# Patient Record
Sex: FEMALE | Race: WHITE | Employment: UNEMPLOYED
[De-identification: names, ages, dates, MRNs, and addresses within clinical notes are randomized per-mention and may not be internally consistent; named-entity substitution may affect disease eponyms.]

---

## 2017-01-17 RX ORDER — ALBUTEROL SULFATE 90 UG/1
2 AEROSOL, METERED RESPIRATORY (INHALATION) 2 TIMES DAILY PRN
Qty: 1 INHALER | Refills: 0 | Status: SHIPPED | OUTPATIENT
Start: 2017-01-17 | End: 2017-02-16 | Stop reason: SDUPTHER

## 2017-02-10 ENCOUNTER — TELEPHONE (OUTPATIENT)
Dept: FAMILY MEDICINE CLINIC | Facility: CLINIC | Age: 62
End: 2017-02-10

## 2017-02-16 ENCOUNTER — OFFICE VISIT (OUTPATIENT)
Dept: FAMILY MEDICINE CLINIC | Facility: CLINIC | Age: 62
End: 2017-02-16

## 2017-02-16 VITALS
HEIGHT: 62 IN | SYSTOLIC BLOOD PRESSURE: 138 MMHG | OXYGEN SATURATION: 96 % | DIASTOLIC BLOOD PRESSURE: 70 MMHG | BODY MASS INDEX: 30 KG/M2 | WEIGHT: 163 LBS | HEART RATE: 70 BPM | TEMPERATURE: 98 F

## 2017-02-16 DIAGNOSIS — J06.9 UPPER RESPIRATORY TRACT INFECTION, UNSPECIFIED TYPE: ICD-10-CM

## 2017-02-16 DIAGNOSIS — I10 ESSENTIAL HYPERTENSION: ICD-10-CM

## 2017-02-16 DIAGNOSIS — E11.9 TYPE 2 DIABETES MELLITUS WITHOUT COMPLICATION, WITHOUT LONG-TERM CURRENT USE OF INSULIN (HCC): Primary | ICD-10-CM

## 2017-02-16 LAB — HBA1C MFR BLD: 6.5 %

## 2017-02-16 PROCEDURE — 83036 HEMOGLOBIN GLYCOSYLATED A1C: CPT | Performed by: FAMILY MEDICINE

## 2017-02-16 PROCEDURE — 99214 OFFICE O/P EST MOD 30 MIN: CPT | Performed by: FAMILY MEDICINE

## 2017-02-16 RX ORDER — ALBUTEROL SULFATE 90 UG/1
2 AEROSOL, METERED RESPIRATORY (INHALATION) 2 TIMES DAILY
Qty: 1 INHALER | Refills: 1 | Status: SHIPPED | OUTPATIENT
Start: 2017-02-16 | End: 2018-05-31 | Stop reason: SDUPTHER

## 2017-02-16 RX ORDER — BENZONATATE 200 MG/1
CAPSULE ORAL
COMMUNITY
Start: 2017-02-10 | End: 2017-02-16 | Stop reason: ALTCHOICE

## 2017-02-16 RX ORDER — BUDESONIDE AND FORMOTEROL FUMARATE DIHYDRATE 80; 4.5 UG/1; UG/1
2 AEROSOL RESPIRATORY (INHALATION) 2 TIMES DAILY
Qty: 1 INHALER | Refills: 0 | COMMUNITY
Start: 2017-02-16 | End: 2017-05-23 | Stop reason: ALTCHOICE

## 2017-02-16 ASSESSMENT — ENCOUNTER SYMPTOMS
NAUSEA: 0
WHEEZING: 1
VOICE CHANGE: 1
SORE THROAT: 1
COUGH: 1
SHORTNESS OF BREATH: 0
ABDOMINAL PAIN: 0

## 2017-02-16 ASSESSMENT — PATIENT HEALTH QUESTIONNAIRE - PHQ9
1. LITTLE INTEREST OR PLEASURE IN DOING THINGS: 0
SUM OF ALL RESPONSES TO PHQ9 QUESTIONS 1 & 2: 0
SUM OF ALL RESPONSES TO PHQ QUESTIONS 1-9: 0
2. FEELING DOWN, DEPRESSED OR HOPELESS: 0

## 2017-02-23 ENCOUNTER — OFFICE VISIT (OUTPATIENT)
Dept: FAMILY MEDICINE CLINIC | Facility: CLINIC | Age: 62
End: 2017-02-23

## 2017-02-23 VITALS
BODY MASS INDEX: 29.44 KG/M2 | HEIGHT: 62 IN | OXYGEN SATURATION: 97 % | WEIGHT: 160 LBS | HEART RATE: 75 BPM | DIASTOLIC BLOOD PRESSURE: 68 MMHG | TEMPERATURE: 98.4 F | SYSTOLIC BLOOD PRESSURE: 128 MMHG

## 2017-02-23 DIAGNOSIS — J06.9 UPPER RESPIRATORY TRACT INFECTION, UNSPECIFIED TYPE: Primary | ICD-10-CM

## 2017-02-23 DIAGNOSIS — I10 ESSENTIAL HYPERTENSION: ICD-10-CM

## 2017-02-23 DIAGNOSIS — Z12.11 COLON CANCER SCREENING: ICD-10-CM

## 2017-02-23 DIAGNOSIS — E11.9 TYPE 2 DIABETES MELLITUS WITHOUT COMPLICATION, WITHOUT LONG-TERM CURRENT USE OF INSULIN (HCC): ICD-10-CM

## 2017-02-23 PROCEDURE — 99213 OFFICE O/P EST LOW 20 MIN: CPT | Performed by: FAMILY MEDICINE

## 2017-02-23 ASSESSMENT — PATIENT HEALTH QUESTIONNAIRE - PHQ9
SUM OF ALL RESPONSES TO PHQ9 QUESTIONS 1 & 2: 0
SUM OF ALL RESPONSES TO PHQ QUESTIONS 1-9: 0
1. LITTLE INTEREST OR PLEASURE IN DOING THINGS: 0
1. LITTLE INTEREST OR PLEASURE IN DOING THINGS: 0
SUM OF ALL RESPONSES TO PHQ9 QUESTIONS 1 & 2: 0
2. FEELING DOWN, DEPRESSED OR HOPELESS: 0
2. FEELING DOWN, DEPRESSED OR HOPELESS: 0
SUM OF ALL RESPONSES TO PHQ QUESTIONS 1-9: 0

## 2017-02-23 ASSESSMENT — ENCOUNTER SYMPTOMS
WHEEZING: 0
NAUSEA: 0
SHORTNESS OF BREATH: 0
ABDOMINAL PAIN: 0
SORE THROAT: 0
COUGH: 0

## 2017-03-03 ENCOUNTER — TELEPHONE (OUTPATIENT)
Dept: FAMILY MEDICINE CLINIC | Facility: CLINIC | Age: 62
End: 2017-03-03

## 2017-04-06 RX ORDER — LEVOTHYROXINE SODIUM 0.2 MG/1
TABLET ORAL
Qty: 30 TABLET | Refills: 2 | Status: SHIPPED | OUTPATIENT
Start: 2017-04-06 | End: 2017-07-31 | Stop reason: SDUPTHER

## 2017-04-06 RX ORDER — LOSARTAN POTASSIUM AND HYDROCHLOROTHIAZIDE 25; 100 MG/1; MG/1
TABLET ORAL
Qty: 30 TABLET | Refills: 2 | Status: SHIPPED | OUTPATIENT
Start: 2017-04-06 | End: 2017-07-31 | Stop reason: SDUPTHER

## 2017-04-20 RX ORDER — OMEPRAZOLE 20 MG/1
CAPSULE, DELAYED RELEASE ORAL
Qty: 30 CAPSULE | Refills: 2 | Status: SHIPPED | OUTPATIENT
Start: 2017-04-20 | End: 2017-10-12 | Stop reason: SDUPTHER

## 2017-05-18 RX ORDER — PIOGLITAZONEHYDROCHLORIDE 15 MG/1
TABLET ORAL
Qty: 30 TABLET | Refills: 2 | Status: SHIPPED | OUTPATIENT
Start: 2017-05-18 | End: 2017-09-05 | Stop reason: SDUPTHER

## 2017-05-18 RX ORDER — GLYBURIDE 5 MG/1
TABLET ORAL
Qty: 360 TABLET | Refills: 2 | Status: SHIPPED | OUTPATIENT
Start: 2017-05-18 | End: 2019-02-05 | Stop reason: ALTCHOICE

## 2017-05-23 ENCOUNTER — OFFICE VISIT (OUTPATIENT)
Dept: FAMILY MEDICINE CLINIC | Age: 62
End: 2017-05-23
Payer: COMMERCIAL

## 2017-05-23 VITALS
BODY MASS INDEX: 29.7 KG/M2 | WEIGHT: 161.4 LBS | HEIGHT: 62 IN | HEART RATE: 56 BPM | TEMPERATURE: 97.5 F | DIASTOLIC BLOOD PRESSURE: 64 MMHG | SYSTOLIC BLOOD PRESSURE: 128 MMHG

## 2017-05-23 DIAGNOSIS — E55.9 VITAMIN D DEFICIENCY: ICD-10-CM

## 2017-05-23 DIAGNOSIS — I10 ESSENTIAL HYPERTENSION: ICD-10-CM

## 2017-05-23 DIAGNOSIS — E03.9 HYPOTHYROIDISM, UNSPECIFIED TYPE: ICD-10-CM

## 2017-05-23 DIAGNOSIS — E11.9 TYPE 2 DIABETES MELLITUS WITHOUT COMPLICATION, WITHOUT LONG-TERM CURRENT USE OF INSULIN (HCC): Primary | ICD-10-CM

## 2017-05-23 DIAGNOSIS — E78.5 HYPERLIPIDEMIA, UNSPECIFIED HYPERLIPIDEMIA TYPE: ICD-10-CM

## 2017-05-23 PROCEDURE — 99213 OFFICE O/P EST LOW 20 MIN: CPT | Performed by: FAMILY MEDICINE

## 2017-05-23 RX ORDER — LINAGLIPTIN 5 MG/1
TABLET, FILM COATED ORAL
COMMUNITY
Start: 2017-02-15 | End: 2017-05-23 | Stop reason: ALTCHOICE

## 2017-05-23 ASSESSMENT — PATIENT HEALTH QUESTIONNAIRE - PHQ9
SUM OF ALL RESPONSES TO PHQ QUESTIONS 1-9: 0
SUM OF ALL RESPONSES TO PHQ9 QUESTIONS 1 & 2: 0
1. LITTLE INTEREST OR PLEASURE IN DOING THINGS: 0
2. FEELING DOWN, DEPRESSED OR HOPELESS: 0

## 2017-05-23 ASSESSMENT — ENCOUNTER SYMPTOMS
COUGH: 0
ABDOMINAL PAIN: 0
CONSTIPATION: 0
SHORTNESS OF BREATH: 0
SORE THROAT: 0
EYE ITCHING: 1
NAUSEA: 0

## 2017-07-29 LAB
ALBUMIN SERPL-MCNC: 4.1 G/DL (ref 3.2–5.3)
ALK PHOSPHATASE: 83 IU/L (ref 35–121)
ALT SERPL-CCNC: 10 IU/L (ref 5–59)
ANION GAP SERPL CALCULATED.3IONS-SCNC: 11 MMOL/L
AST SERPL-CCNC: 14 IU/L (ref 10–42)
BILIRUB SERPL-MCNC: 0.8 MG/DL (ref 0.2–1.3)
BUN BLDV-MCNC: 14 MG/DL (ref 10–20)
CALCIUM SERPL-MCNC: 9.5 MG/DL (ref 8.7–10.8)
CHLORIDE BLD-SCNC: 99 MMOL/L (ref 95–111)
CHOLESTEROL/HDL RATIO: 3.6
CHOLESTEROL: 166 MG/DL
CO2: 28 MMOL/L (ref 21–32)
CREAT SERPL-MCNC: 0.7 MG/DL (ref 0.5–1.3)
EGFR AFRICAN AMERICAN: 103
EGFR IF NONAFRICAN AMERICAN: 85
GLUCOSE: 159 MG/DL (ref 70–100)
HDLC SERPL-MCNC: 46 MG/DL (ref 40–60)
LDL CHOLESTEROL CALCULATED: 96 MG/DL
LDL/HDL RATIO: 2.1
POTASSIUM SERPL-SCNC: 4.3 MMOL/L (ref 3.5–5.4)
SODIUM BLD-SCNC: 134 MMOL/L (ref 134–147)
TOTAL PROTEIN: 6.6 G/DL (ref 5.8–8)
TRIGL SERPL-MCNC: 120 MG/DL
TSH SERPL DL<=0.05 MIU/L-ACNC: 3.56 UIU/ML (ref 0.4–4.4)
VLDLC SERPL CALC-MCNC: 24 MG/DL

## 2017-07-31 RX ORDER — LOSARTAN POTASSIUM AND HYDROCHLOROTHIAZIDE 25; 100 MG/1; MG/1
TABLET ORAL
Qty: 30 TABLET | Refills: 2 | Status: SHIPPED | OUTPATIENT
Start: 2017-07-31 | End: 2017-11-16 | Stop reason: SDUPTHER

## 2017-07-31 RX ORDER — LEVOTHYROXINE SODIUM 0.2 MG/1
TABLET ORAL
Qty: 30 TABLET | Refills: 2 | Status: SHIPPED | OUTPATIENT
Start: 2017-07-31 | End: 2017-11-16 | Stop reason: SDUPTHER

## 2017-08-01 LAB — VITAMIN D 25-HYDROXY: 22 NG/ML

## 2017-08-17 RX ORDER — PRAVASTATIN SODIUM 40 MG
TABLET ORAL
Qty: 30 TABLET | Refills: 2 | Status: SHIPPED | OUTPATIENT
Start: 2017-08-17 | End: 2018-10-14 | Stop reason: SDUPTHER

## 2017-09-05 RX ORDER — PIOGLITAZONEHYDROCHLORIDE 15 MG/1
TABLET ORAL
Qty: 30 TABLET | Refills: 2 | Status: SHIPPED | OUTPATIENT
Start: 2017-09-05 | End: 2017-12-21 | Stop reason: SDUPTHER

## 2017-09-25 ENCOUNTER — OFFICE VISIT (OUTPATIENT)
Dept: FAMILY MEDICINE CLINIC | Age: 62
End: 2017-09-25
Payer: COMMERCIAL

## 2017-09-25 VITALS
SYSTOLIC BLOOD PRESSURE: 148 MMHG | TEMPERATURE: 98.2 F | WEIGHT: 165.4 LBS | DIASTOLIC BLOOD PRESSURE: 74 MMHG | HEART RATE: 72 BPM | OXYGEN SATURATION: 97 % | HEIGHT: 62 IN | BODY MASS INDEX: 30.44 KG/M2

## 2017-09-25 DIAGNOSIS — E78.5 HYPERLIPIDEMIA, UNSPECIFIED HYPERLIPIDEMIA TYPE: ICD-10-CM

## 2017-09-25 DIAGNOSIS — I10 ESSENTIAL HYPERTENSION: ICD-10-CM

## 2017-09-25 DIAGNOSIS — E11.9 TYPE 2 DIABETES MELLITUS WITHOUT COMPLICATION, WITHOUT LONG-TERM CURRENT USE OF INSULIN (HCC): Primary | ICD-10-CM

## 2017-09-25 DIAGNOSIS — Z23 NEED FOR PNEUMOCOCCAL VACCINATION: ICD-10-CM

## 2017-09-25 DIAGNOSIS — Z12.31 SCREENING MAMMOGRAM, ENCOUNTER FOR: ICD-10-CM

## 2017-09-25 LAB — HBA1C MFR BLD: 6.9 %

## 2017-09-25 PROCEDURE — 99214 OFFICE O/P EST MOD 30 MIN: CPT | Performed by: FAMILY MEDICINE

## 2017-09-25 PROCEDURE — 83036 HEMOGLOBIN GLYCOSYLATED A1C: CPT | Performed by: FAMILY MEDICINE

## 2017-09-25 PROCEDURE — 90732 PPSV23 VACC 2 YRS+ SUBQ/IM: CPT | Performed by: FAMILY MEDICINE

## 2017-09-25 PROCEDURE — 90471 IMMUNIZATION ADMIN: CPT | Performed by: FAMILY MEDICINE

## 2017-09-25 ASSESSMENT — ENCOUNTER SYMPTOMS
SHORTNESS OF BREATH: 0
SORE THROAT: 0
ABDOMINAL PAIN: 0
CONSTIPATION: 0
NAUSEA: 0

## 2017-10-09 ENCOUNTER — HOSPITAL ENCOUNTER (OUTPATIENT)
Dept: WOMENS IMAGING | Age: 62
Discharge: HOME | End: 2017-10-09
Payer: COMMERCIAL

## 2017-10-09 DIAGNOSIS — Z12.31 SCREENING MAMMOGRAM, ENCOUNTER FOR: ICD-10-CM

## 2017-10-09 PROCEDURE — G0202 SCR MAMMO BI INCL CAD: HCPCS

## 2017-10-12 RX ORDER — OMEPRAZOLE 20 MG/1
CAPSULE, DELAYED RELEASE ORAL
Qty: 90 CAPSULE | Refills: 1 | Status: SHIPPED | OUTPATIENT
Start: 2017-10-12 | End: 2018-06-25 | Stop reason: SDUPTHER

## 2017-11-16 RX ORDER — LEVOTHYROXINE SODIUM 0.2 MG/1
TABLET ORAL
Qty: 90 TABLET | Refills: 1 | Status: SHIPPED | OUTPATIENT
Start: 2017-11-16 | End: 2018-05-31 | Stop reason: SDUPTHER

## 2017-11-16 RX ORDER — LOSARTAN POTASSIUM AND HYDROCHLOROTHIAZIDE 25; 100 MG/1; MG/1
TABLET ORAL
Qty: 90 TABLET | Refills: 1 | Status: SHIPPED | OUTPATIENT
Start: 2017-11-16 | End: 2018-06-20 | Stop reason: SDUPTHER

## 2017-12-21 RX ORDER — PIOGLITAZONEHYDROCHLORIDE 15 MG/1
TABLET ORAL
Qty: 90 TABLET | Refills: 0 | Status: SHIPPED | OUTPATIENT
Start: 2017-12-21 | End: 2018-10-14 | Stop reason: SDUPTHER

## 2018-01-26 ENCOUNTER — HOSPITAL ENCOUNTER (OUTPATIENT)
Age: 63
Setting detail: SPECIMEN
Discharge: HOME | End: 2018-01-26
Payer: COMMERCIAL

## 2018-01-26 ENCOUNTER — OFFICE VISIT (OUTPATIENT)
Dept: FAMILY MEDICINE CLINIC | Age: 63
End: 2018-01-26
Payer: COMMERCIAL

## 2018-01-26 VITALS
DIASTOLIC BLOOD PRESSURE: 86 MMHG | WEIGHT: 166.4 LBS | OXYGEN SATURATION: 95 % | SYSTOLIC BLOOD PRESSURE: 136 MMHG | BODY MASS INDEX: 30.43 KG/M2 | HEART RATE: 70 BPM | TEMPERATURE: 98.1 F

## 2018-01-26 DIAGNOSIS — I10 ESSENTIAL HYPERTENSION: ICD-10-CM

## 2018-01-26 DIAGNOSIS — E11.9 TYPE 2 DIABETES MELLITUS WITHOUT COMPLICATION, WITHOUT LONG-TERM CURRENT USE OF INSULIN (HCC): ICD-10-CM

## 2018-01-26 DIAGNOSIS — E78.5 HYPERLIPIDEMIA, UNSPECIFIED HYPERLIPIDEMIA TYPE: ICD-10-CM

## 2018-01-26 DIAGNOSIS — E03.9 HYPOTHYROIDISM, UNSPECIFIED TYPE: ICD-10-CM

## 2018-01-26 DIAGNOSIS — E11.9 TYPE 2 DIABETES MELLITUS WITHOUT COMPLICATION, WITHOUT LONG-TERM CURRENT USE OF INSULIN (HCC): Primary | ICD-10-CM

## 2018-01-26 LAB
CREATININE URINE: 109.6 MG/DL (ref 28–217)
MICROALBUMIN/CREAT 24H UR: 61 MG/L
MICROALBUMIN/CREAT UR-RTO: 56 MCG/MG CREAT

## 2018-01-26 PROCEDURE — 99213 OFFICE O/P EST LOW 20 MIN: CPT | Performed by: FAMILY MEDICINE

## 2018-01-26 PROCEDURE — 3014F SCREEN MAMMO DOC REV: CPT | Performed by: FAMILY MEDICINE

## 2018-01-26 PROCEDURE — G8427 DOCREV CUR MEDS BY ELIG CLIN: HCPCS | Performed by: FAMILY MEDICINE

## 2018-01-26 PROCEDURE — G8417 CALC BMI ABV UP PARAM F/U: HCPCS | Performed by: FAMILY MEDICINE

## 2018-01-26 PROCEDURE — G8484 FLU IMMUNIZE NO ADMIN: HCPCS | Performed by: FAMILY MEDICINE

## 2018-01-26 PROCEDURE — 4004F PT TOBACCO SCREEN RCVD TLK: CPT | Performed by: FAMILY MEDICINE

## 2018-01-26 PROCEDURE — 3046F HEMOGLOBIN A1C LEVEL >9.0%: CPT | Performed by: FAMILY MEDICINE

## 2018-01-26 PROCEDURE — 3017F COLORECTAL CA SCREEN DOC REV: CPT | Performed by: FAMILY MEDICINE

## 2018-01-26 ASSESSMENT — ENCOUNTER SYMPTOMS
VOICE CHANGE: 1
ABDOMINAL PAIN: 0
NAUSEA: 0
SHORTNESS OF BREATH: 0
SORE THROAT: 0

## 2018-01-26 NOTE — PROGRESS NOTES
Subjective:      Patient ID: Vj Mcdonald is a 60 y.o. female. Diabetes  Visit Information    Have you changed or started any medications since your last visit including any over-the-counter medicines, vitamins, or herbal medicines? no   Have you stopped taking any of your medications? Is so, why? -  no  Are you having any side effects from any of your medications? - no    Have you seen any other physician or provider since your last visit?  no   Have you had any other diagnostic tests since your last visit?  no   Have you been seen in the emergency room and/or had an admission in a hospital since we last saw you?  no   Have you had your routine dental cleaning in the past 6 months? No- pt has dentures     Do you have an active MyChart account? If no, what is the barrier?   Yes    Patient Care Team:  Mitchell Flor MD as PCP - General (Family Medicine)  Mitchell Flor MD as PCP - S Attributed Provider  Paige Romero DO as Consulting Physician (Obstetrics & Gynecology)    Medical History Review  Past Medical, Family, and Social History reviewed and does contribute to the patient presenting condition    Health Maintenance   Topic Date Due    Hepatitis C screen  1955    Diabetic retinal exam  10/29/1964    HIV screen  10/29/1969    DTaP/Tdap/Td vaccine (1 - Tdap) 10/29/1973    Colon cancer screen colonoscopy  10/29/2004    Zostavax vaccine  10/29/2014    Cervical cancer screen  07/18/2017    Diabetic microalbuminuria test  08/05/2017    Flu vaccine (1) 09/01/2017    Diabetic foot exam  05/23/2018    Lipid screen  07/28/2018    TSH testing  07/28/2018    Potassium monitoring  07/28/2018    Creatinine monitoring  07/28/2018    A1C test (Diabetic or Prediabetic)  09/25/2018    Breast cancer screen  10/09/2018    Pneumococcal med risk  Completed             HPI  This 78-year-old female is seen in the office today for follow-up for her diabetes hypertension and hyperlipidemia blood sugars are running between 120-140 and when she doesn't watch her diet it goes up to 160. Blood pressure is controlled is on Norvasc  Tenormin and losartan denies of any chest pain or shortness of  breath. Denies of any numbness or tingling in her  feet tries to watch her diet as much as possible is on pravastatin for her  hyperlipidemia. Patient still hasn't seen the ENT for he hoarseness  again told her she needs to go since she is a smoker and she has had hoarseness for long time has some shoulder pain but she does a lot of lifting at work  Review of Systems   Constitutional: Negative for appetite change. HENT: Positive for voice change. Negative for ear pain and sore throat. Eyes: Negative for visual disturbance. Respiratory: Negative for shortness of breath. Cardiovascular: Negative for chest pain. Gastrointestinal: Negative for abdominal pain and nausea. Genitourinary: Negative for frequency, pelvic pain and vaginal discharge. Musculoskeletal: Negative for arthralgias. Neurological: Negative for dizziness, syncope and headaches. Objective:   Physical Exam   Constitutional: She is oriented to person, place, and time. She appears well-developed and well-nourished. /86 (Site: Left Arm, Position: Sitting, Cuff Size: Small Adult)   Pulse 70   Temp 98.1 °F (36.7 °C) (Oral)   Wt 166 lb 6.4 oz (75.5 kg)   SpO2 95%   BMI 30.43 kg/m²    HENT:   Head: Normocephalic. Mouth/Throat: Oropharynx is clear and moist.        Eyes: Conjunctivae are normal.   Cardiovascular: Normal rate and regular rhythm. Pulmonary/Chest: Breath sounds normal. She has no rales. Abdominal: Soft. Bowel sounds are normal. There is no tenderness. Musculoskeletal: She exhibits no edema. Lymphadenopathy:     She has no cervical adenopathy. Neurological: She is alert and oriented to person, place, and time. Nursing note and vitals reviewed. Assessment:      1.  Type 2

## 2018-04-12 ENCOUNTER — TELEPHONE (OUTPATIENT)
Dept: FAMILY MEDICINE CLINIC | Age: 63
End: 2018-04-12

## 2018-05-26 LAB
ALBUMIN SERPL-MCNC: 4.2 G/DL (ref 3.5–5.2)
ALK PHOSPHATASE: 82 U/L (ref 30–134)
ALT SERPL-CCNC: 11 U/L (ref 5–40)
ANION GAP SERPL CALCULATED.3IONS-SCNC: 12 MEQ/L (ref 10–19)
AST SERPL-CCNC: 15 U/L (ref 9–40)
BILIRUB SERPL-MCNC: 0.7 MG/DL
BUN BLDV-MCNC: 12 MG/DL (ref 8–23)
CALCIUM SERPL-MCNC: 9.3 MG/DL (ref 8.5–10.5)
CHLORIDE BLD-SCNC: 95 MEQ/L (ref 95–107)
CHOLESTEROL/HDL RATIO: 3.6
CHOLESTEROL: 158 MG/DL
CO2: 29 MEQ/L (ref 19–31)
CREAT SERPL-MCNC: 0.7 MG/DL (ref 0.6–1.3)
EGFR AFRICAN AMERICAN: 106.9 ML/MIN/1.73 M2
EGFR IF NONAFRICAN AMERICAN: 92.2 ML/MIN/1.73 M2
GLUCOSE: 182 MG/DL (ref 70–99)
HDLC SERPL-MCNC: 44 MG/DL
LDL CHOLESTEROL CALCULATED: 92 MG/DL
LDL/HDL RATIO: 2.1
POTASSIUM SERPL-SCNC: 4.5 MEQ/L (ref 3.5–5.4)
SODIUM BLD-SCNC: 136 MEQ/L (ref 135–146)
TOTAL PROTEIN: 7.1 G/DL (ref 6.1–8.3)
TRIGL SERPL-MCNC: 111 MG/DL
TSH SERPL DL<=0.05 MIU/L-ACNC: 1.74 UIU/ML (ref 0.4–4.1)
VLDLC SERPL CALC-MCNC: 22 MG/DL

## 2018-05-31 ENCOUNTER — OFFICE VISIT (OUTPATIENT)
Dept: FAMILY MEDICINE CLINIC | Age: 63
End: 2018-05-31
Payer: COMMERCIAL

## 2018-05-31 VITALS
OXYGEN SATURATION: 98 % | WEIGHT: 166 LBS | HEART RATE: 69 BPM | SYSTOLIC BLOOD PRESSURE: 130 MMHG | BODY MASS INDEX: 30.36 KG/M2 | DIASTOLIC BLOOD PRESSURE: 70 MMHG | RESPIRATION RATE: 20 BRPM | TEMPERATURE: 98 F

## 2018-05-31 DIAGNOSIS — I10 ESSENTIAL HYPERTENSION: ICD-10-CM

## 2018-05-31 DIAGNOSIS — E03.9 HYPOTHYROIDISM, UNSPECIFIED TYPE: ICD-10-CM

## 2018-05-31 DIAGNOSIS — E11.9 TYPE 2 DIABETES MELLITUS WITHOUT COMPLICATION, WITHOUT LONG-TERM CURRENT USE OF INSULIN (HCC): Primary | ICD-10-CM

## 2018-05-31 LAB — HBA1C MFR BLD: 7.1 %

## 2018-05-31 PROCEDURE — 3017F COLORECTAL CA SCREEN DOC REV: CPT | Performed by: FAMILY MEDICINE

## 2018-05-31 PROCEDURE — 83036 HEMOGLOBIN GLYCOSYLATED A1C: CPT | Performed by: FAMILY MEDICINE

## 2018-05-31 PROCEDURE — G8427 DOCREV CUR MEDS BY ELIG CLIN: HCPCS | Performed by: FAMILY MEDICINE

## 2018-05-31 PROCEDURE — 99214 OFFICE O/P EST MOD 30 MIN: CPT | Performed by: FAMILY MEDICINE

## 2018-05-31 PROCEDURE — G8417 CALC BMI ABV UP PARAM F/U: HCPCS | Performed by: FAMILY MEDICINE

## 2018-05-31 PROCEDURE — 2022F DILAT RTA XM EVC RTNOPTHY: CPT | Performed by: FAMILY MEDICINE

## 2018-05-31 PROCEDURE — 4004F PT TOBACCO SCREEN RCVD TLK: CPT | Performed by: FAMILY MEDICINE

## 2018-05-31 PROCEDURE — 3045F PR MOST RECENT HEMOGLOBIN A1C LEVEL 7.0-9.0%: CPT | Performed by: FAMILY MEDICINE

## 2018-05-31 RX ORDER — ALBUTEROL SULFATE 90 UG/1
2 AEROSOL, METERED RESPIRATORY (INHALATION) 2 TIMES DAILY PRN
Qty: 1 INHALER | Refills: 1 | Status: SHIPPED | OUTPATIENT
Start: 2018-05-31 | End: 2019-02-12 | Stop reason: SDUPTHER

## 2018-05-31 RX ORDER — LEVOTHYROXINE SODIUM 0.2 MG/1
TABLET ORAL
Qty: 90 TABLET | Refills: 1 | Status: SHIPPED | OUTPATIENT
Start: 2018-05-31 | End: 2019-01-28 | Stop reason: SDUPTHER

## 2018-05-31 ASSESSMENT — PATIENT HEALTH QUESTIONNAIRE - PHQ9
1. LITTLE INTEREST OR PLEASURE IN DOING THINGS: 0
SUM OF ALL RESPONSES TO PHQ QUESTIONS 1-9: 0
2. FEELING DOWN, DEPRESSED OR HOPELESS: 0
SUM OF ALL RESPONSES TO PHQ9 QUESTIONS 1 & 2: 0

## 2018-05-31 ASSESSMENT — ENCOUNTER SYMPTOMS
ABDOMINAL PAIN: 0
NAUSEA: 0
SORE THROAT: 0
SHORTNESS OF BREATH: 0

## 2018-06-25 RX ORDER — OMEPRAZOLE 20 MG/1
CAPSULE, DELAYED RELEASE ORAL
Qty: 90 CAPSULE | Refills: 0 | Status: SHIPPED | OUTPATIENT
Start: 2018-06-25 | End: 2019-02-09 | Stop reason: SDUPTHER

## 2018-06-28 RX ORDER — ATENOLOL 25 MG/1
25 TABLET ORAL DAILY
Qty: 30 TABLET | Refills: 1 | Status: SHIPPED | OUTPATIENT
Start: 2018-06-28 | End: 2019-02-08 | Stop reason: SINTOL

## 2018-06-28 RX ORDER — AMLODIPINE BESYLATE 10 MG/1
TABLET ORAL
Qty: 30 TABLET | Refills: 1 | Status: SHIPPED | OUTPATIENT
Start: 2018-06-28 | End: 2018-10-13 | Stop reason: SDUPTHER

## 2018-09-24 ENCOUNTER — OFFICE VISIT (OUTPATIENT)
Dept: FAMILY MEDICINE CLINIC | Age: 63
End: 2018-09-24
Payer: COMMERCIAL

## 2018-09-24 VITALS
DIASTOLIC BLOOD PRESSURE: 84 MMHG | HEIGHT: 62 IN | WEIGHT: 170 LBS | BODY MASS INDEX: 31.28 KG/M2 | HEART RATE: 78 BPM | SYSTOLIC BLOOD PRESSURE: 150 MMHG | TEMPERATURE: 97.5 F

## 2018-09-24 DIAGNOSIS — R06.02 SOB (SHORTNESS OF BREATH): ICD-10-CM

## 2018-09-24 DIAGNOSIS — E11.9 TYPE 2 DIABETES MELLITUS WITHOUT COMPLICATION, WITHOUT LONG-TERM CURRENT USE OF INSULIN (HCC): Primary | ICD-10-CM

## 2018-09-24 DIAGNOSIS — Z23 NEED FOR PROPHYLACTIC VACCINATION AND INOCULATION AGAINST INFLUENZA: ICD-10-CM

## 2018-09-24 DIAGNOSIS — I10 ESSENTIAL HYPERTENSION: ICD-10-CM

## 2018-09-24 LAB — HBA1C MFR BLD: 6.9 %

## 2018-09-24 PROCEDURE — 3017F COLORECTAL CA SCREEN DOC REV: CPT | Performed by: FAMILY MEDICINE

## 2018-09-24 PROCEDURE — 90686 IIV4 VACC NO PRSV 0.5 ML IM: CPT | Performed by: FAMILY MEDICINE

## 2018-09-24 PROCEDURE — 99214 OFFICE O/P EST MOD 30 MIN: CPT | Performed by: FAMILY MEDICINE

## 2018-09-24 PROCEDURE — 2022F DILAT RTA XM EVC RTNOPTHY: CPT | Performed by: FAMILY MEDICINE

## 2018-09-24 PROCEDURE — 3044F HG A1C LEVEL LT 7.0%: CPT | Performed by: FAMILY MEDICINE

## 2018-09-24 PROCEDURE — 4004F PT TOBACCO SCREEN RCVD TLK: CPT | Performed by: FAMILY MEDICINE

## 2018-09-24 PROCEDURE — 83036 HEMOGLOBIN GLYCOSYLATED A1C: CPT | Performed by: FAMILY MEDICINE

## 2018-09-24 PROCEDURE — G8417 CALC BMI ABV UP PARAM F/U: HCPCS | Performed by: FAMILY MEDICINE

## 2018-09-24 PROCEDURE — G8427 DOCREV CUR MEDS BY ELIG CLIN: HCPCS | Performed by: FAMILY MEDICINE

## 2018-09-24 PROCEDURE — 90471 IMMUNIZATION ADMIN: CPT | Performed by: FAMILY MEDICINE

## 2018-09-24 ASSESSMENT — ENCOUNTER SYMPTOMS
SHORTNESS OF BREATH: 1
CONSTIPATION: 0
ABDOMINAL PAIN: 0
NAUSEA: 0
WHEEZING: 0
COUGH: 0
SORE THROAT: 0

## 2018-09-24 NOTE — PROGRESS NOTES
Subjective:      Patient ID: Mariajose Ho is a 60 y.o. female. C/o diabetes and need for A1C. Chronic Disease Visit Information    BP Readings from Last 3 Encounters:   09/24/18 (!) 178/90   05/31/18 130/70   01/26/18 136/86          Hemoglobin A1C (%)   Date Value   05/31/2018 7.1 (A)   09/25/2017 6.9   02/16/2017 6.5     Microalb/Crt. Ratio (mcg/mg creat)   Date Value   01/26/2018 56 (H)     LDL Calculated (mg/dL)   Date Value   05/25/2018 92     HDL (mg/dL)   Date Value   05/25/2018 44     BUN (mg/dL)   Date Value   05/25/2018 12     CREATININE (mg/dL)   Date Value   05/25/2018 0.7     Glucose (mg/dL)   Date Value   05/25/2018 182 (H)            Have you changed or started any medications since your last visit including any over-the-counter medicines, vitamins, or herbal medicines? no   Are you having any side effects from any of your medications? -  no  Have you stopped taking any of your medications? Is so, why? -  no    Have you seen any other physician or provider since your last visit? No  Have you had any other diagnostic tests since your last visit? No  Have you been seen in the emergency room and/or had an admission to a hospital since we last saw you? No  Have you had your annual diabetic retinal (eye) exam? No  Have you had your routine dental cleaning in the past 6 months? no    Have you activated your [employer] account? If not, what are your barriers?  Yes     Patient Care Team:  Anamaria Naqvi MD as PCP - General (Family Medicine)  Mago Ramírez DO as Consulting Physician (Obstetrics & Gynecology)         Medical History Review      Health Maintenance   Topic Date Due    Hepatitis C screen  1955    Diabetic retinal exam  10/29/1964    HIV screen  10/29/1969    DTaP/Tdap/Td vaccine (1 - Tdap) 10/29/1973    Shingles Vaccine (1 of 2 - 2 Dose Series) 10/29/2004    Colon cancer screen colonoscopy  10/29/2004    Cervical cancer screen  07/18/2017    Diabetic foot exam  05/23/2018    Flu vaccine (1) 09/01/2018    Breast cancer screen  10/09/2018    Diabetic microalbuminuria test  01/26/2019    Lipid screen  05/25/2019    TSH testing  05/25/2019    Potassium monitoring  05/25/2019    Creatinine monitoring  05/25/2019    A1C test (Diabetic or Prediabetic)  05/31/2019    Pneumococcal med risk  Completed       HPI  89year-old female is seen in the office today for follow-up for her diabetes and hypertension she has not been checking her blood sugar she states her glucometer broke her hemoglobin A1c is good she is on Actos and glyburide . Has hypertension blood pressure is elevated she is on Norvasc  Tenormin and Hyzaar denies of any chest pain  short of breath on exertion is  smoker, denies of any numbness or tingling in her feet, She also drinks a beer quite often and I told her she needs to quit drinking  will order PFT  Review of Systems   Constitutional: Negative for appetite change. HENT: Negative for ear pain and sore throat. Eyes: Negative for visual disturbance. Respiratory: Positive for shortness of breath. Negative for cough and wheezing. Cardiovascular: Negative for chest pain. Gastrointestinal: Negative for abdominal pain, constipation and nausea. Genitourinary: Negative for frequency. Musculoskeletal: Negative for arthralgias. Neurological: Negative for dizziness and headaches. Objective:   Physical Exam   Constitutional: She is oriented to person, place, and time. She appears well-developed and well-nourished. BP (!) 150/84 (Site: Right Upper Arm, Position: Sitting, Cuff Size: Large Adult)   Pulse 78   Temp 97.5 °F (36.4 °C) (Oral)   Ht 5' 2\" (1.575 m)   Wt 170 lb (77.1 kg)   BMI 31.09 kg/m²    HENT:   Head: Normocephalic. Mouth/Throat: Oropharynx is clear and moist.        Cardiovascular: Normal rate and regular rhythm. Pulmonary/Chest: Breath sounds normal. She has no wheezes. She has no rales. Abdominal: Soft.

## 2018-10-09 RX ORDER — LOSARTAN POTASSIUM AND HYDROCHLOROTHIAZIDE 25; 100 MG/1; MG/1
TABLET ORAL
Qty: 30 TABLET | Refills: 2 | Status: SHIPPED | OUTPATIENT
Start: 2018-10-09 | End: 2019-01-15 | Stop reason: SDUPTHER

## 2018-10-11 ENCOUNTER — HOSPITAL ENCOUNTER (OUTPATIENT)
Dept: PULMONOLOGY | Age: 63
Discharge: HOME | End: 2018-10-11
Payer: COMMERCIAL

## 2018-10-11 DIAGNOSIS — R06.02 SOB (SHORTNESS OF BREATH): ICD-10-CM

## 2018-10-11 PROCEDURE — 94726 PLETHYSMOGRAPHY LUNG VOLUMES: CPT

## 2018-10-11 PROCEDURE — 94727 GAS DIL/WSHOT DETER LNG VOL: CPT

## 2018-10-11 PROCEDURE — 94060 EVALUATION OF WHEEZING: CPT

## 2018-10-11 PROCEDURE — 94728 AIRWY RESIST BY OSCILLOMETRY: CPT

## 2018-10-11 PROCEDURE — 94729 DIFFUSING CAPACITY: CPT

## 2018-10-11 PROCEDURE — 94664 DEMO&/EVAL PT USE INHALER: CPT

## 2018-10-11 PROCEDURE — 6360000002 HC RX W HCPCS: Performed by: INTERNAL MEDICINE

## 2018-10-11 RX ORDER — ALBUTEROL SULFATE 2.5 MG/3ML
2.5 SOLUTION RESPIRATORY (INHALATION) ONCE
Status: COMPLETED | OUTPATIENT
Start: 2018-10-11 | End: 2018-10-11

## 2018-10-11 RX ADMIN — ALBUTEROL SULFATE 2.5 MG: 2.5 SOLUTION RESPIRATORY (INHALATION) at 13:18

## 2018-10-15 RX ORDER — PRAVASTATIN SODIUM 40 MG
TABLET ORAL
Qty: 90 TABLET | Refills: 1 | Status: SHIPPED | OUTPATIENT
Start: 2018-10-15 | End: 2019-02-12 | Stop reason: SDUPTHER

## 2018-10-15 RX ORDER — PIOGLITAZONEHYDROCHLORIDE 15 MG/1
TABLET ORAL
Qty: 90 TABLET | Refills: 1 | Status: SHIPPED | OUTPATIENT
Start: 2018-10-15 | End: 2019-02-05 | Stop reason: ALTCHOICE

## 2018-10-15 RX ORDER — AMLODIPINE BESYLATE 10 MG/1
TABLET ORAL
Qty: 90 TABLET | Refills: 1 | Status: SHIPPED | OUTPATIENT
Start: 2018-10-15 | End: 2020-07-02 | Stop reason: SDUPTHER

## 2018-11-20 RX ORDER — GLIPIZIDE 10 MG/1
10 TABLET ORAL 2 TIMES DAILY
Qty: 180 TABLET | Refills: 0 | Status: SHIPPED | OUTPATIENT
Start: 2018-11-20 | End: 2019-03-08 | Stop reason: SDUPTHER

## 2018-11-20 RX ORDER — GLYBURIDE 5 MG/1
TABLET ORAL
Qty: 480 TABLET | Refills: 0 | OUTPATIENT
Start: 2018-11-20

## 2019-01-15 RX ORDER — LOSARTAN POTASSIUM AND HYDROCHLOROTHIAZIDE 25; 100 MG/1; MG/1
TABLET ORAL
Qty: 30 TABLET | Refills: 2 | Status: SHIPPED | OUTPATIENT
Start: 2019-01-15 | End: 2019-04-27 | Stop reason: SDUPTHER

## 2019-01-28 RX ORDER — LEVOTHYROXINE SODIUM 0.2 MG/1
TABLET ORAL
Qty: 90 TABLET | Refills: 1 | Status: SHIPPED | OUTPATIENT
Start: 2019-01-28 | End: 2019-07-25 | Stop reason: SDUPTHER

## 2019-02-05 ENCOUNTER — OFFICE VISIT (OUTPATIENT)
Dept: FAMILY MEDICINE CLINIC | Age: 64
End: 2019-02-05
Payer: COMMERCIAL

## 2019-02-05 ENCOUNTER — HOSPITAL ENCOUNTER (OUTPATIENT)
Age: 64
Setting detail: SPECIMEN
Discharge: HOME | End: 2019-02-05
Payer: COMMERCIAL

## 2019-02-05 VITALS
BODY MASS INDEX: 31.25 KG/M2 | HEART RATE: 66 BPM | OXYGEN SATURATION: 95 % | HEIGHT: 62 IN | SYSTOLIC BLOOD PRESSURE: 138 MMHG | DIASTOLIC BLOOD PRESSURE: 88 MMHG | WEIGHT: 169.8 LBS | TEMPERATURE: 98.7 F

## 2019-02-05 DIAGNOSIS — E11.9 TYPE 2 DIABETES MELLITUS WITHOUT COMPLICATION, WITHOUT LONG-TERM CURRENT USE OF INSULIN (HCC): ICD-10-CM

## 2019-02-05 DIAGNOSIS — E03.9 HYPOTHYROIDISM, UNSPECIFIED TYPE: ICD-10-CM

## 2019-02-05 DIAGNOSIS — I10 ESSENTIAL HYPERTENSION: ICD-10-CM

## 2019-02-05 DIAGNOSIS — E11.9 TYPE 2 DIABETES MELLITUS WITHOUT COMPLICATION, WITHOUT LONG-TERM CURRENT USE OF INSULIN (HCC): Primary | ICD-10-CM

## 2019-02-05 DIAGNOSIS — E78.5 HYPERLIPIDEMIA, UNSPECIFIED HYPERLIPIDEMIA TYPE: ICD-10-CM

## 2019-02-05 LAB
CREATININE URINE: 122.7 MG/DL (ref 28–217)
HBA1C MFR BLD: 7 %
MICROALBUMIN/CREAT 24H UR: 71 MG/L
MICROALBUMIN/CREAT UR-RTO: 58 MCG/MG CREAT

## 2019-02-05 PROCEDURE — 2022F DILAT RTA XM EVC RTNOPTHY: CPT | Performed by: FAMILY MEDICINE

## 2019-02-05 PROCEDURE — 4004F PT TOBACCO SCREEN RCVD TLK: CPT | Performed by: FAMILY MEDICINE

## 2019-02-05 PROCEDURE — 3045F PR MOST RECENT HEMOGLOBIN A1C LEVEL 7.0-9.0%: CPT | Performed by: FAMILY MEDICINE

## 2019-02-05 PROCEDURE — 83036 HEMOGLOBIN GLYCOSYLATED A1C: CPT | Performed by: FAMILY MEDICINE

## 2019-02-05 PROCEDURE — G8482 FLU IMMUNIZE ORDER/ADMIN: HCPCS | Performed by: FAMILY MEDICINE

## 2019-02-05 PROCEDURE — 3017F COLORECTAL CA SCREEN DOC REV: CPT | Performed by: FAMILY MEDICINE

## 2019-02-05 PROCEDURE — 99214 OFFICE O/P EST MOD 30 MIN: CPT | Performed by: FAMILY MEDICINE

## 2019-02-05 PROCEDURE — G8427 DOCREV CUR MEDS BY ELIG CLIN: HCPCS | Performed by: FAMILY MEDICINE

## 2019-02-05 PROCEDURE — G8417 CALC BMI ABV UP PARAM F/U: HCPCS | Performed by: FAMILY MEDICINE

## 2019-02-05 ASSESSMENT — ENCOUNTER SYMPTOMS
CONSTIPATION: 0
SHORTNESS OF BREATH: 1
SORE THROAT: 0
NAUSEA: 0
ABDOMINAL PAIN: 0

## 2019-02-06 ENCOUNTER — TELEPHONE (OUTPATIENT)
Dept: FAMILY MEDICINE CLINIC | Age: 64
End: 2019-02-06

## 2019-02-08 ENCOUNTER — HOSPITAL ENCOUNTER (EMERGENCY)
Age: 64
Discharge: HOME | End: 2019-02-08
Attending: EMERGENCY MEDICINE
Payer: COMMERCIAL

## 2019-02-08 ENCOUNTER — HOSPITAL ENCOUNTER (OUTPATIENT)
Age: 64
Discharge: HOME | End: 2019-02-08
Payer: COMMERCIAL

## 2019-02-08 ENCOUNTER — APPOINTMENT (OUTPATIENT)
Dept: GENERAL RADIOLOGY | Age: 64
End: 2019-02-08
Payer: COMMERCIAL

## 2019-02-08 VITALS
OXYGEN SATURATION: 97 % | DIASTOLIC BLOOD PRESSURE: 49 MMHG | TEMPERATURE: 97.9 F | HEART RATE: 58 BPM | SYSTOLIC BLOOD PRESSURE: 105 MMHG | RESPIRATION RATE: 15 BRPM | BODY MASS INDEX: 31.28 KG/M2 | WEIGHT: 170 LBS | HEIGHT: 62 IN

## 2019-02-08 DIAGNOSIS — I44.1 MOBITZ TYPE 1 SECOND DEGREE ATRIOVENTRICULAR BLOCK: Primary | ICD-10-CM

## 2019-02-08 LAB
ABSOLUTE EOS #: 0 K/UL (ref 0–0.4)
ABSOLUTE IMMATURE GRANULOCYTE: NORMAL K/UL (ref 0–0.3)
ABSOLUTE LYMPH #: 1.8 K/UL (ref 1–4.8)
ABSOLUTE MONO #: 0.5 K/UL (ref 0.1–1.3)
ALBUMIN SERPL-MCNC: 4.1 G/DL (ref 3.5–5.2)
ALBUMIN/GLOBULIN RATIO: ABNORMAL (ref 1–2.5)
ALP BLD-CCNC: 93 U/L (ref 35–104)
ALT SERPL-CCNC: 11 U/L (ref 5–33)
ANION GAP SERPL CALCULATED.3IONS-SCNC: 10 MMOL/L (ref 9–17)
AST SERPL-CCNC: 13 U/L
BASOPHILS # BLD: 1 % (ref 0–2)
BASOPHILS ABSOLUTE: 0.1 K/UL (ref 0–0.2)
BILIRUB SERPL-MCNC: 0.57 MG/DL (ref 0.3–1.2)
BUN BLDV-MCNC: 12 MG/DL (ref 8–23)
BUN/CREAT BLD: ABNORMAL (ref 9–20)
CALCIUM SERPL-MCNC: 9.4 MG/DL (ref 8.6–10.4)
CHLORIDE BLD-SCNC: 96 MMOL/L (ref 98–107)
CO2: 29 MMOL/L (ref 20–31)
CREAT SERPL-MCNC: 0.51 MG/DL (ref 0.5–0.9)
DIFFERENTIAL TYPE: NORMAL
EKG ATRIAL RATE: 72 BPM
EKG P AXIS: 67 DEGREES
EKG Q-T INTERVAL: 412 MS
EKG QRS DURATION: 78 MS
EKG QTC CALCULATION (BAZETT): 412 MS
EKG R AXIS: -62 DEGREES
EKG T AXIS: 48 DEGREES
EKG VENTRICULAR RATE: 60 BPM
EOSINOPHILS RELATIVE PERCENT: 0 % (ref 0–4)
GFR AFRICAN AMERICAN: >60 ML/MIN
GFR NON-AFRICAN AMERICAN: >60 ML/MIN
GFR SERPL CREATININE-BSD FRML MDRD: ABNORMAL ML/MIN/{1.73_M2}
GFR SERPL CREATININE-BSD FRML MDRD: ABNORMAL ML/MIN/{1.73_M2}
GLUCOSE BLD-MCNC: 179 MG/DL (ref 70–99)
HCT VFR BLD CALC: 40.6 % (ref 36–46)
HEMOGLOBIN: 13.8 G/DL (ref 12–16)
IMMATURE GRANULOCYTES: NORMAL %
LYMPHOCYTES # BLD: 26 % (ref 24–44)
MAGNESIUM: 1.8 MG/DL (ref 1.6–2.6)
MCH RBC QN AUTO: 31.7 PG (ref 26–34)
MCHC RBC AUTO-ENTMCNC: 33.9 G/DL (ref 31–37)
MCV RBC AUTO: 93.5 FL (ref 80–100)
MONOCYTES # BLD: 7 % (ref 1–7)
NRBC AUTOMATED: NORMAL PER 100 WBC
PDW BLD-RTO: 13.2 % (ref 11.5–14.9)
PLATELET # BLD: 294 K/UL (ref 150–450)
PLATELET ESTIMATE: NORMAL
PMV BLD AUTO: 7.7 FL (ref 6–12)
POTASSIUM SERPL-SCNC: 4.4 MMOL/L (ref 3.7–5.3)
RBC # BLD: 4.35 M/UL (ref 4–5.2)
RBC # BLD: NORMAL 10*6/UL
SEG NEUTROPHILS: 66 % (ref 36–66)
SEGMENTED NEUTROPHILS ABSOLUTE COUNT: 4.5 K/UL (ref 1.3–9.1)
SODIUM BLD-SCNC: 135 MMOL/L (ref 135–144)
TOTAL PROTEIN: 7.3 G/DL (ref 6.4–8.3)
TROPONIN INTERP: NORMAL
TROPONIN INTERP: NORMAL
TROPONIN T: NORMAL NG/ML
TROPONIN T: NORMAL NG/ML
TROPONIN, HIGH SENSITIVITY: 6 NG/L (ref 0–14)
TROPONIN, HIGH SENSITIVITY: <6 NG/L (ref 0–14)
WBC # BLD: 6.9 K/UL (ref 3.5–11)
WBC # BLD: NORMAL 10*3/UL

## 2019-02-08 PROCEDURE — 71045 X-RAY EXAM CHEST 1 VIEW: CPT

## 2019-02-08 PROCEDURE — 93005 ELECTROCARDIOGRAM TRACING: CPT

## 2019-02-08 PROCEDURE — 83735 ASSAY OF MAGNESIUM: CPT

## 2019-02-08 PROCEDURE — 84484 ASSAY OF TROPONIN QUANT: CPT

## 2019-02-08 PROCEDURE — 99285 EMERGENCY DEPT VISIT HI MDM: CPT

## 2019-02-08 PROCEDURE — 36415 COLL VENOUS BLD VENIPUNCTURE: CPT

## 2019-02-08 PROCEDURE — 85025 COMPLETE CBC W/AUTO DIFF WBC: CPT

## 2019-02-08 PROCEDURE — 80053 COMPREHEN METABOLIC PANEL: CPT

## 2019-02-08 ASSESSMENT — ENCOUNTER SYMPTOMS
SHORTNESS OF BREATH: 1
VOMITING: 0
COUGH: 1
RHINORRHEA: 0
ABDOMINAL PAIN: 0
WHEEZING: 1
NAUSEA: 0

## 2019-02-09 LAB
EKG ATRIAL RATE: 72 BPM
EKG P AXIS: 81 DEGREES
EKG Q-T INTERVAL: 400 MS
EKG QRS DURATION: 76 MS
EKG QTC CALCULATION (BAZETT): 419 MS
EKG R AXIS: -78 DEGREES
EKG T AXIS: 77 DEGREES
EKG VENTRICULAR RATE: 66 BPM

## 2019-02-11 ENCOUNTER — TELEPHONE (OUTPATIENT)
Dept: FAMILY MEDICINE CLINIC | Age: 64
End: 2019-02-11

## 2019-02-11 RX ORDER — OMEPRAZOLE 20 MG/1
CAPSULE, DELAYED RELEASE ORAL
Qty: 90 CAPSULE | Refills: 0 | Status: SHIPPED | OUTPATIENT
Start: 2019-02-11 | End: 2019-06-08 | Stop reason: SDUPTHER

## 2019-02-12 ENCOUNTER — OFFICE VISIT (OUTPATIENT)
Dept: FAMILY MEDICINE CLINIC | Age: 64
End: 2019-02-12
Payer: COMMERCIAL

## 2019-02-12 VITALS
WEIGHT: 171 LBS | DIASTOLIC BLOOD PRESSURE: 92 MMHG | HEIGHT: 62 IN | TEMPERATURE: 98 F | HEART RATE: 78 BPM | BODY MASS INDEX: 31.47 KG/M2 | SYSTOLIC BLOOD PRESSURE: 150 MMHG

## 2019-02-12 DIAGNOSIS — I10 ESSENTIAL HYPERTENSION: Primary | ICD-10-CM

## 2019-02-12 DIAGNOSIS — R94.31 ABNORMAL EKG: ICD-10-CM

## 2019-02-12 DIAGNOSIS — E11.9 TYPE 2 DIABETES MELLITUS WITHOUT COMPLICATION, WITHOUT LONG-TERM CURRENT USE OF INSULIN (HCC): ICD-10-CM

## 2019-02-12 PROCEDURE — 4004F PT TOBACCO SCREEN RCVD TLK: CPT | Performed by: FAMILY MEDICINE

## 2019-02-12 PROCEDURE — 2022F DILAT RTA XM EVC RTNOPTHY: CPT | Performed by: FAMILY MEDICINE

## 2019-02-12 PROCEDURE — 3017F COLORECTAL CA SCREEN DOC REV: CPT | Performed by: FAMILY MEDICINE

## 2019-02-12 PROCEDURE — 99214 OFFICE O/P EST MOD 30 MIN: CPT | Performed by: FAMILY MEDICINE

## 2019-02-12 PROCEDURE — G8482 FLU IMMUNIZE ORDER/ADMIN: HCPCS | Performed by: FAMILY MEDICINE

## 2019-02-12 PROCEDURE — G8417 CALC BMI ABV UP PARAM F/U: HCPCS | Performed by: FAMILY MEDICINE

## 2019-02-12 PROCEDURE — G8427 DOCREV CUR MEDS BY ELIG CLIN: HCPCS | Performed by: FAMILY MEDICINE

## 2019-02-12 PROCEDURE — 3045F PR MOST RECENT HEMOGLOBIN A1C LEVEL 7.0-9.0%: CPT | Performed by: FAMILY MEDICINE

## 2019-02-12 RX ORDER — ALBUTEROL SULFATE 90 UG/1
2 AEROSOL, METERED RESPIRATORY (INHALATION) 2 TIMES DAILY PRN
Qty: 1 INHALER | Refills: 1 | Status: SHIPPED | OUTPATIENT
Start: 2019-02-12 | End: 2019-05-16 | Stop reason: SDUPTHER

## 2019-02-12 RX ORDER — PRAVASTATIN SODIUM 40 MG
TABLET ORAL
Qty: 90 TABLET | Refills: 1 | Status: SHIPPED | OUTPATIENT
Start: 2019-02-12 | End: 2019-08-08 | Stop reason: SDUPTHER

## 2019-02-12 ASSESSMENT — ENCOUNTER SYMPTOMS
NAUSEA: 0
SORE THROAT: 0
WHEEZING: 0
SHORTNESS OF BREATH: 1
ABDOMINAL PAIN: 0

## 2019-02-12 ASSESSMENT — PATIENT HEALTH QUESTIONNAIRE - PHQ9
SUM OF ALL RESPONSES TO PHQ9 QUESTIONS 1 & 2: 0
SUM OF ALL RESPONSES TO PHQ QUESTIONS 1-9: 0
SUM OF ALL RESPONSES TO PHQ QUESTIONS 1-9: 0
2. FEELING DOWN, DEPRESSED OR HOPELESS: 0
1. LITTLE INTEREST OR PLEASURE IN DOING THINGS: 0

## 2019-03-07 ENCOUNTER — HOSPITAL ENCOUNTER (OUTPATIENT)
Dept: NON INVASIVE DIAGNOSTICS | Age: 64
Discharge: HOME | End: 2019-03-07
Payer: COMMERCIAL

## 2019-03-07 DIAGNOSIS — R94.31 ABNORMAL EKG: ICD-10-CM

## 2019-03-07 LAB
LV EF: 58 %
LVEF MODALITY: NORMAL

## 2019-03-07 PROCEDURE — 93306 TTE W/DOPPLER COMPLETE: CPT

## 2019-03-07 PROCEDURE — 93225 XTRNL ECG REC<48 HRS REC: CPT

## 2019-03-07 PROCEDURE — 93226 XTRNL ECG REC<48 HR SCAN A/R: CPT

## 2019-03-11 RX ORDER — GLIPIZIDE 10 MG/1
10 TABLET ORAL 2 TIMES DAILY
Qty: 180 TABLET | Refills: 1 | Status: SHIPPED | OUTPATIENT
Start: 2019-03-11 | End: 2019-11-24 | Stop reason: SDUPTHER

## 2019-03-12 ENCOUNTER — OFFICE VISIT (OUTPATIENT)
Dept: FAMILY MEDICINE CLINIC | Age: 64
End: 2019-03-12
Payer: COMMERCIAL

## 2019-03-12 VITALS
HEIGHT: 62 IN | DIASTOLIC BLOOD PRESSURE: 82 MMHG | TEMPERATURE: 98.7 F | HEART RATE: 78 BPM | WEIGHT: 169 LBS | BODY MASS INDEX: 31.1 KG/M2 | SYSTOLIC BLOOD PRESSURE: 138 MMHG

## 2019-03-12 DIAGNOSIS — E03.9 HYPOTHYROIDISM, UNSPECIFIED TYPE: ICD-10-CM

## 2019-03-12 DIAGNOSIS — E11.9 TYPE 2 DIABETES MELLITUS WITHOUT COMPLICATION, WITHOUT LONG-TERM CURRENT USE OF INSULIN (HCC): Primary | ICD-10-CM

## 2019-03-12 DIAGNOSIS — I10 ESSENTIAL HYPERTENSION: ICD-10-CM

## 2019-03-12 PROCEDURE — 4004F PT TOBACCO SCREEN RCVD TLK: CPT | Performed by: FAMILY MEDICINE

## 2019-03-12 PROCEDURE — 2022F DILAT RTA XM EVC RTNOPTHY: CPT | Performed by: FAMILY MEDICINE

## 2019-03-12 PROCEDURE — 3017F COLORECTAL CA SCREEN DOC REV: CPT | Performed by: FAMILY MEDICINE

## 2019-03-12 PROCEDURE — G8482 FLU IMMUNIZE ORDER/ADMIN: HCPCS | Performed by: FAMILY MEDICINE

## 2019-03-12 PROCEDURE — G8427 DOCREV CUR MEDS BY ELIG CLIN: HCPCS | Performed by: FAMILY MEDICINE

## 2019-03-12 PROCEDURE — 3045F PR MOST RECENT HEMOGLOBIN A1C LEVEL 7.0-9.0%: CPT | Performed by: FAMILY MEDICINE

## 2019-03-12 PROCEDURE — 99213 OFFICE O/P EST LOW 20 MIN: CPT | Performed by: FAMILY MEDICINE

## 2019-03-12 PROCEDURE — G8417 CALC BMI ABV UP PARAM F/U: HCPCS | Performed by: FAMILY MEDICINE

## 2019-03-12 ASSESSMENT — ENCOUNTER SYMPTOMS
ABDOMINAL PAIN: 0
SHORTNESS OF BREATH: 0
NAUSEA: 0
SORE THROAT: 0

## 2019-03-15 LAB
ACQUISITION DURATION: NORMAL S
AVERAGE HEART RATE: 72 BPM
HOOKUP DATE: NORMAL
HOOKUP TIME: NORMAL
MAX HEART RATE TIME/DATE: NORMAL
MAX HEART RATE: 111 BPM
MIN HEART RATE TIME/DATE: NORMAL
MIN HEART RATE: 45 BPM
NUMBER OF QRS COMPLEXES: NORMAL
NUMBER OF SUPRAVENTRICULAR BEATS IN RUNS: 0
NUMBER OF SUPRAVENTRICULAR COUPLETS: 0
NUMBER OF SUPRAVENTRICULAR ECTOPICS: 5
NUMBER OF SUPRAVENTRICULAR ISOLATED BEATS: 5
NUMBER OF SUPRAVENTRICULAR RUNS: 0
NUMBER OF VENTRICULAR BEATS IN RUNS: 0
NUMBER OF VENTRICULAR BIGEMINAL CYCLES: 0
NUMBER OF VENTRICULAR COUPLETS: 0
NUMBER OF VENTRICULAR ECTOPICS: 0
NUMBER OF VENTRICULAR ISOLATED BEATS: 0
NUMBER OF VENTRICULAR RUNS: 0

## 2019-04-26 LAB
ALBUMIN SERPL-MCNC: 4.4 G/DL (ref 3.2–5.3)
ALK PHOSPHATASE: 102 U/L (ref 39–130)
ALT SERPL-CCNC: 11 U/L (ref 0–31)
ANION GAP SERPL CALCULATED.3IONS-SCNC: 7 MMOL/L (ref 4–12)
AST SERPL-CCNC: 14 U/L (ref 0–41)
BILIRUB SERPL-MCNC: 1.2 MG/DL (ref 0.3–1.2)
BUN BLDV-MCNC: 14 MG/DL (ref 5–27)
CALCIUM SERPL-MCNC: 10 MG/DL (ref 8.5–10.5)
CHLORIDE BLD-SCNC: 97 MMOL/L (ref 98–109)
CHOLESTEROL/HDL RATIO: 3.8 (ref 1–5)
CHOLESTEROL: 184 MG/DL (ref 150–200)
CO2: 30 MMOL/L (ref 22–32)
CREAT SERPL-MCNC: 0.64 MG/DL (ref 0.4–1)
EGFR AFRICAN AMERICAN: >60 ML/MIN/1.73SQ.M
EGFR IF NONAFRICAN AMERICAN: >60 ML/MIN/1.73SQ.M
GLUCOSE: 212 MG/DL (ref 65–99)
HDLC SERPL-MCNC: 49 MG/DL
LDL CHOLESTEROL CALCULATED: 102 MG/DL
LDL/HDL RATIO: 2.1
POTASSIUM SERPL-SCNC: 4.7 MMOL/L (ref 3.5–5)
SODIUM BLD-SCNC: 134 MMOL/L (ref 134–146)
TOTAL PROTEIN: 7.3 G/DL (ref 6–8)
TRIGL SERPL-MCNC: 165 MG/DL (ref 27–150)
TSH SERPL DL<=0.05 MIU/L-ACNC: 0.54 UIU/ML (ref 0.49–4.67)
VLDLC SERPL CALC-MCNC: 33 MG/DL (ref 0–30)

## 2019-04-29 RX ORDER — LOSARTAN POTASSIUM AND HYDROCHLOROTHIAZIDE 25; 100 MG/1; MG/1
TABLET ORAL
Qty: 30 TABLET | Refills: 2 | Status: SHIPPED | OUTPATIENT
Start: 2019-04-29 | End: 2019-07-26 | Stop reason: SDUPTHER

## 2019-05-07 ENCOUNTER — OFFICE VISIT (OUTPATIENT)
Dept: FAMILY MEDICINE CLINIC | Age: 64
End: 2019-05-07
Payer: COMMERCIAL

## 2019-05-07 VITALS
WEIGHT: 171.8 LBS | BODY MASS INDEX: 31.62 KG/M2 | OXYGEN SATURATION: 98 % | HEIGHT: 62 IN | SYSTOLIC BLOOD PRESSURE: 150 MMHG | HEART RATE: 72 BPM | DIASTOLIC BLOOD PRESSURE: 92 MMHG | TEMPERATURE: 98.7 F

## 2019-05-07 DIAGNOSIS — Z12.31 SCREENING MAMMOGRAM, ENCOUNTER FOR: ICD-10-CM

## 2019-05-07 DIAGNOSIS — E11.9 TYPE 2 DIABETES MELLITUS WITHOUT COMPLICATION, WITHOUT LONG-TERM CURRENT USE OF INSULIN (HCC): Primary | ICD-10-CM

## 2019-05-07 DIAGNOSIS — G89.29 CHRONIC LEFT SHOULDER PAIN: ICD-10-CM

## 2019-05-07 DIAGNOSIS — M25.512 CHRONIC LEFT SHOULDER PAIN: ICD-10-CM

## 2019-05-07 DIAGNOSIS — I10 ESSENTIAL HYPERTENSION: ICD-10-CM

## 2019-05-07 LAB — HBA1C MFR BLD: 7.3 %

## 2019-05-07 PROCEDURE — G8427 DOCREV CUR MEDS BY ELIG CLIN: HCPCS | Performed by: FAMILY MEDICINE

## 2019-05-07 PROCEDURE — G8417 CALC BMI ABV UP PARAM F/U: HCPCS | Performed by: FAMILY MEDICINE

## 2019-05-07 PROCEDURE — 99214 OFFICE O/P EST MOD 30 MIN: CPT | Performed by: FAMILY MEDICINE

## 2019-05-07 PROCEDURE — 3045F PR MOST RECENT HEMOGLOBIN A1C LEVEL 7.0-9.0%: CPT | Performed by: FAMILY MEDICINE

## 2019-05-07 PROCEDURE — 3017F COLORECTAL CA SCREEN DOC REV: CPT | Performed by: FAMILY MEDICINE

## 2019-05-07 PROCEDURE — 83036 HEMOGLOBIN GLYCOSYLATED A1C: CPT | Performed by: FAMILY MEDICINE

## 2019-05-07 PROCEDURE — 4004F PT TOBACCO SCREEN RCVD TLK: CPT | Performed by: FAMILY MEDICINE

## 2019-05-07 PROCEDURE — 2022F DILAT RTA XM EVC RTNOPTHY: CPT | Performed by: FAMILY MEDICINE

## 2019-05-07 ASSESSMENT — ENCOUNTER SYMPTOMS
VOMITING: 0
NAUSEA: 0
SORE THROAT: 0
SHORTNESS OF BREATH: 0
ABDOMINAL PAIN: 0

## 2019-05-07 NOTE — PROGRESS NOTES
Subjective:      Patient ID: Jeff Tenorio is a 58 y.o. female. Chronic Disease Visit Information    BP Readings from Last 3 Encounters:   05/07/19 (!) 150/92   03/12/19 138/82   02/12/19 (!) 150/92          Hemoglobin A1C (%)   Date Value   02/05/2019 7.0   09/24/2018 6.9   05/31/2018 7.1 (A)     Microalb/Crt. Ratio (mcg/mg creat)   Date Value   02/05/2019 58 (H)     LDL Calculated (mg/dL)   Date Value   04/26/2019 102     HDL (mg/dL)   Date Value   04/26/2019 49     BUN (mg/dL)   Date Value   04/26/2019 14     CREATININE (mg/dL)   Date Value   04/26/2019 0.64     Glucose (mg/dL)   Date Value   04/26/2019 212 (H)            Have you changed or started any medications since your last visit including any over-the-counter medicines, vitamins, or herbal medicines? no   Are you having any side effects from any of your medications? -  no  Have you stopped taking any of your medications? Is so, why? -  no    Have you seen any other physician or provider since your last visit? No  Have you had any other diagnostic tests since your last visit? No  Have you been seen in the emergency room and/or had an admission to a hospital since we last saw you? No  Have you had your annual diabetic retinal (eye) exam? No  Have you had your routine dental cleaning in the past 6 months? no    Have you activated your [employer] account? If not, what are your barriers?  Yes     Patient Care Team:  Sofiya Chino MD as PCP - General (Family Medicine)  Carmen Phillip, DO as Consulting Physician (Obstetrics & Gynecology)         Medical History Review  Past Medical, Family, and Social History reviewed and does not contribute to the patient presenting condition    Health Maintenance   Topic Date Due    Colon cancer screen colonoscopy  10/29/2004    Breast cancer screen  10/09/2018    Diabetic retinal exam  08/07/2019 (Originally 10/29/1964)    DTaP/Tdap/Td vaccine (1 - Tdap) 03/24/2020 (Originally 10/29/1973)    Cervical cancer screen  03/24/2020 (Originally 7/18/2017)    HIV screen  03/24/2020 (Originally 10/29/1969)    Hepatitis C screen  03/25/2020 (Originally 1955)    Shingles Vaccine (1 of 2) 05/07/2020 (Originally 10/29/2004)    Diabetic foot exam  02/05/2020    A1C test (Diabetic or Prediabetic)  02/05/2020    Diabetic microalbuminuria test  02/05/2020    Lipid screen  04/26/2020    TSH testing  04/26/2020    Potassium monitoring  04/26/2020    Creatinine monitoring  04/26/2020    Flu vaccine  Completed    Pneumococcal 0-64 years Vaccine  Completed       HPI  69-year-old female is seen in the office today for follow-up for her diabetes hypertension and hyperlipidemia her blood sugars are running high we'll add Sound[place] she is trying to watch her diet as much as possible. Has got hypertension on Hyzaar and Norvasc and blood pressure is slightly elevated no chest pain or shortness of breath also is on pravastatin for her hyperlipidemia  Review of Systems   Constitutional: Negative for appetite change and fatigue. HENT: Negative for congestion, ear pain and sore throat. Eyes: Negative for visual disturbance. Respiratory: Negative for shortness of breath. Cardiovascular: Negative for chest pain. Gastrointestinal: Negative for abdominal pain, nausea and vomiting. Genitourinary: Negative for frequency and pelvic pain. Musculoskeletal: Positive for joint swelling. Neurological: Negative for dizziness and headaches. Objective:   Physical Exam   Constitutional: She is oriented to person, place, and time. She appears well-developed and well-nourished. BP (!) 150/92   Pulse 72   Temp 98.7 °F (37.1 °C) (Oral)   Ht 5' 2\" (1.575 m)   Wt 171 lb 12.8 oz (77.9 kg)   SpO2 98%   BMI 31.42 kg/m²    HENT:   Head: Normocephalic. Mouth/Throat: Oropharynx is clear and moist.        Eyes: Conjunctivae are normal.   Cardiovascular: Normal rate and regular rhythm.    Pulmonary/Chest: Breath sounds

## 2019-05-13 DIAGNOSIS — M25.512 ACUTE PAIN OF LEFT SHOULDER: Primary | ICD-10-CM

## 2019-05-14 ENCOUNTER — HOSPITAL ENCOUNTER (OUTPATIENT)
Dept: WOMENS IMAGING | Age: 64
Discharge: HOME | End: 2019-05-16
Payer: COMMERCIAL

## 2019-05-14 ENCOUNTER — OFFICE VISIT (OUTPATIENT)
Dept: ORTHOPEDIC SURGERY | Age: 64
End: 2019-05-14
Payer: COMMERCIAL

## 2019-05-14 VITALS — BODY MASS INDEX: 31.28 KG/M2 | HEIGHT: 62 IN | WEIGHT: 170 LBS

## 2019-05-14 DIAGNOSIS — Z12.31 SCREENING MAMMOGRAM, ENCOUNTER FOR: ICD-10-CM

## 2019-05-14 DIAGNOSIS — M19.012 OSTEOARTHRITIS OF LEFT ACROMIOCLAVICULAR JOINT: ICD-10-CM

## 2019-05-14 DIAGNOSIS — M75.122 COMPLETE TEAR OF LEFT ROTATOR CUFF: Primary | ICD-10-CM

## 2019-05-14 PROCEDURE — G8427 DOCREV CUR MEDS BY ELIG CLIN: HCPCS | Performed by: ORTHOPAEDIC SURGERY

## 2019-05-14 PROCEDURE — 99203 OFFICE O/P NEW LOW 30 MIN: CPT | Performed by: ORTHOPAEDIC SURGERY

## 2019-05-14 PROCEDURE — 4004F PT TOBACCO SCREEN RCVD TLK: CPT | Performed by: ORTHOPAEDIC SURGERY

## 2019-05-14 PROCEDURE — 77063 BREAST TOMOSYNTHESIS BI: CPT

## 2019-05-14 PROCEDURE — G8417 CALC BMI ABV UP PARAM F/U: HCPCS | Performed by: ORTHOPAEDIC SURGERY

## 2019-05-14 PROCEDURE — 3017F COLORECTAL CA SCREEN DOC REV: CPT | Performed by: ORTHOPAEDIC SURGERY

## 2019-05-14 RX ORDER — DICLOFENAC SODIUM 75 MG/1
75 TABLET, DELAYED RELEASE ORAL 2 TIMES DAILY WITH MEALS
Qty: 28 TABLET | Refills: 0 | Status: SHIPPED | OUTPATIENT
Start: 2019-05-14 | End: 2019-06-11 | Stop reason: ALTCHOICE

## 2019-05-14 NOTE — LETTER
5/14/2019    MD Moni Choudhury 71  [address] [facility],[address], [address] [facility]    RE: Alexei Carrion    Dear Dr. Katiana Aggarwal,    Thank you for allowing me to participate in the care of Ms. Kobi Her. I had the opportunity to evaluate the patient on 5/14/2019. Attached you will find my evaluation and recommendations. Thanks again for the confidence you have expressed in me by allowing my participation in the care of your patient. I will keep you apprised of further developments in the patients treatment course as it progresses. If I can be of further assistance in any fashion, please feel free to contact me at your convenience.     Sincerely,        Yoel Santiago  Shoulder and Elbow Surgery

## 2019-05-14 NOTE — PROGRESS NOTES
Orthopedic Shoulder Encounter Note     Chief complaint: Left shoulder pain    HPI: Anne-Marie Leyva is a 58 y.o.  right-hand dominant female who presents for evaluation of her left shoulder. She indicates that she has pain only when she sleeps and when she lifts a lot. She describes her pain as a burning pain over the lateral aspect of the shoulder at nighttime and again when she is lifting especially at work. 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 She also reports that she's noticed a bump over the superior aspect of her shoulder which can be irritated by her bra strap. Her pain is been ongoing for the past 6 months without any precipitating trauma or injury. She denies any weakness or stiffness. It doesn't radiate and it is not associated with any numbness or tingling. Previous treatment:    NSAIDs: Aleve    Physical Therapy:  No    Injections: None    Surgeries: None    Review of Systems:     Constitution: no fever or chills   Pain level: 5/10  Musculoskeletal: As noted in the HPI   Neurologic: no neurologic symptoms    Past Medical History  Arsen Merida  has a past medical history of Arthritis, Asthma, Diabetes mellitus ([facility] [address] Hyperlipidemia, Hypertension, and Thyroid condition. Past Surgical History  Arsen Merida  has a past surgical history that includes Tubal ligation; Dilation and curettage of uterus; Tonsillectomy; and hysteroscopy (10/07/14).     Current Medications  Current Outpatient Medications   Medication Sig Dispense Refill    diclofenac (VOLTAREN) 75 MG EC tablet Take 1 tablet by mouth 2 times daily (with meals) for 14 days 28 tablet 0    linagliptin (TRADJENTA) 5 MG tablet Take 1 tablet by mouth daily 30 tablet 1    linagliptin (TRADJENTA) 5 MG tablet Take 1 tablet by mouth daily 14 tablet 0    losartan-hydrochlorothiazide (HYZAAR) 100-25 MG per tablet TAKE 1 TABLET BY MOUTH EVERY DAY 30 tablet 2    glipiZIDE (GLUCOTROL) 10 MG tablet Take 1 tablet by mouth 2 times daily 180 tablet 1    albuterol sulfate HFA (PROAIR HFA) 108 (90 Base) MCG/ACT inhaler Inhale 2 puffs into the lungs 2 times daily as needed for Wheezing 1 Inhaler 1    sertraline (ZOLOFT) 50 MG tablet TAKE 1 TABLET BY MOUTH EVERY DAY 90 tablet 1    pravastatin (PRAVACHOL) 40 MG tablet TAKE 1 TABLET BY MOUTH EVERY DAY 90 tablet 1    omeprazole (PRILOSEC) 20 MG delayed release capsule TAKE ONE CAPSULE BY MOUTH EVERY DAY 90 capsule 0    levothyroxine (SYNTHROID) 200 MCG tablet TAKE 1 TABLET BY MOUTH EVERY DAY 90 tablet 1    amLODIPine (NORVASC) 10 MG tablet TAKE 1 TABLET BY MOUTH EVERY DAY 90 tablet 1     No current facility-administered medications for this visit. Allergies  Allergies have been reviewed. Santa Martin is allergic to lisinopril and metformin and related. Social History  Santa Martin  reports that she has been smoking cigarettes. She has a 15.00 pack-year smoking history. She has quit using smokeless tobacco. She reports that she drinks alcohol. She reports that she does not use drugs. Family History  Gem's family history includes Breast Cancer in her sister; Diabetes in her father and mother.      Physical Exam:     Ht 5' 2\" (1.575 m)   Wt 170 lb (77.1 kg)   BMI 31.09 kg/m²    General Appearance: alert, well appearing, and in no distress  Mental Status: alert, oriented to person, place, and time  Gait: normal    Shoulder:    Skin: warm and dry, no rash or erythema; no swelling or obvious muscular atrophy  Vasculature: 2+ radial pulses bilaterally  Neuro: Sensation grossly intact to light touch diffusely  Tenderness: Tender to palpation over the acromioclavicular joint of the left shoulder    ROM: (Degrees)    Right   A P   Left   A P    Elevation  145    Elevation  145 150  Abduction  165    Abduction  165  165  ER   30    ER   25 70  IR   T12    IR   T12   90 abd/ER      90 abd/ER     90 abd/IR      90 abd/IR     Crepitation  No    Crepitation No  Dyskenesia  No    Dyskenesia No      Muscle strength:    Right       Left    Deltoid   5    Deltoid   5  Supraspinatus  5    Supraspinatus  4  ER   5    ER   3  IR   5    IR   5    Special tests    Right   Rotator Cuff    Left    n   Painful arc    n   n   Pain with ER    n    n   Neer's     n    n   Hawkin's    n    n   Drop Arm    n  n   Lift off/Belly Press   n  n   ER Lag    n          AC Joint  n   AC tenderness   y  n   Cross-chest adduction  y       Labrum/biceps    n   [place]'s    y (equivocal)   n   Biceps sheer    y      n   Speed's/Yergason's   y    n   Tenderness Biceps Groove  y    n   Karsten's    n         Instability  n   Ant Apprehension   n    n   Post Apprehension   n    n   Ant Load shift    n    n   Post Load shift   n   n   Sulcus     n  n   Generalized Laxity   n  n   Relocation test   n  n   Crank test     n  n   Geovanni-superior escape  n       Imaging:  Xrays: 4 views of the left shoulder obtained on 5/14/19 were independently reviewed  Indications: Left shoulder pain  Findings: Mild and space loss involving the acromioclavicular joint with mild osteophytic change at the distal clavicle associated with some cystic changes. Very small inferior humeral head osteophyte. Glenohumeral joint space appears to be well preserved. 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 old female with left shoulder pain that's likely due to a full-thickness rotator cuff tear and acromioclavicular joint osteoarthritis. I had a discussion with the patient today with regards to this. We discussed treatment options available to her including nonoperative and operative intervention. We'll proceed with conservative management by way of physical therapy. We also discussed use of cortisone injections versus NSAIDs. She has elected for the latter.   A prescription for Voltaren was electronically sent to her pharmacy. I'll see her back for reevaluation in 3 months. She was encouraged to return or call earlier with questions and/or concerns. If prior to her visit in 3 months she has persistent pain and dysfunction I recommended that she call ahead so we can order an MRI study of her shoulder cuff.         NA = Not assessed  RTC = Rotator cuff  RCT = Rotator cuff tear  ER = External rotation  IR = Internal rotation  AC = Acromioclavicular  GH = Glenohumeral  n = No  y = Yes

## 2019-05-16 RX ORDER — ALBUTEROL SULFATE 90 UG/1
2 AEROSOL, METERED RESPIRATORY (INHALATION) 2 TIMES DAILY PRN
Qty: 8.5 INHALER | Refills: 1 | Status: SHIPPED | OUTPATIENT
Start: 2019-05-16 | End: 2019-07-12 | Stop reason: SDUPTHER

## 2019-05-16 NOTE — TELEPHONE ENCOUNTER
Please Approve or Refuse.   Send to Pharmacy per Pt's Request:      Next Visit Date:  6/11/2019   Last Visit Date: 5/7/2019    Hemoglobin A1C (%)   Date Value   05/07/2019 7.3   02/05/2019 7.0   09/24/2018 6.9             ( goal A1C is < 7)   BP Readings from Last 3 Encounters:   05/07/19 (!) 150/92   03/12/19 138/82   02/12/19 (!) 150/92          (goal 120/80)  BUN   Date Value Ref Range Status   04/26/2019 14 5 - 27 mg/dL Final     CREATININE   Date Value Ref Range Status   04/26/2019 0.64 0.40 - 1.00 mg/dL Final     Comment:     METHOD TRACEABLE TO IDMS STANDARD     Potassium   Date Value Ref Range Status   04/26/2019 4.7 3.5 - 5.0 mmol/L Final

## 2019-06-10 RX ORDER — OMEPRAZOLE 20 MG/1
CAPSULE, DELAYED RELEASE ORAL
Qty: 90 CAPSULE | Refills: 0 | Status: SHIPPED | OUTPATIENT
Start: 2019-06-10 | End: 2019-09-17 | Stop reason: SDUPTHER

## 2019-06-11 ENCOUNTER — OFFICE VISIT (OUTPATIENT)
Dept: FAMILY MEDICINE CLINIC | Age: 64
End: 2019-06-11
Payer: COMMERCIAL

## 2019-06-11 VITALS
SYSTOLIC BLOOD PRESSURE: 138 MMHG | HEIGHT: 62 IN | WEIGHT: 170.4 LBS | TEMPERATURE: 98.6 F | DIASTOLIC BLOOD PRESSURE: 88 MMHG | BODY MASS INDEX: 31.36 KG/M2 | HEART RATE: 78 BPM

## 2019-06-11 DIAGNOSIS — E11.9 TYPE 2 DIABETES MELLITUS WITHOUT COMPLICATION, WITHOUT LONG-TERM CURRENT USE OF INSULIN (HCC): Primary | ICD-10-CM

## 2019-06-11 DIAGNOSIS — I10 ESSENTIAL HYPERTENSION: ICD-10-CM

## 2019-06-11 DIAGNOSIS — E03.9 HYPOTHYROIDISM, UNSPECIFIED TYPE: ICD-10-CM

## 2019-06-11 DIAGNOSIS — E78.5 HYPERLIPIDEMIA, UNSPECIFIED HYPERLIPIDEMIA TYPE: ICD-10-CM

## 2019-06-11 PROCEDURE — 2022F DILAT RTA XM EVC RTNOPTHY: CPT | Performed by: FAMILY MEDICINE

## 2019-06-11 PROCEDURE — G8427 DOCREV CUR MEDS BY ELIG CLIN: HCPCS | Performed by: FAMILY MEDICINE

## 2019-06-11 PROCEDURE — G8417 CALC BMI ABV UP PARAM F/U: HCPCS | Performed by: FAMILY MEDICINE

## 2019-06-11 PROCEDURE — 4004F PT TOBACCO SCREEN RCVD TLK: CPT | Performed by: FAMILY MEDICINE

## 2019-06-11 PROCEDURE — 3017F COLORECTAL CA SCREEN DOC REV: CPT | Performed by: FAMILY MEDICINE

## 2019-06-11 PROCEDURE — 99213 OFFICE O/P EST LOW 20 MIN: CPT | Performed by: FAMILY MEDICINE

## 2019-06-11 PROCEDURE — 3045F PR MOST RECENT HEMOGLOBIN A1C LEVEL 7.0-9.0%: CPT | Performed by: FAMILY MEDICINE

## 2019-06-11 ASSESSMENT — ENCOUNTER SYMPTOMS
SHORTNESS OF BREATH: 0
NAUSEA: 0
SORE THROAT: 0
ABDOMINAL PAIN: 0

## 2019-06-11 NOTE — PROGRESS NOTES
Subjective:      Patient ID: Vimal Dominguez is a 58 y.o. female. Visit Information    Have you changed or started any medications since your last visit including any over-the-counter medicines, vitamins, or herbal medicines? no   Are you having any side effects from any of your medications? -  no  Have you stopped taking any of your medications? Is so, why? -  no    Have you seen any other physician or provider since your last visit? No  Have you had any other diagnostic tests since your last visit? No  Have you been seen in the emergency room and/or had an admission to a hospital since we last saw you? No  Have you had your routine dental cleaning in the past 6 months? no    Have you activated your "[employer] account? If not, what are your barriers?  Yes     Patient Care Team:  Luciano Olsen MD as PCP - General (Family Medicine)  Luciano Olsen MD as PCP - [facility]  Darlene Medrano DO as Consulting Physician (Obstetrics & Gynecology)      Health Maintenance   Topic Date Due    Colon cancer screen colonoscopy  10/29/2004    Diabetic retinal exam  08/07/2019 (Originally 10/29/1964)    DTaP/Tdap/Td vaccine (1 - Tdap) 03/24/2020 (Originally 10/29/1973)    Cervical cancer screen  03/24/2020 (Originally 7/18/2017)    HIV screen  03/24/2020 (Originally 10/29/1969)    Hepatitis C screen  03/25/2020 (Originally 1955)    Shingles Vaccine (1 of 2) 05/07/2020 (Originally 10/29/2004)    Diabetic foot exam  02/05/2020    Diabetic microalbuminuria test  02/05/2020    Lipid screen  04/26/2020    TSH testing  04/26/2020    Potassium monitoring  04/26/2020    Creatinine monitoring  04/26/2020    A1C test (Diabetic or Prediabetic)  05/07/2020    Breast cancer screen  05/14/2020    Flu vaccine  Completed    Pneumococcal 0-64 years Vaccine  Completed       HPI  30-year-old female is seen in the office today for follow-up for her diabetes I had started on Manuel Pac her blood sugars have improved she is also trying to watch her diet  is also on Glucotrol. Has hypertension blood pressures controlled she is on Hyzaar and Norvasc no chest pain or shortness of breath has hyperlipidemia on Pravachol her anxiety is under control with the Zoloft  Review of Systems   Constitutional: Negative for appetite change. HENT: Negative for ear pain and sore throat. Eyes: Negative for visual disturbance. Respiratory: Negative for shortness of breath. Cardiovascular: Negative for chest pain. Gastrointestinal: Negative for abdominal pain and nausea. Genitourinary: Negative for frequency, pelvic pain and vaginal discharge. Musculoskeletal: Negative for arthralgias. Neurological: Negative for dizziness and headaches. Objective:   Physical Exam   Constitutional: She is oriented to person, place, and time. She appears well-developed and well-nourished. /88   Pulse 78   Temp 98.6 °F (37 °C) (Oral)   Ht 5' 2\" (1.575 m)   Wt 170 lb 6.4 oz (77.3 kg)   BMI 31.17 kg/m²    HENT:   Head: Normocephalic. Mouth/Throat: Oropharynx is clear and moist.        Eyes: Conjunctivae are normal.   Cardiovascular: Normal rate and regular rhythm. Pulmonary/Chest: Breath sounds normal. She has no rales. Abdominal: Soft. Bowel sounds are normal. There is no tenderness. Musculoskeletal: She exhibits no edema. Lymphadenopathy:     She has no cervical adenopathy. Neurological: She is alert and oriented to person, place, and time. Nursing note and vitals reviewed. Assessment:        Diagnosis Orders   1. Type 2 diabetes mellitus without complication, without long-term current use of insulin (HCC)   improving   2. Essential hypertension   controlled   3. Hypothyroidism, unspecified type     4. Hyperlipidemia, unspecified hyperlipidemia type             Plan:       No orders of the defined types were placed in this encounter.     No orders of the defined types were placed in this encounter. Return in about 4 months (around 10/11/2019) for diabetes.     Continue current medications reviewed from the chart              Orinda Najjar, MA

## 2019-06-24 ENCOUNTER — TELEPHONE (OUTPATIENT)
Dept: FAMILY MEDICINE CLINIC | Age: 64
End: 2019-06-24

## 2019-07-25 RX ORDER — LEVOTHYROXINE SODIUM 0.2 MG/1
TABLET ORAL
Qty: 90 TABLET | Refills: 1 | Status: SHIPPED | OUTPATIENT
Start: 2019-07-25 | End: 2020-01-27

## 2019-07-29 RX ORDER — LOSARTAN POTASSIUM AND HYDROCHLOROTHIAZIDE 25; 100 MG/1; MG/1
TABLET ORAL
Qty: 30 TABLET | Refills: 2 | Status: SHIPPED | OUTPATIENT
Start: 2019-07-29 | End: 2019-09-06 | Stop reason: ALTCHOICE

## 2019-08-14 NOTE — TELEPHONE ENCOUNTER
Please Approve or Refuse.   Send to Pharmacy per Pt's Request:      Next Visit Date:  10/17/2019   Last Visit Date: 6/11/2019    Hemoglobin A1C (%)   Date Value   05/07/2019 7.3   02/05/2019 7.0   09/24/2018 6.9             ( goal A1C is < 7)   BP Readings from Last 3 Encounters:   06/11/19 138/88   05/07/19 (!) 150/92   03/12/19 138/82          (goal 120/80)  BUN   Date Value Ref Range Status   04/26/2019 14 5 - 27 mg/dL Final     CREATININE   Date Value Ref Range Status   04/26/2019 0.64 0.40 - 1.00 mg/dL Final     Comment:     METHOD TRACEABLE TO IDMS STANDARD     Potassium   Date Value Ref Range Status   04/26/2019 4.7 3.5 - 5.0 mmol/L Final

## 2019-08-19 RX ORDER — LINAGLIPTIN 5 MG/1
TABLET, FILM COATED ORAL
Qty: 30 TABLET | Refills: 1 | Status: SHIPPED | OUTPATIENT
Start: 2019-08-19 | End: 2019-10-20 | Stop reason: SDUPTHER

## 2019-09-06 ENCOUNTER — TELEPHONE (OUTPATIENT)
Dept: FAMILY MEDICINE CLINIC | Age: 64
End: 2019-09-06

## 2019-09-06 RX ORDER — IRBESARTAN AND HYDROCHLOROTHIAZIDE 150; 12.5 MG/1; MG/1
1 TABLET, FILM COATED ORAL DAILY
Qty: 30 TABLET | Refills: 1 | Status: SHIPPED | OUTPATIENT
Start: 2019-09-06 | End: 2019-09-29 | Stop reason: SDUPTHER

## 2019-09-17 RX ORDER — OMEPRAZOLE 20 MG/1
CAPSULE, DELAYED RELEASE ORAL
Qty: 90 CAPSULE | Refills: 0 | Status: SHIPPED | OUTPATIENT
Start: 2019-09-17 | End: 2020-03-13 | Stop reason: SDUPTHER

## 2019-09-30 RX ORDER — IRBESARTAN AND HYDROCHLOROTHIAZIDE 150; 12.5 MG/1; MG/1
TABLET, FILM COATED ORAL
Qty: 30 TABLET | Refills: 1 | Status: SHIPPED | OUTPATIENT
Start: 2019-09-30 | End: 2019-10-26 | Stop reason: SDUPTHER

## 2019-10-17 ENCOUNTER — OFFICE VISIT (OUTPATIENT)
Dept: FAMILY MEDICINE CLINIC | Age: 64
End: 2019-10-17
Payer: COMMERCIAL

## 2019-10-17 VITALS
HEART RATE: 70 BPM | SYSTOLIC BLOOD PRESSURE: 140 MMHG | RESPIRATION RATE: 18 BRPM | DIASTOLIC BLOOD PRESSURE: 80 MMHG | HEIGHT: 62 IN | TEMPERATURE: 97 F | OXYGEN SATURATION: 96 % | WEIGHT: 165.6 LBS | BODY MASS INDEX: 30.47 KG/M2

## 2019-10-17 DIAGNOSIS — E03.9 HYPOTHYROIDISM, UNSPECIFIED TYPE: ICD-10-CM

## 2019-10-17 DIAGNOSIS — E78.5 HYPERLIPIDEMIA, UNSPECIFIED HYPERLIPIDEMIA TYPE: ICD-10-CM

## 2019-10-17 DIAGNOSIS — E11.9 TYPE 2 DIABETES MELLITUS WITHOUT COMPLICATION, WITHOUT LONG-TERM CURRENT USE OF INSULIN (HCC): Primary | ICD-10-CM

## 2019-10-17 DIAGNOSIS — I10 ESSENTIAL HYPERTENSION: ICD-10-CM

## 2019-10-17 LAB — HBA1C MFR BLD: 6.2 %

## 2019-10-17 PROCEDURE — 3044F HG A1C LEVEL LT 7.0%: CPT | Performed by: FAMILY MEDICINE

## 2019-10-17 PROCEDURE — G8427 DOCREV CUR MEDS BY ELIG CLIN: HCPCS | Performed by: FAMILY MEDICINE

## 2019-10-17 PROCEDURE — 99214 OFFICE O/P EST MOD 30 MIN: CPT | Performed by: FAMILY MEDICINE

## 2019-10-17 PROCEDURE — 4004F PT TOBACCO SCREEN RCVD TLK: CPT | Performed by: FAMILY MEDICINE

## 2019-10-17 PROCEDURE — 2022F DILAT RTA XM EVC RTNOPTHY: CPT | Performed by: FAMILY MEDICINE

## 2019-10-17 PROCEDURE — 3017F COLORECTAL CA SCREEN DOC REV: CPT | Performed by: FAMILY MEDICINE

## 2019-10-17 PROCEDURE — G8417 CALC BMI ABV UP PARAM F/U: HCPCS | Performed by: FAMILY MEDICINE

## 2019-10-17 PROCEDURE — 83036 HEMOGLOBIN GLYCOSYLATED A1C: CPT | Performed by: FAMILY MEDICINE

## 2019-10-17 PROCEDURE — G8484 FLU IMMUNIZE NO ADMIN: HCPCS | Performed by: FAMILY MEDICINE

## 2019-10-17 RX ORDER — LOSARTAN POTASSIUM AND HYDROCHLOROTHIAZIDE 25; 100 MG/1; MG/1
TABLET ORAL
COMMUNITY
Start: 2019-01-15 | End: 2019-10-17 | Stop reason: ALTCHOICE

## 2019-10-17 ASSESSMENT — ENCOUNTER SYMPTOMS
SORE THROAT: 0
VOICE CHANGE: 1
NAUSEA: 0
SHORTNESS OF BREATH: 0
COUGH: 0
ABDOMINAL PAIN: 0

## 2019-10-21 RX ORDER — LINAGLIPTIN 5 MG/1
TABLET, FILM COATED ORAL
Qty: 30 TABLET | Refills: 1 | Status: SHIPPED | OUTPATIENT
Start: 2019-10-21 | End: 2019-11-21 | Stop reason: SDUPTHER

## 2019-10-28 RX ORDER — IRBESARTAN AND HYDROCHLOROTHIAZIDE 150; 12.5 MG/1; MG/1
TABLET, FILM COATED ORAL
Qty: 30 TABLET | Refills: 1 | Status: SHIPPED | OUTPATIENT
Start: 2019-10-28 | End: 2019-11-26 | Stop reason: SDUPTHER

## 2019-11-21 ENCOUNTER — TELEPHONE (OUTPATIENT)
Dept: FAMILY MEDICINE CLINIC | Age: 64
End: 2019-11-21

## 2019-11-25 RX ORDER — GLIPIZIDE 10 MG/1
TABLET ORAL
Qty: 180 TABLET | Refills: 1 | Status: SHIPPED | OUTPATIENT
Start: 2019-11-25 | End: 2020-07-02 | Stop reason: SDUPTHER

## 2019-11-26 RX ORDER — IRBESARTAN AND HYDROCHLOROTHIAZIDE 150; 12.5 MG/1; MG/1
TABLET, FILM COATED ORAL
Qty: 30 TABLET | Refills: 1 | Status: SHIPPED | OUTPATIENT
Start: 2019-11-26 | End: 2020-01-13

## 2020-01-09 ENCOUNTER — TELEPHONE (OUTPATIENT)
Dept: FAMILY MEDICINE CLINIC | Age: 65
End: 2020-01-09

## 2020-01-13 RX ORDER — IRBESARTAN AND HYDROCHLOROTHIAZIDE 150; 12.5 MG/1; MG/1
TABLET, FILM COATED ORAL
Qty: 30 TABLET | Refills: 1 | Status: SHIPPED | OUTPATIENT
Start: 2020-01-13 | End: 2020-04-01 | Stop reason: SDUPTHER

## 2020-01-27 RX ORDER — LEVOTHYROXINE SODIUM 0.2 MG/1
TABLET ORAL
Qty: 90 TABLET | Refills: 1 | Status: SHIPPED | OUTPATIENT
Start: 2020-01-27 | End: 2020-11-03 | Stop reason: SDUPTHER

## 2020-02-10 ENCOUNTER — OFFICE VISIT (OUTPATIENT)
Dept: FAMILY MEDICINE CLINIC | Age: 65
End: 2020-02-10
Payer: MEDICARE

## 2020-02-10 VITALS
BODY MASS INDEX: 30.73 KG/M2 | OXYGEN SATURATION: 95 % | DIASTOLIC BLOOD PRESSURE: 86 MMHG | TEMPERATURE: 97.5 F | HEIGHT: 62 IN | SYSTOLIC BLOOD PRESSURE: 138 MMHG | WEIGHT: 167 LBS | HEART RATE: 68 BPM

## 2020-02-10 PROCEDURE — 99213 OFFICE O/P EST LOW 20 MIN: CPT | Performed by: FAMILY MEDICINE

## 2020-02-10 RX ORDER — AMOXICILLIN AND CLAVULANATE POTASSIUM 875; 125 MG/1; MG/1
1 TABLET, FILM COATED ORAL 2 TIMES DAILY
Qty: 20 TABLET | Refills: 0 | Status: SHIPPED | OUTPATIENT
Start: 2020-02-10 | End: 2020-02-20

## 2020-02-10 RX ORDER — FLUTICASONE PROPIONATE 50 MCG
2 SPRAY, SUSPENSION (ML) NASAL DAILY
Qty: 1 BOTTLE | Refills: 0 | Status: SHIPPED | OUTPATIENT
Start: 2020-02-10

## 2020-02-10 ASSESSMENT — ENCOUNTER SYMPTOMS
COUGH: 0
ABDOMINAL PAIN: 0
NAUSEA: 0
SORE THROAT: 1
SHORTNESS OF BREATH: 0
VOICE CHANGE: 1
RHINORRHEA: 1

## 2020-02-10 ASSESSMENT — PATIENT HEALTH QUESTIONNAIRE - PHQ9
SUM OF ALL RESPONSES TO PHQ QUESTIONS 1-9: 0
SUM OF ALL RESPONSES TO PHQ QUESTIONS 1-9: 0
2. FEELING DOWN, DEPRESSED OR HOPELESS: 0
1. LITTLE INTEREST OR PLEASURE IN DOING THINGS: 0
SUM OF ALL RESPONSES TO PHQ9 QUESTIONS 1 & 2: 0

## 2020-02-10 NOTE — PROGRESS NOTES
Subjective:      Patient ID: Tunde Reyes is a 71 y.o. female. Visit Information    Have you changed or started any medications since your last visit including any over-the-counter medicines, vitamins, or herbal medicines? no   Are you having any side effects from any of your medications? -  no  Have you stopped taking any of your medications? Is so, why? -  no    Have you seen any other physician or provider since your last visit? No  Have you had any other diagnostic tests since your last visit? No  Have you been seen in the emergency room and/or had an admission to a hospital since we last saw you? No  Have you had your routine dental cleaning in the past 6 months? no    Have you activated your EPS account? If not, what are your barriers?  Yes     Patient Care Team:  Kristina Patel MD as PCP - General (Family Medicine)  Kristina Patel MD as PCP - [facility], DO as Consulting Physician (Obstetrics & Gynecology)    Medical History Review  Past Medical, Family, and Social History reviewed and does contribute to the patient presenting condition    Health Maintenance   Topic Date Due    Diabetic retinal exam  10/29/1964    Hepatitis B vaccine (1 of 3 - Risk 3-dose series) 10/29/1973    Colon cancer screen colonoscopy  10/29/2004    Flu vaccine (1) 09/01/2019    DEXA (modify frequency per FRAX score)  10/29/2019    Diabetic foot exam  02/05/2020    Diabetic microalbuminuria test  02/05/2020    DTaP/Tdap/Td vaccine (1 - Tdap) 03/24/2020 (Originally 10/29/1965)    Cervical cancer screen  03/24/2020 (Originally 7/18/2017)    HIV screen  03/24/2020 (Originally 10/29/1969)    Hepatitis C screen  03/25/2020 (Originally 1955)    Shingles Vaccine (1 of 2) 05/07/2020 (Originally 10/29/2004)    Lipid screen  04/26/2020    TSH testing  04/26/2020    Potassium monitoring  04/26/2020    Creatinine monitoring  04/26/2020    Breast cancer screen Thyroid: No thyromegaly. Cardiovascular:      Rate and Rhythm: Normal rate and regular rhythm. Pulmonary:      Breath sounds: Normal breath sounds. No rales. Abdominal:      General: Bowel sounds are normal.      Palpations: Abdomen is soft. Tenderness: There is no abdominal tenderness. Musculoskeletal:         General: No tenderness. Lymphadenopathy:      Cervical: No cervical adenopathy. Skin:     Findings: No rash. Neurological:      Mental Status: She is alert and oriented to person, place, and time. Assessment:        Diagnosis Orders   1. Left otitis media, unspecified otitis media type   antibiotics and nasal spray   2. Type 2 diabetes mellitus without complication, without long-term current use of insulin ([facility])   continue medication   3. Essential hypertension   controlled             Plan:         No orders of the defined types were placed in this encounter.     Orders Placed This Encounter   Medications    amoxicillin-clavulanate (AUGMENTIN) 875-125 MG per tablet     Sig: Take 1 tablet by mouth 2 times daily for 10 days     Dispense:  20 tablet     Refill:  0    fluticasone (FLONASE) 50 MCG/ACT nasal spray     Si sprays by Each Nostril route daily     Dispense:  1 Bottle     Refill:  0      keepScheduled appointment  Continue current medications reviewed from the chart            Gavi Jaramillo MA

## 2020-02-13 ENCOUNTER — TELEPHONE (OUTPATIENT)
Dept: FAMILY MEDICINE CLINIC | Age: 65
End: 2020-02-13

## 2020-02-24 ENCOUNTER — OFFICE VISIT (OUTPATIENT)
Dept: FAMILY MEDICINE CLINIC | Age: 65
End: 2020-02-24
Payer: MEDICARE

## 2020-02-24 VITALS
OXYGEN SATURATION: 97 % | WEIGHT: 166.8 LBS | HEIGHT: 62 IN | BODY MASS INDEX: 30.69 KG/M2 | DIASTOLIC BLOOD PRESSURE: 84 MMHG | HEART RATE: 74 BPM | SYSTOLIC BLOOD PRESSURE: 138 MMHG

## 2020-02-24 LAB
CREATININE URINE POCT: 100
HBA1C MFR BLD: 6.6 %
MICROALBUMIN/CREAT 24H UR: 150 MG/G{CREAT}
MICROALBUMIN/CREAT UR-RTO: NORMAL

## 2020-02-24 PROCEDURE — 99214 OFFICE O/P EST MOD 30 MIN: CPT | Performed by: FAMILY MEDICINE

## 2020-02-24 PROCEDURE — 83036 HEMOGLOBIN GLYCOSYLATED A1C: CPT | Performed by: FAMILY MEDICINE

## 2020-02-24 PROCEDURE — 82044 UR ALBUMIN SEMIQUANTITATIVE: CPT | Performed by: FAMILY MEDICINE

## 2020-02-24 ASSESSMENT — ENCOUNTER SYMPTOMS
NAUSEA: 0
ABDOMINAL PAIN: 0
SORE THROAT: 0
VOICE CHANGE: 1
SHORTNESS OF BREATH: 0

## 2020-02-24 NOTE — PROGRESS NOTES
Subjective:      Patient ID: Gin Isaac is a 71 y.o. female. Visit Information    Have you changed or started any medications since your last visit including any over-the-counter medicines, vitamins, or herbal medicines? no   Are you having any side effects from any of your medications? -  no  Have you stopped taking any of your medications? Is so, why? -  no    Have you seen any other physician or provider since your last visit? No  Have you had any other diagnostic tests since your last visit? No  Have you been seen in the emergency room and/or had an admission to a hospital since we last saw you? No  Have you had your routine dental cleaning in the past 6 months? no    Have you activated your [employer] account? If not, what are your barriers?  Yes     Patient Care Team:  Steve Martinez MD as PCP - General (Family Medicine)  Steve Martinez MD [facility] PCP [facility]  Enriqueta Soler DO as Consulting Physician (Obstetrics & Gynecology)    Medical History Review  Past Medical, Family, and Social History reviewed and does contribute to the patient presenting condition    Health Maintenance   Topic Date Due    Diabetic retinal exam  10/29/1964    Hepatitis B vaccine (1 of 3 - Risk 3-dose series) 10/29/1973    Colon cancer screen colonoscopy  10/29/2004    Flu vaccine (1) 09/01/2019    DEXA (modify frequency per FRAX score)  10/29/2019    Diabetic foot exam  02/05/2020    Diabetic microalbuminuria test  02/05/2020    Annual Wellness Visit (AWV)  02/10/2020    DTaP/Tdap/Td vaccine (1 - Tdap) 03/24/2020 (Originally 10/29/1965)    Cervical cancer screen  03/24/2020 (Originally 7/18/2017)    HIV screen  03/24/2020 (Originally 10/29/1969)    Hepatitis C screen  03/25/2020 (Originally 1955)    Shingles Vaccine (1 of 2) 05/07/2020 (Originally 10/29/2004)    Lipid screen  04/26/2020    TSH testing  04/26/2020    Potassium monitoring  04/26/2020    Creatinine monitoring moist.   Eyes:      Conjunctiva/sclera: Conjunctivae normal.   Neck:      Thyroid: No thyromegaly. Cardiovascular:      Rate and Rhythm: Normal rate and regular rhythm. Pulmonary:      Breath sounds: Normal breath sounds. No rales. Abdominal:      General: Bowel sounds are normal.      Palpations: Abdomen is soft. Tenderness: There is no abdominal tenderness. Musculoskeletal:         General: No tenderness. Comments: No pedal edema pedal pulses are palpable monofilament test is negative   Skin:     Findings: No rash. Neurological:      Mental Status: She is alert and oriented to person, place, and time. When A1c is 6.6 today and urine microalbumin is 150    Assessment:        Diagnosis Orders   1. Type 2 diabetes mellitus with microalbuminuria, without long-term current use of insulin (HCC)   controlled   2. Essential hypertension   controlled   3. Post-menopausal  DEXA Bone Density 2 Sites   4. Hypothyroidism, unspecified type   controlled             Plan:         Orders Placed This Encounter   Procedures    DEXA Bone Density 2 Sites     Standing Status:   Future     Standing Expiration Date:   8/24/2020    POCT microalbumin    POCT glycosylated hemoglobin (Hb A1C)    HM DIABETES FOOT EXAM     Orders Placed This Encounter   Medications    SITagliptin (JANUVIA) 100 MG tablet     Sig: Take 1 tablet by mouth daily     Dispense:  90 tablet     Refill:  1     Return in 4 months (on 6/24/2020) for diabetes.     Continue current medications reviewed from the chart            Bettina Morejon MA

## 2020-03-13 RX ORDER — ALBUTEROL SULFATE 90 UG/1
AEROSOL, METERED RESPIRATORY (INHALATION)
Qty: 1 INHALER | Refills: 1 | Status: SHIPPED | OUTPATIENT
Start: 2020-03-13 | End: 2020-09-14 | Stop reason: SDUPTHER

## 2020-03-13 RX ORDER — OMEPRAZOLE 20 MG/1
CAPSULE, DELAYED RELEASE ORAL
Qty: 90 CAPSULE | Refills: 1 | Status: SHIPPED | OUTPATIENT
Start: 2020-03-13 | End: 2021-05-19 | Stop reason: SDUPTHER

## 2020-03-13 NOTE — TELEPHONE ENCOUNTER
Please Approve or Refuse.   Send to Pharmacy per Pt's Request:      Next Visit Date:  6/22/2020   Last Visit Date: 2/24/2020    Hemoglobin A1C (%)   Date Value   02/24/2020 6.6   10/17/2019 6.2   05/07/2019 7.3             ( goal A1C is < 7)   BP Readings from Last 3 Encounters:   02/24/20 138/84   02/10/20 138/86   10/17/19 (!) 140/80          (goal 120/80)  BUN   Date Value Ref Range Status   04/26/2019 14 5 - 27 mg/dL Final     CREATININE   Date Value Ref Range Status   04/26/2019 0.64 0.40 - 1.00 mg/dL Final     Comment:     METHOD TRACEABLE TO IDMS STANDARD     Potassium   Date Value Ref Range Status   04/26/2019 4.7 3.5 - 5.0 mmol/L Final

## 2020-04-01 RX ORDER — IRBESARTAN AND HYDROCHLOROTHIAZIDE 150; 12.5 MG/1; MG/1
TABLET, FILM COATED ORAL
Qty: 30 TABLET | Refills: 2 | Status: SHIPPED | OUTPATIENT
Start: 2020-04-01 | End: 2020-06-22 | Stop reason: SDUPTHER

## 2020-04-23 ENCOUNTER — TELEPHONE (OUTPATIENT)
Dept: FAMILY MEDICINE CLINIC | Age: 65
End: 2020-04-23

## 2020-06-22 RX ORDER — IRBESARTAN AND HYDROCHLOROTHIAZIDE 150; 12.5 MG/1; MG/1
TABLET, FILM COATED ORAL
Qty: 30 TABLET | Refills: 2 | Status: SHIPPED | OUTPATIENT
Start: 2020-06-22 | End: 2020-07-02 | Stop reason: SDUPTHER

## 2020-07-02 ENCOUNTER — OFFICE VISIT (OUTPATIENT)
Dept: FAMILY MEDICINE CLINIC | Age: 65
End: 2020-07-02
Payer: MEDICARE

## 2020-07-02 VITALS
DIASTOLIC BLOOD PRESSURE: 84 MMHG | TEMPERATURE: 98.4 F | BODY MASS INDEX: 31.1 KG/M2 | HEART RATE: 73 BPM | WEIGHT: 169 LBS | SYSTOLIC BLOOD PRESSURE: 136 MMHG | HEIGHT: 62 IN | OXYGEN SATURATION: 96 %

## 2020-07-02 PROBLEM — F33.41 RECURRENT MAJOR DEPRESSIVE DISORDER, IN PARTIAL REMISSION (HCC): Status: ACTIVE | Noted: 2020-07-02

## 2020-07-02 PROBLEM — J44.9 CHRONIC OBSTRUCTIVE PULMONARY DISEASE (HCC): Status: ACTIVE | Noted: 2020-07-02

## 2020-07-02 PROBLEM — J45.909 ASTHMA: Status: RESOLVED | Noted: 2020-07-02 | Resolved: 2020-07-02

## 2020-07-02 PROBLEM — I44.1 MOBITZ I: Status: ACTIVE | Noted: 2019-03-18

## 2020-07-02 PROBLEM — J45.909 ASTHMA: Status: ACTIVE | Noted: 2020-07-02

## 2020-07-02 PROBLEM — R94.31 ABNORMAL EKG: Status: ACTIVE | Noted: 2020-07-02

## 2020-07-02 PROBLEM — Z80.3 FAMILY HISTORY OF BREAST CANCER IN SISTER: Status: ACTIVE | Noted: 2020-07-02

## 2020-07-02 PROBLEM — M19.90 ARTHRITIS: Status: ACTIVE | Noted: 2020-07-02

## 2020-07-02 PROBLEM — Z53.20 COLONOSCOPY REFUSED: Status: ACTIVE | Noted: 2020-07-02

## 2020-07-02 PROBLEM — E55.9 VITAMIN D DEFICIENCY: Status: ACTIVE | Noted: 2020-07-02

## 2020-07-02 PROCEDURE — 99215 OFFICE O/P EST HI 40 MIN: CPT | Performed by: FAMILY MEDICINE

## 2020-07-02 RX ORDER — TIOTROPIUM BROMIDE 18 UG/1
18 CAPSULE ORAL; RESPIRATORY (INHALATION) DAILY
Qty: 90 CAPSULE | Refills: 1 | Status: SHIPPED | OUTPATIENT
Start: 2020-07-02 | End: 2021-12-03

## 2020-07-02 RX ORDER — AMLODIPINE BESYLATE 10 MG/1
10 TABLET ORAL DAILY
Qty: 90 TABLET | Refills: 3 | Status: SHIPPED | OUTPATIENT
Start: 2020-07-02 | End: 2021-08-16

## 2020-07-02 RX ORDER — IRBESARTAN AND HYDROCHLOROTHIAZIDE 150; 12.5 MG/1; MG/1
1 TABLET, FILM COATED ORAL DAILY
Qty: 90 TABLET | Refills: 2 | Status: SHIPPED | OUTPATIENT
Start: 2020-07-02 | End: 2021-06-25

## 2020-07-02 RX ORDER — ASPIRIN 81 MG/1
81 TABLET ORAL DAILY
Qty: 90 TABLET | Refills: 1
Start: 2020-07-02

## 2020-07-02 RX ORDER — PRAVASTATIN SODIUM 40 MG
40 TABLET ORAL EVERY EVENING
Qty: 90 TABLET | Refills: 3 | Status: SHIPPED | OUTPATIENT
Start: 2020-07-02 | End: 2021-02-16 | Stop reason: SDUPTHER

## 2020-07-02 RX ORDER — GLIPIZIDE 10 MG/1
10 TABLET ORAL
Qty: 180 TABLET | Refills: 3 | Status: SHIPPED | OUTPATIENT
Start: 2020-07-02 | End: 2021-07-15 | Stop reason: SDUPTHER

## 2020-07-02 ASSESSMENT — PATIENT HEALTH QUESTIONNAIRE - PHQ9
1. LITTLE INTEREST OR PLEASURE IN DOING THINGS: 1
SUM OF ALL RESPONSES TO PHQ9 QUESTIONS 1 & 2: 1
SUM OF ALL RESPONSES TO PHQ QUESTIONS 1-9: 1
SUM OF ALL RESPONSES TO PHQ QUESTIONS 1-9: 1
2. FEELING DOWN, DEPRESSED OR HOPELESS: 0

## 2020-07-02 ASSESSMENT — ENCOUNTER SYMPTOMS
COUGH: 1
VOMITING: 0
VOICE CHANGE: 1
CONSTIPATION: 0
SHORTNESS OF BREATH: 1
ABDOMINAL DISTENTION: 0
DIARRHEA: 0
ABDOMINAL PAIN: 0
NAUSEA: 0
WHEEZING: 1
CHEST TIGHTNESS: 0

## 2020-07-02 NOTE — PATIENT INSTRUCTIONS
are trying to quit smoking. · Consider signing up for a smoking cessation program, such as the American [employer] Association's Freedom from Smoking program.  · Get text messaging support. Go to the website at www.smokefree. gov to sign up for the [facility] program.  · Set a quit date. Pick your date carefully so that it is not right in the middle of a big deadline or stressful time. Once you quit, do not even take a puff. Get rid of all ashtrays and lighters after your last cigarette. Clean your house and your clothes so that they do not smell of smoke. · Learn how to be a nonsmoker. Think about ways you can avoid those things that make you reach for a cigarette. ? Avoid situations that put you at greatest risk for smoking. For some people, it is hard to have a drink with friends without smoking. For others, they might skip a coffee break with coworkers who smoke. ? Change your daily routine. Take a different route to work or eat a meal in a different place. · Cut down on stress. Calm yourself or release tension by doing an activity you enjoy, such as reading a book, taking a hot bath, or gardening. · Talk to your doctor or pharmacist about nicotine replacement therapy, which replaces the nicotine in your body. You still get nicotine but you do not use tobacco. Nicotine replacement products help you slowly reduce the amount of nicotine you need. These products come in several forms, many of them available over-the-counter:  ? Nicotine patches  ? Nicotine gum and lozenges  ? Nicotine inhaler  · Ask your doctor about bupropion (Wellbutrin) or varenicline (Chantix), which are prescription medicines. They do not contain nicotine. They help you by reducing withdrawal symptoms, such as stress and anxiety. · Some people find hypnosis, acupuncture, and massage helpful for ending the smoking habit. · Eat a healthy diet and get regular exercise.  Having healthy habits will help your body move past its craving for (1 ounce), ½ cup of cooked cereal, or 1/3 cup of cooked pasta or rice. ? Fruits have 15 grams of carbs in a serving. A serving is 1 small fresh fruit, such as an apple or orange; ½ of a banana; ½ cup of cooked or canned fruit; ½ cup of fruit juice; 1 cup of melon or raspberries; or 2 tablespoons of dried fruit. ? Milk and no-sugar-added yogurt have 15 grams of carbs in a serving. A serving is 1 cup of milk or 2/3 cup of no-sugar-added yogurt. ? Starchy vegetables have 15 grams of carbs in a serving. A serving is ½ cup of mashed potatoes or sweet potato; 1 cup winter squash; ½ of a small baked potato; ½ cup of cooked beans; or ½ cup cooked corn or green peas. · Learn how much carbs to eat each day and at each meal. A dietitian or CDE can teach you how to keep track of the amount of carbs you eat. This is called carbohydrate counting. · If you are not sure how to count carbohydrate grams, use the Plate Method to plan meals. It is a good, quick way to make sure that you have a balanced meal. It also helps you spread carbs throughout the day. ? Divide your plate by types of foods. Put non-starchy vegetables on half the plate, meat or other protein food on one-quarter of the plate, and a grain or starchy vegetable in the final quarter of the plate. To this you can add a small piece of fruit and 1 cup of milk or yogurt, depending on how many carbs you are supposed to eat at a meal.  · Try to eat about the same amount of carbs at each meal. Do not \"save up\" your daily allowance of carbs to eat at one meal.  · Proteins have very little or no carbs per serving. Examples of proteins are beef, chicken, turkey, fish, eggs, tofu, cheese, cottage cheese, and peanut butter. A serving size of meat is 3 ounces, which is about the size of a deck of cards. Examples of meat substitute serving sizes (equal to 1 ounce of meat) are 1/4 cup of cottage cheese, 1 egg, 1 tablespoon of peanut butter, and ½ cup of tofu.   How can you eat under license by [facility] ([facility]). If you have questions about a medical condition or this instruction, always ask your healthcare professional. [address] any warranty or liability for your use of this information. Patient Education        Chronic Obstructive Pulmonary Disease (COPD): Care Instructions  Your Care Instructions     Chronic obstructive pulmonary disease (COPD) is a general term for a group of lung diseases, including emphysema and chronic bronchitis. People with COPD have decreased airflow in and out of the lungs, which makes it hard to breathe. The airways also can get clogged with thick mucus. Cigarette smoking is a major cause of COPD. Although there is no cure for COPD, you can slow its progress. Following your treatment plan and taking care of yourself can help you feel better and live longer. Follow-up care is a key part of your treatment and safety. Be sure to make and go to all appointments, and call your doctor if you are having problems. It's also a good idea to know your test results and keep a list of the medicines you take. How can you care for yourself at home? Staying healthy  · Do not smoke. This is the most important step you can take to prevent more damage to your lungs. If you need help quitting, talk to your doctor about stop-smoking programs and medicines. These can increase your chances of quitting for good. · Avoid colds and flu. Get a pneumococcal vaccine shot. If you have had one before, ask your doctor whether you need a second dose. Get the flu vaccine every fall. If you must be around people with colds or the flu, wash your hands often. · Avoid secondhand smoke, air pollution, and high altitudes. Also avoid cold, dry air and hot, humid air. Stay at home with your windows closed when air pollution is bad. Medicines and oxygen therapy  · Take your medicines exactly as prescribed.  Call your doctor if you think you are having a problem with

## 2020-07-02 NOTE — PROGRESS NOTES
Chief Complaint   Patient presents with    Diabetes    Hypertension    Hyperlipidemia    Hypothyroidism    Other     refused cologuard and fit as well as colonoscopy states not right now    Ryann.Salvage Establish Care     Prior PCP retired         Patient is here to follow-up on chronic  medical problems. Diabetes Mellitus Type II, Follow-up:    Current symptoms/problems include hyperglycemia and visual disturbances. Symptoms have been present for several years. Known diabetic complications: nephropathy    Cardiovascular risk factors: advanced age (older than 54 for men, 72 for women), diabetes mellitus, dyslipidemia, hypertension, obesity (BMI >= 30 kg/m2) and smoking/ tobacco exposure    Medication compliance:  compliant all of the time  Current diabetic medications include oral agents (dual therapy): Glucophage, sitagliptin (Januvia). Eye exam current (within one year): no   I advised Yajaira Ring to make appointment with Ophthalmology. The patient verbalizes understanding and agrees with the plan. Weight trend: increasing   Wt Readings from Last 3 Encounters:   07/02/20 169 lb (76.7 kg)   02/24/20 166 lb 12.8 oz (75.7 kg)   02/10/20 167 lb (75.8 kg)       Prior visit with dietician: no  Current diet: in general, a \"healthy\" diet  , diabetic, low fat/ cholesterol, low salt  Current exercise: no regular exercise    Barriers: none    Current monitoring regimen: home blood tests - 4 times weekly  Home blood sugar records: fasting range: 140-170, max 243  Any episodes of hypoglycemia? no    Is She on ACE inhibitor or angiotensin II receptor blocker? Yes      reports that she has been smoking cigarettes. She has a 22.50 pack-year smoking history. She has quit using smokeless tobacco.     Ready to quit: No  Counseling given: Yes      Daily Aspirin?  No, she is agreeable to start aspirin       A1c is worsening  Lab Results   Component Value Date    LABA1C 6.6 02/24/2020    LABA1C 6.2 10/17/2019 pandemic. Patient reports she is worried about this. Has been using her mask. Patient has been on Zoloft for many years, Tolerated well, denies side effects. Denies suicidal ideation, plan or intent. PHQ-2 Over the past 2 weeks, how often have you been bothered by any of the following problems? Little interest or pleasure in doing things: Several days  Feeling down, depressed, or hopeless: Not at all  PHQ-2 Score: 1  PHQ-9 Over the past 2 weeks, how often have you been bothered by any of the following problems? PHQ-9 Total Score: 1     [x]1-4 = Minimal depression    PHQ Scores 7/2/2020 2/10/2020 2/12/2019 5/31/2018 5/23/2017 2/23/2017 2/23/2017   PHQ2 Score 1 0 0 0 0 0 0   PHQ9 Score 1 0 0 0 0 0 0     Patient reports multiple times throughout the visit, that she has been wheezing lately . Apparently she has a diagnosis of COPD per prior PFTs which she is aware of:   Current treatment includes short-acting beta agonist inhaler, which has been somewhat effective. Residual symptoms: chronic dyspnea,  cough productive of clear sputum in small amounts and wheezing. dyspnea on exertion when climbing up the stairs   Wheezing on and off  Albuterol helps , not taking anything else for it    She denies purulent sputum, chest pain, hemoptysis. She requires her rescue inhaler 2 time(s) per week or more. Nicotine dependence. Smoker, counseling given to quit smoking. Patient says she has been smoking 1.5 packs in 2 days, I updated her smoking history    Ready to quit: No  Counseling given: Yes          PFTs 10/11/18 shows COPD, mild severity 2 years ago, I updated her problem list    \"DATE OF PROCEDURE:  10/11/2018   RESULTS:  The FVC is normal.  FEV1 is mildly decreased. FEV1/FVC  ratio is mildly decreased. There is no response to bronchodilators. Lung volumes show increased residual volume and total lung capacity.    Diffusion capacity is decreased and airways resistance is increased.     IMPRESSION: Mild obstructive lung disease consistent with COPD. \"    The chest x-ray on 2/8/2019 the report in epic, was unremarkable, within normal limits      Hypothyroidism: Recent symptoms: fatigue and unintentional weight gain. She denies weight loss, cold intolerance, heat intolerance, hair loss, dry skin, constipation, diarrhea, edema, tremor, palpitations and dysphagia. Patient is  taking her medication consistently on an empty stomach. TSH is Normal.    No results found for: [facility]  Lab Results   Component Value Date    TSH 0.54 04/26/2019    TSH 1.74 05/25/2018    TSH 3.560 07/28/2017     Cheryle Lathe has Vitamin D deficiency. Cheryle Lathe  is not taking Vitamin D supplementation   she feels tired. Lab Results   Component Value Date    VITD25 22 (L) 07/28/2017         I have recommended that this patient have a colonoscopy, FIT or Cologuard, but she declines at this time. I have discussed the risks and benefits of this procedure with her, including the risk of colon cancer. The patient verbalizes understanding, and she still declines. She is due for Mammogram.   Denies breast pain, lumps or nipple discharge. She declines breast exam today. Cheryle Lathe is due for HIV screening due to CDC recommendation to be screened. Cheryle Lathe denies high risk behavior. Patient is due for hepatitis C screening. Cheryle Lathe 's indication is age.         Current Outpatient Medications   Medication Sig Dispense Refill    irbesartan-hydrochlorothiazide (AVALIDE) 150-12.5 MG per tablet TAKE 1 TABLET BY MOUTH EVERY DAY 30 tablet 2    sertraline (ZOLOFT) 50 MG tablet One tab daily 90 tablet 1    albuterol sulfate HFA (VENTOLIN HFA) 108 (90 Base) MCG/ACT inhaler INHALE 2 PUFFS INTO THE LUNGS 2 TIMES DAILY AS NEEDED FOR WHEEZING 1 Inhaler 1    omeprazole (PRILOSEC) 20 MG delayed release capsule TAKE ONE CAPSULE BY MOUTH EVERY DAY 90 capsule 1    SITagliptin (JANUVIA) 100 MG tablet Take 1 tablet by mouth daily 90 tablet 1    fluticasone (FLONASE) 50 MCG/ACT nasal spray 2 sprays by Each Nostril route daily 1 Bottle 0    levothyroxine (SYNTHROID) 200 MCG tablet TAKE 1 TABLET BY MOUTH EVERY DAY 90 tablet 1    glipiZIDE (GLUCOTROL) 10 MG tablet TAKE 1 TABLET BY MOUTH TWICE A  tablet 1    pravastatin (PRAVACHOL) 40 MG tablet TAKE 1 TABLET BY MOUTH EVERY DAY 90 tablet 0    amLODIPine (NORVASC) 10 MG tablet TAKE 1 TABLET BY MOUTH EVERY DAY 90 tablet 1     No current facility-administered medications for this visit. Rest of complaints with corresponding details per ROS      The patient'spast medical, surgical, social, and family history as well as her current medications and allergies were reviewed as documented in today's encounter. Social History     Tobacco Use    Smoking status: Current Some Day Smoker     Packs/day: 0.75     Years: 30.00     Pack years: 22.50     Types: Cigarettes    Smokeless tobacco: Former User   Substance Use Topics    Alcohol use: Yes     Comment: OCCASIONAL    Drug use: No       Ready to quit: No  Counseling given: Yes    Family History   Problem Relation Age of Onset    Diabetes Mother     Diabetes Father     Breast Cancer Sister         after age 48                     Review of Systems   Constitutional: Positive for fatigue and unexpected weight change. Negative for activity change, appetite change, chills, diaphoresis and fever. HENT: Positive for voice change (chronic, she says). Negative for congestion. Eyes: Positive for visual disturbance (stable, chronic). Respiratory: Positive for cough, shortness of breath and wheezing. Negative for chest tightness. Cardiovascular: Negative for chest pain, palpitations and leg swelling. Gastrointestinal: Negative for abdominal distention, abdominal pain, constipation, diarrhea, nausea and vomiting. Endocrine: Negative for cold intolerance, heat intolerance, polydipsia, polyphagia and polyuria.    Genitourinary: Negative for difficulty urinating. Allergic/Immunologic: Negative for environmental allergies and immunocompromised state. Neurological: Negative for numbness. Psychiatric/Behavioral: Positive for dysphoric mood. Negative for self-injury, sleep disturbance and suicidal ideas. The patient is not nervous/anxious.          -vital signs stable and within normal limits except obesity per BMI and mildly low pulse ox    /84   Pulse 73   Temp 98.4 °F (36.9 °C)   Ht 5' 2\" (1.575 m)   Wt 169 lb (76.7 kg)   SpO2 96%   BMI 30.91 kg/m²         Physical Exam  Vitals signs and nursing note reviewed. Constitutional:       General: She is not in acute distress. Appearance: She is well-developed. She is obese. She is not diaphoretic. HENT:      Head: Normocephalic and atraumatic. Comments: Very hoarse     Right Ear: External ear normal.      Left Ear: External ear normal.      Nose: Nose normal. No mucosal edema or congestion. Mouth/Throat:      Mouth: Mucous membranes are moist.      Pharynx: No posterior oropharyngeal erythema. Eyes:      General: Lids are normal. No scleral icterus. Right eye: No discharge. Left eye: No discharge. Conjunctiva/sclera: Conjunctivae normal.   Neck:      Musculoskeletal: Normal range of motion and neck supple. Thyroid: No thyromegaly. Cardiovascular:      Rate and Rhythm: Normal rate and regular rhythm. Heart sounds: Normal heart sounds. No murmur. Pulmonary:      Effort: Pulmonary effort is normal. No respiratory distress. Breath sounds: Examination of the right-middle field reveals decreased breath sounds. Examination of the left-middle field reveals decreased breath sounds. Examination of the right-lower field reveals decreased breath sounds. Examination of the left-lower field reveals decreased breath sounds. Decreased breath sounds present. No wheezing or rales. Chest:      Chest wall: No tenderness.    Abdominal:      General: Bowel sounds are normal. There is no distension. Palpations: Abdomen is soft. There is no hepatomegaly or splenomegaly. Tenderness: There is no abdominal tenderness. Comments: Obese abdomen. Musculoskeletal: Normal range of motion. General: No tenderness. Right lower leg: No edema. Left lower leg: No edema. Skin:     General: Skin is warm and dry. Capillary Refill: Capillary refill takes less than 2 seconds. Findings: No rash. Neurological:      Mental Status: She is alert and oriented to person, place, and time. Cranial Nerves: No cranial nerve deficit. Motor: No abnormal muscle tone. Psychiatric:         Mood and Affect: Mood is anxious. Behavior: Behavior normal.         Thought Content: Thought content normal.         Judgment: Judgment normal.           Discussed testing withthe patient and all questions fully answered. I personally reviewed testing with patient.     High microalbumin, worsening  Hyperlipidemia worsening  High triglycerides  Vitamin D deficiency  Hyperglycemia  Mildly low sodium      Otherwise labs within normal limits    Office Visit on 02/24/2020   Component Date Value Ref Range Status    Microalb, Ur 02/24/2020 150   Final    Creatinine Ur POCT 02/24/2020 100   Final    Hemoglobin A1C 02/24/2020 6.6  % Final       Lab Results   Component Value Date    WBC 6.9 02/08/2019    HGB 13.8 02/08/2019    HCT 40.6 02/08/2019    MCV 93.5 02/08/2019     02/08/2019       Lab Results   Component Value Date     04/26/2019    K 4.7 04/26/2019    CL 97 04/26/2019    CO2 30 04/26/2019    BUN 14 04/26/2019    CREATININE 0.64 04/26/2019    GLUCOSE 212 04/26/2019    CALCIUM 10.0 04/26/2019        Lab Results   Component Value Date    ALT 11 04/26/2019    AST 14 04/26/2019    ALKPHOS 102 04/26/2019    BILITOT 1.2 04/26/2019       Lab Results   Component Value Date    TSH 0.54 04/26/2019       Lab Results   Component Value Date CHOL 184 04/26/2019    CHOL 158 05/25/2018    CHOL 166 07/28/2017     Lab Results   Component Value Date    TRIG 165 (H) 04/26/2019    TRIG 111 05/25/2018    TRIG 120 07/28/2017     Lab Results   Component Value Date    HDL 49 04/26/2019    HDL 44 05/25/2018    HDL 46 07/28/2017     Lab Results   Component Value Date    LDLCALC 102 04/26/2019    LDLCALC 92 05/25/2018    LDLCALC 96 07/28/2017     Lab Results   Component Value Date    CHOLHDLRATIO 3.8 04/26/2019    CHOLHDLRATIO 3.6 05/25/2018    CHOLHDLRATIO 3.6 07/28/2017         No results found for: [id number]  No results found for: FOLATE  Lab Results   Component Value Date    VITD25 22 (L) 07/28/2017           ASSESSMENT AND PLAN    1. Type 2 diabetes mellitus with microalbuminuria, without long-term current use of insulin (HCC)  Worsening  We will update her labs  - Hemoglobin A1C; Future  - CBC; Future  - Comprehensive Metabolic Panel; Future  - TSH without Reflex; Future  - Vitamin B12 & Folate; Future  - Microalbumin / Creatinine Urine Ratio; Future  - START aspirin EC 81 MG EC tablet; Take 1 tablet by mouth daily  Dispense: 90 tablet; Refill: 1  - glipiZIDE (GLUCOTROL) 10 MG tablet; Take 1 tablet by mouth 2 times daily (before meals)  Dispense: 180 tablet; Refill: 3  Continue Januvia 100 mg daily  -advised home blood glucose testing  daily  -daily feet exam, Foot care: advised to wash feet daily, pat dry and apply lotion at night, avoiding between toes. Need to look at feet daily and report to a physician any signs of inflammation or skin damage  -annual dilated eye exam  -Low carb, low fat diet, increase fruits and vegetables, and exercise 4-5 times a day 30-40 minutes a day discussed  -continue current treatment  -continue Aspirin  -continue ARB and statin      2. Essential hypertension  Well controlled. Continue current treatment. Will recheck labs. - CBC; Future  - Comprehensive Metabolic Panel; Future  - TSH without Reflex;  Future  - Urinalysis Reflex to Culture; Future  - irbesartan-hydrochlorothiazide (AVALIDE) 150-12.5 MG per tablet; Take 1 tablet by mouth daily TAKE 1 TABLET BY MOUTH EVERY DAY  Dispense: 90 tablet; Refill: 2  - amLODIPine (NORVASC) 10 MG tablet; Take 1 tablet by mouth daily  Dispense: 90 tablet; Refill: 3  Discussed low salt diet and BP and pulse monitoring daily    3. Hyperlipidemia with target LDL less than 100  Worsening  We will recheck labs and if still not improving, then will switch pravastatin to high intensity statin  - Lipid Panel; Future  - pravastatin (PRAVACHOL) 40 MG tablet; Take 1 tablet by mouth every evening For cholesterol  Dispense: 90 tablet; Refill: 3    4. Recurrent major depressive disorder, in partial [facility] ([facility]  Worsening due to the coronavirus pandemic  Continue Zoloft, advised to stay active, relaxation discussed  - sertraline (ZOLOFT) 50 MG tablet; Take 1 tablet by mouth every morning One tab daily  Dispense: 90 tablet; Refill: 3    5. Chronic obstructive pulmonary disease, unspecified COPD type ([facility]  Worsening  -START tiotropium (SPIRIVA HANDIHALER) 18 MCG inhalation capsule; Inhale 1 capsule into the lungs daily  Dispense: 90 capsule; Refill: 1  - XR CHEST STANDARD (2 VW); Future  Continue albuterol as needed  Patient was counseled on tobacco cessation. Based upon patient's motivation to change her behavior, the following plan was agreed upon: patient will think about his/her triggers for using tobacco, patient will think about reasons why he/she should quit using tobacco and patient is not ready to work toward tobacco cessation at this time. She was provided with a list of local tobacco cessation resources. Provider spent 7 minutes counseling patient. 6. Acquired hypothyroidism  Stable  Continue Synthroid 200 MCG daily  Advised to take Synthroid in the morning, on empty stomach, without any other meds and with a full glass of water.       7. Vitamin D deficiency  Likely worsening  - Vitamin D 25 Hydroxy; Future  We will consider starting vitamin D supplementation  8. Colonoscopy refused    I have recommended that this patient have a colonoscopy, FIT or Cologuard, but she declines at this time. I have discussed the risks and benefits of this procedure with her, including the risk of colon cancer. The patient verbalizes understanding. 9. Encounter for screening mammogram for breast cancer  - SHARYN DIGITAL SCREEN W CAD BILATERAL; Future    10. Encounter for screening for other viral diseases  - HIV Screen; Future  - Hepatitis C Antibody;  Future    Data Unavailable    Orders Placed This Encounter   Procedures    SHARYN DIGITAL SCREEN W CAD BILATERAL     Standing Status:   Future     Standing Expiration Date:   12/2/2020    XR CHEST STANDARD (2 VW)     Standing Status:   Future     Standing Expiration Date:   7/2/2021     Order Specific Question:   Reason for exam:     Answer:   Cough    HIV Screen     Standing Status:   Future     Standing Expiration Date:   12/2/2020    Hepatitis C Antibody     Standing Status:   Future     Standing Expiration Date:   12/2/2020    Hemoglobin A1C     Standing Status:   Future     Standing Expiration Date:   12/2/2020    CBC     Standing Status:   Future     Standing Expiration Date:   12/2/2020    Comprehensive Metabolic Panel     Standing Status:   Future     Standing Expiration Date:   12/2/2020    Lipid Panel     Standing Status:   Future     Standing Expiration Date:   12/2/2020     Order Specific Question:   Is Patient Fasting?/# of Hours     Answer:   8-10 Hours, water ok to drink    TSH without Reflex     Standing Status:   Future     Standing Expiration Date:   12/2/2020    Vitamin B12 & Folate     Standing Status:   Future     Standing Expiration Date:   12/2/2020    Microalbumin / Creatinine Urine Ratio     Standing Status:   Future     Standing Expiration Date:   12/2/2020    Urinalysis Reflex to Culture     Standing Status:   Future Standing Expiration Date:   12/2/2020     Order Specific Question:   SPECIFY(EX-CATH,MIDSTREAM,CYSTO,ETC)? Answer:   midstream    Vitamin D 25 Hydroxy     Standing Status:   Future     Standing Expiration Date:   12/2/2020       Medications Discontinued During This Encounter   Medication Reason    irbesartan-hydrochlorothiazide (AVALIDE) 150-12.5 MG per tablet REORDER    amLODIPine (NORVASC) 10 MG tablet REORDER    pravastatin (PRAVACHOL) 40 MG tablet REORDER    glipiZIDE (GLUCOTROL) 10 MG tablet REORDER    sertraline (ZOLOFT) 50 MG tablet Cortez Alejandro received counseling on the following healthy behaviors: nutrition, exercise, medication adherence, tobacco cessation and weight loss  Reviewed prior labs and health maintenance  Continue current medications, diet and exercise. Discussed use, benefit, and side effects of prescribed medications. Barriers to medication compliance addressed. Patient given educational materials - see patient instructions  Was a self-tracking handout given in paper form or via [employer]? Yes    Requested Prescriptions     Signed Prescriptions Disp Refills    aspirin EC 81 MG EC tablet 90 tablet 1     Sig: Take 1 tablet by mouth daily    irbesartan-hydrochlorothiazide (AVALIDE) 150-12.5 MG per tablet 90 tablet 2     Sig: Take 1 tablet by mouth daily TAKE 1 TABLET BY MOUTH EVERY DAY    amLODIPine (NORVASC) 10 MG tablet 90 tablet 3     Sig: Take 1 tablet by mouth daily    pravastatin (PRAVACHOL) 40 MG tablet 90 tablet 3     Sig: Take 1 tablet by mouth every evening For cholesterol    glipiZIDE (GLUCOTROL) 10 MG tablet 180 tablet 3     Sig: Take 1 tablet by mouth 2 times daily (before meals)    sertraline (ZOLOFT) 50 MG tablet 90 tablet 3     Sig: Take 1 tablet by mouth every morning One tab daily    tiotropium (SPIRIVA HANDIHALER) 18 MCG inhalation capsule 90 capsule 1     Sig: Inhale 1 capsule into the lungs daily       All patient questions answered.   Patient voiced understanding. Quality Measures    Body mass index is 30.91 kg/m². Elevated. Weight control planned discussed conventional weight loss and Healthy diet and regular exercise. BP: 136/84 Blood pressure is normal. Treatment plan consists of Weight Reduction, DASH Eating Plan, Dietary Sodium Restriction, Increased Physical Activity, Avoid Tobacco and Second-hand Smoke, Patient In-home Blood Pressure Monitoring and No treatment change needed. The patient's past medical,surgical, social, and family history as well as her   current medications and allergies were reviewed as documented in today's encounter. Medications, labs, diagnostic studies, consultations and follow-up asdocumented in this encounter. Return in about 4 months (around 11/2/2020) for AWM. Patient was seen with total face to face time of 40 minutes due to complexity of care and multiple comorbidities, reviewing her chart, and establishing care . More than 50% of this visit was counseling and education. Future Appointments   Date Time Provider Kathy Davis   11/3/2020  9:30 AM Ellie Gandhi MD [email or web address]       This note was completed by using the assistance of a speech-recognition program. However, inadvertent computerized transcription errors may be present. Although every effort was made to ensure accuracy, no guarantees can be provided that every mistake has been identified and corrected by editing .     Electronically signed by Ellie Gandhi MD on 7/2/2020 at 6:43 PM

## 2020-07-13 ENCOUNTER — HOSPITAL ENCOUNTER (OUTPATIENT)
Age: 65
Setting detail: SPECIMEN
Discharge: HOME | End: 2020-07-13
Payer: MEDICARE

## 2020-07-13 PROBLEM — E87.8 ELECTROLYTE IMBALANCE: Status: ACTIVE | Noted: 2020-07-13

## 2020-07-13 LAB
-: ABNORMAL
ALBUMIN SERPL-MCNC: 4.1 G/DL (ref 3.5–5.2)
ALBUMIN/GLOBULIN RATIO: ABNORMAL (ref 1–2.5)
ALP BLD-CCNC: 100 U/L (ref 35–104)
ALT SERPL-CCNC: 11 U/L (ref 5–33)
AMORPHOUS: ABNORMAL
ANION GAP SERPL CALCULATED.3IONS-SCNC: 10 MMOL/L (ref 9–17)
AST SERPL-CCNC: 15 U/L
BACTERIA: ABNORMAL
BILIRUB SERPL-MCNC: 0.79 MG/DL (ref 0.3–1.2)
BILIRUBIN URINE: NEGATIVE
BUN BLDV-MCNC: 10 MG/DL (ref 8–23)
BUN/CREAT BLD: ABNORMAL (ref 9–20)
CALCIUM SERPL-MCNC: 9.4 MG/DL (ref 8.6–10.4)
CASTS UA: ABNORMAL /LPF
CHLORIDE BLD-SCNC: 90 MMOL/L (ref 98–107)
CHOLESTEROL/HDL RATIO: 3.7
CHOLESTEROL: 195 MG/DL
CO2: 27 MMOL/L (ref 20–31)
COLOR: YELLOW
COMMENT UA: ABNORMAL
CREAT SERPL-MCNC: 0.53 MG/DL (ref 0.5–0.9)
CREATININE URINE: 84.8 MG/DL (ref 28–217)
CRYSTALS, UA: ABNORMAL /HPF
EPITHELIAL CELLS UA: ABNORMAL /HPF
FOLATE: 11.5 NG/ML
GFR AFRICAN AMERICAN: >60 ML/MIN
GFR NON-AFRICAN AMERICAN: >60 ML/MIN
GFR SERPL CREATININE-BSD FRML MDRD: ABNORMAL ML/MIN/{1.73_M2}
GFR SERPL CREATININE-BSD FRML MDRD: ABNORMAL ML/MIN/{1.73_M2}
GLUCOSE BLD-MCNC: 203 MG/DL (ref 70–99)
GLUCOSE URINE: NEGATIVE
HCT VFR BLD CALC: 45.5 % (ref 36–46)
HDLC SERPL-MCNC: 53 MG/DL
HEMOGLOBIN: 15.4 G/DL (ref 12–16)
HEPATITIS C ANTIBODY: NONREACTIVE
HIV AG/AB: NONREACTIVE
KETONES, URINE: NEGATIVE
LDL CHOLESTEROL: 118 MG/DL (ref 0–130)
LEUKOCYTE ESTERASE, URINE: NEGATIVE
MCH RBC QN AUTO: 31.9 PG (ref 26–34)
MCHC RBC AUTO-ENTMCNC: 33.7 G/DL (ref 31–37)
MCV RBC AUTO: 94.5 FL (ref 80–100)
MICROALBUMIN/CREAT 24H UR: 218 MG/L
MICROALBUMIN/CREAT UR-RTO: 257 MCG/MG CREAT
MUCUS: ABNORMAL
NITRITE, URINE: NEGATIVE
NRBC AUTOMATED: NORMAL PER 100 WBC
OTHER OBSERVATIONS UA: ABNORMAL
PDW BLD-RTO: 14.2 % (ref 11.5–14.9)
PH UA: 6.5 (ref 5–8)
PLATELET # BLD: 342 K/UL (ref 150–450)
PMV BLD AUTO: 7.9 FL (ref 6–12)
POTASSIUM SERPL-SCNC: 4.6 MMOL/L (ref 3.7–5.3)
PROTEIN UA: ABNORMAL
RBC # BLD: 4.82 M/UL (ref 4–5.2)
RBC UA: ABNORMAL /HPF
RENAL EPITHELIAL, UA: ABNORMAL /HPF
SODIUM BLD-SCNC: 127 MMOL/L (ref 135–144)
SPECIFIC GRAVITY UA: 1.01 (ref 1–1.03)
TOTAL PROTEIN: 7.9 G/DL (ref 6.4–8.3)
TRICHOMONAS: ABNORMAL
TRIGL SERPL-MCNC: 120 MG/DL
TSH SERPL DL<=0.05 MIU/L-ACNC: 0.77 MIU/L (ref 0.3–5)
TURBIDITY: CLEAR
URINE HGB: NEGATIVE
UROBILINOGEN, URINE: NORMAL
VITAMIN B-12: 520 PG/ML (ref 232–1245)
VITAMIN D 25-HYDROXY: 18.4 NG/ML (ref 30–100)
VLDLC SERPL CALC-MCNC: NORMAL MG/DL (ref 1–30)
WBC # BLD: 8.4 K/UL (ref 3.5–11)
WBC UA: ABNORMAL /HPF
YEAST: ABNORMAL

## 2020-07-13 PROCEDURE — 80053 COMPREHEN METABOLIC PANEL: CPT

## 2020-07-13 PROCEDURE — 84443 ASSAY THYROID STIM HORMONE: CPT

## 2020-07-13 PROCEDURE — 82607 VITAMIN B-12: CPT

## 2020-07-13 PROCEDURE — 36415 COLL VENOUS BLD VENIPUNCTURE: CPT

## 2020-07-13 PROCEDURE — 82570 ASSAY OF URINE CREATININE: CPT

## 2020-07-13 PROCEDURE — 82306 VITAMIN D 25 HYDROXY: CPT

## 2020-07-13 PROCEDURE — 83036 HEMOGLOBIN GLYCOSYLATED A1C: CPT

## 2020-07-13 PROCEDURE — 85027 COMPLETE CBC AUTOMATED: CPT

## 2020-07-13 PROCEDURE — 86803 HEPATITIS C AB TEST: CPT

## 2020-07-13 PROCEDURE — 87389 HIV-1 AG W/HIV-1&-2 AB AG IA: CPT

## 2020-07-13 PROCEDURE — 82043 UR ALBUMIN QUANTITATIVE: CPT

## 2020-07-13 PROCEDURE — 82746 ASSAY OF FOLIC ACID SERUM: CPT

## 2020-07-13 PROCEDURE — 80061 LIPID PANEL: CPT

## 2020-07-13 PROCEDURE — 81001 URINALYSIS AUTO W/SCOPE: CPT

## 2020-07-13 RX ORDER — ERGOCALCIFEROL 1.25 MG/1
50000 CAPSULE ORAL WEEKLY
Qty: 12 CAPSULE | Refills: 0 | Status: SHIPPED | OUTPATIENT
Start: 2020-07-13 | End: 2020-09-29

## 2020-07-14 LAB
ESTIMATED AVERAGE GLUCOSE: 148 MG/DL
HBA1C MFR BLD: 6.8 % (ref 4–6)

## 2020-09-14 RX ORDER — ALBUTEROL SULFATE 90 UG/1
2 AEROSOL, METERED RESPIRATORY (INHALATION) EVERY 6 HOURS PRN
Qty: 1 INHALER | Refills: 3 | Status: SHIPPED | OUTPATIENT
Start: 2020-09-14 | End: 2022-05-09 | Stop reason: SDUPTHER

## 2020-09-14 NOTE — TELEPHONE ENCOUNTER
Please Approve or Refuse.   Send to Pharmacy per Pt's Request:      Next Visit Date:  11/3/2020   Last Visit Date: 7/2/2020    Hemoglobin A1C (%)   Date Value   07/13/2020 6.8 (H)   02/24/2020 6.6   10/17/2019 6.2             ( goal A1C is < 7)   BP Readings from Last 3 Encounters:   07/02/20 136/84   02/24/20 138/84   02/10/20 138/86          (goal 120/80)  BUN   Date Value Ref Range Status   07/13/2020 10 8 - 23 mg/dL Final     CREATININE   Date Value Ref Range Status   07/13/2020 0.53 0.50 - 0.90 mg/dL Final     Potassium   Date Value Ref Range Status   07/13/2020 4.6 3.7 - 5.3 mmol/L Final

## 2020-09-29 RX ORDER — ERGOCALCIFEROL 1.25 MG/1
CAPSULE ORAL
Qty: 12 CAPSULE | Refills: 0 | Status: SHIPPED | OUTPATIENT
Start: 2020-09-29 | End: 2022-04-26 | Stop reason: SDUPTHER

## 2020-09-29 NOTE — TELEPHONE ENCOUNTER
Last seen 7/2/20    Next Visit Date:  Future Appointments   Date Time Provider Kathy Davis   11/3/2020  9:30 AM Basia Her MD [facility] [address] Maintenance   Topic Date Due    Diabetic retinal exam  10/29/1965    DTaP/Tdap/Td vaccine (1 - Tdap) 10/29/1973    Shingles Vaccine (1 of 2) 10/29/2004    Colon cancer screen colonoscopy  10/29/2004    DEXA (modify frequency per FRAX score)  10/29/2009    Cervical cancer screen  07/18/2017    Annual Wellness Visit (AWV)  02/10/2020    Breast cancer screen  05/14/2020    Flu vaccine (1) 09/01/2020    Diabetic foot exam  02/24/2021    Statin Therapy  07/02/2021    A1C test (Diabetic or Prediabetic)  07/13/2021    Lipid screen  07/13/2021    TSH testing  07/13/2021    Potassium monitoring  07/13/2021    Creatinine monitoring  07/13/2021    Pneumococcal 65+ years Vaccine (1 of 1 - PPSV23) 09/25/2022    Hepatitis C screen  Completed    HIV screen  Completed    Hepatitis A vaccine  Aged Out    Hib vaccine  Aged Out    Meningococcal (ACWY) vaccine  Aged Out       Hemoglobin A1C (%)   Date Value   07/13/2020 6.8 (H)   02/24/2020 6.6   10/17/2019 6.2             ( goal A1C is < 7)   Microalb/Crt.  Ratio (mcg/mg creat)   Date Value   07/13/2020 257 (H)     LDL Cholesterol (mg/dL)   Date Value   07/13/2020 118     LDL Calculated (mg/dL)   Date Value   04/26/2019 102       (goal LDL is <100)   AST (U/L)   Date Value   07/13/2020 15     ALT (U/L)   Date Value   07/13/2020 11     BUN (mg/dL)   Date Value   07/13/2020 10     BP Readings from Last 3 Encounters:   07/02/20 136/84   02/24/20 138/84   02/10/20 138/86          (goal 120/80)    All Future Testing planned in [facility]  Lab Frequency Next Occurrence   SHARYN DIGITAL SCREEN W CAD BILATERAL Once 12/02/2020   XR CHEST STANDARD (2 VW) Once 59/74/1653   Basic Metabolic Panel Once 02/23/1691               Patient Active Problem List:     Essential hypertension     Uterine polyp     Hyperlipidemia with target LDL less than 100     Hypothyroidism     Type 2 diabetes mellitus with microalbuminuria, without long-term current use of insulin (HCC)     Abnormal EKG     Mobitz I     Arthritis     Vitamin D deficiency     Recurrent major depressive disorder, in partial remission ([address]     Chronic obstructive pulmonary disease ([address]     Colonoscopy refused     Family history of breast cancer in sister     Electrolyte imbalance         \

## 2020-11-03 ENCOUNTER — OFFICE VISIT (OUTPATIENT)
Dept: FAMILY MEDICINE CLINIC | Age: 65
End: 2020-11-03
Payer: MEDICARE

## 2020-11-03 ENCOUNTER — HOSPITAL ENCOUNTER (OUTPATIENT)
Age: 65
Setting detail: SPECIMEN
Discharge: HOME | End: 2020-11-03
Payer: MEDICARE

## 2020-11-03 VITALS
WEIGHT: 172 LBS | HEART RATE: 81 BPM | HEIGHT: 62 IN | SYSTOLIC BLOOD PRESSURE: 128 MMHG | OXYGEN SATURATION: 95 % | DIASTOLIC BLOOD PRESSURE: 74 MMHG | BODY MASS INDEX: 31.65 KG/M2

## 2020-11-03 LAB
ALBUMIN SERPL-MCNC: 4.3 G/DL (ref 3.5–5.2)
ALBUMIN/GLOBULIN RATIO: ABNORMAL (ref 1–2.5)
ALP BLD-CCNC: 110 U/L (ref 35–104)
ALT SERPL-CCNC: 10 U/L (ref 5–33)
ANION GAP SERPL CALCULATED.3IONS-SCNC: 10 MMOL/L (ref 9–17)
AST SERPL-CCNC: 16 U/L
BILIRUB SERPL-MCNC: 0.9 MG/DL (ref 0.3–1.2)
BUN BLDV-MCNC: 8 MG/DL (ref 8–23)
BUN/CREAT BLD: ABNORMAL (ref 9–20)
CALCIUM SERPL-MCNC: 9.5 MG/DL (ref 8.6–10.4)
CHLORIDE BLD-SCNC: 92 MMOL/L (ref 98–107)
CO2: 29 MMOL/L (ref 20–31)
CREAT SERPL-MCNC: 0.65 MG/DL (ref 0.5–0.9)
GFR AFRICAN AMERICAN: >60 ML/MIN
GFR NON-AFRICAN AMERICAN: >60 ML/MIN
GFR SERPL CREATININE-BSD FRML MDRD: ABNORMAL ML/MIN/{1.73_M2}
GFR SERPL CREATININE-BSD FRML MDRD: ABNORMAL ML/MIN/{1.73_M2}
GLUCOSE BLD-MCNC: 142 MG/DL (ref 70–99)
HBA1C MFR BLD: 6.6 %
HCT VFR BLD CALC: 47 % (ref 36–46)
HEMOGLOBIN: 15.9 G/DL (ref 12–16)
MCH RBC QN AUTO: 32 PG (ref 26–34)
MCHC RBC AUTO-ENTMCNC: 33.9 G/DL (ref 31–37)
MCV RBC AUTO: 94.2 FL (ref 80–100)
NRBC AUTOMATED: ABNORMAL PER 100 WBC
PDW BLD-RTO: 14.4 % (ref 11.5–14.9)
PLATELET # BLD: 321 K/UL (ref 150–450)
PMV BLD AUTO: 8 FL (ref 6–12)
POTASSIUM SERPL-SCNC: 4.9 MMOL/L (ref 3.7–5.3)
RBC # BLD: 4.99 M/UL (ref 4–5.2)
SODIUM BLD-SCNC: 131 MMOL/L (ref 135–144)
THYROXINE, FREE: 0.93 NG/DL (ref 0.93–1.7)
TOTAL PROTEIN: 8.1 G/DL (ref 6.4–8.3)
TSH SERPL DL<=0.05 MIU/L-ACNC: 10.39 MIU/L (ref 0.3–5)
WBC # BLD: 9.6 K/UL (ref 3.5–11)

## 2020-11-03 PROCEDURE — 85027 COMPLETE CBC AUTOMATED: CPT

## 2020-11-03 PROCEDURE — 80053 COMPREHEN METABOLIC PANEL: CPT

## 2020-11-03 PROCEDURE — 36415 COLL VENOUS BLD VENIPUNCTURE: CPT

## 2020-11-03 PROCEDURE — 90694 VACC AIIV4 NO PRSRV 0.5ML IM: CPT | Performed by: FAMILY MEDICINE

## 2020-11-03 PROCEDURE — 84439 ASSAY OF FREE THYROXINE: CPT

## 2020-11-03 PROCEDURE — 84443 ASSAY THYROID STIM HORMONE: CPT

## 2020-11-03 PROCEDURE — G0008 ADMIN INFLUENZA VIRUS VAC: HCPCS | Performed by: FAMILY MEDICINE

## 2020-11-03 PROCEDURE — 83036 HEMOGLOBIN GLYCOSYLATED A1C: CPT | Performed by: FAMILY MEDICINE

## 2020-11-03 PROCEDURE — G0402 INITIAL PREVENTIVE EXAM: HCPCS | Performed by: FAMILY MEDICINE

## 2020-11-03 RX ORDER — VARENICLINE TARTRATE
KIT
Qty: 1 BOX | Refills: 0 | Status: SHIPPED | OUTPATIENT
Start: 2020-11-03 | End: 2021-12-03

## 2020-11-03 RX ORDER — LEVOTHYROXINE SODIUM 0.2 MG/1
200 TABLET ORAL
Qty: 90 TABLET | Refills: 3 | Status: SHIPPED | OUTPATIENT
Start: 2020-11-03 | End: 2022-01-03

## 2020-11-03 ASSESSMENT — LIFESTYLE VARIABLES
AUDIT-C TOTAL SCORE: 3
HOW OFTEN DURING THE LAST YEAR HAVE YOU HAD A FEELING OF GUILT OR REMORSE AFTER DRINKING: 0
HAVE YOU OR SOMEONE ELSE BEEN INJURED AS A RESULT OF YOUR DRINKING: 0
HOW OFTEN DURING THE LAST YEAR HAVE YOU FOUND THAT YOU WERE NOT ABLE TO STOP DRINKING ONCE YOU HAD STARTED: 0
HAS A RELATIVE, FRIEND, DOCTOR, OR ANOTHER HEALTH PROFESSIONAL EXPRESSED CONCERN ABOUT YOUR DRINKING OR SUGGESTED YOU CUT DOWN: 0
HOW OFTEN DURING THE LAST YEAR HAVE YOU NEEDED AN ALCOHOLIC DRINK FIRST THING IN THE MORNING TO GET YOURSELF GOING AFTER A NIGHT OF HEAVY DRINKING: 0
HOW OFTEN DURING THE LAST YEAR HAVE YOU FAILED TO DO WHAT WAS NORMALLY EXPECTED FROM YOU BECAUSE OF DRINKING: 0
HOW MANY STANDARD DRINKS CONTAINING ALCOHOL DO YOU HAVE ON A TYPICAL DAY: 1
HOW OFTEN DO YOU HAVE A DRINK CONTAINING ALCOHOL: 2
HOW OFTEN DURING THE LAST YEAR HAVE YOU BEEN UNABLE TO REMEMBER WHAT HAPPENED THE NIGHT BEFORE BECAUSE YOU HAD BEEN DRINKING: 0
AUDIT TOTAL SCORE: 3
HOW OFTEN DO YOU HAVE SIX OR MORE DRINKS ON ONE OCCASION: 0

## 2020-11-03 ASSESSMENT — PATIENT HEALTH QUESTIONNAIRE - PHQ9
1. LITTLE INTEREST OR PLEASURE IN DOING THINGS: 0
SUM OF ALL RESPONSES TO PHQ QUESTIONS 1-9: 0
SUM OF ALL RESPONSES TO PHQ QUESTIONS 1-9: 0
SUM OF ALL RESPONSES TO PHQ9 QUESTIONS 1 & 2: 0
SUM OF ALL RESPONSES TO PHQ QUESTIONS 1-9: 0
2. FEELING DOWN, DEPRESSED OR HOPELESS: 0

## 2020-11-03 NOTE — RESULT ENCOUNTER NOTE
ABNORMAL. Please notify patient. Blood glucose 142 improved, improved diabetes  Low sodium improved 131  Thyroid is abnormal, did she miss Synthroid? I would not like to increase the Synthroid dosage as she is already on a high dosage, needs to take it on empty stomach and no other medications or food for 30 minutes  Red blood cells are increasing likely related to smoking, great job she wants to quit smoking!   Otherwise labs within normal limits  I would like her to complete the chest x-ray which I have ordered in July    Lab Results       Component                Value               Date                       LABA1C                   6.6                 11/03/2020                 LABA1C                   6.8 (H)             07/13/2020                 LABA1C                   6.6                 02/24/2020                Future Appointments  3/3/2021   9:30 AM    MD goran Love          [id number]

## 2020-11-03 NOTE — PATIENT INSTRUCTIONS
DASH Diet: Care Instructions  Your Care Instructions     The DASH diet is an eating plan that can help lower your blood pressure. DASH stands for Dietary Approaches to Stop Hypertension. Hypertension is high blood pressure. The DASH diet focuses on eating foods that are high in calcium, potassium, and magnesium. These nutrients can lower blood pressure. The foods that are highest in these nutrients are fruits, vegetables, low-fat dairy products, nuts, seeds, and legumes. But taking calcium, potassium, and magnesium supplements instead of eating foods that are high in those nutrients does not have the same effect. The DASH diet also includes whole grains, fish, and poultry. The DASH diet is one of several lifestyle changes your doctor may recommend to lower your high blood pressure. Your doctor may also want you to decrease the amount of sodium in your diet. Lowering sodium while following the DASH diet can lower blood pressure even further than just the DASH diet alone. Follow-up care is a key part of your treatment and safety. Be sure to make and go to all appointments, and call your doctor if you are having problems. It's also a good idea to know your test results and keep a list of the medicines you take. How can you care for yourself at home? Following the DASH diet  · Eat 4 to 5 servings of fruit each day. A serving is 1 medium-sized piece of fruit, ½ cup chopped or canned fruit, 1/4 cup dried fruit, or 4 ounces (½ cup) of fruit juice. Choose fruit more often than fruit juice. · Eat 4 to 5 servings of vegetables each day. A serving is 1 cup of lettuce or raw leafy vegetables, ½ cup of chopped or cooked vegetables, or 4 ounces (½ cup) of vegetable juice. Choose vegetables more often than vegetable juice. · Get 2 to 3 servings of low-fat and fat-free dairy each day. A serving is 8 ounces of milk, 1 cup of yogurt, or 1 ½ ounces of cheese. · Eat 6 to 8 servings of grains each day.  A serving is 1 slice of bread, 1 ounce of dry cereal, or ½ cup of cooked rice, pasta, or cooked cereal. Try to choose whole-grain products as much as possible. · Limit lean meat, poultry, and fish to 2 servings each day. A serving is 3 ounces, about the size of a deck of cards. · Eat 4 to 5 servings of nuts, seeds, and legumes (cooked dried beans, lentils, and split peas) each week. A serving is 1/3 cup of nuts, 2 tablespoons of seeds, or ½ cup of cooked beans or peas. · Limit fats and oils to 2 to 3 servings each day. A serving is 1 teaspoon of vegetable oil or 2 tablespoons of salad dressing. · Limit sweets and added sugars to 5 servings or less a week. A serving is 1 tablespoon jelly or jam, ½ cup sorbet, or 1 cup of lemonade. · Eat less than 2,300 milligrams (mg) of sodium a day. If you limit your sodium to 1,500 mg a day, you can lower your blood pressure even more. Tips for success  · Start small. Do not try to make dramatic changes to your diet all at once. You might feel that you are missing out on your favorite foods and then be more likely to not follow the plan. Make small changes, and stick with them. Once those changes become habit, add a few more changes. · Try some of the following:  ? Make it a goal to eat a fruit or vegetable at every meal and at snacks. This will make it easy to get the recommended amount of fruits and vegetables each day. ? Try yogurt topped with fruit and nuts for a snack or healthy dessert. ? Add lettuce, tomato, cucumber, and onion to sandwiches. ? Combine a ready-made pizza crust with low-fat mozzarella cheese and lots of vegetable toppings. Try using tomatoes, squash, spinach, broccoli, carrots, cauliflower, and onions. ? Have a variety of cut-up vegetables with a low-fat dip as an appetizer instead of chips and dip. ? Sprinkle sunflower seeds or chopped almonds over salads. Or try adding chopped walnuts or almonds to cooked vegetables.   ? Try some vegetarian meals using beans and peas. Add garbanzo or kidney beans to salads. Make burritos and tacos with mashed dasilva beans or black beans. Where can you learn more? Go to https://chviviane.ReconRobotics. org and sign in to your [employer] account. Enter Q956 in the [facility] box to learn more about \"DASH Diet: Care Instructions. \"     If you do not have an account, please click on the \"Sign Up Now\" link. Current as of: December 16, 2019               Content Version: 12.6  © 4427-6892 [employer]af[employer]. Care instructions adapted under license by [facility] ([facility]). If you have questions about a medical condition or this instruction, always ask your healthcare professional. [address] any warranty or liability for your use of this information. Learning About Cutting Calories  How do calories affect your weight? Food gives your body energy. Energy from the food you eat is measured in calories. This energy keeps your heart beating, your brain active, and your muscles working. Your body needs a certain number of calories each day. After your body uses the calories it needs, it stores extra calories as fat. To lose weight safely, you have to eat fewer calories while eating in a healthy way. How many calories do you need each day? The more active you are, the more calories you need. When you are less active, you need fewer calories. How many calories you need each day also depends on several things, including your age and whether you are male or female. Here are some general guidelines for adults:  · Less active women and older adults need 1,600 to 2,000 calories each day. · Active women and less active men need 2,000 to 2,400 calories each day. · Active men need 2,400 to 3,000 calories each day. How can you cut calories and eat healthy meals? Whole grains, vegetables and fruits, and dried beans are good lower-calorie foods. They give you lots of nutrients and fiber.  And they fill you up. Sweets, energy drinks, and soda pop are high in calories. They give you few nutrients and no fiber. Try to limit soda pop, fruit juice, and energy drinks. Drink water instead. Some fats can be part of a healthy diet. But cutting back on fats from highly processed foods like fast foods and many snack foods is a good way to lower the calories in your diet. Also, use smaller amounts of fats like butter, margarine, salad dressing, and mayonnaise. Add fresh garlic, lemon, or herbs to your meals to add flavor without adding fat. Meats and dairy products can be a big source of hidden fats. Try to choose lean or low-fat versions of these products. Fat-free cookies, candies, chips, and frozen treats can still be high in sugar and calories. Some fat-free foods have more calories than regular ones. Eat fat-free treats in moderation, as you would other foods. If your favorite foods are high in fat, salt, sugar, or calories, limit how often you eat them. Eat smaller servings, or look for healthy substitutes. Fill up on fruits, vegetables, and whole grains. Eating at home  · Use meat as a side dish instead of as the main part of your meal.  · Try main dishes that use whole wheat pasta, brown rice, dried beans, or vegetables. · Find ways to cook with little or no fat, such as broiling, steaming, or grilling. · Use cooking spray instead of oil. If you use oil, use a monounsaturated oil, such as canola or olive oil. · Trim fat from meats before you cook them. · Drain off fat after you brown the meat or while you roast it. · Chill soups and stews after you cook them. Then skim the fat off the top after it hardens. Eating out  · Order foods that are broiled or poached rather than fried or breaded. · Cut back on the amount of butter or margarine that you use on bread. · Order sauces, gravies, and salad dressings on the side, and use only a little.   · When you order pasta, choose tomato sauce rather than cream sauce.  · Ask for salsa with your baked potato instead of sour cream, butter, cheese, or chamorro. · Order meals in a small size instead of upgrading to a large. · Share an entree, or take part of your food home to eat as another meal.  · Share appetizers and desserts. Where can you learn more? Go to https://chpepiceweb.restOpolis. org and sign in to your [employer] account. Enter X720 in the [employer] box to learn more about \"Learning About Cutting Calories. \"     If you do not have an account, please click on the \"Sign Up Now\" link. Current as of: August 22, 2019               Content Version: 12.6  © 3470-0247 [employer]genico[employer] Incorporated. Care instructions adapted under license by [facility] ([facility]). If you have questions about a medical condition or this instruction, always ask your healthcare professional. [address] any warranty or liability for your use of this information. Learning About Healthy Weight  What is a healthy weight? A healthy weight is the weight at which you feel good about yourself and have energy for work and play. It's also one that lowers your risk for health problems. What can you do to stay at a healthy weight? It can be hard to stay at a healthy weight, especially when fast food, vending-machine snacks, and processed foods are so easy to find. And with your busy lifestyle, activity may be low on your list of things to do. But staying at a healthy weight may be easier than you think. Here are some dos and don'ts for staying at a healthy weight:  Do eat healthy foods  The kinds of foods you eat have a big impact on both your weight and your health. Reaching and staying at a healthy weight is not about going on a diet. It's about making healthier food choices every day and changing your diet for good. Healthy eating means eating a variety of foods so that you get all the nutrients you need.  Your body needs protein, carbohydrate, and fats you're at rest. Being active helps you lose fat and build lean muscle. Try to be active for at least 1 hour every day. This may sound like a lot, but it's okay to be active in smaller blocks of time that add up to 1 hour a day. Any activity that makes your heart beat faster and keeps it there for a while counts. A brisk walk, run, or swim will get your heart beating faster. So will climbing stairs, shooting baskets, or cycling. Even some household chores like vacuuming and mowing the lawn will get your heart rate up. Pick activities that you enjoy--ones that make your heart beat faster, your muscles stronger, and your muscles and joints more flexible. If you find more than one thing you like doing, do them all. You don't have to do the same thing every day. Don't diet  Diets don't work. Diets are temporary. Because you give up so much when you diet, you may be hungry and think about food all the time. And after you stop dieting, you also may overeat to make up for what you missed. Most people who diet end up gaining back the pounds they lost--and more. Remember that healthy bodies come in lots of shapes and sizes. Everyone can get healthier by eating better and being more active. Where can you learn more? Go to https://NN LABSpaulHighlightCam.Risktail. org and sign in to your [email or web address] account. Enter 341 [id number] in the [facility] box to learn more about \"Learning About Healthy Weight. \"     If you do not have an account, please click on the \"Sign Up Now\" link. Current as of: December 11, 2019               Content Version: 12.6  © 2932-9572 vLex, Incorporated. Care instructions adapted under license by [facility] ([facility]). If you have questions about a medical condition or this instruction, always ask your healthcare professional. [address] any warranty or liability for your use of this information. Learning About Low-Carbohydrate Diets  What is a low-carbohydrate diet? A low-carbohydrate (or \"low-carb\") diet limits foods and drinks that have carbohydrates. This includes grains, fruits, milk and yogurt, and starchy vegetables like potatoes, beans, and corn. It also avoids foods and drinks that have added sugar. Instead, low-carb diets include foods that are high in protein and fat. Why might you follow a low-carb diet? Low-carb diets may be used for a variety of reasons, such as for weight loss. People who have diabetes may use a low-carb diet to help manage their blood sugar levels. What should you do before you start the diet? Talk to your doctor before you try any diet. This is even more important if you have health problems like kidney disease, heart disease, or diabetes. Your doctor may suggest that you meet with a registered dietitian. A dietitian can help you make an eating plan that works for you. What foods do you eat on a low-carb diet? On a low-carb diet, you choose foods that are high in protein and fat. Examples of these are:  · Meat, poultry, and fish. · Eggs. · Nuts, such as walnuts, pecans, almonds, and peanuts. · Peanut butter and other nut butters. · Tofu. · Avocado. · Kwasi Julyelbach. · Non-starchy vegetables like broccoli, cauliflower, green beans, mushrooms, peppers, lettuce, and spinach. · Unsweetened non-dairy milks like almond milk and coconut milk. · Cheese, cottage cheese, and cream cheese. Current as of: August 22, 2019               Content Version: 12.6  © 2580-2863 [employer] Incorporated. Care instructions adapted under license by [facility] ([facility]). If you have questions about a medical condition or this instruction, always ask your healthcare professional. [address] any warranty or liability for your use of this information. Eating Healthy Foods: Care Instructions  Your Care Instructions     Eating healthy foods can help lower your risk for disease.  Healthy food gives you energy and keeps your heart strong, your brain active, your muscles working, and your bones strong. A healthy diet includes a variety of foods from the basic food groups: grains, vegetables, fruits, milk and milk products, and meat and beans. Some people may eat more of their favorite foods from only one food group and, as a result, miss getting the nutrients they need. So, it is important to pay attention not only to what you eat but also to what you are missing from your diet. You can eat a healthy, balanced diet by making a few small changes. Follow-up care is a key part of your treatment and safety. Be sure to make and go to all appointments, and call your doctor if you are having problems. It's also a good idea to know your test results and keep a list of the medicines you take. How can you care for yourself at home? Look at what you eat  · Keep a food diary for a week or two and record everything you eat or drink. Track the number of servings you eat from each food group. · For a balanced diet every day, eat a variety of:  ? 6 or more ounce-equivalents of grains, such as cereals, breads, crackers, rice, or pasta, every day. An ounce-equivalent is 1 slice of bread, 1 cup of ready-to-eat cereal, or ½ cup of cooked rice, cooked pasta, or cooked cereal.  ? 2½ cups of vegetables, especially:  § Dark-green vegetables such as broccoli and spinach. § Orange vegetables such as carrots and sweet potatoes. § Dry beans (such as dasilva and kidney beans) and peas (such as lentils). ? 2 cups of fresh, frozen, or canned fruit. A small apple or 1 banana or orange equals 1 cup. ? 3 cups of nonfat or low-fat milk, yogurt, or other milk products. ? 5½ ounces of meat and beans, such as chicken, fish, lean meat, beans, nuts, and seeds. One egg, 1 tablespoon of peanut butter, ½ ounce nuts or seeds, or ¼ cup of cooked beans equals 1 ounce of meat. · Learn how to read food labels for serving sizes and ingredients.  Fast-food and convenience-food meals often contain few or no fruits or vegetables. Make sure you eat some fruits and vegetables to make the meal more nutritious. · Look at your food diary. For each food group, add up what you have eaten and then divide the total by the number of days. This will give you an idea of how much you are eating from each food group. See if you can find some ways to change your diet to make it more healthy. Start small  · Do not try to make dramatic changes to your diet all at once. You might feel that you are missing out on your favorite foods and then be more likely to fail. · Start slowly, and gradually change your habits. Try some of the following:  ? Use whole wheat bread instead of white bread. ? Use nonfat or low-fat milk instead of whole milk. ? Eat brown rice instead of white rice, and eat whole wheat pasta instead of white-flour pasta. ? Try low-fat cheeses and low-fat yogurt. ? Add more fruits and vegetables to meals and have them for snacks. ? Add lettuce, tomato, cucumber, and onion to sandwiches. ? Add fruit to yogurt and cereal.  Enjoy food  · You can still eat your favorite foods. You just may need to eat less of them. If your favorite foods are high in fat, salt, and sugar, limit how often you eat them, but do not cut them out entirely. · Eat a wide variety of foods. Make healthy choices when eating out  · The type of restaurant you choose can help you make healthy choices. Even fast-food chains are now offering more low-fat or healthier choices on the menu. · Choose smaller portions, or take half of your meal home. · When eating out, try:  ? A veggie pizza with a whole wheat crust or grilled chicken (instead of sausage or pepperoni). ? Pasta with roasted vegetables, grilled chicken, or marinara sauce instead of cream sauce. ? A vegetable wrap or grilled chicken wrap. ? Broiled or poached food instead of fried or breaded items.   Make healthy choices easy  · Buy packaged, prewashed, ready-to-eat fresh vegetables and fruits, such as baby carrots, salad mixes, and chopped or shredded broccoli and cauliflower. · Buy packaged, presliced fruits, such as melon or pineapple. · Choose 100% fruit or vegetable juice instead of soda. Limit juice intake to 4 to 6 oz (½ to ¾ cup) a day. · Blend low-fat yogurt, fruit juice, and canned or frozen fruit to make a smoothie for breakfast or a snack. Where can you learn more? Go to https://CentrixtianSeedrs.Aereo. org and sign in to your [email or web address] account. Enter S615 in the [employer] box to learn more about \"Eating Healthy Foods: Care Instructions. \"     If you do not have an account, please click on the \"Sign Up Now\" link. Current as of: August 22, 2019               Content Version: 12.6  © 2538-3873 [employer]Squared. Care instructions adapted under license by [facility] ([facility]). If you have questions about a medical condition or this instruction, always ask your healthcare professional. [place] [address] any warranty or liability for your use of this information. Abnormal Weight Gain: Care Instructions  Your Care Instructions     There are two types of weight gain--normal and abnormal. Normal weight gain is usually caused by eating too much or exercising too little. It can also happen as you get older. But abnormal weight gain has other causes. It can be caused by a problem with your thyroid gland, called hypothyroidism. Or it can be caused by a problem with your adrenal glands, called Cushing's syndrome. Or your body could be holding too much fluid because of kidney, liver, or heart problems. In some cases, a medicine you take can cause you to gain weight. You can work with your doctor to find out the cause of your weight gain. You will probably need tests to do this. Follow-up care is a key part of your treatment and safety. Be sure to make and go to all appointments, and call your doctor if you are having problems.  It's also a good idea to know your test results and keep a list of the medicines you take. How can you care for yourself at home? · Weigh yourself at the same time every day. It's best to do it first thing in the morning after you empty your bladder. Be sure to always wear the same amount of clothing. · Write down any changes in your weight and the possible causes. Discuss these with your doctor. · Your doctor may want you to change your diet and exercise habits. A good way to lose weight is to reduce calories and increase exercise. · Walking is an easy way to get exercise. Try to walk a little longer every day. You also may want to swim, bike, or do other activities. · Ask your doctor if you should see a dietitian. This is a person who can help you plan meals that work best for your lifestyle. · If your doctor prescribed medicines, take them exactly as prescribed. Call your doctor if you think you are having a problem with your medicine. You will get more details on the specific medicines your doctor prescribes. When should you call for help? Watch closely for changes in your health, and be sure to contact your doctor if:    · You do not get better as expected.     · You continue to gain weight. Where can you learn more? Go to https://mysportgrouppeSimple Admiteb.Daemonic Labs. org and sign in to your [employer] account. Enter A175 in the [facility] box to learn more about \"Abnormal Weight Gain: Care Instructions. \"     If you do not have an account, please click on the \"Sign Up Now\" link. Current as of: December 11, 2019               Content Version: 12.6  © 7018-2628 WineMeNow, Incorporated. Care instructions adapted under license by [facility] ([facility]). If you have questions about a medical condition or this instruction, always ask your healthcare professional. [address] any warranty or liability for your use of this information.            Stopping Smoking: Care Instructions  Your Care Instructions     Cigarette smokers crave the nicotine in cigarettes. Giving it up is much harder than simply changing a habit. Your body has to stop craving the nicotine. It is hard to quit, but you can do it. There are many tools that people use to quit smoking. You may find that combining tools works best for you. There are several steps to quitting. First you get ready to quit. Then you get support to help you. After that, you learn new skills and behaviors to become a nonsmoker. For many people, a necessary step is getting and using medicine. Your doctor will help you set up the plan that best meets your needs. You may want to attend a smoking cessation program to help you quit smoking. When you choose a program, look for one that has proven success. Ask your doctor for ideas. You will greatly increase your chances of success if you take medicine as well as get counseling or join a cessation program.  Some of the changes you feel when you first quit tobacco are uncomfortable. Your body will miss the nicotine at first, and you may feel short-tempered and grumpy. You may have trouble sleeping or concentrating. Medicine can help you deal with these symptoms. You may struggle with changing your smoking habits and rituals. The last step is the tricky one: Be prepared for the smoking urge to continue for a time. This is a lot to deal with, but keep at it. You will feel better. Follow-up care is a key part of your treatment and safety. Be sure to make and go to all appointments, and call your doctor if you are having problems. It's also a good idea to know your test results and keep a list of the medicines you take. How can you care for yourself at home? · Ask your family, friends, and coworkers for support. You have a better chance of quitting if you have help and support. · Join a support group, such as Nicotine Anonymous, for people who are trying to quit smoking.   · Consider signing up for a smoking cessation program, such as the American Lung Association's Freedom from Smoking program.  · Get text messaging support. Go to the website at www.smokefree. gov to sign up for the [facility] program.  · Set a quit date. Pick your date carefully so that it is not right in the middle of a big deadline or stressful time. Once you quit, do not even take a puff. Get rid of all ashtrays and lighters after your last cigarette. Clean your house and your clothes so that they do not smell of smoke. · Learn how to be a nonsmoker. Think about ways you can avoid those things that make you reach for a cigarette. ? Avoid situations that put you at greatest risk for smoking. For some people, it is hard to have a drink with friends without smoking. For others, they might skip a coffee break with coworkers who smoke. ? Change your daily routine. Take a different route to work or eat a meal in a different place. · Cut down on stress. Calm yourself or release tension by doing an activity you enjoy, such as reading a book, taking a hot bath, or gardening. · Talk to your doctor or pharmacist about nicotine replacement therapy, which replaces the nicotine in your body. You still get nicotine but you do not use tobacco. Nicotine replacement products help you slowly reduce the amount of nicotine you need. These products come in several forms, many of them available over-the-counter:  ? Nicotine patches  ? Nicotine gum and lozenges  ? Nicotine inhaler  · Ask your doctor about bupropion (Wellbutrin) or varenicline (Chantix), which are prescription medicines. They do not contain nicotine. They help you by reducing withdrawal symptoms, such as stress and anxiety. · Some people find hypnosis, acupuncture, and massage helpful for ending the smoking habit. · Eat a healthy diet and get regular exercise. Having healthy habits will help your body move past its craving for nicotine. · Be prepared to keep trying.  Most people are not successful the first few times they try to quit. Do not get mad at yourself if you smoke again. Make a list of things you learned and think about when you want to try again, such as next week, next month, or next year. Where can you learn more? Go to https://chpelisa.Iperia. org and sign in to your [employer] account. Enter W270 in the [facility] box to learn more about \"Stopping Smoking: Care Instructions. \"     If you do not have an account, please click on the \"Sign Up Now\" link. Current as of: March 12, 2020               Content Version: 12.6  © 3654-0332 [employer], [employer]. Care instructions adapted under license by [facility] ([facility]). If you have questions about a medical condition or this instruction, always ask your healthcare professional. [address] any warranty or liability for your use of this information. Learning About Benefits From Quitting Smoking  How does quitting smoking make you healthier? If you're thinking about quitting smoking, you may have a few reasons to be smoke-free. Your health may be one of them. · When you quit smoking, you lower your risks for cancer, lung disease, heart attack, stroke, blood vessel disease, and blindness from macular degeneration. · When you're smoke-free, you get sick less often, and you heal faster. You are less likely to get colds, flu, bronchitis, and pneumonia. · As a nonsmoker, you may find that your mood is better and you are less stressed. When and how will you feel healthier? Quitting has real health benefits that start from day 1 of being smoke-free. And the longer you stay smoke-free, the healthier you get and the better you feel. The first hours  · After just 20 minutes, your blood pressure and heart rate go down. That means there's less stress on your heart and blood vessels. · Within 12 hours, the level of carbon monoxide in your blood drops back to normal. That makes room for more oxygen.  With more oxygen in your body, you may notice that you have more energy than when you smoked. After 2 weeks  · Your lungs start to work better. · Your risk of heart attack starts to drop. After 1 month  · When your lungs are clear, you cough less and breathe deeper, so it's easier to be active. · Your sense of taste and smell return. That means you can enjoy food more than you have since you started smoking. Over the years  · Over the years, your risks of heart disease, heart attack, and stroke are lower. · After 10 years, your risk of dying from lung cancer is cut by about half. And your risk for many other types of cancer is lower too. How would quitting help others in your life? When you quit smoking, you improve the health of everyone who now breathes in your smoke. · Their heart, lung, and cancer risks drop, much like yours. · They are sick less. For babies and small children, living smoke-free means they're less likely to have ear infections, pneumonia, and bronchitis. · If you're a woman who is or will be pregnant someday, quitting smoking means a healthier . · Children who are close to you are less likely to become adult smokers. Where can you learn more? Go to https://Duck Duck Moose.Groove Customer Support. org and sign in to your [employer] account. Enter [phone] in the [facility] box to learn more about \"Learning About Benefits From Quitting Smoking. \"     If you do not have an account, please click on the \"Sign Up Now\" link. Current as of: 2020               Content Version: 12.6  © 7409-5400 [employer]. Care instructions adapted under license by [facility] ([facility]). If you have questions about a medical condition or this instruction, always ask your healthcare professional. [place] [address] any warranty or liability for your use of this information.            Deciding About Using Medicines To Quit Smoking  How can you decide about using medicines to quit smoking? What are the medicines you can use? Your doctor may prescribe varenicline (Chantix) or bupropion (Zyban). These medicines can help you cope with cravings for tobacco. They are pills that don't contain nicotine. You also can use nicotine replacement products. These do contain nicotine. There are many types. · Gum and lozenges slowly release nicotine into your mouth. · Patches stick to your skin. They slowly release nicotine into your bloodstream.  · An inhaler has a fischer that contains nicotine. You breathe in a puff of nicotine vapor through your mouth and throat. · Nasal spray releases a mist that contains nicotine. What are key points about this decision? · Using medicines can double your chances of quitting smoking. They can ease cravings and withdrawal symptoms. · Getting counseling along with using medicine can raise your chances of quitting even more. · If you smoke fewer than 5 cigarettes a day, you may not need medicines to help you quit smoking. · These medicines have less nicotine than cigarettes. And by itself, nicotine is not nearly as harmful as smoking. The tars, carbon monoxide, and other toxic chemicals in tobacco cause the harmful effects. · The side effects of nicotine replacement products depend on the type of product. For example, a patch can make your skin red and itchy. Medicines in pill form can make you sick to your stomach. They can also cause dry mouth and trouble sleeping. For most people, the side effects are not bad enough to make them stop using the products. Why might you choose to use medicines to quit smoking? · You have tried on your own to stop smoking, but you were not able to stop. · You smoke more than 5 cigarettes a day. · You want to increase your chances of quitting smoking. · You want to reduce your cravings and withdrawal symptoms. · You feel the benefits of medicine outweigh the side effects. Why might you choose not to use medicine?   · You want to try quitting on your own by stopping all at once (\"cold turkey\"). · You want to cut back slowly on the number of cigarettes you smoke. · You smoke fewer than 5 cigarettes a day. · You do not like using medicine. · You feel the side effects of medicines outweigh the benefits. · You are worried about the cost of medicines. Your decision  Thinking about the facts and your feelings can help you make a decision that is right for you. Be sure you understand the benefits and risks of your options, and think about what else you need to do before you make the decision. Where can you learn more? Go to https://chpepiceweb.Qalendra. org and sign in to your [employer] account. Enter C821 in the "[employer]" box to learn more about \"Deciding About Using Medicines To Quit Smoking. \"     If you do not have an account, please click on the \"Sign Up Now\" link. Current as of: March 12, 2020               Content Version: 12.6  © 5556-7936 [place], [employer]. Care instructions adapted under license by [facility] . If you have questions about a medical condition or this instruction, always ask your healthcare professional. [place] [address] any warranty or liability for your use of this information. Personalized Preventive Plan for Winnie Abbott - 11/3/2020  Medicare offers a range of preventive health benefits. Some of the tests and screenings are paid in full while other may be subject to a deductible, co-insurance, and/or copay. Some of these benefits include a comprehensive review of your medical history including lifestyle, illnesses that may run in your family, and various assessments and screenings as appropriate. After reviewing your medical record and screening and assessments performed today your provider may have ordered immunizations, labs, imaging, and/or referrals for you.   A list of these orders (if applicable) as well as your Preventive Care list are included within your After Visit Summary for your review. Other Preventive Recommendations:    · A preventive eye exam performed by an eye specialist is recommended every 1-2 years to screen for glaucoma; cataracts, macular degeneration, and other eye disorders. · A preventive dental visit is recommended every 6 months. · Try to get at least 150 minutes of exercise per week or 10,000 steps per day on a pedometer . · Order or download the FREE \"Exercise & Physical Activity: Your Everyday Guide\" from [employer]. Call 9-572.654.8940 or search [employer] online. · You need 4907-0908 mg of calcium and 6675-3946 IU of vitamin D per day. It is possible to meet your calcium requirement with diet alone, but a vitamin D supplement is usually necessary to meet this goal.  · When exposed to the sun, use a sunscreen that protects against both UVA and UVB radiation with an SPF of 30 or greater. Reapply every 2 to 3 hours or after sweating, drying off with a towel, or swimming. · Always wear a seat belt when traveling in a car. Always wear a helmet when riding a bicycle or motorcycle.

## 2020-11-03 NOTE — RESULT ENCOUNTER NOTE
Addressed during office visit today, A1c 6.6, abnormal, improved diabetes, continue treatment recommended during the office visit

## 2020-11-03 NOTE — PROGRESS NOTES
Visit Information    Have you changed or started any medications since your last visit including any over-the-counter medicines, vitamins, or herbal medicines? no   Are you having any side effects from any of your medications? -  no  Have you stopped taking any of your medications? Is so, why? -  no    Have you seen any other physician or provider since your last visit? No  Have you had any other diagnostic tests since your last visit? No  Have you been seen in the emergency room and/or had an admission to a hospital since we last saw you? Yes - Records Requested  Have you had your routine dental cleaning in the past 6 months? no    Have you activated your [employer] account? If not, what are your barriers?  Yes     Patient Care Team:  Jesika Leonard MD as PCP - General (Family Medicine)  Jesika Leonard MD as PCP - [facility]  Max Chung DO as Consulting Physician (Obstetrics & Gynecology)    Medical History Review  Past Medical, Family, and Social History reviewed and does contribute to the patient presenting condition    Health Maintenance   Topic Date Due    Diabetic retinal exam  10/29/1964    DTaP/Tdap/Td vaccine (1 - Tdap) 10/29/1973    Shingles Vaccine (1 of 2) 10/29/2004    Colon cancer screen colonoscopy  10/29/2004    DEXA (modify frequency per FRAX score)  10/29/2009    Annual Wellness Visit (AWV)  02/10/2020    Breast cancer screen  05/14/2020    Flu vaccine (1) 09/01/2020    Diabetic foot exam  02/24/2021    A1C test (Diabetic or Prediabetic)  07/13/2021    Lipid screen  07/13/2021    TSH testing  07/13/2021    Potassium monitoring  07/13/2021    Creatinine monitoring  07/13/2021    Pneumococcal 65+ years Vaccine (1 of 1 - PPSV23) 09/25/2022    Hepatitis C screen  Completed    Hepatitis A vaccine  Aged Out    Hib vaccine  Aged Out    Meningococcal (ACWY) vaccine  Aged Out

## 2020-11-03 NOTE — PROGRESS NOTES
Medicare Annual Wellness Visit  Name: Camacho Brown Date: 11/3/2020   MRN: [id number] Sex: Female   Age: 76 y.o. Ethnicity: Non-/Non    : 1955 Race: Divya Cortez is here for Medicare AWV    Screenings for behavioral, psychosocial and functional/safety risks, and cognitive dysfunction are all negative except as indicated below. These results, as well as other patient data from the [address] form, are documented in Flowsheets linked to this Encounter. Allergies   Allergen Reactions    Lisinopril Other (See Comments)     cough    Metformin And Related Diarrhea       Prior to Visit Medications    Medication Sig Taking?  Authorizing Provider   vitamin D (ERGOCALCIFEROL) 1.25 MG (05303 UT) CAPS capsule TAKE 1 CAPSULE BY MOUTH ONE TIME PER WEEK Yes Jesika Leonard MD   SITagliptin (JANUVIA) 100 MG tablet Take 1 tablet by mouth daily Yes Jesika Leonard MD   albuterol sulfate HFA (VENTOLIN HFA) 108 (90 Base) MCG/ACT inhaler Inhale 2 puffs into the lungs every 6 hours as needed for Wheezing or Shortness of Breath (cough) INHALE 2 PUFFS INTO THE LUNGS 2 TIMES DAILY AS NEEDED FOR WHEEZING Yes Jesika Leonard MD   aspirin EC 81 MG EC tablet Take 1 tablet by mouth daily Yes Jesika Leonard MD   irbesartan-hydrochlorothiazide (AVALIDE) 150-12.5 MG per tablet Take 1 tablet by mouth daily TAKE 1 TABLET BY MOUTH EVERY DAY Yes Jesika Leonard MD   amLODIPine (NORVASC) 10 MG tablet Take 1 tablet by mouth daily Yes Jesika Leonard MD   pravastatin (PRAVACHOL) 40 MG tablet Take 1 tablet by mouth every evening For cholesterol Yes Jesika Leonard MD   glipiZIDE (GLUCOTROL) 10 MG tablet Take 1 tablet by mouth 2 times daily (before meals) Yes Jesika Leonard MD   sertraline (ZOLOFT) 50 MG tablet Take 1 tablet by mouth every morning One tab daily Yes Jesika Leonard MD   tiotropium (SPIRIVA HANDIHALER) 18 MCG inhalation capsule Inhale 1 capsule into the lungs daily Yes Jerrica Hyman MD   omeprazole (PRILOSEC) 20 MG delayed release capsule TAKE ONE CAPSULE BY MOUTH EVERY DAY Yes Franco Cho MD   fluticasone (FLONASE) 50 MCG/ACT nasal spray 2 sprays by Each Nostril route daily Yes Franco Cho MD   levothyroxine (SYNTHROID) 200 MCG tablet TAKE 1 TABLET BY MOUTH EVERY DAY Yes Franco Cho MD         Past Medical History:   Diagnosis Date    Arthritis     Asthma     Diabetes mellitus ([address]     Family history of breast cancer in sister 7/2/2020    Hyperlipidemia     Hypertension     Thyroid condition        Past Surgical History:   Procedure Laterality Date    DILATION AND CURETTAGE OF UTERUS      HYSTEROSCOPY  10/07/14    D&C poypectomy with myosure    TONSILLECTOMY      TUBAL LIGATION      16yrs ago. Family History   Problem Relation Age of Onset    Diabetes Mother     Diabetes Father     Breast Cancer Sister         after age 48        CareTeam (Including outside providers/suppliers regularly involved in providing care):   Patient Care Team:  Jerrica Hyman MD as PCP - General (Family Medicine)  Jerrica Hyman MD as PCP - [facility] Empaneled Provider  Sheri Jones DO as Consulting Physician (Obstetrics & Gynecology)      Vital signs within normal limits except obesity per BMI and mildly low pulse ox  Wt Readings from Last 3 Encounters:   11/03/20 172 lb (78 kg)   07/02/20 169 lb (76.7 kg)   02/24/20 166 lb 12.8 oz (75.7 kg)     Vitals:    11/03/20 0915   BP: 128/74   Pulse: 81   SpO2: 95%   Weight: 172 lb (78 kg)   Height: 5' 2\" (1.575 m)     Body mass index is 31.46 kg/m². Based upon direct observation of the patient, evaluation of cognition reveals recent and remote memory intact.     Physical exam    General Appearance: alert and oriented to person, place and time, well-developed and well-nourished, in no acute distress  Head: normocephalic and atraumatic  ENT: tympanic membrane, external ear and ear canal normal bilaterally, hoarseness  I did not examine the mouth due to coronavirus pandemic and wearing masks      Neck: neck supple and non tender without mass   Lungs: Decreased breath sounds bilateral   No leg edema bilateral     Patient never completed a chest x-ray I have ordered for her on 7/2/2020    Patient's complete Health Risk Assessment and screening values have been reviewed and are found in Flowsheets. The following problems were reviewed today and where indicated follow up appointments were made and/or referrals ordered. Positive Risk Factor Screenings with Interventions:     Substance History:  Social History     Tobacco History     Smoking Status  Current Some Day Smoker Smoking Frequency  0.75 packs/day for 30 years (22.5 pk yrs) Smoking Tobacco Type  Cigarettes    Smokeless Tobacco Use  Former User          Alcohol History     Alcohol Use Status  Yes Comment  OCCASIONAL          Drug Use     Drug Use Status  No          Sexual Activity     Sexually Active  Yes               Alcohol Screening: Audit-C Score: 3  Total Score: 3    A score of 8 or more is associated with harmful or hazardous drinking. A score of 13 or more in women, and 15 or more in men, is likely to indicate alcohol dependence.   Substance Abuse Interventions:  · Tobacco abuse:  tobacco cessation tips and resources provided, patient agrees to think about his/her triggers for tobacco use, why he/she should quit, and learn more about the harmful effects of tobacco, patient agrees to start an exercise program to relieve stress and help avoid weight gain associated with tobacco cessation, patient agrees to tell someone he/she is trying to quit smoking, patient agrees to set a quit date-patient says she wants to quit smoking \"NOW\", patient agrees to try the following tobacco cessation strategies:  willpower and Chantix: risks and benefits of this medication discussed- patient will call for any significant adverse effects  · Alcohol misuse/dependence:  educational materials provided, patient agrees to limit alcohol intake to no more than 7 drinks/week  and  NO MORE THAN 1 DRINK AT A TIME, patient says she will try  ·     General Health and ACP:  General  In general, how would you say your health is?: Fair  In the past 7 days, have you experienced any of the following?  New or Increased Pain, New or Increased Fatigue, Loneliness, Social Isolation, Stress or Anger?: None of These  Do you get the social and emotional support that you need?: Yes  Do you have a Living Will?: (!) No(states she is in process of getting one)  Advance Directives     Power of [facility] Will ACP-Advance Directive ACP-Power of     Not on File Not on File Filed [address] [facility] Risk Interventions:  · No Living Will: Patient declines ACP discussion/assistance, has a  helping her do the living will  ·     Health Habits/Nutrition:  Health Habits/Nutrition  Do you exercise for at least 20 minutes 2-3 times per week?: (!) No  Have you lost any weight without trying in the past 3 months?: No  Do you eat fewer than 2 meals per day?: No  Have you seen a dentist within the past year?: (!) No(dentures)  Body mass index: (!) 31.46  Health Habits/Nutrition Interventions:  · Inadequate physical activity:  patient agrees to increase physical activity as follows: As tolerated, she does not want to go outside due to the coronavirus pandemic  · Nutritional issues:  educational materials for healthy, well-balanced diet provided, Low-carb diet advised to help her lose weight  · Dental exam overdue:  patient declines dental evaluation as patient has dentures which I do agree with as patient has dentures, I do agree with not seeing the dentist at this time    Hearing/Vision:   Hearing Screening    125Hz 250Hz 500Hz 1000Hz 2000Hz 3000Hz 4000Hz 6000Hz 8000Hz   Right ear:            Left ear:               Visual Acuity Screening    Right eye Left eye Both eyes   Without correction: 20/25 20/25 20/25   With correction:        Hearing/Vision  Do you or your family notice any trouble with your hearing?: No  Do you have difficulty driving, watching TV, or doing any of your daily activities because of your eyesight?: No  Have you had an eye exam within the past year?: (!) No  Hearing/Vision Interventions:  · Vision concerns:  patient encouraged to make appointment with his/her eye specialist      Patient has known diabetes mellitus type 2     Home Blood Glucose 154 about 1 week ago when she last tested  A1c improved, praise given  Patient reports compliance with her diabetic medications  Lab Results   Component Value Date    LABA1C 6.6 11/03/2020    LABA1C 6.8 (H) 07/13/2020    LABA1C 6.6 02/24/2020       COPD:  Current treatment includes short-acting beta agonist inhaler, anticholinergic inhaler, and has been effective. Residual symptoms: none. She denies increased dyspnea, decreased exercise tolerance, cough, purulent sputum, wheezing, chest tightness, chest pain. She requires her rescue inhaler 1 time(s) per week. Nicotine dependence. Smoker, counseling given to quit smoking. Ready to quit: Yes  Counseling given: Yes    Hypothyroidism: Recent symptoms: fatigue at baseline, unintentional weight gain. She denies  weight loss, cold intolerance, heat intolerance, hair loss, dry skin, constipation, diarrhea, edema, tremor, palpitations and dysphagia. Patient is  taking her medication consistently on an empty stomach.     TSH is Normal.    No results found for: [facility]  Lab Results   Component Value Date    TSH 0.77 07/13/2020    TSH 0.54 04/26/2019            Personalized Preventive Plan   Current Health Maintenance Status  Immunization History   Administered Date(s) Administered    Influenza Virus Vaccine 10/12/2012, 11/13/2014, 11/19/2015    Influenza, Quadv, IM, (6 mo and older Fluzone, Flulaval, Fluarix and 3 yrs and older Afluria) 11/11/2016    Influenza, Quadv, IM, PF (6 mo and older Fluzone, Flulaval, Fluarix, and 3 yrs and older Afluria) 09/24/2018    Influenza, Quadv, adjuvanted, 65 yrs +, IM, PF (Fluad) 11/03/2020    Pneumococcal Conjugate 7-valent (Prevnar7) 10/07/2011    Pneumococcal Polysaccharide ([id number]) 09/25/2017        Health Maintenance   Topic Date Due    Diabetic retinal exam  10/29/1964    DTaP/Tdap/Td vaccine (1 - Tdap) 10/29/1973    Shingles Vaccine (1 of 2) 10/29/2004    Colon cancer screen colonoscopy  10/29/2004    DEXA (modify frequency per FRAX score)  10/29/2009    Annual Wellness Visit (AWV)  02/10/2020    Breast cancer screen  05/14/2020    Diabetic foot exam  02/24/2021    Lipid screen  07/13/2021    TSH testing  07/13/2021    Potassium monitoring  07/13/2021    Creatinine monitoring  07/13/2021    A1C test (Diabetic or Prediabetic)  11/03/2021    Pneumococcal 65+ years Vaccine (1 of 1 - PPSV23) 09/25/2022    Flu vaccine  Completed    Hepatitis C screen  Completed    Hepatitis A vaccine  Aged Out    Hib vaccine  Aged Out    Meningococcal (ACWY) vaccine  Aged Out     Recommendations for [place] Due: see orders and patient instructions/AVS.  . Recommended screening schedule for the next 5-10 years is provided to the patient in written form: see Patient Instructions/AVS.    Jose Lara was seen today for medicare aw. Diagnoses and all orders for this visit:    Routine general medical examination at a health care facility    Type 2 diabetes mellitus with microalbuminuria, without long-term current use of insulin (HCC)    -Improved       POCT glycosylated hemoglobin (Hb A1C)  Lab Results   Component Value Date    LABA1C 6.6 11/03/2020    LABA1C 6.8 (H) 07/13/2020    LABA1C 6.6 02/24/2020       -     SITagliptin (JANUVIA) 100 MG tablet; Take 1 tablet by mouth daily  -     CBC; Future  -     Comprehensive Metabolic Panel; Future  -     TSH without Reflex;  Future  -     T4, Free; Future  -advised home blood glucose testing  daily  -daily feet exam, Foot care: advised to wash feet daily, pat dry and apply lotion at night, avoiding between toes. Need to look at feet daily and report to a physician any signs of inflammation or skin damage  -annual dilated eye exam  -Low carb, low fat diet, increase fruits and vegetables, and exercise 4-5 times a day 30-40 minutes a day discussed  -continue current treatment  -continue Aspirin  -continue ARB and statin        Colon cancer screening  -     POCT Fecal Immunochemical Test (FIT); Future    Post-menopausal  -     DEXA BONE DENSITY 2 SITES; Future    Encounter for screening mammogram for breast cancer  -     SHARYN DIGITAL SCREEN W OR WO CAD BILATERAL; Future    Personal history of smoking  -    START varenicline (CHANTIX STARTING MONTH CHRIS) 0.5 MG X 11 & 1 MG X 42 tablet; 0.5 mg po daily x 3 days, 0.5 mg BID x 4 days, then 1 mg BID thereafter . Call for refill  Counseling given  · Tobacco abuse:  tobacco cessation tips and resources provided, patient agrees to think about his/her triggers for tobacco use, why he/she should quit, and learn more about the harmful effects of tobacco, patient agrees to start an exercise program to relieve stress and help avoid weight gain associated with tobacco cessation, patient agrees to tell someone he/she is trying to quit smoking, patient agrees to set a quit date-patient says she wants to quit smoking \"NOW\", patient agrees to try the following tobacco cessation strategies:  willpower and Chantix: risks and benefits of this medication discussed- patient will call for any significant adverse effects  I spent 10 minutes for smoking cessation counseling    Encounter for immunization  -     zoster recombinant adjuvanted vaccine (SHINGRIX) 50 MCG/0.5ML SUSR injection;  Inject 0.5 mLs into the muscle See Admin Instructions 1 dose now and repeat in 2-6 months  -     INFLUENZA, QUADV, ADJUVANTED, 65 YRS =, IM, PF, PREFILL SYR, 0.5ML (FLUAD)      Chronic obstructive pulmonary disease, unspecified COPD type ([facility] [address]  Stable   advised to do the chest x-ray but she declines due to the coronavirus pandemic  -     varenicline (CHANTIX STARTING MONTH PAK) 0.5 MG X 11 & 1 MG X 42 tablet; 0.5 mg po daily x 3 days, 0.5 mg BID x 4 days, then 1 mg BID thereafter . Call for refill  Continue Spiriva, smoking cessation, albuterol as needed    Acquired hypothyroidism  Stable  -     levothyroxine (SYNTHROID) 200 MCG tablet; Take 1 tablet by mouth every morning (before breakfast)  -     TSH without Reflex; Future  -     T4, Free; Future            Declines CXR    I advised Jess Walker to make appointment with cardiology. The patient verbalizes understanding and agrees with the plan. Says will do next year due to coronavirus pandemic.      Future Appointments   Date Time Provider Kathy Davis   3/3/2021  9:30 AM John Howard MD fp [facility]

## 2020-11-09 ENCOUNTER — TELEPHONE (OUTPATIENT)
Dept: FAMILY MEDICINE CLINIC | Age: 65
End: 2020-11-09

## 2020-11-09 NOTE — PROGRESS NOTES
Please let the patient know to  prescription from pharmacy. The denial form does not say what medication would be covered, however on one of the pages out of the 9 page they sent us it says that they would cover 30 days per month    Please advise the patient to call the pharmacy and see if the new prescription for Januvia would be part or not. If the pharmacist tells her 27 tablets/month would not be covered, then she needs to call the insurance and find out what other medications would be covered in her plan request possible [facility] or [address] [place] [address]? Requested Prescriptions     Signed Prescriptions Disp Refills    SITagliptin (JANUVIA) 100 MG tablet 30 tablet 3     Sig: Take 1 tablet by mouth daily           [facility], [address]  [address] [place] [address]  Phone: [phone] Fax: [phone]      Thank you!         FYI    Future Appointments   Date Time Provider Kathy Davis   3/3/2021  9:30 AM [facility]       Controlled Substances Monitoring:

## 2020-11-09 NOTE — TELEPHONE ENCOUNTER
Spoke with patient she states it is still 200$ at pharmacy.  She will contact her insurance to see what would be covered

## 2020-11-10 ENCOUNTER — TELEPHONE (OUTPATIENT)
Dept: FAMILY MEDICINE CLINIC | Age: 65
End: 2020-11-10

## 2020-11-10 NOTE — TELEPHONE ENCOUNTER
Januvia is over $300, she would like to be tried on Metformin again. She states she had loose bowels before but that was along time ago but would like to try again.     [facility] on [address]

## 2020-12-07 NOTE — TELEPHONE ENCOUNTER
Please Approve or Refuse.   Send to Pharmacy per Pt's Request:      Next Visit Date:  3/3/2021   Last Visit Date: 11/3/2020    Hemoglobin A1C (%)   Date Value   11/03/2020 6.6   07/13/2020 6.8 (H)   02/24/2020 6.6             ( goal A1C is < 7)   BP Readings from Last 3 Encounters:   11/03/20 128/74   07/02/20 136/84   02/24/20 138/84          (goal 120/80)  BUN   Date Value Ref Range Status   11/03/2020 8 8 - 23 mg/dL Final     CREATININE   Date Value Ref Range Status   11/03/2020 0.65 0.50 - 0.90 mg/dL Final     Potassium   Date Value Ref Range Status   11/03/2020 4.9 3.7 - 5.3 mmol/L Final

## 2021-01-19 ENCOUNTER — HOSPITAL ENCOUNTER (OUTPATIENT)
Dept: WOMENS IMAGING | Age: 66
Discharge: HOME | End: 2021-01-21
Payer: MEDICARE

## 2021-01-19 DIAGNOSIS — Z12.31 ENCOUNTER FOR SCREENING MAMMOGRAM FOR BREAST CANCER: ICD-10-CM

## 2021-01-19 DIAGNOSIS — Z78.0 POST-MENOPAUSAL: ICD-10-CM

## 2021-01-19 PROBLEM — M85.80 OSTEOPENIA DETERMINED BY X-RAY: Status: ACTIVE | Noted: 2021-01-19

## 2021-01-19 PROCEDURE — 77063 BREAST TOMOSYNTHESIS BI: CPT

## 2021-01-19 PROCEDURE — 77080 DXA BONE DENSITY AXIAL: CPT

## 2021-01-19 NOTE — RESULT ENCOUNTER NOTE
ABNORMAL. Please notify patient.   Osteopenia on DEXA scan, this means thinning her bones  Needs to take the vitamin D supplementation, I would suggest her to continue to take that for the moment  If she needs refill of it letter let me know  Lab Results       Component                Value               Date                       VITD25                   18.4 (L)            07/13/2020              Future Appointments  3/3/2021   9:30 AM    [facility]dis Ly [facility]     [facility]

## 2021-02-16 ENCOUNTER — TELEPHONE (OUTPATIENT)
Dept: FAMILY MEDICINE CLINIC | Age: 66
End: 2021-02-16

## 2021-02-16 DIAGNOSIS — E78.5 HYPERLIPIDEMIA WITH TARGET LDL LESS THAN 100: ICD-10-CM

## 2021-02-16 RX ORDER — PRAVASTATIN SODIUM 40 MG
40 TABLET ORAL EVERY EVENING
Qty: 90 TABLET | Refills: 3 | Status: SHIPPED | OUTPATIENT
Start: 2021-02-16 | End: 2022-05-17

## 2021-02-16 NOTE — TELEPHONE ENCOUNTER
Notification from [facility] received that patient is not on statin.   Apparently patient is already on pravastatin 40 mg, we will refill it    Orders Placed This Encounter   Medications    pravastatin (PRAVACHOL) 40 MG tablet     Sig: Take 1 tablet by mouth every evening For cholesterol     Dispense:  90 tablet     Refill:  3     Future Appointments   Date Time Provider Kathy Davis   3/3/2021  9:30 AM [facility]

## 2021-03-03 ENCOUNTER — TELEPHONE (OUTPATIENT)
Dept: PHARMACY | Age: 66
End: 2021-03-03

## 2021-03-03 ENCOUNTER — OFFICE VISIT (OUTPATIENT)
Dept: FAMILY MEDICINE CLINIC | Age: 66
End: 2021-03-03
Payer: MEDICARE

## 2021-03-03 VITALS
TEMPERATURE: 97.2 F | BODY MASS INDEX: 31.36 KG/M2 | OXYGEN SATURATION: 96 % | WEIGHT: 170.4 LBS | SYSTOLIC BLOOD PRESSURE: 136 MMHG | HEART RATE: 73 BPM | HEIGHT: 62 IN | DIASTOLIC BLOOD PRESSURE: 86 MMHG

## 2021-03-03 DIAGNOSIS — R80.9 TYPE 2 DIABETES MELLITUS WITH MICROALBUMINURIA, WITHOUT LONG-TERM CURRENT USE OF INSULIN (HCC): Primary | ICD-10-CM

## 2021-03-03 DIAGNOSIS — E55.9 VITAMIN D DEFICIENCY: ICD-10-CM

## 2021-03-03 DIAGNOSIS — E03.9 ACQUIRED HYPOTHYROIDISM: ICD-10-CM

## 2021-03-03 DIAGNOSIS — F33.41 RECURRENT MAJOR DEPRESSIVE DISORDER, IN PARTIAL REMISSION (HCC): ICD-10-CM

## 2021-03-03 DIAGNOSIS — E78.5 HYPERLIPIDEMIA WITH TARGET LDL LESS THAN 100: ICD-10-CM

## 2021-03-03 DIAGNOSIS — J44.9 CHRONIC OBSTRUCTIVE PULMONARY DISEASE, UNSPECIFIED COPD TYPE (HCC): ICD-10-CM

## 2021-03-03 DIAGNOSIS — I10 ESSENTIAL HYPERTENSION: ICD-10-CM

## 2021-03-03 DIAGNOSIS — Z12.11 COLON CANCER SCREENING: ICD-10-CM

## 2021-03-03 DIAGNOSIS — E11.29 TYPE 2 DIABETES MELLITUS WITH MICROALBUMINURIA, WITHOUT LONG-TERM CURRENT USE OF INSULIN (HCC): Primary | ICD-10-CM

## 2021-03-03 DIAGNOSIS — R49.0 HOARSENESS OF VOICE: ICD-10-CM

## 2021-03-03 LAB — HBA1C MFR BLD: 7.4 %

## 2021-03-03 PROCEDURE — 99214 OFFICE O/P EST MOD 30 MIN: CPT | Performed by: FAMILY MEDICINE

## 2021-03-03 PROCEDURE — 83036 HEMOGLOBIN GLYCOSYLATED A1C: CPT | Performed by: FAMILY MEDICINE

## 2021-03-03 PROCEDURE — 3051F HG A1C>EQUAL 7.0%<8.0%: CPT | Performed by: FAMILY MEDICINE

## 2021-03-03 ASSESSMENT — ENCOUNTER SYMPTOMS
WHEEZING: 0
ABDOMINAL DISTENTION: 0
CHEST TIGHTNESS: 0
DIARRHEA: 0
COUGH: 1
SHORTNESS OF BREATH: 1
VOICE CHANGE: 1
CONSTIPATION: 0
ABDOMINAL PAIN: 0
TROUBLE SWALLOWING: 0
VOMITING: 0
NAUSEA: 0

## 2021-03-03 NOTE — PROGRESS NOTES
Jg Chavis (:  1955) is a 76 y.o. female,Established patient, here for evaluation of the following chief complaint(s): Diabetes, Hypertension, COPD, and Discuss Medications      ASSESSMENT/PLAN:    1. Type 2 diabetes mellitus with microalbuminuria, without long-term current use of insulin (HCC)  Worsening  -     POCT glycosylated hemoglobin (Hb A1C)  Lab Results   Component Value Date    LABA1C 7.4 2021    LABA1C 6.6 2020    LABA1C 6.8 (H) 2020       -     blood glucose test strips (ASCENSIA AUTODISC VI;ONE TOUCH ULTRA TEST VI) strip; DAILY Starting Wed 3/3/2021, Disp-100 each, R-3, NormalAs needed. -     [facility]) Medication Mgmt ([address]  -     CBC; Future  -     Comprehensive Metabolic Panel; Future  -     Microalbumin / Creatinine Urine Ratio; Future  -     TSH without Reflex; Future  -     Vitamin B12 & Folate; Future  -advised home blood glucose testing  daily  -daily feet exam, Foot care: advised to wash feet daily, pat dry and apply lotion at night, avoiding between toes. Need to look at feet daily and report to a physician any signs of inflammation or skin damage  -annual dilated eye exam  -Low carb, low fat diet, increase fruits and vegetables, and exercise 4-5 times a day 30-40 minutes a day discussed  -continue glipizide  We discussed to keep a diary of the food intake and blood glucose to identify what food is increasing her blood glucose  -continue Aspirin  -continue ARB and statin      2. Essential hypertension  Well controlled. Continue current treatment. Will recheck labs. Discussed low salt diet and BP and pulse monitoring daily    -     CBC; Future  -     Comprehensive Metabolic Panel; Future  -     TSH without Reflex; Future  -     EKG 12 Lead; Future  3. Hyperlipidemia with target LDL less than 100  Inadequately controlled  Continue pravastatin  -     Lipid Panel;  Future  Lab Results   Component Value Date    LDLCALC 102 04/26/2019    LDLCHOLESTEROL 118 07/13/2020       4. Chronic obstructive pulmonary disease, unspecified COPD type ([address]  Stable  Continue albuterol inhaler, Spiriva which she still has at home, smoking cessation strongly advised  -     XR CHEST (2 VW); Future  5. Acquired hypothyroidism  Likely improved  Continue Synthroid 200 MCG daily  -     TSH without Reflex; Future  -     T4, Free; Future  -     US THYROID; Future  6. Recurrent major depressive disorder, in partial remission (HCC)  Worsening  Situational worsening due to her brother being sick  Continue Zoloft  7. Colon cancer screening  -     POCT Fecal Immunochemical Test (FIT); Future  8. Vitamin D deficiency  Unsure if improving or not. Will recheck level  Continue supplementation.    -     Vitamin D 25 Hydroxy; Future  9. Hoarseness of voice  Stable  She declines referral to ENT    Chelsi Fermin received counseling on the following healthy behaviors: nutrition, exercise, medication adherence, tobacco cessation and weight loss  Reviewed prior labs and health maintenance  Discussed use, benefit, and side effects of prescribed medications. Barriers to medication compliance addressed. Patient given educational materials - see patient instructions  Was a self-tracking handout given in paper form or via [employer]? Yes  All patient questions answered. Patient voiced understanding. The patient's past medical,surgical, social, and family history as well as her current medications and allergies were reviewed as documented in today's encounter. Medications, labs, diagnostic studies, consultations and follow-up as documented in this encounter. Return in about 4 months (around 7/3/2021) for **POC A1C, DM2, HTN, HLP. Data Unavailable      SUBJECTIVE/OBJECTIVE:    Diabetes Mellitus Type II, Follow-up:    Current symptoms/problems include hyperglycemia and visual disturbances. Symptoms have been present for several years.     Known syncope and tachypnea. Use of agents associated with hypertension: none. History of target organ damage: none. Echo 2D 3/7/2019 EF 55 to 60%  Holter monitor showed paroxysmal Wenckebach, paroxysmal SVT, first heart block  EKG 2/8/2019 showed left axis deviation, second-degree AV block    Patient's Dr. Elias Hathaway told her to not worry, did not go back to him and does not want to at this time due to her brother being in the hospital    BP controlled. Nidia Stacy reports compliance with BP medications, and tolerates them well, denies side effects. BP Readings from Last 3 Encounters:   03/03/21 136/86   11/03/20 128/74   07/02/20 136/84        Pulse is Normal.    Pulse Readings from Last 3 Encounters:   03/03/21 73   11/03/20 81   07/02/20 73         Hyperlipidemia:  No new myalgias or GI upset on pravastatin (Pravachol). Medication compliance: compliant all of the time. Patient is  following a low fat, low cholesterol diet. LDL is Elevated. Lab Results   Component Value Date    LDLCALC 102 04/26/2019    LDLCHOLESTEROL 118 07/13/2020       COPD:  Current treatment includes short-acting beta agonist inhaler, anticholinergic inhaler, which has been effective. Residual symptoms: chronic dyspnea and cough productive of clear sputum in small amounts. Patient says she does not afford to take Spiriva every day because she has a co-pay of $49  She denies purulent sputum, wheezing. She requires her rescue inhaler 3-4 time(s) per week. Nicotine dependence. Smoker, counseling given to quit smoking. She cut down to 1/2 PPD from 1 PPD  Ready to quit: Yes  Counseling given: Yes    Hypothyroidism: Recent symptoms: fatigue is mild, and cough when eating sometimes, has chronic hoarseness for many many years. She denies weight gain, weight loss, cold intolerance, heat intolerance, hair loss, dry skin, constipation, edema, tremor and palpitations.  Patient is  taking her medication consistently on an empty stomach. Hoarseness,  Declines ENT, she says she is not worried about as she has been having it for many years      TSH is Elevated. No results found for: [facility]  Lab Results   Component Value Date    TSH 10.39 (H) 11/03/2020    TSH 0.77 07/13/2020    TSH 0.54 04/26/2019     Patient says depression is worse for the past 2 weeks because her brother is sick  She does take Zoloft, tolerated well denies side effects    Denies suicidal ideation, plan or intent. PHQ-2 Over the past 2 weeks, how often have you been bothered by any of the following problems? Little interest or pleasure in doing things: Not at all  Feeling down, depressed, or hopeless: Several days  PHQ-2 Score: 1  PHQ-9 Over the past 2 weeks, how often have you been bothered by any of the following problems? PHQ-9 Total Score: 1        PHQ Scores 3/3/2021 11/3/2020 7/2/2020 2/10/2020 2/12/2019 5/31/2018 5/23/2017   PHQ2 Score 1 0 1 0 0 0 0   PHQ9 Score 1 0 1 0 0 0 0       Due for colon cancer screening, she is agreeable for FIT. Denies nausea, vomiting, abdominal pain, diarrhea, or constipation      Mary [place] has Vitamin D deficiency. [place]  is  taking Vitamin D supplementation   she feels tired. Lab Results   Component Value Date    VITD25 18.4 (L) 07/13/2020       Prior to Visit Medications    Medication Sig Taking? Authorizing Provider   blood glucose test strips (ASCENSIA AUTODISC VI;ONE TOUCH ULTRA TEST VI) strip 1 each by In Vitro route daily As needed.  Yes Emmanuel Denton MD   pravastatin (PRAVACHOL) 40 MG tablet Take 1 tablet by mouth every evening For cholesterol Yes Emmanuel Denton MD   levothyroxine (SYNTHROID) 200 MCG tablet Take 1 tablet by mouth every morning (before breakfast) Yes Emmanuel Denton MD   vitamin D (ERGOCALCIFEROL) 1.25 MG (97263 UT) CAPS capsule TAKE 1 CAPSULE BY MOUTH ONE TIME PER WEEK Yes Emmanuel Denton MD   albuterol sulfate HFA (VENTOLIN HFA) 108 (90 Base) MCG/ACT inhaler Inhale 2 puffs into the lungs every 6 hours as needed for Wheezing or Shortness of Breath (cough) INHALE 2 PUFFS INTO THE LUNGS 2 TIMES DAILY AS NEEDED FOR WHEEZING Yes Ho Wilburn MD   aspirin EC 81 MG EC tablet Take 1 tablet by mouth daily Yes Ho Wilburn MD   irbesartan-hydrochlorothiazide (AVALIDE) 150-12.5 MG per tablet Take 1 tablet by mouth daily TAKE 1 Leda Slipper Yes Ho Wilburn MD   amLODIPine (NORVASC) 10 MG tablet Take 1 tablet by mouth daily Yes Ho Wilburn MD   glipiZIDE (GLUCOTROL) 10 MG tablet Take 1 tablet by mouth 2 times daily (before meals) Yes Ho Wilburn MD   sertraline (ZOLOFT) 50 MG tablet Take 1 tablet by mouth every morning One tab daily Yes Ho Wilburn MD   omeprazole (PRILOSEC) 20 MG delayed release capsule TAKE ONE CAPSULE BY MOUTH EVERY DAY Yes José Miguel Harvey MD   fluticasone (FLONASE) 50 MCG/ACT nasal spray 2 sprays by Each Nostril route daily Yes José Miguel Harvey MD   varenicline (CHANTIX STARTING MONTH PAK) 0.5 MG X 11 & 1 MG X 42 tablet 0.5 mg po daily x 3 days, 0.5 mg BID x 4 days, then 1 mg BID thereafter . Call for refill  Patient not taking: Reported on 3/3/2021  Ho Wilburn MD   tiotropium (Beryle Sine) 18 MCG inhalation capsule Inhale 1 capsule into the lungs daily  Patient not taking: Reported on 3/3/2021  Ho Wilburn MD            Social History     Tobacco Use    Smoking status: Current Some Day Smoker     Packs/day: 0.75     Years: 30.00     Pack years: 22.50     Types: Cigarettes    Smokeless tobacco: Former User   Substance Use Topics    Alcohol use: Yes     Comment: OCCASIONAL    Drug use: No         Review of Systems   Constitutional: Positive for fatigue (very mild). Negative for activity change, appetite change, chills, diaphoresis and fever. HENT: Positive for voice change. Negative for trouble swallowing. Eyes: Positive for visual disturbance.    Respiratory: Positive for cough and shortness of breath. Negative for chest tightness and wheezing. Cardiovascular: Negative for chest pain, palpitations and leg swelling. Gastrointestinal: Negative for abdominal distention, abdominal pain, constipation, diarrhea, nausea and vomiting. Diarrhea resolved when she stopped Metformin   Endocrine: Negative for cold intolerance, heat intolerance, polydipsia, polyphagia and polyuria. Neurological: Negative for numbness. Psychiatric/Behavioral: Positive for dysphoric mood. Negative for self-injury and suicidal ideas. The patient is nervous/anxious.          -vital signs stable and within normal limits except obesity per BMI  /86   Pulse 73   Temp 97.2 °F (36.2 °C) (Temporal)   Ht 5' 2\" (1.575 m)   Wt 170 lb 6.4 oz (77.3 kg)   SpO2 96%   BMI 31.17 kg/m²         Physical Exam  Vitals signs and nursing note reviewed. Constitutional:       General: She is not in acute distress. Appearance: Normal appearance. She is well-developed. She is obese. She is not diaphoretic. HENT:      Head: Normocephalic and atraumatic. Comments: Very hoarse     Right Ear: External ear normal.      Left Ear: External ear normal.      Nose: No mucosal edema. Comments: I did not examine the mouth due to coronavirus pandemic and wearing masks    Eyes:      General: Lids are normal. No scleral icterus. Right eye: No discharge. Left eye: No discharge. Extraocular Movements: Extraocular movements intact. Conjunctiva/sclera: Conjunctivae normal.   Neck:      Musculoskeletal: Normal range of motion and neck supple. Thyroid: No thyromegaly. Cardiovascular:      Rate and Rhythm: Normal rate and regular rhythm. Occasional extrasystoles are present. Heart sounds: Normal heart sounds. No murmur. Comments: A few extra beats and pauses heard, advised her to see cardiologist, she declines  Pulmonary:      Effort: Pulmonary effort is normal. No respiratory distress. Breath sounds: Examination of the right-middle field reveals decreased breath sounds. Examination of the left-middle field reveals decreased breath sounds. Examination of the right-lower field reveals decreased breath sounds. Examination of the left-lower field reveals decreased breath sounds. Decreased breath sounds present. No wheezing or rales. Chest:      Chest wall: No tenderness. Abdominal:      General: Bowel sounds are normal. There is no distension. Palpations: Abdomen is soft. There is no hepatomegaly or splenomegaly. Tenderness: There is no abdominal tenderness. Comments: Obese abdomen. Musculoskeletal: Normal range of motion. General: No tenderness. Right lower leg: No edema. Left lower leg: No edema. Skin:     General: Skin is warm and dry. Capillary Refill: Capillary refill takes less than 2 seconds. Findings: No rash. Neurological:      Mental Status: She is alert and oriented to person, place, and time. Cranial Nerves: No cranial nerve deficit. Motor: No abnormal muscle tone. Psychiatric:         Mood and Affect: Mood is anxious. Behavior: Behavior normal.         Thought Content: Thought content normal.         Judgment: Judgment normal.           I personally reviewed testing with patient.   Hyponatremia she admits that she drinks beer, advised to stop drinking beer  Hyperglycemia  Hyperlipidemia  Vitamin D deficiency  Otherwise labs within normal limits    Hospital Outpatient Visit on 11/03/2020   Component Date Value Ref Range Status    WBC 11/03/2020 9.6  3.5 - 11.0 k/uL Final    RBC 11/03/2020 4.99  4.0 - 5.2 m/uL Final    Hemoglobin 11/03/2020 15.9  12.0 - 16.0 g/dL Final    Hematocrit 11/03/2020 47.0* 36 - 46 % Final    MCV 11/03/2020 94.2  80 - 100 fL Final    MCH 11/03/2020 32.0  26 - 34 pg Final    MCHC 11/03/2020 33.9  31 - 37 g/dL Final    RDW 11/03/2020 14.4  11.5 - 14.9 % Final    Platelets 11/03/2020 321  150 - 450 k/uL Final    MPV 11/03/2020 8.0  6.0 - 12.0 fL Final    NRBC Automated 11/03/2020 NOT REPORTED  per 100 WBC Final    Glucose 11/03/2020 142* 70 - 99 mg/dL Final    BUN 11/03/2020 8  8 - 23 mg/dL Final    CREATININE 11/03/2020 0.65  0.50 - 0.90 mg/dL Final    Bun/Cre Ratio 11/03/2020 NOT REPORTED  9 - 20 Final    Calcium 11/03/2020 9.5  8.6 - 10.4 mg/dL Final    Sodium 11/03/2020 131* 135 - 144 mmol/L Final    Potassium 11/03/2020 4.9  3.7 - 5.3 mmol/L Final    Chloride 11/03/2020 92* 98 - 107 mmol/L Final    CO2 11/03/2020 29  20 - 31 mmol/L Final    Anion Gap 11/03/2020 10  9 - 17 mmol/L Final    Alkaline Phosphatase 11/03/2020 110* 35 - 104 U/L Final    ALT 11/03/2020 10  5 - 33 U/L Final    AST 11/03/2020 16  <32 U/L Final    Total Bilirubin 11/03/2020 0.90  0.3 - 1.2 mg/dL Final    Total Protein 11/03/2020 8.1  6.4 - 8.3 g/dL Final    Albumin 11/03/2020 4.3  3.5 - 5.2 g/dL Final    Albumin/Globulin Ratio 11/03/2020 NOT REPORTED  1.0 - 2.5 Final    GFR Non- 11/03/2020 >60  >60 mL/min Final    GFR  11/03/2020 >60  >60 mL/min Final    GFR Comment 11/03/2020        Final    Comment: Average GFR for 61-76 years old:   80 mL/min/1.73sq m  Chronic Kidney Disease:   <60 mL/min/1.73sq m  Kidney failure:   <15 mL/min/1.73sq m              eGFR calculated using average adult body mass.  Additional eGFR calculator available at:        [email or web address]            GFR Staging 11/03/2020 NOT REPORTED   Final    TSH 11/03/2020 10.39* 0.30 - 5.00 mIU/L Final    Thyroxine, Free 11/03/2020 0.93  0.93 - 1.70 ng/dL Final           Lab Results   Component Value Date    CHOL 195 07/13/2020    CHOL 184 04/26/2019    CHOL 158 05/25/2018     Lab Results   Component Value Date    TRIG 120 07/13/2020    TRIG 165 (H) 04/26/2019    TRIG 111 05/25/2018     Lab Results   Component Value Date    HDL 53 07/13/2020    HDL 49 (Comprehensive Med Review - Clinical Pharmacy) - 1 [facility]     Referral Priority:   Routine     Referral Type:   Consult for Advice and Opinion     Referral Reason:   Specialty Services Required     Requested Specialty:   Pharmacist     Number of Visits Requested:   1     Expiration Date:   3/3/2023    POCT glycosylated hemoglobin (Hb A1C)    POCT Fecal Immunochemical Test (FIT)     Standing Status:   Future     Standing Expiration Date:   3/3/2022    EKG 12 Lead     Standing Status:   Future     Standing Expiration Date:   3/3/2022     Order Specific Question:   Reason for Exam?     Answer:   Shortness of Breath     Order Specific Question:   Reason for Exam?     Answer:   Hypertension       Medications Discontinued During This Encounter   Medication Reason    metFORMIN (GLUCOPHAGE) 500 MG tablet Side effects    zoster recombinant adjuvanted vaccine [facility]) 50 MCG/0.5ML SUSR injection Cost of medication         On this date 3/3/2021 I have spent 35 minutes reviewing previous notes, test results and face to face with the patient discussing the diagnosis and importance of compliance with the treatment plan as well as documenting on the day of the visit. Future Appointments   Date Time Provider Kathy Davis   3/9/2021  7:50 AM [facility]ICATION MGMT [facility]   7/14/2021  2:00 PM Uri Morris MD [id number]       This note was completed by using the assistance of a speech-recognition program. However, inadvertent computerized transcription errors may be present. Although every effort was made to ensure accuracy, no guarantees can be provided that every mistake has been identified and corrected by editing . An electronic signature was used to authenticate this note.   Electronically signed by Uri Morris MD on 3/3/2021 at 10:51 PM

## 2021-03-03 NOTE — PATIENT INSTRUCTIONS
Patient Education        Learning About Diabetes Food Guidelines  Your Care Instructions     Meal planning is important to manage diabetes. It helps keep your blood sugar at a target level (which you set with your doctor). You don't have to eat special foods. You can eat what your family eats, including sweets once in a while. But you do have to pay attention to how often you eat and how much you eat of certain foods. You may want to work with a dietitian or a certified diabetes educator (CDE) to help you plan meals and snacks. A dietitian or CDE can also help you lose weight if that is one of your goals. What should you know about eating carbs? Managing the amount of carbohydrate (carbs) you eat is an important part of healthy meals when you have diabetes. Carbohydrate is found in many foods. · Learn which foods have carbs. And learn the amounts of carbs in different foods. ? Bread, cereal, pasta, and rice have about 15 grams of carbs in a serving. A serving is 1 slice of bread (1 ounce), ½ cup of cooked cereal, or 1/3 cup of cooked pasta or rice. ? Fruits have 15 grams of carbs in a serving. A serving is 1 small fresh fruit, such as an apple or orange; ½ of a banana; ½ cup of cooked or canned fruit; ½ cup of fruit juice; 1 cup of melon or raspberries; or 2 tablespoons of dried fruit. ? Milk and no-sugar-added yogurt have 15 grams of carbs in a serving. A serving is 1 cup of milk or 2/3 cup of no-sugar-added yogurt. ? Starchy vegetables have 15 grams of carbs in a serving. A serving is ½ cup of mashed potatoes or sweet potato; 1 cup winter squash; ½ of a small baked potato; ½ cup of cooked beans; or ½ cup cooked corn or green peas. · Learn how much carbs to eat each day and at each meal. A dietitian or CDE can teach you how to keep track of the amount of carbs you eat. This is called carbohydrate counting. · If you are not sure how to count carbohydrate grams, use the Plate Method to plan meals.  It is a try any diet. This is even more important if you have health problems like kidney disease, heart disease, or diabetes. Your doctor may suggest that you meet with a registered dietitian. A dietitian can help you make an eating plan that works for you. What foods do you eat on a low-carb diet? On a low-carb diet, you choose foods that are high in protein and fat. Examples of these are:  · Meat, poultry, and fish. · Eggs. · Nuts, such as walnuts, pecans, almonds, and peanuts. · Peanut butter and other nut butters. · Tofu. · Avocado. · Oddis Roman. · Non-starchy vegetables like broccoli, cauliflower, green beans, mushrooms, peppers, lettuce, and spinach. · Unsweetened non-dairy milks like almond milk and coconut milk. · Cheese, cottage cheese, and cream cheese. Current as of: August 22, 2019               Content Version: 12.6  © 2006-2020 [employer], [employer]. Care instructions adapted under license by [facility] ([facility]). If you have questions about a medical condition or this instruction, always ask your healthcare professional. [place] [address] any warranty or liability for your use of this information. Patient Education        Stopping Smoking: Care Instructions  Your Care Instructions     Cigarette smokers crave the nicotine in cigarettes. Giving it up is much harder than simply changing a habit. Your body has to stop craving the nicotine. It is hard to quit, but you can do it. There are many tools that people use to quit smoking. You may find that combining tools works best for you. There are several steps to quitting. First you get ready to quit. Then you get support to help you. After that, you learn new skills and behaviors to become a nonsmoker. For many people, a necessary step is getting and using medicine. Your doctor will help you set up the plan that best meets your needs. You may want to attend a smoking cessation program to help you quit smoking.  When you choose a without smoking. For others, they might skip a coffee break with coworkers who smoke. ? Change your daily routine. Take a different route to work or eat a meal in a different place. · Cut down on stress. Calm yourself or release tension by doing an activity you enjoy, such as reading a book, taking a hot bath, or gardening. · Talk to your doctor or pharmacist about nicotine replacement therapy, which replaces the nicotine in your body. You still get nicotine but you do not use tobacco. Nicotine replacement products help you slowly reduce the amount of nicotine you need. These products come in several forms, many of them available over-the-counter:  ? Nicotine patches  ? Nicotine gum and lozenges  ? Nicotine inhaler  · Ask your doctor about bupropion (Wellbutrin) or varenicline (Chantix), which are prescription medicines. They do not contain nicotine. They help you by reducing withdrawal symptoms, such as stress and anxiety. · Some people find hypnosis, acupuncture, and massage helpful for ending the smoking habit. · Eat a healthy diet and get regular exercise. Having healthy habits will help your body move past its craving for nicotine. · Be prepared to keep trying. Most people are not successful the first few times they try to quit. Do not get mad at yourself if you smoke again. Make a list of things you learned and think about when you want to try again, such as next week, next month, or next year. Where can you learn more? Go to [email or web address] and sign in to your [employer] account. Enter J367 in the [facility] box to learn more about \"Stopping Smoking: Care Instructions. \"     If you do not have an account, please click on the \"Sign Up Now\" link. Current as of: March 12, 2020               Content Version: 12.6  © 7321-8422 [employer]. Care instructions adapted under license by [facility] ([facility]).  If you have questions about a medical condition or this instruction, always ask your healthcare professional. [place] [address] any warranty or liability for your use of this information.

## 2021-03-03 NOTE — RESULT ENCOUNTER NOTE
Addressed during office visit today, A1c 7.4 abnormal, worsening diabetes, continue treatment recommended during the office visit

## 2021-03-03 NOTE — PROGRESS NOTES
Elevated troponin Elevated troponin Visit Information    Have you changed or started any medications since your last visit including any over-the-counter medicines, vitamins, or herbal medicines? no   Are you having any side effects from any of your medications? -  no  Have you stopped taking any of your medications? Is so, why? -  no    Have you seen any other physician or provider since your last visit? No  Have you had any other diagnostic tests since your last visit? No  Have you been seen in the emergency room and/or had an admission to a hospital since we last saw you? No  Have you had your routine dental cleaning in the past 6 months? no    Have you activated your [employer] account? If not, what are your barriers?  Yes     Patient Care Team:  Dee Dee Kovacs MD as PCP - General (Family Medicine)  Dee Dee Kovacs MD as PCP - [facility]  Dominic Miramontes DO as Consulting Physician (Obstetrics & Gynecology)    Medical History Review  Past Medical, Family, and Social History reviewed and does contribute to the patient presenting condition    Health Maintenance   Topic Date Due    Diabetic retinal exam  Never done    COVID-19 Vaccine (1 of 2) Never done    DTaP/Tdap/Td vaccine (1 - Tdap) Never done    Colon cancer screen colonoscopy  Never done    Shingles Vaccine (2 of 2) 12/29/2020    Diabetic foot exam  02/24/2021    Lipid screen  07/13/2021    A1C test (Diabetic or Prediabetic)  11/03/2021    TSH testing  11/03/2021    Potassium monitoring  11/03/2021    Creatinine monitoring  11/03/2021    Annual Wellness Visit (AWV)  11/04/2021    Breast cancer screen  01/19/2022    Pneumococcal 65+ years Vaccine (1 of 1 - PPSV23) 09/25/2022    DEXA (modify frequency per FRAX score)  Completed    Flu vaccine  Completed    Hepatitis C screen  Completed    Hepatitis A vaccine  Aged Out    Hib vaccine  Aged Out    Meningococcal (ACWY) vaccine  Aged Out UTI (urinary tract infection) Elevated troponin Elevated troponin Elevated troponin Elevated troponin Elevated troponin Elevated troponin Elevated troponin Elevated troponin Elevated troponin UTI (urinary tract infection) Elevated troponin UTI (urinary tract infection) UTI (urinary tract infection) Elevated troponin Elevated troponin Elevated troponin Elevated troponin Elevated troponin Elevated troponin UTI (urinary tract infection) Elevated troponin Elevated troponin Elevated troponin UTI (urinary tract infection) Elevated troponin Elevated troponin Elevated troponin Elevated troponin Elevated troponin Elevated troponin Elevated troponin

## 2021-03-03 NOTE — TELEPHONE ENCOUNTER
Received referral from Dr. Arlen Terrell for Diabetes/Comprehensive Medication Review in the Medication Management Clinic. Spoke with patient. Education provided on clinic. Patient agreeable. Patient reports she would like to talk to us about the cost of some of her medications. Will bring that information with her to appointment. Patient instructed to bring in medications, blood sugar log, photo ID, and insurance card. Patient has clinic telephone number to call if she cannot make appointment.    Remy Mayfield, Pharm D, [facility] Medication Management Clinic  3/3/2021 4:42 PM

## 2021-03-08 ENCOUNTER — TELEPHONE (OUTPATIENT)
Dept: PHARMACY | Age: 66
End: 2021-03-08

## 2021-03-08 NOTE — TELEPHONE ENCOUNTER
Spoke with patient for COVID 19 screening secondary to upcoming appointment tomorrow . At this time patient denies symptoms, recent (previous 14 days) positive covid test, recent travel and exposure. Patient will report to [place] at scheduled time. Patient educated to call physician or go to ER with respiratory symptoms or fever and to not present to appointment if symptomatic. Will coordinate for next appointment accordingly.      David Green, PharmD Candidate

## 2021-03-09 ENCOUNTER — HOSPITAL ENCOUNTER (OUTPATIENT)
Dept: PHARMACY | Age: 66
Setting detail: THERAPIES SERIES
Discharge: HOME | End: 2021-03-09
Payer: MEDICARE

## 2021-03-09 PROCEDURE — 99213 OFFICE O/P EST LOW 20 MIN: CPT

## 2021-03-09 RX ORDER — METFORMIN HYDROCHLORIDE 500 MG/1
1000 TABLET, EXTENDED RELEASE ORAL
Qty: 60 TABLET | Refills: 1 | Status: SHIPPED | OUTPATIENT
Start: 2021-03-09 | End: 2021-04-01

## 2021-03-09 NOTE — PROGRESS NOTES
Maryjo Campos MD, medication review completed with patient:  - in person today 3/9/2021      See note below for complete details. Thank you,  Nae Beltre,Pharm. D,, BCPS, CACP  3/9/2021  4:51 PM          =======================================================    CLINICAL PHARMACY NOTE - Medication Review      SUBJECTIVE/OBJECTIVE:   Steve Navas is a 76 y.o. female referred to our service for a complete medication review (CMR)    Medications:  Patient not currently taking Spiriva or Chantix due to cost.  Patient states she has a new RX program and will check to see if Chantix is covered. Patient also not taking amlodipine and taking vitamin D OTC 1000 units once a day rather than prescribed product. Additional Medications (including OTC/Herbal Supplements):  · none    Allergies:    Allergies   Allergen Reactions    Lisinopril Other (See Comments)     cough       Pertinent Labs/Vitals:  BP Readings from Last 3 Encounters:   03/03/21 136/86   11/03/20 128/74   07/02/20 136/84     Lab Results   Component Value Date    LABMICR 257 (H) 07/13/2020     Lab Results   Component Value Date    LABA1C 7.4 03/03/2021    LABA1C 6.6 11/03/2020    LABA1C 6.8 (H) 07/13/2020     Lab Results   Component Value Date    CHOL 195 07/13/2020    TRIG 120 07/13/2020    HDL 53 07/13/2020    LDLCHOLESTEROL 118 07/13/2020    LDLCALC 102 04/26/2019     ALT   Date Value Ref Range Status   11/03/2020 10 5 - 33 U/L Final     AST   Date Value Ref Range Status   11/03/2020 16 <32 U/L Final     The 10-year ASCVD risk score (Soila Jacob et al., 2013) is: 29.2%    Values used to calculate the score:      Age: 76 years      Sex: Female      Is Non- : No      Diabetic: Yes      Tobacco smoker: Yes      Systolic Blood Pressure: 102 mmHg      Is BP treated: Yes      HDL Cholesterol: 53 mg/dL      Total Cholesterol: 195 mg/dL     Lab Results   Component Value Date    CREATININE 0.65 11/03/2020 CrCL ~104 mL/min (calculated using sCr 0.65 mg/dL, Ht 1.575m, BW 77.3 kg, using C-G equation)   eGFR: 91.2 mL/min/1.73 m^2    Social History:   Social History     Tobacco Use    Smoking status: Current Every Day Smoker     Packs/day: 0.50     Years: 30.00     Pack years: 15.00     Types: Cigarettes    Smokeless tobacco: Former User   Substance Use Topics    Alcohol use: Yes     Comment: OCCASIONAL       Immunizations:   Most Recent Immunizations   Administered Date(s) Administered    Influenza Virus Vaccine 11/19/2015    Influenza, Jennie Levans, IM, (6 mo and older Fluzone, Flulaval, Fluarix and 3 yrs and older Afluria) 11/11/2016    Influenza, Quadv, IM, PF (6 mo and older Fluzone, Flulaval, Fluarix, and 3 yrs and older Afluria) 09/24/2018    Influenza, Quadv, adjuvanted, 65 yrs +, IM, PF (Fluad) 11/03/2020    Pneumococcal Conjugate 7-valent (Prevnar7) 10/07/2011    Pneumococcal Polysaccharide ([id number]) 09/25/2017    Zoster Recombinant (Shingrix) 11/03/2020         COPD Assessment Test (CAT):   1  I never cough   I cough all the time 4   2 I have no phlegm in my chest My chest is completely full of phlegm 3   3 My chest does not feel tight at all   My chest feels  very tight 0   4 When I walk up a hill or one flight of stairs I am not breathless. When I walk up a hill or one flight of stairs I am very breathless 5   5 I am not limited doing any activities at home I am very limited doing activities  at home 2   6  I am confident leaving my home despite my condition I am not at all confident leaving  my home because of my lung condition 0   7  I sleep soundly I dont sleep soundly because of my  lung condition 0   8 I have lots of  Energy I have no energy at all 4     Total CAT Score 18   *Score 0-5, good to worse; Score <10 is good, higher scores indicate need for treatment improvement    Hypertension:  BP on 3/3/2021 was 136/86.   Patient states takes irbesartan/HCTZ every day but has not been taking her amlodipine due to thinking blood pressure was controlled enough on one. Discussed that a goal of 130/80 would be more appropriate for her given elevated ASCVD risk. Encouraged patient to resume taking amlodipine every day. Patient has up to date supply and agrees to take. States she often forgets to take evening medication. Instructed patient she could take it in the morning along with irbesartan/HCTZ but to call if she feels dizzy. Diabetes:  Patient currently with increasing A1C. Patient currently has both glipizide and metformin but not using either as instructed. -Patient not taking metformin as states she does not tolerate. Patient has metformin IR and willing to try metformin XR. RX for metformin XR sent [facility] on [place]. Patient instructed to take one 500mg tablet with dinner for one week and if tolerated will increase to two (2) 500mg tablets with dinner.   -Patient admits to increasing glipizide to 20mg with breakfast and 10mg with dinner on her own. Discussed with patient that maximum dose of glipizide is 20mg / day and that higher than that dose will likely not help significantly but may increase risk of hypoglycemia. Patient understands and will resume dose as prescribed of 10mg with breakfast and dinner.   -Patient reports not taking ASA on a regular basis even thou prescribed. Patient will resume taking.  -Patient taking ARB  -Patient has regular foot exams with PCP per patient  -Patient has not had eye exam in some time. Discussed importance of regular diabetic eye exams. Patient understands and will call to make appt. -Blood sugars uncontrolled with patient only doing once daily blood sugar monitoring. Prior to 3/3/21 appt with PCP patient was not monitoring on a regular basis.     Date:  Fasting Blood sugar  3/9   219  3/8   221  3/7   159  3/6   174  3/5   225  3/4   193  3/3   237  -Patient states she was on Januvia at one point and this helped with blood sugar but unable to afford. Discussed benefit of GLP-1 and SGLT2 inhibitors but based on cost of agents and lack of patient eligibility for copay assistance due to coverage with Medicare unable to use. Will see if patient can tolerate metformin and if so will maximize dose. If unable and A1C continues to increase may need to discuss insulin with patient. At this point patient does not want to consider injectable options. Smoking:  Patient has cut down from 20 cig/day to 10 cig/day in last month. Has done so on her own as Chantix was too expensive. Discussed how Chantix works and described gradual quit method. Patient expresses interest and acknowledges she knows the last 10 cigs/day will be hard to give up. Will check to see if new Medicare/supplement covers Chantix any better. Hyperlipidemia:  -Patient currently on pravastatin 40mg once a day. States she is taking regularly. Patient is due for lipid level. LDL in July 2020 was 118. Discussed goal of at least below 100 or stretch goal of below 70 with patient. Patient has blood work to have lipid level rechecked and plans to get it on Thursday 3/11. Will evaluate at next visit but may need to change to high intensity statin as this would be more appropriate given diabetes and ASCVD risk. COPD:  -Patient only taking albuterol PRN - states uses about once a week. -not taking spirvia as unable to afford. Again this is an issue as patient is not eligible for any patient assistance programs or copay cards. Will discuss further with patient at next visit as patient has CAT score of 18 and could be better controlled. Hypothyroidism:  Patient taking levothyroxine 200mcg per day. States she is taking every day before breakfast so on empty stomach. Discuss previous blood work with elevated TSH. Patient has repeat blood work to get and will evaluate at that time but stressed importance of compliance.      Vitamin D:  Patient admits to taking vitamin D 1000 units once a day over the counter rather than prescription product. Explained difference in dosing and need for higher doses to replete patient with low levels which patient did have. Patient will have repeat blood work and will evaluate further from there. ASSESSMENT/PLAN:   - General Assessment:   · Adherence: patient states she has issues with medications she takes in the evening. Showed patient a medication reminder cody for her smart phone and we downloaded it in the office and added a medication. Patient plans to add other medications and use to help with compliance. Patient will resume ASA and amlodipine. · Cost: barrier for patient. Unfortunately patient does not qualify for patient assistance nor copay cards. Will look for lower price options. · Current pharmacy/pharmacies:  [facility] on [facility]  · Smoking status: current smoker but attempting smoking cessation  · Blood pressure: Is not at BP target of 130/80. Patient will resume amlodipine which should bring her under control. Lipids: Patient is prescribed moderate-intensity statin therapy. Could benefit from high intensity. Will evaluate at next visit. Appt set for patient in two weeks to evaulate medication changes and blood work. AVS given to patient and reviewed. - Upcoming appointments:   Future Appointments   Date Time Provider Kathy Davis   3/24/2021 10:10 AM [facility] MEDICATION MGMT [facility] MED MGMT [facility]   7/14/2021  2:00 PM Dee Dee Kovacs MD fp sc [facility]       Nae Beltre,Pharm. D,, BCPS, CACP  3/9/2021  5:22 PM

## 2021-03-12 ENCOUNTER — HOSPITAL ENCOUNTER (OUTPATIENT)
Age: 66
Discharge: HOME | End: 2021-03-12
Payer: MEDICARE

## 2021-03-12 ENCOUNTER — HOSPITAL ENCOUNTER (OUTPATIENT)
Dept: GENERAL RADIOLOGY | Age: 66
Discharge: HOME | End: 2021-03-14
Payer: MEDICARE

## 2021-03-12 DIAGNOSIS — J44.9 CHRONIC OBSTRUCTIVE PULMONARY DISEASE, UNSPECIFIED COPD TYPE (HCC): ICD-10-CM

## 2021-03-12 DIAGNOSIS — I10 ESSENTIAL HYPERTENSION: ICD-10-CM

## 2021-03-12 PROCEDURE — 93005 ELECTROCARDIOGRAM TRACING: CPT

## 2021-03-12 PROCEDURE — 71046 X-RAY EXAM CHEST 2 VIEWS: CPT

## 2021-03-12 NOTE — RESULT ENCOUNTER NOTE
ABNORMAL. Please notify patient. Abnormal EKG needs to see cardiologist.  If she wants a referral I can place it.   Her prior cardiologist was Dr. Christina Santos, please let me know if she wants referral to him or to another cardiologist  Future Appointments  3/24/2021  10:10 AM   [facility] MEDICATION MGMT       [facility] MED MGMT   [facility]  7/14/2021  2:00 PM    Uri Morris MD     [facility]          [facility]

## 2021-03-12 NOTE — RESULT ENCOUNTER NOTE
ABNORMAL. Please notify patient.   Chest x-ray does not show pneumonia,  It does show chronic slight elevation of the left hemidiaphragm  I would like her to see a pulmonologist, please let me know she agrees and I will place the referral      Future Appointments  3/24/2021  10:10 AM   [facility] MEDICATION [facility] MED MGMT   [facility]  7/14/2021  2:00 PM    Sandro Morning, MD [facility]          [facility]

## 2021-03-13 LAB
EKG ATRIAL RATE: 77 BPM
EKG P AXIS: 49 DEGREES
EKG Q-T INTERVAL: 454 MS
EKG QRS DURATION: 88 MS
EKG QTC CALCULATION (BAZETT): 434 MS
EKG R AXIS: -60 DEGREES
EKG T AXIS: 72 DEGREES
EKG VENTRICULAR RATE: 55 BPM

## 2021-03-15 ENCOUNTER — HOSPITAL ENCOUNTER (OUTPATIENT)
Age: 66
Discharge: HOME | End: 2021-03-15
Payer: MEDICARE

## 2021-03-15 DIAGNOSIS — J44.9 CHRONIC OBSTRUCTIVE PULMONARY DISEASE, UNSPECIFIED COPD TYPE (HCC): Primary | ICD-10-CM

## 2021-03-15 DIAGNOSIS — E55.9 VITAMIN D DEFICIENCY: ICD-10-CM

## 2021-03-15 DIAGNOSIS — E78.5 HYPERLIPIDEMIA WITH TARGET LDL LESS THAN 100: ICD-10-CM

## 2021-03-15 DIAGNOSIS — R80.9 TYPE 2 DIABETES MELLITUS WITH MICROALBUMINURIA, WITHOUT LONG-TERM CURRENT USE OF INSULIN (HCC): ICD-10-CM

## 2021-03-15 DIAGNOSIS — I10 ESSENTIAL HYPERTENSION: ICD-10-CM

## 2021-03-15 DIAGNOSIS — E03.9 ACQUIRED HYPOTHYROIDISM: ICD-10-CM

## 2021-03-15 DIAGNOSIS — J98.6 ACQUIRED ELEVATED HEMIDIAPHRAGM: ICD-10-CM

## 2021-03-15 DIAGNOSIS — R80.9 PROTEINURIA, UNSPECIFIED TYPE: Primary | ICD-10-CM

## 2021-03-15 DIAGNOSIS — R06.02 SOB (SHORTNESS OF BREATH): Primary | ICD-10-CM

## 2021-03-15 DIAGNOSIS — E11.29 TYPE 2 DIABETES MELLITUS WITH MICROALBUMINURIA, WITHOUT LONG-TERM CURRENT USE OF INSULIN (HCC): ICD-10-CM

## 2021-03-15 DIAGNOSIS — R94.31 ABNORMAL EKG: ICD-10-CM

## 2021-03-15 LAB
ALBUMIN SERPL-MCNC: 4.3 G/DL (ref 3.5–5.2)
ALBUMIN/GLOBULIN RATIO: ABNORMAL (ref 1–2.5)
ALP BLD-CCNC: 99 U/L (ref 35–104)
ALT SERPL-CCNC: 13 U/L (ref 5–33)
ANION GAP SERPL CALCULATED.3IONS-SCNC: 11 MMOL/L (ref 9–17)
AST SERPL-CCNC: 17 U/L
BILIRUB SERPL-MCNC: 0.89 MG/DL (ref 0.3–1.2)
BUN BLDV-MCNC: 13 MG/DL (ref 8–23)
BUN/CREAT BLD: ABNORMAL (ref 9–20)
CALCIUM SERPL-MCNC: 9.5 MG/DL (ref 8.6–10.4)
CHLORIDE BLD-SCNC: 93 MMOL/L (ref 98–107)
CHOLESTEROL/HDL RATIO: 3.1
CHOLESTEROL: 187 MG/DL
CO2: 28 MMOL/L (ref 20–31)
CREAT SERPL-MCNC: 0.58 MG/DL (ref 0.5–0.9)
CREATININE URINE: 254.4 MG/DL (ref 28–217)
FOLATE: 15.7 NG/ML
GFR AFRICAN AMERICAN: >60 ML/MIN
GFR NON-AFRICAN AMERICAN: >60 ML/MIN
GFR SERPL CREATININE-BSD FRML MDRD: ABNORMAL ML/MIN/{1.73_M2}
GFR SERPL CREATININE-BSD FRML MDRD: ABNORMAL ML/MIN/{1.73_M2}
GLUCOSE BLD-MCNC: 223 MG/DL (ref 70–99)
HCT VFR BLD CALC: 45.5 % (ref 36–46)
HDLC SERPL-MCNC: 60 MG/DL
HEMOGLOBIN: 15.4 G/DL (ref 12–16)
LDL CHOLESTEROL: 97 MG/DL (ref 0–130)
MCH RBC QN AUTO: 31.6 PG (ref 26–34)
MCHC RBC AUTO-ENTMCNC: 33.9 G/DL (ref 31–37)
MCV RBC AUTO: 93 FL (ref 80–100)
MICROALBUMIN/CREAT 24H UR: 1731 MG/L
MICROALBUMIN/CREAT UR-RTO: 680 MCG/MG CREAT
NRBC AUTOMATED: NORMAL PER 100 WBC
PDW BLD-RTO: 13.8 % (ref 11.5–14.9)
PLATELET # BLD: 319 K/UL (ref 150–450)
PMV BLD AUTO: 7.6 FL (ref 6–12)
POTASSIUM SERPL-SCNC: 4.2 MMOL/L (ref 3.7–5.3)
RBC # BLD: 4.89 M/UL (ref 4–5.2)
SODIUM BLD-SCNC: 132 MMOL/L (ref 135–144)
THYROXINE, FREE: 1.67 NG/DL (ref 0.93–1.7)
TOTAL PROTEIN: 7.5 G/DL (ref 6.4–8.3)
TRIGL SERPL-MCNC: 149 MG/DL
TSH SERPL DL<=0.05 MIU/L-ACNC: 1.33 MIU/L (ref 0.3–5)
VITAMIN B-12: 538 PG/ML (ref 232–1245)
VITAMIN D 25-HYDROXY: 11.7 NG/ML (ref 30–100)
VLDLC SERPL CALC-MCNC: NORMAL MG/DL (ref 1–30)
WBC # BLD: 8.3 K/UL (ref 3.5–11)

## 2021-03-15 PROCEDURE — 84443 ASSAY THYROID STIM HORMONE: CPT

## 2021-03-15 PROCEDURE — 80061 LIPID PANEL: CPT

## 2021-03-15 PROCEDURE — 80053 COMPREHEN METABOLIC PANEL: CPT

## 2021-03-15 PROCEDURE — 84439 ASSAY OF FREE THYROXINE: CPT

## 2021-03-15 PROCEDURE — 82607 VITAMIN B-12: CPT

## 2021-03-15 PROCEDURE — 82306 VITAMIN D 25 HYDROXY: CPT

## 2021-03-15 PROCEDURE — 36415 COLL VENOUS BLD VENIPUNCTURE: CPT

## 2021-03-15 PROCEDURE — 85027 COMPLETE CBC AUTOMATED: CPT

## 2021-03-15 PROCEDURE — 82746 ASSAY OF FOLIC ACID SERUM: CPT

## 2021-03-15 PROCEDURE — 82043 UR ALBUMIN QUANTITATIVE: CPT

## 2021-03-15 PROCEDURE — 82570 ASSAY OF URINE CREATININE: CPT

## 2021-03-15 NOTE — RESULT ENCOUNTER NOTE
ABNORMAL. Please notify patient. Vitamin D very low, new prescription for high-dose vitamin D weekly for 3 months sent at the pharmacy, needs to take it with food. Blood glucose very high 223, low-carb diet is advised  VERY Very high proteins in the urine, which is new from 8 months ago. I need to place a referral to a kidney doctor as well.  (Urine microalbumin was 218, 8 months ago, now it is 1731)    Please give her contact information for Dr. Ondina Baptiste, [place] [facility], and pulmonologist, see referral list in epic  I also had to place a pulmonary function test referral for the pulmonologist    Future Appointments  3/24/2021  10:10 AM   [facility] MEDICATION MGMT       [facility] MED MGMT   [facility]  7/14/2021  2:00 PM    Anabell Jeff MD     [facility]

## 2021-03-16 ENCOUNTER — TELEPHONE (OUTPATIENT)
Dept: FAMILY MEDICINE CLINIC | Age: 66
End: 2021-03-16

## 2021-03-16 NOTE — TELEPHONE ENCOUNTER
Patient states she was given her x-ray results yesterday and wanted to know what they meant again. Patient states she was given referrals for a pulmonologist and a cardiologist. I informed patient again what the results meant and that she needs to contact the pulmonologist regarding the shortness of breath. She states she won't be calling the cardiologist yet as it is not her main concern. I informed her she needed to due to the abnormal EKG findings. I gave patient the phone numbers and addresses for those referrals including the nephrologist and the phone number for outpatient scheduling to get the bronchodilator study done.

## 2021-03-23 ENCOUNTER — TELEPHONE (OUTPATIENT)
Dept: PHARMACY | Age: 66
End: 2021-03-23

## 2021-03-24 ENCOUNTER — TELEPHONE (OUTPATIENT)
Dept: PHARMACY | Age: 66
End: 2021-03-24

## 2021-03-24 NOTE — TELEPHONE ENCOUNTER
Patient was a no show for Medication Management Appointment today for CMR/Diabetes Follow-up. Spoke with patient.  is in the hospital and she is not able to make it in for an appointment. Attempted to reschedule appointment. Wants to wait until patient is discharged. States she is still interested in followup with clinic just wont make an appointment right now. Discussed with patient that if we do not hear from her by March 31, will call back to check in and schedule appointment.    Lillian Bennett, Pharm D, BCPS  [address] [facility] Medication Management Clinic  3/24/2021 10:38 AM

## 2021-03-30 ENCOUNTER — TELEPHONE (OUTPATIENT)
Dept: PHARMACY | Age: 66
End: 2021-03-30

## 2021-03-31 ENCOUNTER — HOSPITAL ENCOUNTER (OUTPATIENT)
Dept: ULTRASOUND IMAGING | Age: 66
Discharge: HOME | End: 2021-04-02
Payer: MEDICARE

## 2021-03-31 DIAGNOSIS — E03.9 ACQUIRED HYPOTHYROIDISM: ICD-10-CM

## 2021-03-31 PROCEDURE — 76536 US EXAM OF HEAD AND NECK: CPT

## 2021-04-01 DIAGNOSIS — R80.9 TYPE 2 DIABETES MELLITUS WITH MICROALBUMINURIA, WITHOUT LONG-TERM CURRENT USE OF INSULIN (HCC): Primary | ICD-10-CM

## 2021-04-01 DIAGNOSIS — E11.29 TYPE 2 DIABETES MELLITUS WITH MICROALBUMINURIA, WITHOUT LONG-TERM CURRENT USE OF INSULIN (HCC): Primary | ICD-10-CM

## 2021-04-01 RX ORDER — METFORMIN HYDROCHLORIDE 500 MG/1
1000 TABLET, EXTENDED RELEASE ORAL
Qty: 180 TABLET | Refills: 3 | Status: SHIPPED | OUTPATIENT
Start: 2021-04-01 | End: 2021-12-03 | Stop reason: SDUPTHER

## 2021-04-01 NOTE — RESULT ENCOUNTER NOTE
Please notify patient.   Mild heterogenous thyroid, consistent with chronic thyroid disorder, no thyroid nodules  Continue treatment with Synthroid      Future Appointments  6/25/2021  11:30 AM   Coy Hayes MD     [facility] RenalSrv   [facility] Renal Se  7/2/2021   9:00 AM    SCHEDULE, [facility] COVID SCRE* [address] [place]  7/6/2021   7:30 AM    Sta Respiratory Tech       [facility] EEG       [facility]  7/14/2021  2:00 PM    Mago Hurtado MD     Psychiatric          [address] [facility]

## 2021-04-01 NOTE — TELEPHONE ENCOUNTER
Current Outpatient Medications on File Prior to Visit   Medication Sig Dispense Refill    metFORMIN (GLUCOPHAGE XR) 500 MG extended release tablet Take 2 tablets by mouth Daily with supper 60 tablet 1    blood glucose test strips (ASCENSIA AUTODISC VI;ONE TOUCH ULTRA TEST VI) strip 1 each by In Vitro route daily As needed. 100 each 3    pravastatin (PRAVACHOL) 40 MG tablet Take 1 tablet by mouth every evening For cholesterol 90 tablet 3    varenicline (CHANTIX STARTING MONTH PAK) 0.5 MG X 11 & 1 MG X 42 tablet 0.5 mg po daily x 3 days, 0.5 mg BID x 4 days, then 1 mg BID thereafter . Call for refill (Patient not taking: Reported on 3/3/2021) 1 box 0    levothyroxine (SYNTHROID) 200 MCG tablet Take 1 tablet by mouth every morning (before breakfast) 90 tablet 3    vitamin D (ERGOCALCIFEROL) 1.25 MG (26070 UT) CAPS capsule TAKE 1 CAPSULE BY MOUTH ONE TIME PER WEEK (Patient taking differently: Patient taking 1000 units once a day) 12 capsule 0    albuterol sulfate HFA (VENTOLIN HFA) 108 (90 Base) MCG/ACT inhaler Inhale 2 puffs into the lungs every 6 hours as needed for Wheezing or Shortness of Breath (cough) INHALE 2 PUFFS INTO THE LUNGS 2 TIMES DAILY AS NEEDED FOR WHEEZING 1 Inhaler 3    aspirin EC 81 MG EC tablet Take 1 tablet by mouth daily (Patient not taking: Reported on 3/9/2021) 90 tablet 1    irbesartan-hydrochlorothiazide (AVALIDE) 150-12.5 MG per tablet Take 1 tablet by mouth daily TAKE 1 TABLET BY MOUTH EVERY DAY 90 tablet 2    amLODIPine (NORVASC) 10 MG tablet Take 1 tablet by mouth daily (Patient not taking: Reported on 3/9/2021) 90 tablet 3    glipiZIDE (GLUCOTROL) 10 MG tablet Take 1 tablet by mouth 2 times daily (before meals) 180 tablet 3    sertraline (ZOLOFT) 50 MG tablet Take 1 tablet by mouth every morning One tab daily 90 tablet 3    tiotropium (SPIRIVA HANDIHALER) 18 MCG inhalation capsule Inhale 1 capsule into the lungs daily (Patient not taking: Reported on 3/3/2021) 90 capsule 1    omeprazole (PRILOSEC) 20 MG delayed release capsule TAKE ONE CAPSULE BY MOUTH EVERY DAY 90 capsule 1    fluticasone (FLONASE) 50 MCG/ACT nasal spray 2 sprays by Each Nostril route daily (Patient taking differently: 2 sprays by Each Nostril route daily as needed for Rhinitis ) 1 Bottle 0     No current facility-administered medications on file prior to visit.

## 2021-04-07 ENCOUNTER — VIRTUAL VISIT (OUTPATIENT)
Dept: FAMILY MEDICINE CLINIC | Age: 66
End: 2021-04-07
Payer: MEDICARE

## 2021-04-07 DIAGNOSIS — J40 BRONCHITIS: ICD-10-CM

## 2021-04-07 DIAGNOSIS — J44.1 CHRONIC OBSTRUCTIVE PULMONARY DISEASE WITH ACUTE EXACERBATION (HCC): Primary | ICD-10-CM

## 2021-04-07 DIAGNOSIS — R49.0 HOARSENESS: ICD-10-CM

## 2021-04-07 PROCEDURE — 99443 PR PHYS/QHP TELEPHONE EVALUATION 21-30 MIN: CPT | Performed by: FAMILY MEDICINE

## 2021-04-07 RX ORDER — AZITHROMYCIN 250 MG/1
TABLET, FILM COATED ORAL
Qty: 6 TABLET | Refills: 0 | Status: SHIPPED | OUTPATIENT
Start: 2021-04-07 | End: 2021-04-12

## 2021-04-07 RX ORDER — BENZOCAINE/MENTHOL 6 MG-10 MG
1 LOZENGE MUCOUS MEMBRANE
Qty: 15 LOZENGE | Refills: 0 | Status: SHIPPED | OUTPATIENT
Start: 2021-04-07 | End: 2021-05-07

## 2021-04-07 RX ORDER — BENZONATATE 100 MG/1
100 CAPSULE ORAL 3 TIMES DAILY PRN
Qty: 21 CAPSULE | Refills: 0 | Status: SHIPPED | OUTPATIENT
Start: 2021-04-07 | End: 2021-04-14

## 2021-04-07 RX ORDER — METHYLPREDNISOLONE 4 MG/1
TABLET ORAL
Qty: 1 KIT | Refills: 0 | Status: SHIPPED | OUTPATIENT
Start: 2021-04-07 | End: 2021-04-13

## 2021-04-07 RX ORDER — ALBUTEROL SULFATE 2.5 MG/3ML
2.5 SOLUTION RESPIRATORY (INHALATION) EVERY 6 HOURS PRN
Qty: 125 VIAL | Refills: 0 | Status: SHIPPED | OUTPATIENT
Start: 2021-04-07 | End: 2021-09-23 | Stop reason: SDUPTHER

## 2021-04-07 NOTE — PROGRESS NOTES
lozenge; Refill: 0    2. Hoarseness  Symptomatic treatment, antibiotic and steroids  - guaiFENesin (MUCINEX) 600 MG extended release tablet; Take 1 tablet by mouth 2 times daily  Dispense: 30 tablet; Refill: 0  - benzonatate (TESSALON PERLES) 100 MG capsule; Take 1 capsule by mouth 3 times daily as needed for Cough  Dispense: 21 capsule; Refill: 0  - methylPREDNISolone (MEDROL DOSEPACK) 4 MG tablet; Take by mouth. Dispense: 1 kit; Refill: 0  - azithromycin (ZITHROMAX) 250 MG tablet; 500 mg orally on day one followed by 250 mg daily on days two through five  Dispense: 6 tablet; Refill: 0  - Benzocaine-Menthol (RA THROAT LOZENGES) 6-10 MG LOZG lozenge; Take 1 lozenge by mouth every 2 hours as needed for Sore Throat  Dispense: 15 lozenge; Refill: 0    3. Bronchitis  Start on aerosol treatments follow-up in 2 weeks in office  - guaiFENesin (MUCINEX) 600 MG extended release tablet; Take 1 tablet by mouth 2 times daily  Dispense: 30 tablet; Refill: 0  - benzonatate (TESSALON PERLES) 100 MG capsule; Take 1 capsule by mouth 3 times daily as needed for Cough  Dispense: 21 capsule; Refill: 0  - methylPREDNISolone (MEDROL DOSEPACK) 4 MG tablet; Take by mouth. Dispense: 1 kit; Refill: 0  - azithromycin (ZITHROMAX) 250 MG tablet; 500 mg orally on day one followed by 250 mg daily on days two through five  Dispense: 6 tablet; Refill: 0  - Benzocaine-Menthol (RA THROAT LOZENGES) 6-10 MG LOZG lozenge; Take 1 lozenge by mouth every 2 hours as needed for Sore Throat  Dispense: 15 lozenge; Refill: 0        I affirm this is a Patient Initiated Episode with a Patient who has not had a related appointment within my department in the past 7 days or scheduled within the next 24 hours.     Patient identification was verified at the start of the visit: Yes    Total Time: minutes: 21-30 minutes    The visit was conducted pursuant to the emergency declaration under the [facility], [facility] waiver authority and

## 2021-04-08 ENCOUNTER — TELEPHONE (OUTPATIENT)
Dept: PHARMACY | Age: 66
End: 2021-04-08

## 2021-04-08 NOTE — TELEPHONE ENCOUNTER
Patient was no show for CMR/DM follow up 3/24. Left detailed message or patient to return call to schedule appointment. [address], [address]   [facility] Medication Management Services  [phone]

## 2021-04-09 ENCOUNTER — TELEPHONE (OUTPATIENT)
Dept: FAMILY MEDICINE CLINIC | Age: 66
End: 2021-04-09

## 2021-04-09 NOTE — TELEPHONE ENCOUNTER
Spoke with patient regarding nebulizer order and that she will have to call her insurance company to find out what home medical equipment company they will approve for it.

## 2021-04-22 ENCOUNTER — TELEPHONE (OUTPATIENT)
Dept: PHARMACY | Age: 66
End: 2021-04-22

## 2021-04-22 NOTE — TELEPHONE ENCOUNTER
Patient called to cancel appt for 4/23 without reason. States she will call back when she wishes to schedule appt. Will set aside for two weeks and if patient does not call within that time frame will contact her (by May 6th, 2021)  Nae Beltre,Pharm. D,, [facility], CACP  4/22/2021  4:29 PM

## 2021-04-23 ENCOUNTER — TELEPHONE (OUTPATIENT)
Dept: PHARMACY | Age: 66
End: 2021-04-23

## 2021-04-23 NOTE — TELEPHONE ENCOUNTER
Noted. Thank you!     Lab Results   Component Value Date    LABA1C 7.4 03/03/2021    LABA1C 6.6 11/03/2020    LABA1C 6.8 (H) 07/13/2020

## 2021-04-23 NOTE — TELEPHONE ENCOUNTER
Patient left voice message last evening that she was cancelling today's MTM appointment for DM management. When writer called her today to see about rescheduling, she said she would call when she was ready. When asked when that might be she stated she did not feel this service was of any value to her. Writer explained the risks/complications of Diabetes and importance of proper management but she refuses at this time. Referral will be closed as patient wishes to follow up with Dr Manuel Chance and be managed solely by her.     Note routed to Dr Manuel Chance

## 2021-05-07 PROBLEM — D75.1 POLYCYTHEMIA: Status: ACTIVE | Noted: 2021-05-07

## 2021-05-07 PROBLEM — N18.1 CKD (CHRONIC KIDNEY DISEASE), STAGE I: Status: ACTIVE | Noted: 2021-05-07

## 2021-05-19 RX ORDER — OMEPRAZOLE 20 MG/1
CAPSULE, DELAYED RELEASE ORAL
Qty: 90 CAPSULE | Refills: 1 | Status: SHIPPED | OUTPATIENT
Start: 2021-05-19 | End: 2021-11-15

## 2021-05-19 NOTE — TELEPHONE ENCOUNTER
Please Approve or Refuse.   Send to Pharmacy per Pt's Request:      Next Visit Date:  7/14/2021   Last Visit Date: 4/7/2021    Hemoglobin A1C (%)   Date Value   03/03/2021 7.4   11/03/2020 6.6   07/13/2020 6.8 (H)             ( goal A1C is < 7)   BP Readings from Last 3 Encounters:   03/03/21 136/86   11/03/20 128/74   07/02/20 136/84          (goal 120/80)  BUN   Date Value Ref Range Status   03/15/2021 13 8 - 23 mg/dL Final     CREATININE   Date Value Ref Range Status   03/15/2021 0.58 0.50 - 0.90 mg/dL Final     Potassium   Date Value Ref Range Status   03/15/2021 4.2 3.7 - 5.3 mmol/L Final

## 2021-06-25 DIAGNOSIS — I10 ESSENTIAL HYPERTENSION: ICD-10-CM

## 2021-06-25 RX ORDER — IRBESARTAN AND HYDROCHLOROTHIAZIDE 150; 12.5 MG/1; MG/1
TABLET, FILM COATED ORAL
Qty: 90 TABLET | Refills: 2 | Status: ON HOLD | OUTPATIENT
Start: 2021-06-25 | End: 2022-04-07 | Stop reason: HOSPADM

## 2021-07-15 ENCOUNTER — OFFICE VISIT (OUTPATIENT)
Dept: FAMILY MEDICINE CLINIC | Age: 66
End: 2021-07-15
Payer: MEDICARE

## 2021-07-15 VITALS
SYSTOLIC BLOOD PRESSURE: 130 MMHG | TEMPERATURE: 97.2 F | HEIGHT: 62 IN | OXYGEN SATURATION: 95 % | WEIGHT: 166 LBS | HEART RATE: 75 BPM | DIASTOLIC BLOOD PRESSURE: 88 MMHG | BODY MASS INDEX: 30.55 KG/M2

## 2021-07-15 DIAGNOSIS — F33.41 RECURRENT MAJOR DEPRESSIVE DISORDER, IN PARTIAL REMISSION (HCC): ICD-10-CM

## 2021-07-15 DIAGNOSIS — I10 ESSENTIAL HYPERTENSION: ICD-10-CM

## 2021-07-15 DIAGNOSIS — E03.9 ACQUIRED HYPOTHYROIDISM: ICD-10-CM

## 2021-07-15 DIAGNOSIS — Z12.11 COLON CANCER SCREENING: ICD-10-CM

## 2021-07-15 DIAGNOSIS — E11.29 TYPE 2 DIABETES MELLITUS WITH MICROALBUMINURIA, WITHOUT LONG-TERM CURRENT USE OF INSULIN (HCC): Primary | ICD-10-CM

## 2021-07-15 DIAGNOSIS — J42 CHRONIC BRONCHITIS, UNSPECIFIED CHRONIC BRONCHITIS TYPE (HCC): ICD-10-CM

## 2021-07-15 DIAGNOSIS — R80.9 TYPE 2 DIABETES MELLITUS WITH MICROALBUMINURIA, WITHOUT LONG-TERM CURRENT USE OF INSULIN (HCC): Primary | ICD-10-CM

## 2021-07-15 LAB — HBA1C MFR BLD: 7.8 %

## 2021-07-15 PROCEDURE — 3051F HG A1C>EQUAL 7.0%<8.0%: CPT | Performed by: FAMILY MEDICINE

## 2021-07-15 PROCEDURE — 83036 HEMOGLOBIN GLYCOSYLATED A1C: CPT | Performed by: FAMILY MEDICINE

## 2021-07-15 PROCEDURE — 99214 OFFICE O/P EST MOD 30 MIN: CPT | Performed by: FAMILY MEDICINE

## 2021-07-15 RX ORDER — SERTRALINE HYDROCHLORIDE 100 MG/1
100 TABLET, FILM COATED ORAL EVERY MORNING
Qty: 60 TABLET | Refills: 1 | Status: SHIPPED | OUTPATIENT
Start: 2021-07-15 | End: 2021-07-30 | Stop reason: SDUPTHER

## 2021-07-15 RX ORDER — GLIPIZIDE 10 MG/1
TABLET ORAL
Qty: 180 TABLET | Refills: 3 | Status: SHIPPED | OUTPATIENT
Start: 2021-07-15 | End: 2021-12-03 | Stop reason: SDUPTHER

## 2021-07-15 SDOH — ECONOMIC STABILITY: TRANSPORTATION INSECURITY
IN THE PAST 12 MONTHS, HAS THE LACK OF TRANSPORTATION KEPT YOU FROM MEDICAL APPOINTMENTS OR FROM GETTING MEDICATIONS?: NO

## 2021-07-15 SDOH — ECONOMIC STABILITY: TRANSPORTATION INSECURITY
IN THE PAST 12 MONTHS, HAS LACK OF TRANSPORTATION KEPT YOU FROM MEETINGS, WORK, OR FROM GETTING THINGS NEEDED FOR DAILY LIVING?: NO

## 2021-07-15 SDOH — ECONOMIC STABILITY: FOOD INSECURITY: WITHIN THE PAST 12 MONTHS, THE FOOD YOU BOUGHT JUST DIDN'T LAST AND YOU DIDN'T HAVE MONEY TO GET MORE.: NEVER TRUE

## 2021-07-15 SDOH — ECONOMIC STABILITY: HOUSING INSECURITY
IN THE LAST 12 MONTHS, WAS THERE A TIME WHEN YOU DID NOT HAVE A STEADY PLACE TO SLEEP OR SLEPT IN A SHELTER (INCLUDING NOW)?: NO

## 2021-07-15 SDOH — ECONOMIC STABILITY: INCOME INSECURITY: IN THE LAST 12 MONTHS, WAS THERE A TIME WHEN YOU WERE NOT ABLE TO PAY THE MORTGAGE OR RENT ON TIME?: NO

## 2021-07-15 SDOH — ECONOMIC STABILITY: FOOD INSECURITY: WITHIN THE PAST 12 MONTHS, YOU WORRIED THAT YOUR FOOD WOULD RUN OUT BEFORE YOU GOT MONEY TO BUY MORE.: NEVER TRUE

## 2021-07-15 ASSESSMENT — ENCOUNTER SYMPTOMS
BACK PAIN: 1
WHEEZING: 0
VOICE CHANGE: 1
VOMITING: 0
CONSTIPATION: 0
COUGH: 0
DIARRHEA: 0
SHORTNESS OF BREATH: 0
CHEST TIGHTNESS: 0
COLOR CHANGE: 0
ABDOMINAL DISTENTION: 0
PHOTOPHOBIA: 0
TROUBLE SWALLOWING: 0
ABDOMINAL PAIN: 0

## 2021-07-15 ASSESSMENT — PATIENT HEALTH QUESTIONNAIRE - PHQ9
SUM OF ALL RESPONSES TO PHQ QUESTIONS 1-9: 0
SUM OF ALL RESPONSES TO PHQ QUESTIONS 1-9: 0
SUM OF ALL RESPONSES TO PHQ9 QUESTIONS 1 & 2: 0
2. FEELING DOWN, DEPRESSED OR HOPELESS: 0
1. LITTLE INTEREST OR PLEASURE IN DOING THINGS: 0
SUM OF ALL RESPONSES TO PHQ QUESTIONS 1-9: 0

## 2021-07-15 ASSESSMENT — SOCIAL DETERMINANTS OF HEALTH (SDOH): HOW HARD IS IT FOR YOU TO PAY FOR THE VERY BASICS LIKE FOOD, HOUSING, MEDICAL CARE, AND HEATING?: NOT HARD AT ALL

## 2021-07-15 NOTE — RESULT ENCOUNTER NOTE
Addressed during office visit today, *A1c 7.8, abnormal, worsening diabetes but well controlled, continue treatment recommended during the office visit

## 2021-07-15 NOTE — PROGRESS NOTES
Chief Complaint   Patient presents with    Diabetes    Discuss Medications     would like zoloft increased          Elizabeth Lofton  here today for follow up on chronic medical problems, go over labs and/or diagnostic studies, and medication refills. Diabetes and Discuss Medications (would like zoloft increased )      HPI: Patient is here for follow-up on diabetes, A1c is mildly increased to 7.8. Patient reports she takes all her medications but skips Metformin sometimes due to intolerance. She was on Januvia in the past but is in tier 3 and is expensive for her. She is also on glipizide 20 mg/day. Hypothyroidism stable on current treatment. Patient has recent blood work done. Hypertension controlled denies any chest pain shortness of breath dyspnea on exertion palpitations. Patient has COPD and is on inhalers, reports she uses a as needed basis. She denies any hospitalizations. History of depression on Zoloft 50 mg reports she wants to increase it, recently stressed. Patient denies any bad thoughts of hurting herself or others. She has done counseling in the past and is not willing to do counseling. Reports Zoloft is helping. /88   Pulse 75   Temp 97.2 °F (36.2 °C)   Ht 5' 2\" (1.575 m)   Wt 166 lb (75.3 kg)   SpO2 95%   BMI 30.36 kg/m²    Body mass index is 30.36 kg/m². Wt Readings from Last 3 Encounters:   07/15/21 166 lb (75.3 kg)   03/03/21 170 lb 6.4 oz (77.3 kg)   11/03/20 172 lb (78 kg)        [x]Negative depression screening. PHQ Scores 7/15/2021 3/3/2021 11/3/2020 7/2/2020 2/10/2020 2/12/2019 5/31/2018   PHQ2 Score 0 1 0 1 0 0 0   PHQ9 Score 0 1 0 1 0 0 0      []1-4 = Minimal depression   []5-9 = Milddepression   []10-14 = Moderate depression   []15-19 = Moderately severe depression   []20-27 = Severe depression    Discussed testing with the patient and all questions fully answered.     Orders Only on 05/07/2021   Component Date Value Ref Range Status  Sodium 11/03/2020 131* mmol/L Final    Chloride 11/03/2020 92* mmol/L Final    Potassium 11/03/2020 4.9  mmol/L Final    BUN 11/03/2020 8  mg/dL Final    CREATININE 11/03/2020 0.65   Final    Glucose 11/03/2020 142* mg/dL Final    CO2 11/03/2020 29  mmol/L Final    Calcium 11/03/2020 9.5  mg/dL Final    Gfr Calculated 11/03/2020 60   Final    Sodium 03/15/2021 132* mmol/L Final    Potassium 03/15/2021 4.2  mmol/L Final    BUN 03/15/2021 13  mg/dL Final    CREATININE 03/15/2021 0.58   Final         Most recent labs reviewed:     Lab Results   Component Value Date    WBC 8.3 03/15/2021    HGB 15.4 03/15/2021    HCT 45.5 03/15/2021    MCV 93.0 03/15/2021     03/15/2021       @BRIEFLAB(NA,K,CL,CO2,BUN,CREATININE,GLUCOSE,CALCIUM)@     Lab Results   Component Value Date    ALT 13 03/15/2021    AST 17 03/15/2021    ALKPHOS 99 03/15/2021    BILITOT 0.89 03/15/2021       Lab Results   Component Value Date    TSH 1.33 03/15/2021       Lab Results   Component Value Date    CHOL 187 03/15/2021    CHOL 195 07/13/2020    CHOL 184 04/26/2019     Lab Results   Component Value Date    TRIG 149 03/15/2021    TRIG 120 07/13/2020    TRIG 165 (H) 04/26/2019     Lab Results   Component Value Date    HDL 60 03/15/2021    HDL 53 07/13/2020    HDL 49 04/26/2019     Lab Results   Component Value Date    LDLCALC 102 04/26/2019    LDLCALC 92 05/25/2018    LDLCALC 96 07/28/2017    LDLCHOLESTEROL 97 03/15/2021    LDLCHOLESTEROL 118 07/13/2020     Lab Results   Component Value Date    LABVLDL 33 (H) 04/26/2019    LABVLDL 22 05/25/2018    LABVLDL 24 07/28/2017    VLDL NOT REPORTED 03/15/2021    VLDL NOT REPORTED 07/13/2020    VLDL 32 05/27/2015     Lab Results   Component Value Date    CHOLHDLRATIO 3.1 03/15/2021    CHOLHDLRATIO 3.7 07/13/2020    CHOLHDLRATIO 3.8 04/26/2019       Lab Results   Component Value Date    LABA1C 7.8 07/15/2021       Lab Results   Component Value Date    NFTTKHZR13 538 03/15/2021       Lab Results Component Value Date    FOLATE 15.7 03/15/2021       No results found for: IRON, TIBC, FERRITIN    Lab Results   Component Value Date    VITD25 11.7 (L) 03/15/2021             Current Outpatient Medications   Medication Sig Dispense Refill    Tdap (ADACEL) 5-2-15.5 LF-MCG/0.5 injection Inject 0.5 mLs into the muscle once for 1 dose 0.5 mL 0    zoster recombinant adjuvanted vaccine (SHINGRIX) 50 MCG/0.5ML SUSR injection Inject 0.5 mLs into the muscle See Admin Instructions 1 dose now and repeat in 2-6 months 0.5 mL 0    glipiZIDE (GLUCOTROL) 10 MG tablet Take 20 mg in the morning and 10 mg in the evening 180 tablet 3    sertraline (ZOLOFT) 100 MG tablet Take 1 tablet by mouth every morning One tab daily 60 tablet 1    irbesartan-hydroCHLOROthiazide (AVALIDE) 150-12.5 MG per tablet TAKE 1 TABLET BY MOUTH DAILY 90 tablet 2    omeprazole (PRILOSEC) 20 MG delayed release capsule TAKE ONE CAPSULE BY MOUTH EVERY DAY 90 capsule 1    albuterol (PROVENTIL) (2.5 MG/3ML) 0.083% nebulizer solution Take 3 mLs by nebulization every 6 hours as needed for Wheezing or Shortness of Breath 125 vial 0    metFORMIN (GLUCOPHAGE-XR) 500 MG extended release tablet TAKE 2 TABLETS BY MOUTH DAILY WITH SUPPER 180 tablet 3    blood glucose test strips (ASCENSIA AUTODISC VI;ONE TOUCH ULTRA TEST VI) strip 1 each by In Vitro route daily As needed.  100 each 3    pravastatin (PRAVACHOL) 40 MG tablet Take 1 tablet by mouth every evening For cholesterol 90 tablet 3    levothyroxine (SYNTHROID) 200 MCG tablet Take 1 tablet by mouth every morning (before breakfast) 90 tablet 3    vitamin D (ERGOCALCIFEROL) 1.25 MG (32786 UT) CAPS capsule TAKE 1 CAPSULE BY MOUTH ONE TIME PER WEEK (Patient taking differently: Patient taking 1000 units once a day) 12 capsule 0    albuterol sulfate HFA (VENTOLIN HFA) 108 (90 Base) MCG/ACT inhaler Inhale 2 puffs into the lungs every 6 hours as needed for Wheezing or Shortness of Breath (cough) INHALE 2 PUFFS INTO THE LUNGS 2 TIMES DAILY AS NEEDED FOR WHEEZING 1 Inhaler 3    aspirin EC 81 MG EC tablet Take 1 tablet by mouth daily 90 tablet 1    amLODIPine (NORVASC) 10 MG tablet Take 1 tablet by mouth daily 90 tablet 3    fluticasone (FLONASE) 50 MCG/ACT nasal spray 2 sprays by Each Nostril route daily (Patient taking differently: 2 sprays by Each Nostril route daily as needed for Rhinitis ) 1 Bottle 0    varenicline (CHANTIX STARTING MONTH PAK) 0.5 MG X 11 & 1 MG X 42 tablet 0.5 mg po daily x 3 days, 0.5 mg BID x 4 days, then 1 mg BID thereafter . Call for refill (Patient not taking: Reported on 7/15/2021) 1 box 0    tiotropium (SPIRIVA HANDIHALER) 18 MCG inhalation capsule Inhale 1 capsule into the lungs daily (Patient not taking: Reported on 7/15/2021) 90 capsule 1     No current facility-administered medications for this visit. Social History     Socioeconomic History    Marital status:      Spouse name: Not on file    Number of children: Not on file    Years of education: Not on file    Highest education level: Not on file   Occupational History    Not on file   Tobacco Use    Smoking status: Current Every Day Smoker     Packs/day: 0.50     Years: 30.00     Pack years: 15.00     Types: Cigarettes    Smokeless tobacco: Former User   Substance and Sexual Activity    Alcohol use: Yes     Comment: OCCASIONAL    Drug use: No    Sexual activity: Yes   Other Topics Concern    Not on file   Social History Narrative    Not on file     Social Determinants of Health     Financial Resource Strain: Low Risk     Difficulty of Paying Living Expenses: Not hard at all   Food Insecurity: No Food Insecurity    Worried About [facility] in the Last Year: Never true    [facility] in the Last Year: Never true   Transportation Needs: No Transportation Needs    Lack of Transportation (Medical): No    Lack of Transportation (Non-Medical):  No   Physical Activity:     Days of Exercise per Week:     Minutes of Exercise per Session:    Stress:     Feeling of Stress :    Social Connections:     Frequency of Communication with Friends and Family:     Frequency of Social Gatherings with Friends and Family:     Attends Judaism Services:     Active Member of Clubs or Organizations:     Attends Club or Organization Meetings:     Marital Status:    Intimate Partner Violence:     Fear of Current or Ex-Partner:     Emotionally Abused:     Physically Abused:     Sexually Abused:      Ready to quit: Not Answered  Counseling given: Yes        Family History   Problem Relation Age of Onset    Diabetes Mother     Diabetes Father     Breast Cancer Sister         after age 48              -rest of complaints with corresponding details per ROS    The patient's past medical, surgical, social, and family history as well as her current medications and allergies were reviewed as documented intoday's encounter. Review of Systems   Constitutional: Negative for activity change, appetite change, fatigue and unexpected weight change. HENT: Positive for congestion and voice change. Negative for nosebleeds, postnasal drip and trouble swallowing. Chronic hoarseness   Eyes: Negative for photophobia and visual disturbance. Respiratory: Negative for cough, chest tightness, shortness of breath and wheezing. Cardiovascular: Negative for chest pain, palpitations and leg swelling. Gastrointestinal: Negative for abdominal distention, abdominal pain, constipation, diarrhea and vomiting. Genitourinary: Negative for difficulty urinating, dysuria, frequency, urgency and vaginal pain. Musculoskeletal: Positive for arthralgias, back pain and gait problem. Negative for joint swelling, myalgias and neck stiffness. Skin: Negative for color change. Neurological: Positive for numbness. Negative for dizziness, speech difficulty and weakness.    Psychiatric/Behavioral: Positive for decreased concentration. Negative for agitation, dysphoric mood, self-injury, sleep disturbance and suicidal ideas. The patient is nervous/anxious. Physical Exam  Vitals and nursing note reviewed. HENT:      Head: Normocephalic and atraumatic. Nose: Nose normal.   Eyes:      Extraocular Movements: Extraocular movements intact. Pupils: Pupils are equal, round, and reactive to light. Cardiovascular:      Rate and Rhythm: Normal rate and regular rhythm. Heart sounds: Normal heart sounds. Pulmonary:      Effort: Pulmonary effort is normal.      Breath sounds: Decreased air movement present. Examination of the right-lower field reveals decreased breath sounds. Examination of the left-lower field reveals decreased breath sounds. Decreased breath sounds present. No wheezing, rhonchi or rales. Abdominal:      General: Abdomen is protuberant. Palpations: Abdomen is soft. Tenderness: There is no abdominal tenderness. Lymphadenopathy:      Cervical: No cervical adenopathy. Neurological:      Mental Status: She is alert and oriented to person, place, and time. Cranial Nerves: Cranial nerves are intact. Motor: Motor function is intact. Coordination: Coordination is intact. Gait: Gait normal.   Psychiatric:         Attention and Perception: Attention and perception normal.         Mood and Affect: Mood is depressed. Mood is not anxious. Affect is not flat or tearful. Speech: She is communicative. Speech is delayed. Speech is not rapid and pressured. Behavior: Behavior is slowed. Behavior is cooperative. Thought Content: Thought content normal. Thought content is not paranoid. Cognition and Memory: Cognition normal.         Judgment: Judgment normal.             ASSESSMENT AND PLAN      1.  Type 2 diabetes mellitus with microalbuminuria, without long-term current use of insulin (HCC)  Mild increase in A1c but still controlled, increase glipizide to 30 mg/day discussed with patient. All other medications are tier 3.  -  DIABETES FOOT EXAM  - POCT glycosylated hemoglobin (Hb A1C)  - glipiZIDE (GLUCOTROL) 10 MG tablet; Take 20 mg in the morning and 10 mg in the evening  Dispense: 180 tablet; Refill: 3    2. Recurrent major depressive disorder, in partial remission (HCC)  Increase Zoloft 100 mg. Patient refused counseling  - sertraline (ZOLOFT) 100 MG tablet; Take 1 tablet by mouth every morning One tab daily  Dispense: 60 tablet; Refill: 1    3. Acquired hypothyroidism  Stable continue Synthroid recent blood work under control thyroid also normal    4. Essential hypertension  Controlled continue same medications    5. Chronic bronchitis, unspecified chronic bronchitis type ([facility] [address]  Stable continue same medications    6. Colon cancer screening    - Cologuard; Future      Orders Placed This Encounter   Procedures    Cologuard     This test is performed by an external laboratory and is used for result attachment only. It is required that this order requisition be faxed to: [employer] [employer] [facility] @@ [phone]. See [email or web address] for further information. Standing Status:   Future     Standing Expiration Date:   7/15/2022    POCT glycosylated hemoglobin (Hb A1C)     DIABETES FOOT EXAM         Medications Discontinued During This Encounter   Medication Reason    glipiZIDE (GLUCOTROL) 10 MG tablet REORDER    sertraline (ZOLOFT) 50 MG tablet        Gem received counseling on the following healthy behaviors: nutrition, exercise, medication adherence and tobacco cessation  Reviewed prior labs and health maintenance  Continue current medications, diet and exercise. Discussed use, benefit, and side effects of prescribed medications. Barriers to medication compliance addressed. Patient given educational materials - see patient instructions  Was a self-tracking handout given in paper form or via [employer]?  Yes    Requested Prescriptions Signed Prescriptions Disp Refills    Tdap (ADACEL) 5-2-15.5 LF-MCG/0.5 injection 0.5 mL 0     Sig: Inject 0.5 mLs into the muscle once for 1 dose    zoster recombinant adjuvanted vaccine (SHINGRIX) 50 MCG/0.5ML SUSR injection 0.5 mL 0     Sig: Inject 0.5 mLs into the muscle See Admin Instructions 1 dose now and repeat in 2-6 months    glipiZIDE (GLUCOTROL) 10 MG tablet 180 tablet 3     Sig: Take 20 mg in the morning and 10 mg in the evening    sertraline (ZOLOFT) 100 MG tablet 60 tablet 1     Sig: Take 1 tablet by mouth every morning One tab daily       All patient questions answered. Patient voiced understanding. Quality Measures    Body mass index is 30.36 kg/m². Elevated. Weight control planned discussed daily exercise regimen and Healthy diet and regular exercise. BP: 130/88. Blood pressure is normal. Treatment plan consists of Weight Reduction, DASH Eating Plan, Dietary Sodium Restriction, Increased Physical Activity and No treatment change needed. Fall Risk 11/3/2020 2/10/2020   2 or more falls in past year? no no   Fall with injury in past year? no no     The patient does not have a history of falls. I did , complete a risk assessment for falls.  A plan of care for falls in-office gait and balance testing performed using The Timed Up and Go Test was negative for increased falls risk- no further intervention is currently indicated, home safety tips provided, No Treatment plan indicated    Lab Results   Component Value Date    LDLCALC 102 04/26/2019    LDLCHOLESTEROL 97 03/15/2021    (goal LDL reduction with dx if diabetes is 50% LDL reduction)    PHQ Scores 7/15/2021 3/3/2021 11/3/2020 7/2/2020 2/10/2020 2/12/2019 5/31/2018   PHQ2 Score 0 1 0 1 0 0 0   PHQ9 Score 0 1 0 1 0 0 0     Interpretation of Total Score Depression Severity: 1-4 = Minimal depression, 5-9 = Mild depression, 10-14 = Moderate depression, 15-19 = Moderately severe depression, 20-27 = Severe depression      The patient'spast medical, surgical, social, and family history as well as her   current medications and allergies were reviewed as documented in today's encounter. Medications, labs, diagnostic studies, consultations andfollow-up as documented in this encounter. Return in about 3 months (around 10/15/2021) for dm ,htn, hld, A1C. Patient wasseen with total face to face time of 30 minutes. More than 50% of this visit was counseling and education. Future Appointments   Date Time Provider Kathy Davis   12/3/2021  2:00 PM [facility]     This note was completed by using the assistance of a speech-recognition program. However, inadvertent computerized transcription errors may be present. Althoughevery effort was made to ensure accuracy, no guarantees can be provided that every mistake has been identified and corrected by editing.   Electronically signed by Gabe Boxer, MD on 7/15/2021  11:03 AM

## 2021-07-30 DIAGNOSIS — F33.41 RECURRENT MAJOR DEPRESSIVE DISORDER, IN PARTIAL REMISSION (HCC): ICD-10-CM

## 2021-07-30 RX ORDER — SERTRALINE HYDROCHLORIDE 100 MG/1
100 TABLET, FILM COATED ORAL EVERY MORNING
Qty: 90 TABLET | Refills: 3 | Status: SHIPPED | OUTPATIENT
Start: 2021-07-30 | End: 2021-09-16 | Stop reason: SDUPTHER

## 2021-07-30 NOTE — TELEPHONE ENCOUNTER
Please Approve or Refuse.   Send to Pharmacy per Pt's Request:      Next Visit Date:  12/3/2021   Last Visit Date: 7/15/2021    Hemoglobin A1C (%)   Date Value   07/15/2021 7.8   03/03/2021 7.4   11/03/2020 6.6             ( goal A1C is < 7)   BP Readings from Last 3 Encounters:   07/15/21 130/88   03/03/21 136/86   11/03/20 128/74          (goal 120/80)  BUN   Date Value Ref Range Status   03/15/2021 13 8 - 23 mg/dL Final     CREATININE   Date Value Ref Range Status   03/15/2021 0.58 0.50 - 0.90 mg/dL Final     Potassium   Date Value Ref Range Status   03/15/2021 4.2 3.7 - 5.3 mmol/L Final

## 2021-08-15 DIAGNOSIS — I10 ESSENTIAL HYPERTENSION: ICD-10-CM

## 2021-08-16 RX ORDER — AMLODIPINE BESYLATE 10 MG/1
TABLET ORAL
Qty: 90 TABLET | Refills: 3 | Status: SHIPPED | OUTPATIENT
Start: 2021-08-16 | End: 2022-03-08 | Stop reason: HOSPADM

## 2021-08-23 ENCOUNTER — TELEPHONE (OUTPATIENT)
Dept: FAMILY MEDICINE CLINIC | Age: 66
End: 2021-08-23

## 2021-08-23 NOTE — TELEPHONE ENCOUNTER
Pt called and stated that she cannot afford her albuterol inhaler she is wondering if there are any alternatives she can have prescribed

## 2021-08-23 NOTE — TELEPHONE ENCOUNTER
Please advise the patient, it seems like she was prescribed Ventolin, which is an albuterol inhaler    Needs to call her insurance and see if albuterol base or proair is cheaper and then I will prescribe it

## 2021-09-16 DIAGNOSIS — F33.41 RECURRENT MAJOR DEPRESSIVE DISORDER, IN PARTIAL REMISSION (HCC): ICD-10-CM

## 2021-09-16 RX ORDER — SERTRALINE HYDROCHLORIDE 100 MG/1
100 TABLET, FILM COATED ORAL DAILY
Qty: 90 TABLET | Refills: 3 | Status: SHIPPED | OUTPATIENT
Start: 2021-09-16 | End: 2022-05-05 | Stop reason: SDUPTHER

## 2021-09-23 ENCOUNTER — TELEPHONE (OUTPATIENT)
Dept: FAMILY MEDICINE CLINIC | Age: 66
End: 2021-09-23

## 2021-09-23 DIAGNOSIS — J44.9 CHRONIC OBSTRUCTIVE PULMONARY DISEASE, UNSPECIFIED COPD TYPE (HCC): Primary | ICD-10-CM

## 2021-09-23 RX ORDER — NEBULIZER ACCESSORIES
KIT MISCELLANEOUS
Qty: 1 KIT | Refills: 0 | Status: SHIPPED | OUTPATIENT
Start: 2021-09-23

## 2021-09-23 RX ORDER — ALBUTEROL SULFATE 2.5 MG/3ML
2.5 SOLUTION RESPIRATORY (INHALATION) EVERY 6 HOURS PRN
Qty: 125 EACH | Refills: 3 | Status: SHIPPED | OUTPATIENT
Start: 2021-09-23

## 2021-09-23 NOTE — TELEPHONE ENCOUNTER
PATIENT STATES SHE HAS RAN OUT OF THE TUBES FOR HER NEBULIZER MACHINE. CAN THIS BE SENT TO HER [facility]?  I DIDN'T SEE AN ORDER FOR JUST TUBES  SO UNABLE TO PEND FOR YOU

## 2021-09-23 NOTE — TELEPHONE ENCOUNTER
Please let the patient know to  prescription from the pharmacy. Orders Placed This Encounter   Medications    Respiratory Therapy Supplies (NEBULIZER/TUBING/MOUTHPIECE) KIT     Sig: Dx. COPD, needs nebulizer supplies     Dispense:  1 kit     Refill:  0    albuterol (PROVENTIL) (2.5 MG/3ML) 0.083% nebulizer solution     Sig: Take 3 mLs by nebulization every 6 hours as needed for Wheezing or Shortness of Breath     Dispense:  125 each     Refill:  3         [facility]/pharmacy [facility] [address], [address] [address]  Phone: [phone] Fax: [phone]      No orders of the defined types were placed in this encounter. Future Appointments   Date Time Provider Kathy Davis   12/3/2021  2:00 PM Olga Adams MD [id number]       Thank you!

## 2021-09-23 NOTE — TELEPHONE ENCOUNTER
Please let the patient know to  prescription from the pharmacy. Orders Placed This Encounter   Medications    Respiratory Therapy Supplies (NEBULIZER/TUBING/MOUTHPIECE) KIT     Sig: Dx. COPD, needs nebulizer supplies     Dispense:  1 kit     Refill:  0         [facility]/pharmacy [address], [address] [address]  Phone: [phone] Fax: [phone]      No orders of the defined types were placed in this encounter. Future Appointments   Date Time Provider Kathy Davis   12/3/2021  2:00 PM Lul Baltazar MD [facility]       Thank you!

## 2021-11-02 ENCOUNTER — TELEPHONE (OUTPATIENT)
Dept: FAMILY MEDICINE CLINIC | Age: 66
End: 2021-11-02

## 2021-11-02 DIAGNOSIS — R14.0 BLOATING: Primary | ICD-10-CM

## 2021-11-02 RX ORDER — SIMETHICONE 80 MG
80 TABLET,CHEWABLE ORAL 4 TIMES DAILY PRN
Qty: 180 TABLET | Refills: 3 | Status: SHIPPED | OUTPATIENT
Start: 2021-11-02

## 2021-11-15 RX ORDER — OMEPRAZOLE 20 MG/1
CAPSULE, DELAYED RELEASE ORAL
Qty: 90 CAPSULE | Refills: 1 | Status: SHIPPED | OUTPATIENT
Start: 2021-11-15 | End: 2022-03-24 | Stop reason: ALTCHOICE

## 2021-12-03 ENCOUNTER — OFFICE VISIT (OUTPATIENT)
Dept: FAMILY MEDICINE CLINIC | Age: 66
End: 2021-12-03
Payer: MEDICARE

## 2021-12-03 VITALS
OXYGEN SATURATION: 96 % | SYSTOLIC BLOOD PRESSURE: 136 MMHG | HEART RATE: 70 BPM | HEIGHT: 62 IN | BODY MASS INDEX: 29.4 KG/M2 | TEMPERATURE: 97 F | DIASTOLIC BLOOD PRESSURE: 88 MMHG | WEIGHT: 159.8 LBS

## 2021-12-03 DIAGNOSIS — E55.9 VITAMIN D DEFICIENCY: ICD-10-CM

## 2021-12-03 DIAGNOSIS — I44.1 AV BLOCK, 2ND DEGREE: ICD-10-CM

## 2021-12-03 DIAGNOSIS — R80.9 TYPE 2 DIABETES MELLITUS WITH MICROALBUMINURIA, WITHOUT LONG-TERM CURRENT USE OF INSULIN (HCC): Primary | ICD-10-CM

## 2021-12-03 DIAGNOSIS — E78.5 HYPERLIPIDEMIA WITH TARGET LDL LESS THAN 100: ICD-10-CM

## 2021-12-03 DIAGNOSIS — Z12.11 SCREEN FOR COLON CANCER: ICD-10-CM

## 2021-12-03 DIAGNOSIS — I10 ESSENTIAL HYPERTENSION: ICD-10-CM

## 2021-12-03 DIAGNOSIS — E11.29 TYPE 2 DIABETES MELLITUS WITH MICROALBUMINURIA, WITHOUT LONG-TERM CURRENT USE OF INSULIN (HCC): Primary | ICD-10-CM

## 2021-12-03 LAB — HBA1C MFR BLD: 8.4 %

## 2021-12-03 PROCEDURE — 3052F HG A1C>EQUAL 8.0%<EQUAL 9.0%: CPT | Performed by: FAMILY MEDICINE

## 2021-12-03 PROCEDURE — 99214 OFFICE O/P EST MOD 30 MIN: CPT | Performed by: FAMILY MEDICINE

## 2021-12-03 PROCEDURE — 83036 HEMOGLOBIN GLYCOSYLATED A1C: CPT | Performed by: FAMILY MEDICINE

## 2021-12-03 RX ORDER — GLIPIZIDE 10 MG/1
20 TABLET ORAL 2 TIMES DAILY
Qty: 180 TABLET | Refills: 3 | Status: SHIPPED | OUTPATIENT
Start: 2021-12-03 | End: 2022-02-08 | Stop reason: SDUPTHER

## 2021-12-03 RX ORDER — METFORMIN HYDROCHLORIDE 500 MG/1
500 TABLET, EXTENDED RELEASE ORAL
Qty: 90 TABLET | Refills: 3
Start: 2021-12-03 | End: 2022-05-10 | Stop reason: SDUPTHER

## 2021-12-03 ASSESSMENT — ENCOUNTER SYMPTOMS
COUGH: 0
SHORTNESS OF BREATH: 0
DIARRHEA: 1
ABDOMINAL DISTENTION: 0
NAUSEA: 0
VOICE CHANGE: 1
WHEEZING: 0
CHEST TIGHTNESS: 0
ABDOMINAL PAIN: 0
VOMITING: 0
CONSTIPATION: 0

## 2021-12-03 ASSESSMENT — PATIENT HEALTH QUESTIONNAIRE - PHQ9
SUM OF ALL RESPONSES TO PHQ QUESTIONS 1-9: 0
2. FEELING DOWN, DEPRESSED OR HOPELESS: 0
1. LITTLE INTEREST OR PLEASURE IN DOING THINGS: 0
SUM OF ALL RESPONSES TO PHQ QUESTIONS 1-9: 0
SUM OF ALL RESPONSES TO PHQ9 QUESTIONS 1 & 2: 0
SUM OF ALL RESPONSES TO PHQ QUESTIONS 1-9: 0

## 2021-12-03 NOTE — PROGRESS NOTES
Jacobo Hdz (:  1955) is a 78 y.o. female,Established patient, here for evaluation of the following chief complaint(s): Diabetes, Hypertension, and Hyperlipidemia      ASSESSMENT/PLAN:    1. Type 2 diabetes mellitus with microalbuminuria, without long-term current use of insulin (HCC)  Worsening diabetes  -     POCT glycosylated hemoglobin (Hb A1C) 8.4, worsening diabetes  Lab Results   Component Value Date    LABA1C 8.4 2021    LABA1C 7.8 07/15/2021    LABA1C 7.4 2021       -     glipiZIDE (GLUCOTROL) 10 MG tablet; Take 2 tablets by mouth 2 times daily Dose increased 12/3/2021, Disp-180 tablet, R-3Normal  -     CBC; Future  -     Comprehensive Metabolic Panel; Future  -     Magnesium; Future  -     Vitamin B12 & Folate; Future  -     metFORMIN (GLUCOPHAGE-XR) 500 MG extended release tablet; Take 1 tablet by mouth daily (with breakfast) Dose decreased 12/3/2021, Disp-90 tablet, R-3NO PRINT  -advised home blood glucose testing  daily  -daily feet exam, Foot care: advised to wash feet daily, pat dry and apply lotion at night, avoiding between toes. Need to look at feet daily and report to a physician any signs of inflammation or skin damage  -annual dilated eye exam  -Low carb, low fat diet, increase fruits and vegetables, and exercise 4-5 times a day 30-40 minutes a day discussed  -To restart Metformin but only once a day, to increase afternoon dosage of glipizide    -continue Aspirin 81 mg  -continue irbesartan ARB and statin pravastatin 40 mg      2. Essential hypertension  borderline high BP   Continue current treatment. It irbesartan/hydrochlorothiazide 150/12.5 mg  Will recheck labs. Discussed low salt diet and BP and pulse monitoring.    -     CBC; Future  -     Comprehensive Metabolic Panel; Future  -     Magnesium; Future  3.  Hyperlipidemia with target LDL less than 100  Improve  Continue pravastatin 40 mg daily  Lab Results   Component Value Date    LDLCALC 102 2019 LDLCHOLESTEROL 97 03/15/2021       4. Vitamin D deficiency  Unsure if improving or not. Will recheck level  Continue supplementation.    -     Vitamin D 25 Hydroxy; Future  5. AV block, 2nd degree  -     AFL - Zacarias Hodge DO, Cardiology, [place]  Apparently in the past she was seen by cardiologist, Dr. Char Valle who recommended to proceed with stress test to rule out significant underlying coronary artery disease but the patient apparently declined at that time  6. Screen for colon cancer  -     Areli Urban MD, Gastroenterology, [place]--patient is agreeable for colonoscopy  We ordered Cologuard 7/15/2021 but she never completed it    Will  get booster and flu shot at the [facility] received counseling on the following healthy behaviors: nutrition, exercise, medication adherence, tobacco cessation and weight loss  Reviewed prior labs and health maintenance  Discussed use, benefit, and side effects of prescribed medications. Barriers to medication compliance addressed. Patient given educational materials - see patient instructions  Was a self-tracking handout given in paper form or via [employer]? Yes  All patient questions answered. Patient voiced understanding. The patient's past medical,surgical, social, and family history as well as her current medications and allergies were reviewed as documented in today's encounter. Medications, labs, diagnostic studies, consultations and follow-up as documented in this encounter. Return in about 3 months (around 3/3/2022) for MEDICARE, VISION, PHQ9, MINICOG, HRA QUESTIONS, **POC A1C, diabetes. Data Unavailable    Future Appointments   Date Time Provider Kathy Davis   5/10/2022  9:00 AM [facility]         SUBJECTIVE/OBJECTIVE:    Diabetes Mellitus Type II, Follow-up:    Current symptoms/problems include hyperglycemia. Symptoms have been present for several years.     Known diabetic complications: nephropathy  Cardiovascular risk factors: advanced age (older than 54 for men, 72 for women), diabetes mellitus, dyslipidemia, hypertension, microalbuminuria, obesity (BMI >= 30 kg/m2) and smoking/ tobacco exposure    Medication compliance:  compliant most of the time  Current diabetic medications include . Glipizide 20 MG in the morning + 10 MG in the afternoon  Missing Metformin twice a day, patient says gives her diarrhea        Eye exam current (within one year): yes, eye exam on [place] done, pt will bring report  Current diet: in general, a \"healthy\" diet    Sometimes drinking beer, 1-2 at a time but not every day  He does not drink pop    Barriers: impairment:  mental health: depression, stress, her brother is on hospice    Current monitoring regimen: home blood tests - daily  Home blood sugar records: fasting range: 197  Any episodes of hypoglycemia? no    Is She on ACE inhibitor or angiotensin II receptor blocker? Yes      reports that she has been smoking cigarettes. She has a 15.00 pack-year smoking history. She has quit using smokeless tobacco.     Ready to quit: Yes  Counseling given: Yes      Daily Aspirin? Yes      A1c is 8.4, worsening      Lab Results   Component Value Date    LABA1C 8.4 12/03/2021    LABA1C 7.8 07/15/2021    LABA1C 7.4 03/03/2021       Urine microabumin is Elevated, worsening, patient was referred to nephrology in the past due to proteinuria  Lab Results   Component Value Date    LABMICR 680 (H) 03/15/2021        Has lost weight unintentionally, 11 pounds in 9 months  Wt Readings from Last 3 Encounters:   12/03/21 159 lb 12.8 oz (72.5 kg)   07/15/21 166 lb (75.3 kg)   03/03/21 170 lb 6.4 oz (77.3 kg)         Hypertension:   Harjinder Garcia  is not  Exercising, but staying active,  and is adherent to low salt diet. Blood pressure is well controlled at home. Cardiac symptoms  fatigue.    Patient denies  chest pain, chest pressure/discomfort, claudication, dyspnea, exertional chest pressure/discomfort, irregular heart beat, lower extremity edema, near-syncope, orthopnea, palpitations, paroxysmal nocturnal dyspnea, syncope and tachypnea. Use of agents associated with hypertension: none. History of target organ damage: likely coronary artery disease . Prior EKG abnormal on 3/12/2021, second-degree AV block, Mobitz 1  Left axis deviation  EKG 2/8/19 left axis deviation, second degree AV block      Patient was referred previously to cardiologist but she never went    She is not on beta-blocker    BP borderline high . Gris Vega reports compliance with BP medications, and tolerates them well, denies side effects. BP Readings from Last 3 Encounters:   12/03/21 136/88   07/15/21 130/88   03/03/21 136/86        Pulse is Normal.    Pulse Readings from Last 3 Encounters:   12/03/21 70   07/15/21 75   03/03/21 73         Hyperlipidemia:  No new myalgias or GI upset on pravastatin (Pravachol). Medication compliance: compliant all of the time. Patient is  following a low fat, low cholesterol diet. LDL is Normal.    Lab Results   Component Value Date    LDLCALC 102 04/26/2019    LDLCHOLESTEROL 97 03/15/2021     Gem has Vitamin D deficiency. Gris Vega  is  taking Vitamin D supplementation   she feels tired. Lab Results   Component Value Date    VITD25 11.7 (L) 03/15/2021     Patient is due for colon cancer screening. Gris Vega denies  nausea, vomiting, constipation, blood in the stool or abdominal pain. We discussed options, she would like to have: colonoscopy. [x]Negative depression screening. PHQ Scores 12/3/2021 7/15/2021 3/3/2021 11/3/2020 7/2/2020 2/10/2020 2/12/2019   PHQ2 Score 0 0 1 0 1 0 0   PHQ9 Score 0 0 1 0 1 0 0       Prior to Visit Medications    Medication Sig Taking?  Authorizing Provider   omeprazole (PRILOSEC) 20 MG delayed release capsule TAKE 1 CAPSULE BY MOUTH EVERY DAY Yes Ace Huber MD   simethicone (MYLICON) 80 MG chewable tablet Take 1 tablet by mouth 4 times daily as needed for Flatulence Yes Jeffery Portillo, APRN - CNP   Respiratory Therapy Supplies (NEBULIZER/TUBING/MOUTHPIECE) KIT Dx. COPD, needs nebulizer supplies Yes Dylon Preston MD   albuterol (PROVENTIL) (2.5 MG/3ML) 0.083% nebulizer solution Take 3 mLs by nebulization every 6 hours as needed for Wheezing or Shortness of Breath Yes Dylon Preston MD   sertraline (ZOLOFT) 100 MG tablet Take 1 tablet by mouth daily Yes Dylon Preston MD   amLODIPine (NORVASC) 10 MG tablet TAKE 1 TABLET BY MOUTH EVERY DAY Yes Dylon Preston MD   irbesartan-hydroCHLOROthiazide (AVALIDE) 150-12.5 MG per tablet TAKE 1 TABLET BY MOUTH DAILY Yes Dylon Preston MD   blood glucose test strips (ASCENSIA AUTODISC VI;ONE TOUCH ULTRA TEST VI) strip 1 each by In Vitro route daily As needed.  Yes Dylon Preston MD   pravastatin (PRAVACHOL) 40 MG tablet Take 1 tablet by mouth every evening For cholesterol Yes Dylon Preston MD   levothyroxine (SYNTHROID) 200 MCG tablet Take 1 tablet by mouth every morning (before breakfast) Yes Dylon Preston MD   vitamin D (ERGOCALCIFEROL) 1.25 MG (66368 UT) CAPS capsule TAKE 1 CAPSULE BY MOUTH ONE TIME PER WEEK  Patient taking differently: Patient taking 1000 units once a day Yes Dylon Preston MD   albuterol sulfate HFA (VENTOLIN HFA) 108 (90 Base) MCG/ACT inhaler Inhale 2 puffs into the lungs every 6 hours as needed for Wheezing or Shortness of Breath (cough) INHALE 2 PUFFS INTO THE LUNGS 2 TIMES DAILY AS NEEDED FOR WHEEZING Yes Dylon Preston MD   aspirin EC 81 MG EC tablet Take 1 tablet by mouth daily Yes Dylon Preston MD   fluticasone (FLONASE) 50 MCG/ACT nasal spray 2 sprays by Each Nostril route daily  Patient taking differently: 2 sprays by Each Nostril route daily as needed for Rhinitis  Yes Carlin Garcia MD   glipiZIDE (GLUCOTROL) 10 MG tablet Take 2 tablets by mouth 2 times daily Dose increased 12/3/2021 Yes Belinda Murillo MD   metFORMIN (GLUCOPHAGE-XR) 500 MG extended release tablet TAKE 2 TABLETS BY MOUTH DAILY WITH SUPPER  Patient not taking: Reported on 12/3/2021  Belinda Murillo MD            Social History     Tobacco Use    Smoking status: Current Every Day Smoker     Packs/day: 0.50     Years: 30.00     Pack years: 15.00     Types: Cigarettes    Smokeless tobacco: Former User   Substance Use Topics    Alcohol use: Yes     Comment: OCCASIONAL    Drug use: No         Review of Systems   Constitutional: Positive for fatigue and unexpected weight change. Negative for activity change, appetite change, chills, diaphoresis and fever. HENT: Positive for voice change. Eyes: Positive for visual disturbance (stable, chronic). Respiratory: Negative for cough, chest tightness, shortness of breath and wheezing. Cardiovascular: Negative for chest pain, palpitations and leg swelling. Gastrointestinal: Positive for diarrhea. Negative for abdominal distention, abdominal pain, blood in stool, constipation, nausea and vomiting. Getting diarrhea when taking Metformin   Endocrine: Negative for cold intolerance, heat intolerance, polydipsia, polyphagia and polyuria. Neurological: Negative for numbness. Hematological: Does not bruise/bleed easily. Psychiatric/Behavioral: Negative for dysphoric mood. The patient is nervous/anxious.          -vital signs stable and within normal limits except Overweight per BMI and borderline high BP    /88   Pulse 70   Temp 97 °F (36.1 °C) (Temporal)   Ht 5' 2\" (1.575 m)   Wt 159 lb 12.8 oz (72.5 kg)   SpO2 96%   BMI 29.23 kg/m²         Physical Exam  Vitals and nursing note reviewed. Constitutional:       General: She is not in acute distress. Appearance: Normal appearance. She is well-developed. She is not diaphoretic. HENT:      Head: Normocephalic and atraumatic.       Comments: Very hoarse     Right Ear: External ear normal.      Left Ear: External ear normal.      Nose: No mucosal edema. Comments: I did not examine the mouth due to coronavirus pandemic and wearing masks    Eyes:      General: Lids are normal. No scleral icterus. Right eye: No discharge. Left eye: No discharge. Extraocular Movements: Extraocular movements intact. Conjunctiva/sclera: Conjunctivae normal.   Neck:      Thyroid: No thyromegaly. Cardiovascular:      Rate and Rhythm: Normal rate and regular rhythm. Occasional extrasystoles are present. Heart sounds: Normal heart sounds. No murmur heard. Comments: A few extra beats and pauses heard, advised her to see cardiologist  Pulmonary:      Effort: Pulmonary effort is normal. No respiratory distress. Breath sounds: Examination of the right-lower field reveals decreased breath sounds. Examination of the left-lower field reveals decreased breath sounds. Decreased breath sounds present. No wheezing or rales. Chest:      Chest wall: No tenderness. Abdominal:      General: Bowel sounds are normal. There is no distension. Palpations: Abdomen is soft. There is no hepatomegaly or splenomegaly. Tenderness: There is no abdominal tenderness. Musculoskeletal:         General: No tenderness. Normal range of motion. Cervical back: Normal range of motion and neck supple. Right lower leg: No edema. Left lower leg: No edema. Skin:     General: Skin is warm and dry. Capillary Refill: Capillary refill takes less than 2 seconds. Findings: No rash. Neurological:      Mental Status: She is alert and oriented to person, place, and time. Cranial Nerves: No cranial nerve deficit. Motor: No abnormal muscle tone. Psychiatric:         Mood and Affect: Mood is anxious. Behavior: Behavior normal.         Thought Content:  Thought content normal.         Judgment: Judgment normal.           I personally reviewed testing with patient.   Hyperglycemia  Vitamin D deficiency   Otherwise labs within normal limits    Office Visit on 07/15/2021   Component Date Value Ref Range Status    Hemoglobin A1C 07/15/2021 7.8  % Final       Lab Results   Component Value Date    WBC 8.3 03/15/2021    HGB 15.4 03/15/2021    HCT 45.5 03/15/2021    MCV 93.0 03/15/2021     03/15/2021       Lab Results   Component Value Date     03/15/2021    K 4.2 03/15/2021    CL 93 03/15/2021    CO2 28 03/15/2021    BUN 13 03/15/2021    CREATININE 0.58 03/15/2021    GLUCOSE 223 03/15/2021    GLUCOSE 212 04/26/2019    CALCIUM 9.5 03/15/2021        Lab Results   Component Value Date    ALT 13 03/15/2021    AST 17 03/15/2021    ALKPHOS 99 03/15/2021    BILITOT 0.89 03/15/2021       Lab Results   Component Value Date    TSH 1.33 03/15/2021       Lab Results   Component Value Date    CHOL 187 03/15/2021    CHOL 195 07/13/2020    CHOL 184 04/26/2019     Lab Results   Component Value Date    TRIG 149 03/15/2021    TRIG 120 07/13/2020    TRIG 165 (H) 04/26/2019     Lab Results   Component Value Date    HDL 60 03/15/2021    HDL 53 07/13/2020    HDL 49 04/26/2019     Lab Results   Component Value Date    LDLCALC 102 04/26/2019    LDLCALC 92 05/25/2018    LDLCALC 96 07/28/2017    LDLCHOLESTEROL 97 03/15/2021    LDLCHOLESTEROL 118 07/13/2020     Lab Results   Component Value Date    CHOLHDLRATIO 3.1 03/15/2021    CHOLHDLRATIO 3.7 07/13/2020    CHOLHDLRATIO 3.8 04/26/2019         Lab Results   Component Value Date    RJZONUHM21 538 03/15/2021     Lab Results   Component Value Date    FOLATE 15.7 03/15/2021     Lab Results   Component Value Date    VITD25 11.7 (L) 03/15/2021           Orders Placed This Encounter   Medications    glipiZIDE (GLUCOTROL) 10 MG tablet     Sig: Take 2 tablets by mouth 2 times daily Dose increased 12/3/2021     Dispense:  180 tablet     Refill:  3    metFORMIN (GLUCOPHAGE-XR) 500 MG extended release tablet     Sig: Take 1 tablet by mouth daily (with breakfast) Dose decreased 12/3/2021     Dispense:  90 tablet     Refill:  3       Orders Placed This Encounter   Procedures    CBC     Standing Status:   Future     Standing Expiration Date:   12/3/2022    Comprehensive Metabolic Panel     Standing Status:   Future     Standing Expiration Date:   1/30/2022    Magnesium     Standing Status:   Future     Standing Expiration Date:   12/3/2022    Vitamin D 25 Hydroxy     Standing Status:   Future     Standing Expiration Date:   12/3/2022    Vitamin B12 & Folate     Standing Status:   Future     Standing Expiration Date:   1/30/2022   Tracy Dasilva MD, Gastroenterology, [place]     Referral Priority:   Routine     Referral Type:   Eval and Treat     Referral Reason:   Specialty Services Required     Referred to Provider:   Elieser Diaz MD     Requested Specialty:   Gastroenterology     Number of Visits Requested:   1    [address], [address], Cardiology, [facility]     Referral Priority:   Routine     Referral Type:   Eval and Treat     Referral Reason:   Specialty Services Required     Referred to Provider:   Demi Medellin DO     Requested Specialty:   Cardiology     Number of Visits Requested:   1    POCT glycosylated hemoglobin (Hb A1C)       Medications Discontinued During This Encounter   Medication Reason    tiotropium (Lucio Bidding) 18 MCG inhalation capsule     varenicline (CHANTIX STARTING MONTH CHRIS) 0.5 MG X 11 & 1 MG X 42 tablet     zoster recombinant adjuvanted vaccine (SHINGRIX) 50 MCG/0.5ML SUSR injection Therapy completed    glipiZIDE (GLUCOTROL) 10 MG tablet REORDER    metFORMIN (GLUCOPHAGE-XR) 500 MG extended release tablet REORDER         On this date 12/3/2021 I have spent  35 minutes reviewing previous notes, test results and face to face with the patient discussing the diagnosis and importance of compliance with the treatment plan as well as documenting on the day of the visit.       This note was completed by using the assistance of a speech-recognition program. However, inadvertent computerized transcription errors may be present. Although every effort was made to ensure accuracy, no guarantees can be provided that every mistake has been identified and corrected by editing . An electronic signature was used to authenticate this note.   Electronically signed by Belinda Murillo MD on 12/4/2021 at 8:32 AM

## 2021-12-03 NOTE — RESULT ENCOUNTER NOTE
Addressed during office visit today, A1c 8.4, worsening diabetes, continue treatment recommended during the office visit.

## 2021-12-03 NOTE — PROGRESS NOTES
Visit Information    Have you changed or started any medications since your last visit including any over-the-counter medicines, vitamins, or herbal medicines? no   Are you having any side effects from any of your medications? -  no  Have you stopped taking any of your medications? Is so, why? -  no    Have you seen any other physician or provider since your last visit? No  Have you had any other diagnostic tests since your last visit? No  Have you been seen in the emergency room and/or had an admission to a hospital since we last saw you? No  Have you had your routine dental cleaning in the past 6 months? no    Have you activated your [employer] account? If not, what are your barriers?  No:      Patient Care Team:  Luis Marques MD as PCP - General (Family Medicine)  Luis Marques MD as PCP - [facility] Provider  Sanya Singletary DO as Consulting Physician (Obstetrics & Gynecology)    Medical History Review  Past Medical, Family, and Social History reviewed and does contribute to the patient presenting condition    Health Maintenance   Topic Date Due    Diabetic retinal exam  Never done    DTaP/Tdap/Td vaccine (1 - Tdap) Never done    Colon cancer screen colonoscopy  Never done    Shingles Vaccine (2 of 2) 12/29/2020    Flu vaccine (1) 09/01/2021    COVID-19 Vaccine (3 - Booster for Claudeen Fermo series) 10/22/2021    Annual Wellness Visit (AWV)  11/04/2021    Breast cancer screen  01/19/2022    Lipid screen  03/15/2022    TSH testing  03/15/2022    Potassium monitoring  03/15/2022    Creatinine monitoring  03/15/2022    Diabetic foot exam  07/15/2022    A1C test (Diabetic or Prediabetic)  07/15/2022    Pneumococcal 65+ years Vaccine (1 of 1 - PPSV23) 09/25/2022    DEXA (modify frequency per FRAX score)  Completed    Hepatitis C screen  Completed    Hepatitis A vaccine  Aged Out    Hib vaccine  Aged Out    Meningococcal (ACWY) vaccine  Aged Out

## 2021-12-04 PROBLEM — R06.02 SOB (SHORTNESS OF BREATH): Status: RESOLVED | Noted: 2021-03-15 | Resolved: 2021-12-04

## 2021-12-04 ASSESSMENT — ENCOUNTER SYMPTOMS: BLOOD IN STOOL: 0

## 2022-01-01 DIAGNOSIS — E03.9 ACQUIRED HYPOTHYROIDISM: ICD-10-CM

## 2022-01-03 RX ORDER — LEVOTHYROXINE SODIUM 0.2 MG/1
TABLET ORAL
Qty: 90 TABLET | Refills: 3 | Status: SHIPPED | OUTPATIENT
Start: 2022-01-03

## 2022-01-03 NOTE — TELEPHONE ENCOUNTER
Please Approve or Refuse.   Send to Pharmacy per Pt's Request:      Next Visit Date:  5/10/2022   Last Visit Date: 12/3/2021    Hemoglobin A1C (%)   Date Value   12/03/2021 8.4   07/15/2021 7.8   03/03/2021 7.4             ( goal A1C is < 7)   BP Readings from Last 3 Encounters:   12/03/21 136/88   07/15/21 130/88   03/03/21 136/86          (goal 120/80)  BUN   Date Value Ref Range Status   03/15/2021 13 8 - 23 mg/dL Final     CREATININE   Date Value Ref Range Status   03/15/2021 0.58 0.50 - 0.90 mg/dL Final     Potassium   Date Value Ref Range Status   03/15/2021 4.2 3.7 - 5.3 mmol/L Final

## 2022-01-10 ENCOUNTER — TELEPHONE (OUTPATIENT)
Dept: GASTROENTEROLOGY | Age: 67
End: 2022-01-10

## 2022-01-25 NOTE — TELEPHONE ENCOUNTER
Patient states she does not need the tubing or supplies for her nebulizer. She states she needs a refill for the solution. Please Approve or Refuse.   Send to Pharmacy per Pt's Request:      Next Visit Date:  12/3/2021   Last Visit Date: 7/15/2021    Hemoglobin A1C (%)   Date Value   07/15/2021 7.8   03/03/2021 7.4   11/03/2020 6.6             ( goal A1C is < 7)   BP Readings from Last 3 Encounters:   07/15/21 130/88   03/03/21 136/86   11/03/20 128/74          (goal 120/80)  BUN   Date Value Ref Range Status   03/15/2021 13 8 - 23 mg/dL Final     CREATININE   Date Value Ref Range Status   03/15/2021 0.58 0.50 - 0.90 mg/dL Final     Potassium   Date Value Ref Range Status   03/15/2021 4.2 3.7 - 5.3 mmol/L Final Other

## 2022-02-08 DIAGNOSIS — R80.9 TYPE 2 DIABETES MELLITUS WITH MICROALBUMINURIA, WITHOUT LONG-TERM CURRENT USE OF INSULIN (HCC): ICD-10-CM

## 2022-02-08 DIAGNOSIS — E11.29 TYPE 2 DIABETES MELLITUS WITH MICROALBUMINURIA, WITHOUT LONG-TERM CURRENT USE OF INSULIN (HCC): ICD-10-CM

## 2022-02-08 RX ORDER — GLIPIZIDE 10 MG/1
20 TABLET ORAL 2 TIMES DAILY
Qty: 180 TABLET | Refills: 3 | Status: SHIPPED | OUTPATIENT
Start: 2022-02-08 | End: 2022-04-25

## 2022-02-28 ENCOUNTER — APPOINTMENT (OUTPATIENT)
Dept: GENERAL RADIOLOGY | Age: 67
End: 2022-02-28
Attending: INTERNAL MEDICINE
Payer: MEDICARE

## 2022-02-28 ENCOUNTER — HOSPITAL ENCOUNTER (OUTPATIENT)
Dept: CARDIAC CATH/INVASIVE PROCEDURES | Age: 67
Discharge: HOME | End: 2022-03-01
Attending: INTERNAL MEDICINE | Admitting: INTERNAL MEDICINE
Payer: MEDICARE

## 2022-02-28 DIAGNOSIS — Z95.0 PACEMAKER: ICD-10-CM

## 2022-02-28 DIAGNOSIS — Z95.0 S/P PLACEMENT OF CARDIAC PACEMAKER: ICD-10-CM

## 2022-02-28 LAB
GFR NON-AFRICAN AMERICAN: >60 ML/MIN
GFR SERPL CREATININE-BSD FRML MDRD: >60 ML/MIN
GFR SERPL CREATININE-BSD FRML MDRD: NORMAL ML/MIN/{1.73_M2}
GLUCOSE BLD-MCNC: 240 MG/DL (ref 65–105)
GLUCOSE BLD-MCNC: 246 MG/DL (ref 74–100)
PLATELET # BLD: 300 K/UL (ref 138–453)
POC BUN: 13 MG/DL (ref 8–26)
POC CHLORIDE: 94 MMOL/L (ref 98–107)
POC CREATININE: 0.69 MG/DL (ref 0.51–1.19)
POC HEMATOCRIT: 47 % (ref 36–46)
POC HEMOGLOBIN: 16 G/DL (ref 12–16)
POC POTASSIUM: 4.2 MMOL/L (ref 3.5–4.5)
POC SODIUM: 131 MMOL/L (ref 138–146)

## 2022-02-28 PROCEDURE — 7100000001 HC PACU RECOVERY - ADDTL 15 MIN

## 2022-02-28 PROCEDURE — C1887 CATHETER, GUIDING: HCPCS

## 2022-02-28 PROCEDURE — 82565 ASSAY OF CREATININE: CPT

## 2022-02-28 PROCEDURE — 7100000000 HC PACU RECOVERY - FIRST 15 MIN

## 2022-02-28 PROCEDURE — 84520 ASSAY OF UREA NITROGEN: CPT

## 2022-02-28 PROCEDURE — 6370000000 HC RX 637 (ALT 250 FOR IP): Performed by: INTERNAL MEDICINE

## 2022-02-28 PROCEDURE — 82435 ASSAY OF BLOOD CHLORIDE: CPT

## 2022-02-28 PROCEDURE — 6370000000 HC RX 637 (ALT 250 FOR IP): Performed by: STUDENT IN AN ORGANIZED HEALTH CARE EDUCATION/TRAINING PROGRAM

## 2022-02-28 PROCEDURE — 2500000003 HC RX 250 WO HCPCS

## 2022-02-28 PROCEDURE — C1894 INTRO/SHEATH, NON-LASER: HCPCS

## 2022-02-28 PROCEDURE — 99153 MOD SED SAME PHYS/QHP EA: CPT

## 2022-02-28 PROCEDURE — 93458 L HRT ARTERY/VENTRICLE ANGIO: CPT

## 2022-02-28 PROCEDURE — 71045 X-RAY EXAM CHEST 1 VIEW: CPT

## 2022-02-28 PROCEDURE — 2709999900 HC NON-CHARGEABLE SUPPLY

## 2022-02-28 PROCEDURE — 85014 HEMATOCRIT: CPT

## 2022-02-28 PROCEDURE — 84132 ASSAY OF SERUM POTASSIUM: CPT

## 2022-02-28 PROCEDURE — 85049 AUTOMATED PLATELET COUNT: CPT

## 2022-02-28 PROCEDURE — 6360000004 HC RX CONTRAST MEDICATION

## 2022-02-28 PROCEDURE — 6370000000 HC RX 637 (ALT 250 FOR IP)

## 2022-02-28 PROCEDURE — 6360000002 HC RX W HCPCS

## 2022-02-28 PROCEDURE — 33208 INSRT HEART PM ATRIAL & VENT: CPT

## 2022-02-28 PROCEDURE — 82947 ASSAY GLUCOSE BLOOD QUANT: CPT

## 2022-02-28 PROCEDURE — 84295 ASSAY OF SERUM SODIUM: CPT

## 2022-02-28 PROCEDURE — C1785 PMKR, DUAL, RATE-RESP: HCPCS

## 2022-02-28 PROCEDURE — 99152 MOD SED SAME PHYS/QHP 5/>YRS: CPT

## 2022-02-28 PROCEDURE — 2780000010 HC IMPLANT OTHER

## 2022-02-28 PROCEDURE — C1898 LEAD, PMKR, OTHER THAN TRANS: HCPCS

## 2022-02-28 PROCEDURE — 2580000003 HC RX 258: Performed by: STUDENT IN AN ORGANIZED HEALTH CARE EDUCATION/TRAINING PROGRAM

## 2022-02-28 RX ORDER — NICOTINE POLACRILEX 4 MG
15 LOZENGE BUCCAL PRN
Status: DISCONTINUED | OUTPATIENT
Start: 2022-02-28 | End: 2022-03-01 | Stop reason: HOSPADM

## 2022-02-28 RX ORDER — DIPHENHYDRAMINE HCL 25 MG
12.5 TABLET ORAL NIGHTLY PRN
Status: DISCONTINUED | OUTPATIENT
Start: 2022-02-28 | End: 2022-03-01 | Stop reason: HOSPADM

## 2022-02-28 RX ORDER — ASPIRIN 81 MG/1
81 TABLET ORAL DAILY
Status: DISCONTINUED | OUTPATIENT
Start: 2022-03-01 | End: 2022-03-01 | Stop reason: HOSPADM

## 2022-02-28 RX ORDER — SODIUM CHLORIDE 0.9 % (FLUSH) 0.9 %
5-40 SYRINGE (ML) INJECTION EVERY 12 HOURS SCHEDULED
Status: DISCONTINUED | OUTPATIENT
Start: 2022-02-28 | End: 2022-03-01 | Stop reason: HOSPADM

## 2022-02-28 RX ORDER — ATORVASTATIN CALCIUM 40 MG/1
40 TABLET, FILM COATED ORAL NIGHTLY
Status: DISCONTINUED | OUTPATIENT
Start: 2022-02-28 | End: 2022-03-01 | Stop reason: HOSPADM

## 2022-02-28 RX ORDER — HYDRALAZINE HYDROCHLORIDE 20 MG/ML
10 INJECTION INTRAMUSCULAR; INTRAVENOUS EVERY 6 HOURS PRN
Status: DISCONTINUED | OUTPATIENT
Start: 2022-02-28 | End: 2022-03-01 | Stop reason: HOSPADM

## 2022-02-28 RX ORDER — DEXTROSE MONOHYDRATE 50 MG/ML
100 INJECTION, SOLUTION INTRAVENOUS PRN
Status: DISCONTINUED | OUTPATIENT
Start: 2022-02-28 | End: 2022-03-01 | Stop reason: HOSPADM

## 2022-02-28 RX ORDER — DEXTROSE MONOHYDRATE 25 G/50ML
12.5 INJECTION, SOLUTION INTRAVENOUS PRN
Status: DISCONTINUED | OUTPATIENT
Start: 2022-02-28 | End: 2022-03-01 | Stop reason: HOSPADM

## 2022-02-28 RX ORDER — SODIUM CHLORIDE 0.9 % (FLUSH) 0.9 %
5-40 SYRINGE (ML) INJECTION PRN
Status: DISCONTINUED | OUTPATIENT
Start: 2022-02-28 | End: 2022-03-01 | Stop reason: HOSPADM

## 2022-02-28 RX ORDER — ACETAMINOPHEN 325 MG/1
650 TABLET ORAL EVERY 4 HOURS PRN
Status: DISCONTINUED | OUTPATIENT
Start: 2022-02-28 | End: 2022-03-01 | Stop reason: HOSPADM

## 2022-02-28 RX ORDER — ALBUTEROL SULFATE 2.5 MG/3ML
2.5 SOLUTION RESPIRATORY (INHALATION) EVERY 6 HOURS PRN
Status: DISCONTINUED | OUTPATIENT
Start: 2022-02-28 | End: 2022-03-01 | Stop reason: HOSPADM

## 2022-02-28 RX ORDER — SODIUM CHLORIDE 9 MG/ML
25 INJECTION, SOLUTION INTRAVENOUS PRN
Status: DISCONTINUED | OUTPATIENT
Start: 2022-02-28 | End: 2022-03-01 | Stop reason: HOSPADM

## 2022-02-28 RX ORDER — SODIUM CHLORIDE 9 MG/ML
INJECTION, SOLUTION INTRAVENOUS CONTINUOUS
Status: DISCONTINUED | OUTPATIENT
Start: 2022-02-28 | End: 2022-03-01 | Stop reason: HOSPADM

## 2022-02-28 RX ORDER — OXYCODONE HYDROCHLORIDE AND ACETAMINOPHEN 5; 325 MG/1; MG/1
1 TABLET ORAL EVERY 6 HOURS PRN
Status: DISCONTINUED | OUTPATIENT
Start: 2022-02-28 | End: 2022-03-01 | Stop reason: HOSPADM

## 2022-02-28 RX ADMIN — OXYCODONE HYDROCHLORIDE AND ACETAMINOPHEN 1 TABLET: 5; 325 TABLET ORAL at 20:39

## 2022-02-28 RX ADMIN — DESMOPRESSIN ACETATE 40 MG: 0.2 TABLET ORAL at 20:39

## 2022-02-28 RX ADMIN — INSULIN LISPRO 1 UNITS: 100 INJECTION, SOLUTION INTRAVENOUS; SUBCUTANEOUS at 20:39

## 2022-02-28 RX ADMIN — SODIUM CHLORIDE, PRESERVATIVE FREE 10 ML: 5 INJECTION INTRAVENOUS at 20:39

## 2022-02-28 RX ADMIN — ACETAMINOPHEN 650 MG: 325 TABLET ORAL at 14:45

## 2022-02-28 RX ADMIN — SODIUM CHLORIDE: 9 INJECTION, SOLUTION INTRAVENOUS at 08:53

## 2022-02-28 ASSESSMENT — PAIN SCALES - GENERAL
PAINLEVEL_OUTOF10: 3
PAINLEVEL_OUTOF10: 5
PAINLEVEL_OUTOF10: 7

## 2022-02-28 ASSESSMENT — PAIN DESCRIPTION - LOCATION
LOCATION: OTHER (COMMENT)
LOCATION: ARM

## 2022-02-28 ASSESSMENT — PAIN DESCRIPTION - ORIENTATION
ORIENTATION: LEFT
ORIENTATION: LEFT

## 2022-02-28 ASSESSMENT — PAIN - FUNCTIONAL ASSESSMENT: PAIN_FUNCTIONAL_ASSESSMENT: PREVENTS OR INTERFERES SOME ACTIVE ACTIVITIES AND ADLS

## 2022-02-28 ASSESSMENT — PAIN DESCRIPTION - FREQUENCY: FREQUENCY: INTERMITTENT

## 2022-02-28 ASSESSMENT — PAIN DESCRIPTION - DESCRIPTORS: DESCRIPTORS: BURNING

## 2022-02-28 NOTE — PROGRESS NOTES
Received post cath/ ppm procedure to [facility] room 5. Assessment obtained. Restrictions reviewed with patient. Post procedure pathway initiated. chest site soft , drsg dry and intact. No hematoma noted. Family at side. Patient without complaints.

## 2022-02-28 NOTE — OP NOTE
[facility]    CARDIAC CATHETERIZATION    Date:   2/28/2022  Patient name:  Hellen Villegas  Date of admission:  2/28/2022  7:57 AM  MRN:   [id number]  YOB: 1955    Operators:  Primary:   Luis M Mcgovern MD (Attending Physician)    Assistant/CV fellow:  Bob Salazar MD      Procedure performed:     [x] Left Heart Catheterization. [] Graft Angiography. [x] Left Ventriculography. [] Right Heart Catheterization. [x] Coronary Angiography. [] Aortic Valve Studies. [] PCI:      [] Other:       Pre Procedure Conscious Sedation Data:  ASA Class:    [] I [x] II [] III [] IV    Mallampati Class:  [] I [x] II [] III [] IV      Indication:  [] STEMI      [] + Stress test  [] ACS      [] + EKG Changes  [] Non Q MI       [x] Significant Risk Factors  [x] Recurrent Angina             [x] Diabetes Mellitus    [] New LBBB      [] Uncontrolled HTN. [] CHF / Low EF changes     [] Abnormal CTA / Ca Score      Procedure:  Access:  [] Femoral  [x] Radial  artery       [x] Right  [] Left    Procedure: After informed consent was obtained with explanation of the risks and benefits, patient was brought to the cath lab. The access area was prepped and draped in sterile fashion. 1% lidocaine was used for local block. The artery was cannulated with 6  Fr sheath with brisk arterial blood return. The side port was frequently flushed and aspirated with normal saline. Findings:    LMCA: Normal 0% stenosis. LAD: Mid area 75% and distal 70% stenosis     LCx: Mild irregularities 10-20%.      RCA: Mid area 75% stenosis   Distal 80% stenosis      Coronary Tree        Dominance: Right       LV Analysis   LV function assessed as:Normal.     Estimated Blood Loss:            [x] Minimal 10-25 cc   [] Minimal < 25 cc      [] Moderate 25-50 cc         [] >50 cc     Conclusions        Procedure Summary        Two vessel CAD    Preserved LV function        Recommendations        Proceed with PPM before intervention (due to anticoagulation)    Plan on PTCA -Stent tomorrow           Electronically signed on 2/28/2022 at 11:14 AM by:    Kristi Elliott MD  Fellow, [address]      I have reviewed the case / procedure with resident / fellow  I have examined the patient personally  Patient agree with treatment plan as discussed before, final arrangement based on my evaluation and exam.    Risk and benefit of procedure planned were explained in details. Procedure was performed by me personally, with all aspect of the procedure being done using standard protocol. Note was modified based on my own assessment and treatment.     Og Fernandez MD  [facility]

## 2022-02-28 NOTE — RESEARCH
Asked by Dr. Lynne Cary to evaluate pt. For protocol: [place] Registry Study: A multi-Center Registry Evaluating Participants Who Receive [place] Enevelope or No Envelope During CIED Implantation. Pt met preliminary inclusion/exclusion criteria. Pt  approached at 07:45 on 2/28/22. Multilpe questions answered. Consent given to pt . Pt. Read study consent. Questions answered. Consent voluntarily signed at 08:30 on 2/28/22  A copy of signed consent given to pt.

## 2022-02-28 NOTE — PROGRESS NOTES
Vasc band has been totally deflated and removed. Pressure dressing applied then arm board. No bleeding or hematoma present.

## 2022-02-28 NOTE — PROGRESS NOTES
Resting in bed. Sling intact to left arm. Patient denies discomfort. VASC band intact to right wrist.  No bleeding or hematoma. Eating lunch.

## 2022-02-28 NOTE — RESULT ENCOUNTER NOTE
Management per cardiology    Future Appointments  5/10/2022  9:00 AM    Apolinar Bowman MD     [facility]               [facility]

## 2022-02-28 NOTE — PROGRESS NOTES
Patient admitted, consent signed, all questions answered. Pt ready for procedure. Bed in low position, call light to reach with side rails up 2 of 2. Bilateral groin hair clipped. 2% Chlorhexidine cloths used to prep site left anterior chest wall. Family at bedside with patient.

## 2022-02-28 NOTE — OP NOTE
[facility]             Procedure Note    Dual Chamber Permanent Pacemaker      Today's Date:  2/28/2022  Patient name:  James Mahan  MRN:   [id number]  YOB: 1955  PCP:    Debbie Larson MD      Procedure: Dual chamber PPM    Indication: Second degree AV block, Beta blocker intolerance    Operators:  Primary: Dr. Vinny De La Cruz (Attending)    :        Name Model Serial   Medtronic [id number]          Model # Serial #   RA-Lead 6173-80 [id number]   RV-Lead 8636-49 [id number]         Sedation monitoring  RV lead placement  RA lead placement  Intra-operative  Lead Testing  Pocket Creation  Generator Implant    Estimated Blood Loss:            [x] Minimal 10-25 cc   [] Minimal < 25 cc      [] Moderate 25-50 cc         [] >50 cc      Procedure  After the usual preparation of the left neck and chest, the patient was draped in the usual sterile manner. Local anesthesia was infused below the left clavicle from the midline laterally. An incision was made inferior and parallel to the clavicle. The incision was carried down to the fascia. A pocket was formed inferior using blunt dissection. A thin walled 18 gauge needle was used to puncture the left subclavian vein using the modified Seldinger technique. A guide wire was passed into the right heart under fluoroscopic control. This was repeated with a second guide wire. RV Lead Implant:  A 7 [place] sheath was then passed over the guide wire and the guide wire removed. The ventricular lead was advanced through the sheath into the right heart. The lead was then positioned in the right ventricle under fluoroscopic control. The acute pacing and sensing thresholds were measured and found to be satisfactory. RA Lead Implant:  A second 7.0 [place] sheath was then passed over the guide wire and the guide wire removed. The atrial lead was advanced into the right heart.   The lead was then positioned in the right atrium. The acute pacing and sensing thresholds were measured and found to be acceptable. The two sheaths were removed and the leads were secured to the fascia. Generator: The implanted leads were attached to the device using the setscrews. The pocket was irrigated with antibiotic solution. The pulse generator and leads were coiled and placed in the pocket. Fluoroscopy was used to verify the final placement of the pacemaker and leads. The pocket was closed using multiple layers of suture and a dry sterile dressing was applied. There were no complications, patient tolerated the procedure well. The patient left the EP lab in stable condition. Impression / Device:    Successful Implantation of Dual chamber PPM      Plan:    Telemetry monitoring  Post-Device protocol  Interrogate pacemaker prior to discharge. CXR if needed  Incision and Device Check at [facility] in 7 days  Discharge if patient remains stable  Plan for PCI tomorrow        Patient instructed to no showering or get the wound wet for 1 week, no driving for 1 week, no lifting more than 10 pounds for 4 weeks, no arm raising above the shoulder for 4 weeks. No lovenox or heparin at any dose is to be given. Electronically signed on 02/28/22 at 1:05 PM by:    Cecilia Steiner MD,   Fellow, [address]    I have reviewed the case / procedure with resident / fellow  I have examined the patient personally  Patient agree with treatment plan as discussed before, final arrangement based on my evaluation and exam.    Risk and benefit of procedure planned were explained in details. Procedure was performed by me personally, with all aspect of the procedure being done using standard protocol. Note was modified based on my own assessment and treatment.     Candido Guadarrama MD  [place] [facility]

## 2022-02-28 NOTE — H&P
[facility]  Pre- Procedure History and Physical/Update          Patient Name:  Corinna Arredondo  MRN:    [id number]  YOB: 1955  Date of evaluation:  2/28/2022       Please refer to the consult note / H&P completed by Dr. Nita Dacosta on 1/24/22 in the medical record and note that:       [x] I have examined the patient and reviewed the H&P/Consult and there are no changes to be made to the assessment or plan. [] I have examined the patient and reviewed the H&P/Consult and have noted the following changes:        Past Medical History:   Diagnosis Date    Arthritis     Asthma     Diabetes mellitus ([facility] [address]     Family history of breast cancer in sister 7/2/2020    Hyperlipidemia     Hypertension     Osteopenia determined by x-ray 1/19/2021    Thyroid condition        Past Surgical History:   Procedure Laterality Date    DILATION AND CURETTAGE OF UTERUS      HYSTEROSCOPY  10/07/14    D&C poypectomy with myosure    TONSILLECTOMY      TUBAL LIGATION      16yrs ago. reports that she has been smoking cigarettes. She has a 15.00 pack-year smoking history. She has quit using smokeless tobacco. She reports current alcohol use. She reports that she does not use drugs. Prior to Admission medications    Medication Sig Start Date End Date Taking?  Authorizing Provider   glipiZIDE (GLUCOTROL) 10 MG tablet Take 2 tablets by mouth 2 times daily Dose increased 12/3/2021 2/8/22   Sanjuana Lofton MD   levothyroxine (SYNTHROID) 200 MCG tablet TAKE 1 TABLET BY MOUTH EVERY DAY BEFORE BREAKFAST 1/3/22   Sanjuana Lofton MD   metFORMIN (GLUCOPHAGE-XR) 500 MG extended release tablet Take 1 tablet by mouth daily (with breakfast) Dose decreased 12/3/2021 12/3/21   Sanjuana Lofton MD   omeprazole (PRILOSEC) 20 MG delayed release capsule TAKE 1 CAPSULE BY MOUTH EVERY DAY 11/15/21   Sanjuana Lofton MD   simethicone (MYLICON) 80 MG chewable tablet Take 1 tablet by mouth 4 times daily as needed for Flatulence 11/2/21   SHARDA Garduno - CNP   Respiratory Therapy Supplies (NEBULIZER/TUBING/MOUTHPIECE) KIT Dx. COPD, needs nebulizer supplies 9/23/21   Hero Norton MD   albuterol (PROVENTIL) (2.5 MG/3ML) 0.083% nebulizer solution Take 3 mLs by nebulization every 6 hours as needed for Wheezing or Shortness of Breath 9/23/21   Hero Norton MD   sertraline (ZOLOFT) 100 MG tablet Take 1 tablet by mouth daily 9/16/21   Hero Norton MD   amLODIPine (NORVASC) 10 MG tablet TAKE 1 TABLET BY MOUTH EVERY DAY 8/16/21   Hero Norton MD   irbesartan-hydroCHLOROthiazide (AVALIDE) 150-12.5 MG per tablet TAKE 1 TABLET BY MOUTH DAILY 6/25/21   Hero Norton MD   blood glucose test strips (ASCENSIA AUTODISC VI;ONE TOUCH ULTRA TEST VI) strip 1 each by In Vitro route daily As needed.  3/3/21   Hero Norton MD   pravastatin (PRAVACHOL) 40 MG tablet Take 1 tablet by mouth every evening For cholesterol 2/16/21   Kathy Cervantes MD   vitamin D (ERGOCALCIFEROL) 1.25 MG (03234 UT) CAPS capsule TAKE 1 CAPSULE BY MOUTH ONE TIME PER WEEK  Patient taking differently: Patient taking 1000 units once a day 9/29/20   Hero Norton MD   albuterol sulfate HFA (VENTOLIN HFA) 108 (90 Base) MCG/ACT inhaler Inhale 2 puffs into the lungs every 6 hours as needed for Wheezing or Shortness of Breath (cough) INHALE 2 PUFFS INTO THE LUNGS 2 TIMES DAILY AS NEEDED FOR WHEEZING 9/14/20   Hero Norton MD   aspirin EC 81 MG EC tablet Take 1 tablet by mouth daily 7/2/20   Hero Norton MD   fluticasone (FLONASE) 50 MCG/ACT nasal spray 2 sprays by Each Nostril route daily  Patient taking differently: 2 sprays by Each Nostril route daily as needed for Rhinitis  2/10/20   Jaron Pierce MD       Allergies   Allergen Reactions    Lisinopril Other (See Comments)     cough         REVIEW OF SYSTEMS:     A detailed review of system was performed as already noted and is otherwise as above.       PHYSICAL EXAM:     There were no vitals filed for this visit. Constitutional and General Appearance: Alert. Not in acute stress. Respiratory:  · Clear to auscultation b/l. No wheeze or crackles. Cardiovascular:  · Regular rate and rhythm. No murmurs. · Jugular venous pulsation is normal  Abdomen:  · No masses or tenderness  Extremities:  · Lower extremity edema - No  · Skin: Warm and dry  Neurological:  · Alert and oriented. · Moves all extremities well      Active Problems:    * No active hospital problems. *  Resolved Problems:    * No resolved hospital problems. *      Assessment:  1. SCHWARTZ  2. HTN  3. DM  4. Smoker  5. Second degree AV block - Wenckebach - confirmed on Holter Monitor       Plan:  1. Proceed with planned cardiac cath and PPM placement. 2. Further orders to follow. Pre Procedure Conscious Sedation Data:  ASA Class:                  [] I [x] II [] III [] IV     Mallampati Class:       [] I [x] II [] III [] IV      The risks, benefits, and alternatives of the prcoedure were discussed in detail with the patient. The patient agrees to proceed with the procedure, verbalizes understanding and signed consent.        Lesli Liao MD,   Fellow, [facility]

## 2022-03-01 VITALS
SYSTOLIC BLOOD PRESSURE: 163 MMHG | OXYGEN SATURATION: 91 % | RESPIRATION RATE: 19 BRPM | HEIGHT: 62 IN | BODY MASS INDEX: 29.26 KG/M2 | HEART RATE: 74 BPM | DIASTOLIC BLOOD PRESSURE: 89 MMHG | TEMPERATURE: 98.7 F | WEIGHT: 159 LBS

## 2022-03-01 LAB
GLUCOSE BLD-MCNC: 180 MG/DL (ref 65–105)
GLUCOSE BLD-MCNC: 181 MG/DL (ref 65–105)

## 2022-03-01 PROCEDURE — 82947 ASSAY GLUCOSE BLOOD QUANT: CPT

## 2022-03-01 PROCEDURE — 2580000003 HC RX 258: Performed by: STUDENT IN AN ORGANIZED HEALTH CARE EDUCATION/TRAINING PROGRAM

## 2022-03-01 PROCEDURE — 6370000000 HC RX 637 (ALT 250 FOR IP): Performed by: STUDENT IN AN ORGANIZED HEALTH CARE EDUCATION/TRAINING PROGRAM

## 2022-03-01 RX ADMIN — INSULIN LISPRO 1 UNITS: 100 INJECTION, SOLUTION INTRAVENOUS; SUBCUTANEOUS at 11:35

## 2022-03-01 RX ADMIN — Medication 81 MG: at 07:57

## 2022-03-01 RX ADMIN — SODIUM CHLORIDE, PRESERVATIVE FREE 10 ML: 5 INJECTION INTRAVENOUS at 07:58

## 2022-03-01 RX ADMIN — OXYCODONE HYDROCHLORIDE AND ACETAMINOPHEN 1 TABLET: 5; 325 TABLET ORAL at 04:42

## 2022-03-01 RX ADMIN — INSULIN LISPRO 1 UNITS: 100 INJECTION, SOLUTION INTRAVENOUS; SUBCUTANEOUS at 07:58

## 2022-03-01 ASSESSMENT — PAIN DESCRIPTION - FREQUENCY: FREQUENCY: INTERMITTENT

## 2022-03-01 ASSESSMENT — PAIN DESCRIPTION - LOCATION
LOCATION: INCISION
LOCATION: OTHER (COMMENT)

## 2022-03-01 ASSESSMENT — PAIN SCALES - GENERAL
PAINLEVEL_OUTOF10: 0
PAINLEVEL_OUTOF10: 7
PAINLEVEL_OUTOF10: 2

## 2022-03-01 ASSESSMENT — PAIN DESCRIPTION - PAIN TYPE: TYPE: ACUTE PAIN

## 2022-03-01 ASSESSMENT — PAIN DESCRIPTION - ORIENTATION: ORIENTATION: LEFT

## 2022-03-01 NOTE — RESULT ENCOUNTER NOTE
Addressed in inpatient  High blood glucose, has known diabetes    Lab Results       Component                Value               Date                       LABA1C                   8.4                 12/03/2021                 LABA1C                   7.8                 07/15/2021                 LABA1C                   7.4                 03/03/2021

## 2022-03-01 NOTE — CARE COORDINATION
Case Management Initial Discharge Plan  Patrick Brandon,             Met with:patient to discuss discharge plans. Information verified: address, contacts, phone number, , insurance Yes  Insurance Provider: [facility]    Emergency Contact/Next of Kin name & number: thang Doss [phone]  Who are involved in patient's support system? Thang Doss and LOUISE Aponte    PCP: Prasad Redd MD  Date of last visit: Dec 2021      Discharge Planning    Living Arrangements:  Spouse/Significant Other     Home has one story  3 stairs to climb to get into front door    Patient able to perform ADL's:Independent    Current Services (outpatient & in home) none  DME equipment: none  DME provider: n/a    Is patient receiving oral anticoagulation therapy? No        Potential Assistance Needed:  N/A    Patient agreeable to home care: No  [place] of choice provided:  n/a    Prior SNF/Rehab Placement and Facility: N/A  Agreeable to SNF/Rehab: No  [place] of choice provided: n/a     Evaluation: n/a    Expected Discharge date:  22    Patient expects to be discharged to:   home    If home: is the family and/or caregiver wiling & able to provide support at home? Yes   Who will be providing this support? LOUISE Aponte and Thang [place]*    Follow Up Appointment: Best Day/ Time:      Transportation provider: thang Currie arrangements needed for discharge: No    Readmission Risk              Risk of Unplanned Readmission:  0             Does patient have a readmission risk score greater than 14?: No  If yes, follow-up appointment must be made within 7 days of discharge. Goals of Care: regular heart rate      Educated patient on transitional options, provided freedom of choice and are agreeable with plan      Discharge Plan: home with LOUISE Aponte    Pharmacy-[facility] on [place]          Electronically signed by Clay William RN on 3/1/22 at 8:20 AM EST

## 2022-03-01 NOTE — DISCHARGE SUMMARY
[facility]  Discharge Note                 Name:  Rainer Villareal  YOB: 1955  Social Security Number:  [id number]  Medical Record Number:  [id number]    Date of Admission:  2/28/2022  Date of Discharge:  3/1/2022    Admitting physician: Rod Benoit MD    Discharge Attending: SHARDA Dodson CNP, CNP  Primary Care Physician: Mohit Gardner MD  Consultants: Cardiology  Discharge to home in stable condition    HOSPITAL ADMISSION PROBLEM LIST:  Patient Active Problem List   Diagnosis    Essential hypertension    Uterine polyp    Hyperlipidemia with target LDL less than 100    Hypothyroidism    Type 2 diabetes mellitus with microalbuminuria, without long-term current use of insulin ([address]    Left axis deviation    AV block, 2nd degree    Arthritis    Vitamin D deficiency    Recurrent major depressive disorder, in partial remission ([address]    Chronic obstructive pulmonary disease ([facility] [address]    Colonoscopy refused    Family history of breast cancer in sister   Richard Carbon Electrolyte imbalance    Osteopenia determined by x-ray    Hoarseness of voice    Acquired elevated hemidiaphragm    Proteinuria    CKD (chronic kidney disease), stage I    Polycythemia    Pacemaker    S/P placement of cardiac pacemaker         Procedures: PPM placement    HOSPITAL COURSE :           The patient was admitted for: second degree [facility] Procedures if any: PPM placement  Medications changes recommendation: See List  Follow Up Plan: F/U staple removal in 10 days. Will come back next week for PCI      Discharge exam:   Vitals:    03/01/22 1145   BP: (!) 163/89   Pulse: 74   Resp: 19   Temp: 98.7 °F (37.1 °C)   SpO2: 91%     Neuro: normal  Chest: Clear to ausculation. No wheezing. Cardiac: Regular rate. s1 and s2 auscultated. No murmur noted.   Abdomen/groin: soft, non-tender, without masses or organomegaly  Lower extremity edema: none    Discharge Medications:     Medication List ASK your doctor about these medications     * albuterol sulfate  (90 Base) MCG/ACT inhaler; Commonly known   as: Ventolin HFA; Inhale 2 puffs into the lungs every 6 hours as needed   for Wheezing or Shortness of Breath (cough) INHALE 2 PUFFS INTO THE LUNGS   2 TIMES DAILY AS NEEDED FOR WHEEZING   * albuterol (2.5 MG/3ML) 0.083% nebulizer solution; Commonly known as:   PROVENTIL; Take 3 mLs by nebulization every 6 hours as needed for Wheezing   or Shortness of Breath   amLODIPine 10 MG tablet; Commonly known as: NORVASC; TAKE 1 TABLET BY   MOUTH EVERY DAY   aspirin EC 81 MG EC tablet; Take 1 tablet by mouth daily   blood glucose test strips strip; Commonly known as: ASCENSIA AUTODISC   VI;ONE TOUCH ULTRA TEST VI; 1 each by In Vitro route daily As needed.  fluticasone 50 MCG/ACT nasal spray; Commonly known as: FLONASE; 2 sprays   by Each Nostril route daily   glipiZIDE 10 MG tablet; Commonly known as: GLUCOTROL; Take 2 tablets by   mouth 2 times daily Dose increased 12/3/2021   irbesartan-hydroCHLOROthiazide 150-12.5 MG per tablet; Commonly known   as: AVALIDE; TAKE 1 TABLET BY MOUTH DAILY   levothyroxine 200 MCG tablet; Commonly known as: SYNTHROID; TAKE 1   TABLET BY MOUTH EVERY DAY BEFORE BREAKFAST   metFORMIN 500 MG extended release tablet; Commonly known as:   GLUCOPHAGE-XR; Take 1 tablet by mouth daily (with breakfast) Dose   decreased 12/3/2021   Nebulizer/Tubing/Mouthpiece Kit; Dx. COPD, needs nebulizer supplies   omeprazole 20 MG delayed release capsule; Commonly known as: PRILOSEC;   TAKE 1 CAPSULE BY MOUTH EVERY DAY   pravastatin 40 MG tablet; Commonly known as: PRAVACHOL; Take 1 tablet by   mouth every evening For cholesterol   sertraline 100 MG tablet; Commonly known as: ZOLOFT; Take 1 tablet by   mouth daily   simethicone 80 MG chewable tablet; Commonly known as: MYLICON;  Take 1   tablet by mouth 4 times daily as needed for Flatulence   vitamin D 1.25 MG (69060 UT) Caps capsule; Commonly known as:   ERGOCALCIFEROL; TAKE 1 CAPSULE BY MOUTH ONE TIME PER WEEK  * This list has 2 medication(s) that are the same as other medications   prescribed for you. Read the directions carefully, and ask your doctor or   other care provider to review them with you. Assessment:  1. SCHWARTZ  2. HTN  3. DM  4. Smoker  5. Second degree AV block - Wenckebach - confirmed on Holter Monitor         Plan:  1. Proceed with planned cardiac cath and PPM placement. 2. Further orders to follow. PPM placement 2/28/22  Findings:     LMCA: Normal 0% stenosis. LAD: Mid area 75% and distal 70% stenosis     LCx: Mild irregularities 10-20%. RCA: Mid area 75% stenosis   Distal 80% stenosis      Coronary Tree        Dominance: Right       LV Analysis   LV function assessed as:Normal.      Estimated Blood Loss:            [x]? ? Minimal 10-25 cc   []? ? Minimal < 25 cc      []? ? Moderate 25-50 cc         []?? >50 cc      Conclusions        Procedure Summary        Two vessel CAD    Preserved LV function        Recommendations        Proceed with PPM before intervention (due to anticoagulation)    Plan on PTCA -Stent next week. Discussed with patient and family and nursing. Medications and discharge instructions reviewed with patient and nursing. Discussed in detail with patient post procedure activity restriction including but not limited to site care, diet, exercise/activity restrictions, lifting and arm movement restrictions, pain control, use of ice for swelling, and follow-up. Questions/concerns addressed in detail. OK for d/c home. F/U in office as scheduled in 1 week for wound check. Office will call in next 24-48hrs to schedule PCI next week.      Electronically signed by SHARDA Lares CNP on 3/1/2022 at [facility]      [phone]

## 2022-03-01 NOTE — PLAN OF CARE
Problem: Anxiety:  Goal: Level of anxiety will decrease  Description: Level of anxiety will decrease  Outcome: Ongoing     Problem: Falls - Risk of:  Goal: Will remain free from falls  Description: Will remain free from falls  Outcome: Ongoing  Goal: Absence of physical injury  Description: Absence of physical injury  Outcome: Ongoing     Problem: Infection:  Goal: Will remain free from infection  Description: Will remain free from infection  Outcome: Ongoing     Problem: Safety:  Goal: Free from accidental physical injury  Description: Free from accidental physical injury  Outcome: Ongoing  Goal: Free from intentional harm  Description: Free from intentional harm  Outcome: Ongoing     Problem: Daily Care:  Goal: Daily care needs are met  Description: Daily care needs are met  Outcome: Ongoing     Problem: Pain:  Goal: Patient's pain/discomfort is manageable  Description: Patient's pain/discomfort is manageable  Outcome: Ongoing  Goal: Pain level will decrease  Description: Pain level will decrease  Outcome: Ongoing  Goal: Control of acute pain  Description: Control of acute pain  Outcome: Ongoing  Goal: Control of chronic pain  Description: Control of chronic pain  Outcome: Ongoing     Problem: Skin Integrity:  Goal: Skin integrity will stabilize  Description: Skin integrity will stabilize  Outcome: Ongoing     Problem: Discharge Planning:  Goal: Patients continuum of care needs are met  Description: Patients continuum of care needs are met  Outcome: Ongoing     Problem: Cardiac:  Goal: Ability to maintain an adequate cardiac output will improve  Description: Ability to maintain an adequate cardiac output will improve  Outcome: Ongoing  Goal: Hemodynamic stability will improve  Description: Hemodynamic stability will improve  Outcome: Ongoing     Problem: Fluid Volume:  Goal: Ability to achieve and maintain adequate urine output will improve  Description: Ability to achieve and maintain adequate urine output will improve  Outcome: Ongoing     Problem: Respiratory:  Goal: Respiratory status will improve  Description: Respiratory status will improve  Outcome: Ongoing

## 2022-03-02 ENCOUNTER — TELEPHONE (OUTPATIENT)
Dept: FAMILY MEDICINE CLINIC | Age: 67
End: 2022-03-02

## 2022-03-02 NOTE — TELEPHONE ENCOUNTER
Gisele [facility] Transitions Initial Follow Up Call    Outreach made within 2 business days of discharge: Yes    Patient: June Mcmanus Patient : 1955   MRN: [id number]  Reason for Admission: There are no discharge diagnoses documented for the most recent discharge. Discharge Date: 3/1/22       Spoke with: [facility]    Discharge department/facility: [facility] Interactive Patient Contact:  Was patient able to fill all prescriptions:   Was patient instructed to bring all medications to the follow-up visit:   Is patient taking all medications as directed in the discharge summary?    Does patient understand their discharge instructions:   Does patient have questions or concerns that need addressed prior to 7-14 day follow up office visit:     Scheduled appointment with PCP within 7-14 days    Follow Up  Future Appointments   Date Time Provider Kathy Davis   5/10/2022  9:00 AM Hero Norton MD [facility] [address] [facility] [place]

## 2022-03-08 ENCOUNTER — HOSPITAL ENCOUNTER (OUTPATIENT)
Dept: CARDIAC CATH/INVASIVE PROCEDURES | Age: 67
Discharge: HOME | End: 2022-03-08
Attending: INTERNAL MEDICINE | Admitting: INTERNAL MEDICINE
Payer: MEDICARE

## 2022-03-08 VITALS
HEART RATE: 65 BPM | SYSTOLIC BLOOD PRESSURE: 124 MMHG | BODY MASS INDEX: 28.89 KG/M2 | WEIGHT: 157 LBS | DIASTOLIC BLOOD PRESSURE: 83 MMHG | RESPIRATION RATE: 20 BRPM | HEIGHT: 62 IN | OXYGEN SATURATION: 97 % | TEMPERATURE: 98.1 F

## 2022-03-08 DIAGNOSIS — Z98.61 CAD S/P PERCUTANEOUS CORONARY ANGIOPLASTY: ICD-10-CM

## 2022-03-08 DIAGNOSIS — I25.10 CAD S/P PERCUTANEOUS CORONARY ANGIOPLASTY: ICD-10-CM

## 2022-03-08 LAB
ACTIVATED CLOTTING TIME: 292 SEC (ref 79–149)
GLUCOSE BLD-MCNC: 220 MG/DL (ref 65–105)

## 2022-03-08 PROCEDURE — 6370000000 HC RX 637 (ALT 250 FOR IP)

## 2022-03-08 PROCEDURE — 7100000000 HC PACU RECOVERY - FIRST 15 MIN

## 2022-03-08 PROCEDURE — 7100000001 HC PACU RECOVERY - ADDTL 15 MIN

## 2022-03-08 PROCEDURE — C1769 GUIDE WIRE: HCPCS

## 2022-03-08 PROCEDURE — 2500000003 HC RX 250 WO HCPCS

## 2022-03-08 PROCEDURE — 6360000004 HC RX CONTRAST MEDICATION

## 2022-03-08 PROCEDURE — 6360000002 HC RX W HCPCS

## 2022-03-08 PROCEDURE — 2580000003 HC RX 258: Performed by: INTERNAL MEDICINE

## 2022-03-08 PROCEDURE — C1874 STENT, COATED/COV W/DEL SYS: HCPCS

## 2022-03-08 PROCEDURE — 82947 ASSAY GLUCOSE BLOOD QUANT: CPT

## 2022-03-08 PROCEDURE — C1887 CATHETER, GUIDING: HCPCS

## 2022-03-08 PROCEDURE — 2709999900 HC NON-CHARGEABLE SUPPLY

## 2022-03-08 PROCEDURE — C9600 PERC DRUG-EL COR STENT SING: HCPCS

## 2022-03-08 PROCEDURE — C1894 INTRO/SHEATH, NON-LASER: HCPCS

## 2022-03-08 PROCEDURE — 85347 COAGULATION TIME ACTIVATED: CPT

## 2022-03-08 RX ORDER — SODIUM CHLORIDE 9 MG/ML
INJECTION, SOLUTION INTRAVENOUS CONTINUOUS
Status: DISCONTINUED | OUTPATIENT
Start: 2022-03-08 | End: 2022-03-09 | Stop reason: HOSPADM

## 2022-03-08 RX ORDER — SODIUM CHLORIDE 9 MG/ML
25 INJECTION, SOLUTION INTRAVENOUS PRN
Status: DISCONTINUED | OUTPATIENT
Start: 2022-03-08 | End: 2022-03-09 | Stop reason: HOSPADM

## 2022-03-08 RX ORDER — SODIUM CHLORIDE 0.9 % (FLUSH) 0.9 %
5-40 SYRINGE (ML) INJECTION PRN
Status: DISCONTINUED | OUTPATIENT
Start: 2022-03-08 | End: 2022-03-09 | Stop reason: HOSPADM

## 2022-03-08 RX ORDER — METOPROLOL SUCCINATE 25 MG/1
25 TABLET, EXTENDED RELEASE ORAL DAILY
Qty: 30 TABLET | Refills: 0 | Status: SHIPPED | OUTPATIENT
Start: 2022-03-08 | End: 2022-08-17 | Stop reason: DRUGHIGH

## 2022-03-08 RX ORDER — ACETAMINOPHEN 325 MG/1
650 TABLET ORAL EVERY 4 HOURS PRN
Status: DISCONTINUED | OUTPATIENT
Start: 2022-03-08 | End: 2022-03-09 | Stop reason: HOSPADM

## 2022-03-08 RX ORDER — SODIUM CHLORIDE 0.9 % (FLUSH) 0.9 %
5-40 SYRINGE (ML) INJECTION EVERY 12 HOURS SCHEDULED
Status: DISCONTINUED | OUTPATIENT
Start: 2022-03-08 | End: 2022-03-09 | Stop reason: HOSPADM

## 2022-03-08 RX ORDER — CLOPIDOGREL BISULFATE 75 MG/1
75 TABLET ORAL DAILY
Qty: 30 TABLET | Refills: 3 | Status: SHIPPED | OUTPATIENT
Start: 2022-03-08

## 2022-03-08 RX ADMIN — SODIUM CHLORIDE: 9 INJECTION, SOLUTION INTRAVENOUS at 11:07

## 2022-03-08 ASSESSMENT — PAIN DESCRIPTION - ORIENTATION: ORIENTATION: LEFT

## 2022-03-08 ASSESSMENT — PAIN DESCRIPTION - DESCRIPTORS: DESCRIPTORS: BURNING

## 2022-03-08 ASSESSMENT — PAIN SCALES - GENERAL: PAINLEVEL_OUTOF10: 3

## 2022-03-08 ASSESSMENT — PAIN DESCRIPTION - PAIN TYPE: TYPE: ACUTE PAIN

## 2022-03-08 ASSESSMENT — PAIN DESCRIPTION - FREQUENCY: FREQUENCY: CONTINUOUS

## 2022-03-08 ASSESSMENT — PAIN DESCRIPTION - LOCATION: LOCATION: CHEST

## 2022-03-08 NOTE — RESULT ENCOUNTER NOTE
Management per cardio    Future Appointments  5/10/2022  9:00 AM    Jacky Mota MD     fp sc               [facility]

## 2022-03-08 NOTE — PROGRESS NOTES
Received post procedure to [facility] to room 14. Assessment obtained. Restrictions reviewed with patient. Post procedure pathway initiated. Right radial site soft with VASC band intact. No hematoma noted. Family at side. Patient without complaints.

## 2022-03-08 NOTE — OP NOTE
[facility]    CARDIAC CATHETERIZATION    Date:   3/8/2022  Patient name:  Megan Richard  Date of admission:  3/8/2022  9:43 AM  MRN:   [id number]  YOB: 1955    Operators:  Primary:   John Larsen MD (Attending Physician)    Assistant/CV fellow:       Procedure performed:      [x] Left Heart Catheterization. [] Graft Angiography.  [] Left Ventriculography. [] Right Heart Catheterization. [x] Coronary Angiography. [] Aortic Valve Studies. [x] PCI:      [] Other:       Pre Procedure Conscious Sedation Data:  ASA Class:    [] I [x] II [] III [] IV    Mallampati Class:  [] I [x] II [] III [] IV      Indication:  [] STEMI      [] + Stress test  [] ACS      [] + EKG Changes  [] Non Q MI       [x] Significant Risk Factors  [] Recurrent Angina             [] Diabetes Mellitus    [] New LBBB      [] Uncontrolled HTN. [] CHF / Low EF changes     [] Abnormal CTA / Ca Score      Procedure:  Access:  [] Femoral  [x] Radial  artery       [x] Right  [] Left    Procedure: After informed consent was obtained with explanation of the risks and benefits, patient was brought to the cath lab. The access area was prepped and draped in sterile fashion. 1% lidocaine was used for local block. The artery was cannulated with 6  Fr sheath with brisk arterial blood return. The side port was frequently flushed and aspirated with normal saline. Findings:    LAD:       Lesion on Mid LAD: Mid subsection. 80% stenosis 6 mm length reduced to 0%. Pre procedure LAQUITA III flow was noted. Post Procedure LAQUITA III flow was    present. Good runoff was present. The lesion was diagnosed as Moderate    Risk (B). Devices used       - ASAHI P[place]ater Flex 180 cm. Number of passes: 1.       - Resolute [place] 3.5 x 12 HANNAH. 1 inflation(s) to a max pressure of: 12    halie. Lesion on Dist LAD: Mid subsection. 80% stenosis 10 mm length reduced to    0%. Pre procedure LAQUITA III flow was noted.  Post Procedure LAQUITA III flow    was present. Good runoff was present. The lesion was diagnosed as    Moderate Risk (B). Devices used       - Resolute [place] 2.5 x 26 HANNAH. 1 inflation(s) to a max pressure of: 10    halie. RCA:       Lesion on Mid RCA: Mid subsection. 75% stenosis 12 mm length reduced to    0%. Pre procedure LAQUITA III flow was noted. Post Procedure LAQUITA III flow    was present. Good runoff was present. The lesion was diagnosed as    Moderate Risk (B). Devices used       - Luge Wire 182 cm. Number of passes: 1.       - Resolute [place] 3.0 x 18 HANNAH. 1 inflation(s) to a max pressure of: 14    halie. Lesion on Dist RCA: Proximal subsection. 85% stenosis 4 mm length reduced    to 0%. Post Procedure LAQUITA III flow was present. Good runoff was present. The lesion was diagnosed as Moderate Risk (B). Devices used       - Resolute [place] 2.75 x 8 HANNAH. 1 inflation(s) to a max pressure of: 10    halie. Coronary Tree      Dominance: Right         Estimated Blood Loss: Less than 10 mL    Conclusions:  Successful PTCA -HANNAH mid and distal LAD   Successful PTCA -HANNAH mid and distal RCA      Recommendations      Post stent protocol    ____________________________________________________________________    History and Risk Factors    [x] Hypertension     [] Family history of CAD  [x] Hyperlipidemia     [] Cerebrovascular Disease   [x] Prior MI       [] Peripheral Vascular disease   [x] Prior PCI              [] Diabetes Mellitus    [] Left Main PCI. [] Currently on Dialysis. [] Prior CABG. [] Currently smoker. [] Cardiac Arrest outside of healthcare facility. [] Yes    [x] No        Witnessed     [] Yes   [] No     Arrest after arrival of EMS  [] Yes   [] No     [] Cardiac Arrest at other Facility.     [] Yes   [x] No    Funmilayo Currie MD       Cardiovascular Fellow PGY-4  3/8/2022, 1:21 PM        I have reviewed the case / procedure with resident / fellow  I have examined the patient personally  Patient agree with treatment plan as discussed before, final arrangement based on my evaluation and exam.    Risk and benefit of procedure planned were explained in details. Procedure was performed by me personally, with all aspect of the procedure being done using standard protocol. Note was modified based on my own assessment and treatment.     Sarah Puente MD  [facility]

## 2022-03-08 NOTE — PROGRESS NOTES
VASC band has been totally deflated and removed. No bleeding or hematoma. Pressure dressing applied then arm board. Ambulated in halls. Gait steady.

## 2022-03-24 DIAGNOSIS — K21.9 GASTROESOPHAGEAL REFLUX DISEASE WITHOUT ESOPHAGITIS: Primary | ICD-10-CM

## 2022-03-24 RX ORDER — PANTOPRAZOLE SODIUM 20 MG/1
20 TABLET, DELAYED RELEASE ORAL
Qty: 90 TABLET | Refills: 1 | Status: SHIPPED | OUTPATIENT
Start: 2022-03-24 | End: 2022-06-27

## 2022-03-24 NOTE — TELEPHONE ENCOUNTER
Received notification from her insurance/pharmacy that omeprazole interacts with Plavix. She needs to stop omeprazole and I will send pantoprazole for her stomach issues    Please let the patient know to  prescription from the pharmacy. If worsening symptoms in few days or not getting better as expected needs to let us know and make appointment. Orders Placed This Encounter   Medications    pantoprazole (PROTONIX) 20 MG tablet     Sig: Take 1 tablet by mouth every morning (before breakfast) ** stop omeprazole**     Dispense:  90 tablet     Refill:  1         [facility]/pharmacy [facility] [address] [address] [place] [address]  [address] [address]  Phone: [phone] Fax: [phone]      No orders of the defined types were placed in this encounter. Future Appointments   Date Time Provider Kathy Davis   5/10/2022  9:00 AM Linda Robles MD [facility]       Thank you!

## 2022-04-05 ENCOUNTER — HOSPITAL ENCOUNTER (INPATIENT)
Age: 67
LOS: 2 days | Discharge: HOME | DRG: 871 | End: 2022-04-07
Attending: EMERGENCY MEDICINE | Admitting: INTERNAL MEDICINE
Payer: MEDICARE

## 2022-04-05 ENCOUNTER — TELEPHONE (OUTPATIENT)
Dept: FAMILY MEDICINE CLINIC | Age: 67
End: 2022-04-05

## 2022-04-05 ENCOUNTER — APPOINTMENT (OUTPATIENT)
Dept: GENERAL RADIOLOGY | Age: 67
DRG: 871 | End: 2022-04-05
Payer: MEDICARE

## 2022-04-05 DIAGNOSIS — E87.1 HYPONATREMIA: ICD-10-CM

## 2022-04-05 DIAGNOSIS — J18.9 PNEUMONIA OF RIGHT MIDDLE LOBE DUE TO INFECTIOUS ORGANISM: Primary | ICD-10-CM

## 2022-04-05 PROBLEM — A41.9 SEPSIS WITHOUT ACUTE ORGAN DYSFUNCTION (HCC): Status: ACTIVE | Noted: 2022-04-05

## 2022-04-05 LAB
-: ABNORMAL
ABSOLUTE EOS #: 0 K/UL (ref 0–0.4)
ABSOLUTE IMMATURE GRANULOCYTE: 0 K/UL (ref 0–0.3)
ABSOLUTE LYMPH #: 1.27 K/UL (ref 1–4.8)
ABSOLUTE MONO #: 1.52 K/UL (ref 0.1–0.8)
ALBUMIN SERPL-MCNC: 3.8 G/DL (ref 3.5–5.2)
ALBUMIN/GLOBULIN RATIO: 1.2 (ref 1–2.5)
ALP BLD-CCNC: 99 U/L (ref 35–104)
ALT SERPL-CCNC: 8 U/L (ref 5–33)
ANION GAP SERPL CALCULATED.3IONS-SCNC: 14 MMOL/L (ref 9–17)
ANION GAP SERPL CALCULATED.3IONS-SCNC: 16 MMOL/L (ref 9–17)
AST SERPL-CCNC: 14 U/L
BASOPHILS # BLD: 0 % (ref 0–2)
BASOPHILS ABSOLUTE: 0 K/UL (ref 0–0.2)
BILIRUB SERPL-MCNC: 1.34 MG/DL (ref 0.3–1.2)
BILIRUBIN URINE: NEGATIVE
BUN BLDV-MCNC: 18 MG/DL (ref 8–23)
BUN BLDV-MCNC: 19 MG/DL (ref 8–23)
CALCIUM SERPL-MCNC: 8.6 MG/DL (ref 8.6–10.4)
CALCIUM SERPL-MCNC: 8.9 MG/DL (ref 8.6–10.4)
CASTS UA: ABNORMAL /LPF (ref 0–2)
CASTS UA: ABNORMAL /LPF (ref 0–2)
CHLORIDE BLD-SCNC: 84 MMOL/L (ref 98–107)
CHLORIDE BLD-SCNC: 85 MMOL/L (ref 98–107)
CO2: 20 MMOL/L (ref 20–31)
CO2: 21 MMOL/L (ref 20–31)
COLOR: YELLOW
CREAT SERPL-MCNC: 0.65 MG/DL (ref 0.5–0.9)
CREAT SERPL-MCNC: 0.83 MG/DL (ref 0.5–0.9)
EOSINOPHILS RELATIVE PERCENT: 0 % (ref 1–4)
EPITHELIAL CELLS UA: ABNORMAL /HPF (ref 0–5)
FLU A ANTIGEN: NEGATIVE
FLU B ANTIGEN: NEGATIVE
GFR AFRICAN AMERICAN: >60 ML/MIN
GFR AFRICAN AMERICAN: >60 ML/MIN
GFR NON-AFRICAN AMERICAN: >60 ML/MIN
GFR NON-AFRICAN AMERICAN: >60 ML/MIN
GFR SERPL CREATININE-BSD FRML MDRD: ABNORMAL ML/MIN/{1.73_M2}
GFR SERPL CREATININE-BSD FRML MDRD: ABNORMAL ML/MIN/{1.73_M2}
GLUCOSE BLD-MCNC: 220 MG/DL (ref 70–99)
GLUCOSE BLD-MCNC: 248 MG/DL (ref 65–105)
GLUCOSE BLD-MCNC: 252 MG/DL (ref 70–99)
GLUCOSE URINE: NEGATIVE
HCT VFR BLD CALC: 34.5 % (ref 36.3–47.1)
HEMOGLOBIN: 12.1 G/DL (ref 11.9–15.1)
IMMATURE GRANULOCYTES: 0 %
KETONES, URINE: ABNORMAL
LACTIC ACID, SEPSIS WHOLE BLOOD: 1.5 MMOL/L (ref 0.5–1.9)
LEUKOCYTE ESTERASE, URINE: ABNORMAL
LYMPHOCYTES # BLD: 5 % (ref 24–44)
MAGNESIUM: 1.2 MG/DL (ref 1.6–2.6)
MCH RBC QN AUTO: 31 PG (ref 25.2–33.5)
MCHC RBC AUTO-ENTMCNC: 35.1 G/DL (ref 28.4–34.8)
MCV RBC AUTO: 88.5 FL (ref 82.6–102.9)
MONOCYTES # BLD: 6 % (ref 1–7)
MORPHOLOGY: NORMAL
MUCUS: ABNORMAL
MYOGLOBIN: 108 NG/ML (ref 25–58)
NITRITE, URINE: NEGATIVE
NRBC AUTOMATED: 0 PER 100 WBC
OSMOLALITY URINE: 414 MOSM/KG (ref 80–1300)
PDW BLD-RTO: 12.8 % (ref 11.8–14.4)
PH UA: 5.5 (ref 5–8)
PLATELET # BLD: 265 K/UL (ref 138–453)
PMV BLD AUTO: 9.4 FL (ref 8.1–13.5)
POTASSIUM SERPL-SCNC: 3.3 MMOL/L (ref 3.7–5.3)
POTASSIUM SERPL-SCNC: 3.5 MMOL/L (ref 3.7–5.3)
PRO-BNP: 749 PG/ML
PROTEIN UA: ABNORMAL
RBC # BLD: 3.9 M/UL (ref 3.95–5.11)
RBC UA: ABNORMAL /HPF (ref 0–2)
SARS-COV-2, RAPID: NOT DETECTED
SEG NEUTROPHILS: 89 % (ref 36–66)
SEGMENTED NEUTROPHILS ABSOLUTE COUNT: 22.51 K/UL (ref 1.8–7.7)
SODIUM BLD-SCNC: 119 MMOL/L (ref 135–144)
SODIUM BLD-SCNC: 121 MMOL/L (ref 135–144)
SODIUM,UR: <20 MMOL/L
SPECIFIC GRAVITY UA: 1.02 (ref 1–1.03)
SPECIMEN DESCRIPTION: NORMAL
TOTAL PROTEIN: 7.1 G/DL (ref 6.4–8.3)
TROPONIN, HIGH SENSITIVITY: 11 NG/L (ref 0–14)
TROPONIN, HIGH SENSITIVITY: 11 NG/L (ref 0–14)
TROPONIN, HIGH SENSITIVITY: 12 NG/L (ref 0–14)
TURBIDITY: ABNORMAL
URINE HGB: NEGATIVE
UROBILINOGEN, URINE: NORMAL
WBC # BLD: 25.3 K/UL (ref 3.5–11.3)
WBC UA: ABNORMAL /HPF (ref 0–5)

## 2022-04-05 PROCEDURE — 2580000003 HC RX 258: Performed by: NURSE PRACTITIONER

## 2022-04-05 PROCEDURE — 87804 INFLUENZA ASSAY W/OPTIC: CPT

## 2022-04-05 PROCEDURE — 96374 THER/PROPH/DIAG INJ IV PUSH: CPT

## 2022-04-05 PROCEDURE — 2580000003 HC RX 258: Performed by: STUDENT IN AN ORGANIZED HEALTH CARE EDUCATION/TRAINING PROGRAM

## 2022-04-05 PROCEDURE — 81001 URINALYSIS AUTO W/SCOPE: CPT

## 2022-04-05 PROCEDURE — 93005 ELECTROCARDIOGRAM TRACING: CPT | Performed by: STUDENT IN AN ORGANIZED HEALTH CARE EDUCATION/TRAINING PROGRAM

## 2022-04-05 PROCEDURE — 96375 TX/PRO/DX INJ NEW DRUG ADDON: CPT

## 2022-04-05 PROCEDURE — 80053 COMPREHEN METABOLIC PANEL: CPT

## 2022-04-05 PROCEDURE — 6370000000 HC RX 637 (ALT 250 FOR IP): Performed by: STUDENT IN AN ORGANIZED HEALTH CARE EDUCATION/TRAINING PROGRAM

## 2022-04-05 PROCEDURE — 99223 1ST HOSP IP/OBS HIGH 75: CPT | Performed by: STUDENT IN AN ORGANIZED HEALTH CARE EDUCATION/TRAINING PROGRAM

## 2022-04-05 PROCEDURE — 99285 EMERGENCY DEPT VISIT HI MDM: CPT

## 2022-04-05 PROCEDURE — 1200000000 HC SEMI PRIVATE

## 2022-04-05 PROCEDURE — 84300 ASSAY OF URINE SODIUM: CPT

## 2022-04-05 PROCEDURE — 83605 ASSAY OF LACTIC ACID: CPT

## 2022-04-05 PROCEDURE — 36415 COLL VENOUS BLD VENIPUNCTURE: CPT

## 2022-04-05 PROCEDURE — 82947 ASSAY GLUCOSE BLOOD QUANT: CPT

## 2022-04-05 PROCEDURE — 83880 ASSAY OF NATRIURETIC PEPTIDE: CPT

## 2022-04-05 PROCEDURE — 6370000000 HC RX 637 (ALT 250 FOR IP): Performed by: NURSE PRACTITIONER

## 2022-04-05 PROCEDURE — 83874 ASSAY OF MYOGLOBIN: CPT

## 2022-04-05 PROCEDURE — 6360000002 HC RX W HCPCS: Performed by: STUDENT IN AN ORGANIZED HEALTH CARE EDUCATION/TRAINING PROGRAM

## 2022-04-05 PROCEDURE — 71045 X-RAY EXAM CHEST 1 VIEW: CPT

## 2022-04-05 PROCEDURE — 85025 COMPLETE CBC W/AUTO DIFF WBC: CPT

## 2022-04-05 PROCEDURE — 83036 HEMOGLOBIN GLYCOSYLATED A1C: CPT

## 2022-04-05 PROCEDURE — 87040 BLOOD CULTURE FOR BACTERIA: CPT

## 2022-04-05 PROCEDURE — 80048 BASIC METABOLIC PNL TOTAL CA: CPT

## 2022-04-05 PROCEDURE — 87635 SARS-COV-2 COVID-19 AMP PRB: CPT

## 2022-04-05 PROCEDURE — 83735 ASSAY OF MAGNESIUM: CPT

## 2022-04-05 PROCEDURE — 84484 ASSAY OF TROPONIN QUANT: CPT

## 2022-04-05 PROCEDURE — 83935 ASSAY OF URINE OSMOLALITY: CPT

## 2022-04-05 RX ORDER — PANTOPRAZOLE SODIUM 20 MG/1
20 TABLET, DELAYED RELEASE ORAL
Status: DISCONTINUED | OUTPATIENT
Start: 2022-04-06 | End: 2022-04-05

## 2022-04-05 RX ORDER — INSULIN GLARGINE 100 [IU]/ML
5 INJECTION, SOLUTION SUBCUTANEOUS NIGHTLY
Status: DISCONTINUED | OUTPATIENT
Start: 2022-04-05 | End: 2022-04-07 | Stop reason: HOSPADM

## 2022-04-05 RX ORDER — LEVOTHYROXINE SODIUM 0.1 MG/1
200 TABLET ORAL DAILY
Status: DISCONTINUED | OUTPATIENT
Start: 2022-04-06 | End: 2022-04-07 | Stop reason: HOSPADM

## 2022-04-05 RX ORDER — 0.9 % SODIUM CHLORIDE 0.9 %
1000 INTRAVENOUS SOLUTION INTRAVENOUS ONCE
Status: COMPLETED | OUTPATIENT
Start: 2022-04-05 | End: 2022-04-05

## 2022-04-05 RX ORDER — AZITHROMYCIN 250 MG/1
500 TABLET, FILM COATED ORAL EVERY 24 HOURS
Status: DISCONTINUED | OUTPATIENT
Start: 2022-04-06 | End: 2022-04-07 | Stop reason: HOSPADM

## 2022-04-05 RX ORDER — MAGNESIUM SULFATE IN WATER 40 MG/ML
2000 INJECTION, SOLUTION INTRAVENOUS ONCE
Status: COMPLETED | OUTPATIENT
Start: 2022-04-05 | End: 2022-04-05

## 2022-04-05 RX ORDER — ONDANSETRON 2 MG/ML
4 INJECTION INTRAMUSCULAR; INTRAVENOUS ONCE
Status: COMPLETED | OUTPATIENT
Start: 2022-04-05 | End: 2022-04-05

## 2022-04-05 RX ORDER — MAGNESIUM SULFATE 1 G/100ML
1000 INJECTION INTRAVENOUS PRN
Status: DISCONTINUED | OUTPATIENT
Start: 2022-04-05 | End: 2022-04-07 | Stop reason: HOSPADM

## 2022-04-05 RX ORDER — ONDANSETRON 2 MG/ML
4 INJECTION INTRAMUSCULAR; INTRAVENOUS EVERY 6 HOURS PRN
Status: DISCONTINUED | OUTPATIENT
Start: 2022-04-05 | End: 2022-04-07 | Stop reason: HOSPADM

## 2022-04-05 RX ORDER — KETOROLAC TROMETHAMINE 15 MG/ML
15 INJECTION, SOLUTION INTRAMUSCULAR; INTRAVENOUS ONCE
Status: COMPLETED | OUTPATIENT
Start: 2022-04-05 | End: 2022-04-05

## 2022-04-05 RX ORDER — POTASSIUM CHLORIDE 20 MEQ/1
40 TABLET, EXTENDED RELEASE ORAL PRN
Status: DISCONTINUED | OUTPATIENT
Start: 2022-04-05 | End: 2022-04-07 | Stop reason: HOSPADM

## 2022-04-05 RX ORDER — IRBESARTAN AND HYDROCHLOROTHIAZIDE 150; 12.5 MG/1; MG/1
1 TABLET, FILM COATED ORAL DAILY
Status: DISCONTINUED | OUTPATIENT
Start: 2022-04-06 | End: 2022-04-05

## 2022-04-05 RX ORDER — ASPIRIN 81 MG/1
324 TABLET, CHEWABLE ORAL ONCE
Status: COMPLETED | OUTPATIENT
Start: 2022-04-05 | End: 2022-04-05

## 2022-04-05 RX ORDER — MAGNESIUM SULFATE IN WATER 40 MG/ML
2000 INJECTION, SOLUTION INTRAVENOUS ONCE
Status: COMPLETED | OUTPATIENT
Start: 2022-04-05 | End: 2022-04-06

## 2022-04-05 RX ORDER — IPRATROPIUM BROMIDE AND ALBUTEROL SULFATE 2.5; .5 MG/3ML; MG/3ML
1 SOLUTION RESPIRATORY (INHALATION)
Status: DISCONTINUED | OUTPATIENT
Start: 2022-04-06 | End: 2022-04-07 | Stop reason: HOSPADM

## 2022-04-05 RX ORDER — DEXTROSE MONOHYDRATE 25 G/50ML
12.5 INJECTION, SOLUTION INTRAVENOUS PRN
Status: DISCONTINUED | OUTPATIENT
Start: 2022-04-05 | End: 2022-04-07 | Stop reason: HOSPADM

## 2022-04-05 RX ORDER — FLUTICASONE PROPIONATE 50 MCG
2 SPRAY, SUSPENSION (ML) NASAL DAILY PRN
Status: DISCONTINUED | OUTPATIENT
Start: 2022-04-05 | End: 2022-04-07 | Stop reason: HOSPADM

## 2022-04-05 RX ORDER — CEFTRIAXONE 1 G/1
1000 INJECTION, POWDER, FOR SOLUTION INTRAMUSCULAR; INTRAVENOUS ONCE
Status: COMPLETED | OUTPATIENT
Start: 2022-04-05 | End: 2022-04-05

## 2022-04-05 RX ORDER — ACETAMINOPHEN 500 MG
1000 TABLET ORAL ONCE
Status: COMPLETED | OUTPATIENT
Start: 2022-04-05 | End: 2022-04-05

## 2022-04-05 RX ORDER — SODIUM CHLORIDE 0.9 % (FLUSH) 0.9 %
10 SYRINGE (ML) INJECTION PRN
Status: DISCONTINUED | OUTPATIENT
Start: 2022-04-05 | End: 2022-04-07 | Stop reason: HOSPADM

## 2022-04-05 RX ORDER — SODIUM CHLORIDE 9 MG/ML
INJECTION, SOLUTION INTRAVENOUS PRN
Status: DISCONTINUED | OUTPATIENT
Start: 2022-04-05 | End: 2022-04-07 | Stop reason: HOSPADM

## 2022-04-05 RX ORDER — ASPIRIN 81 MG/1
81 TABLET ORAL DAILY
Status: DISCONTINUED | OUTPATIENT
Start: 2022-04-06 | End: 2022-04-07 | Stop reason: HOSPADM

## 2022-04-05 RX ORDER — NICOTINE POLACRILEX 4 MG
15 LOZENGE BUCCAL PRN
Status: DISCONTINUED | OUTPATIENT
Start: 2022-04-05 | End: 2022-04-07 | Stop reason: HOSPADM

## 2022-04-05 RX ORDER — HYDROCHLOROTHIAZIDE 25 MG/1
12.5 TABLET ORAL DAILY
Status: DISCONTINUED | OUTPATIENT
Start: 2022-04-06 | End: 2022-04-05

## 2022-04-05 RX ORDER — SODIUM CHLORIDE 9 MG/ML
INJECTION, SOLUTION INTRAVENOUS CONTINUOUS
Status: DISCONTINUED | OUTPATIENT
Start: 2022-04-05 | End: 2022-04-06

## 2022-04-05 RX ORDER — ALBUTEROL SULFATE 2.5 MG/3ML
2.5 SOLUTION RESPIRATORY (INHALATION)
Status: DISCONTINUED | OUTPATIENT
Start: 2022-04-05 | End: 2022-04-07 | Stop reason: HOSPADM

## 2022-04-05 RX ORDER — ACETAMINOPHEN 650 MG/1
650 SUPPOSITORY RECTAL EVERY 6 HOURS PRN
Status: DISCONTINUED | OUTPATIENT
Start: 2022-04-05 | End: 2022-04-07 | Stop reason: HOSPADM

## 2022-04-05 RX ORDER — METOPROLOL SUCCINATE 25 MG/1
25 TABLET, EXTENDED RELEASE ORAL DAILY
Status: DISCONTINUED | OUTPATIENT
Start: 2022-04-06 | End: 2022-04-07 | Stop reason: HOSPADM

## 2022-04-05 RX ORDER — ONDANSETRON 4 MG/1
4 TABLET, ORALLY DISINTEGRATING ORAL EVERY 8 HOURS PRN
Status: DISCONTINUED | OUTPATIENT
Start: 2022-04-05 | End: 2022-04-07 | Stop reason: HOSPADM

## 2022-04-05 RX ORDER — DEXTROSE MONOHYDRATE 50 MG/ML
100 INJECTION, SOLUTION INTRAVENOUS PRN
Status: DISCONTINUED | OUTPATIENT
Start: 2022-04-05 | End: 2022-04-07 | Stop reason: HOSPADM

## 2022-04-05 RX ORDER — SODIUM CHLORIDE 0.9 % (FLUSH) 0.9 %
5-40 SYRINGE (ML) INJECTION EVERY 12 HOURS SCHEDULED
Status: DISCONTINUED | OUTPATIENT
Start: 2022-04-05 | End: 2022-04-07 | Stop reason: HOSPADM

## 2022-04-05 RX ORDER — PRAVASTATIN SODIUM 20 MG
40 TABLET ORAL EVERY EVENING
Status: DISCONTINUED | OUTPATIENT
Start: 2022-04-05 | End: 2022-04-07 | Stop reason: HOSPADM

## 2022-04-05 RX ORDER — CLOPIDOGREL BISULFATE 75 MG/1
75 TABLET ORAL DAILY
Status: DISCONTINUED | OUTPATIENT
Start: 2022-04-06 | End: 2022-04-07 | Stop reason: HOSPADM

## 2022-04-05 RX ORDER — LOSARTAN POTASSIUM 50 MG/1
50 TABLET ORAL DAILY
Status: DISCONTINUED | OUTPATIENT
Start: 2022-04-06 | End: 2022-04-07 | Stop reason: HOSPADM

## 2022-04-05 RX ORDER — ACETAMINOPHEN 325 MG/1
650 TABLET ORAL EVERY 6 HOURS PRN
Status: DISCONTINUED | OUTPATIENT
Start: 2022-04-05 | End: 2022-04-07 | Stop reason: HOSPADM

## 2022-04-05 RX ORDER — POTASSIUM CHLORIDE 20 MEQ/1
40 TABLET, EXTENDED RELEASE ORAL 2 TIMES DAILY WITH MEALS
Status: COMPLETED | OUTPATIENT
Start: 2022-04-06 | End: 2022-04-06

## 2022-04-05 RX ORDER — POTASSIUM CHLORIDE 7.45 MG/ML
10 INJECTION INTRAVENOUS PRN
Status: DISCONTINUED | OUTPATIENT
Start: 2022-04-05 | End: 2022-04-07 | Stop reason: HOSPADM

## 2022-04-05 RX ADMIN — KETOROLAC TROMETHAMINE 15 MG: 15 INJECTION, SOLUTION INTRAMUSCULAR; INTRAVENOUS at 19:36

## 2022-04-05 RX ADMIN — INSULIN LISPRO 2 UNITS: 100 INJECTION, SOLUTION INTRAVENOUS; SUBCUTANEOUS at 23:10

## 2022-04-05 RX ADMIN — ASPIRIN 324 MG: 81 TABLET, CHEWABLE ORAL at 19:35

## 2022-04-05 RX ADMIN — ACETAMINOPHEN 1000 MG: 500 TABLET ORAL at 20:35

## 2022-04-05 RX ADMIN — ONDANSETRON 4 MG: 2 INJECTION INTRAMUSCULAR; INTRAVENOUS at 19:36

## 2022-04-05 RX ADMIN — POTASSIUM CHLORIDE 40 MEQ: 20 TABLET, EXTENDED RELEASE ORAL at 23:29

## 2022-04-05 RX ADMIN — SODIUM CHLORIDE: 9 INJECTION, SOLUTION INTRAVENOUS at 22:40

## 2022-04-05 RX ADMIN — CEFTRIAXONE SODIUM 1000 MG: 1 INJECTION, POWDER, FOR SOLUTION INTRAMUSCULAR; INTRAVENOUS at 20:57

## 2022-04-05 RX ADMIN — INSULIN GLARGINE 5 UNITS: 100 INJECTION, SOLUTION SUBCUTANEOUS at 23:10

## 2022-04-05 RX ADMIN — MAGNESIUM SULFATE 2000 MG: 2 INJECTION INTRAVENOUS at 21:04

## 2022-04-05 RX ADMIN — Medication 500 MG: at 20:33

## 2022-04-05 RX ADMIN — SODIUM CHLORIDE, PRESERVATIVE FREE 10 ML: 5 INJECTION INTRAVENOUS at 23:09

## 2022-04-05 RX ADMIN — SODIUM CHLORIDE 1000 ML: 9 INJECTION, SOLUTION INTRAVENOUS at 20:33

## 2022-04-05 ASSESSMENT — PAIN SCALES - GENERAL
PAINLEVEL_OUTOF10: 0
PAINLEVEL_OUTOF10: 9
PAINLEVEL_OUTOF10: 9
PAINLEVEL_OUTOF10: 6

## 2022-04-05 ASSESSMENT — ENCOUNTER SYMPTOMS
ABDOMINAL DISTENTION: 0
BACK PAIN: 0
DIARRHEA: 0
SHORTNESS OF BREATH: 1
STRIDOR: 0
CONSTIPATION: 0
COLOR CHANGE: 0
WHEEZING: 0
COUGH: 1
VOMITING: 0
EYE DISCHARGE: 0
ABDOMINAL PAIN: 0

## 2022-04-05 ASSESSMENT — PAIN DESCRIPTION - FREQUENCY: FREQUENCY: CONTINUOUS

## 2022-04-05 ASSESSMENT — PAIN DESCRIPTION - LOCATION: LOCATION: CHEST

## 2022-04-05 ASSESSMENT — PAIN DESCRIPTION - PROGRESSION: CLINICAL_PROGRESSION: GRADUALLY WORSENING

## 2022-04-05 ASSESSMENT — PAIN DESCRIPTION - ONSET: ONSET: GRADUAL

## 2022-04-05 ASSESSMENT — PAIN DESCRIPTION - ORIENTATION: ORIENTATION: RIGHT

## 2022-04-05 ASSESSMENT — PAIN - FUNCTIONAL ASSESSMENT: PAIN_FUNCTIONAL_ASSESSMENT: 0-10

## 2022-04-05 NOTE — ED NOTES
The following labs labeled with pt sticker and tubed to lab:     [x] Blue     [x] Lavender   [] on ice  [x] Green/yellow  [] Green/black [] on ice  [] Yellow  [] Red  [] Pink      [x] COVID-19 swab    [x] Rapid  [] PCR  [x] Flu swab  [] Peds Viral Panel     [] Urine Sample  [] Pelvic Cultures  [] Blood Cultures            Renetta Smith RN  04/05/22 8220 [facility] [address] [place] [address]  04/05/22 1934

## 2022-04-05 NOTE — TELEPHONE ENCOUNTER
I received notification from her pharmacist associated with her insurance, that we need to stop omeprazole due to interaction with Plavix        This was already addressed 3/24/2022, I changed to pantoprazole

## 2022-04-05 NOTE — ED NOTES
PT. Came in to the ER for right sided chest pain that moves to her center of her chest. PT.  Also has some SOB     Roland Ybarra RN  04/05/22 1941

## 2022-04-06 LAB
ABSOLUTE EOS #: 0 K/UL (ref 0–0.4)
ABSOLUTE IMMATURE GRANULOCYTE: 0.58 K/UL (ref 0–0.3)
ABSOLUTE LYMPH #: 0.88 K/UL (ref 1–4.8)
ABSOLUTE MONO #: 0 K/UL (ref 0.1–0.8)
ANION GAP SERPL CALCULATED.3IONS-SCNC: 12 MMOL/L (ref 9–17)
BASOPHILS # BLD: 0 % (ref 0–2)
BASOPHILS ABSOLUTE: 0 K/UL (ref 0–0.2)
BUN BLDV-MCNC: 19 MG/DL (ref 8–23)
CALCIUM SERPL-MCNC: 8.8 MG/DL (ref 8.6–10.4)
CHLORIDE BLD-SCNC: 92 MMOL/L (ref 98–107)
CO2: 23 MMOL/L (ref 20–31)
CORTISOL: 28 UG/DL (ref 2.7–18.4)
CREAT SERPL-MCNC: 0.66 MG/DL (ref 0.5–0.9)
EOSINOPHILS RELATIVE PERCENT: 0 % (ref 1–4)
ESTIMATED AVERAGE GLUCOSE: 186 MG/DL
GFR AFRICAN AMERICAN: >60 ML/MIN
GFR NON-AFRICAN AMERICAN: >60 ML/MIN
GFR SERPL CREATININE-BSD FRML MDRD: ABNORMAL ML/MIN/{1.73_M2}
GLUCOSE BLD-MCNC: 134 MG/DL (ref 65–105)
GLUCOSE BLD-MCNC: 154 MG/DL (ref 65–105)
GLUCOSE BLD-MCNC: 167 MG/DL (ref 70–99)
GLUCOSE BLD-MCNC: 170 MG/DL (ref 65–105)
GLUCOSE BLD-MCNC: 179 MG/DL (ref 65–105)
HBA1C MFR BLD: 8.1 % (ref 4–6)
HCT VFR BLD CALC: 31.4 % (ref 36.3–47.1)
HEMOGLOBIN: 11 G/DL (ref 11.9–15.1)
IMMATURE GRANULOCYTES: 2 %
LEGIONELLA PNEUMOPHILIA AG, URINE: NEGATIVE
LYMPHOCYTES # BLD: 3 % (ref 24–44)
MAGNESIUM: 1.8 MG/DL (ref 1.6–2.6)
MCH RBC QN AUTO: 31.9 PG (ref 25.2–33.5)
MCHC RBC AUTO-ENTMCNC: 35 G/DL (ref 28.4–34.8)
MCV RBC AUTO: 91 FL (ref 82.6–102.9)
MONOCYTES # BLD: 0 % (ref 1–7)
MORPHOLOGY: NORMAL
MYCOPLASMA PNEUMONIAE IGM: 0.4
MYOGLOBIN: 48 NG/ML (ref 25–58)
MYOGLOBIN: 49 NG/ML (ref 25–58)
NRBC AUTOMATED: 0 PER 100 WBC
PDW BLD-RTO: 12.9 % (ref 11.8–14.4)
PLATELET # BLD: 247 K/UL (ref 138–453)
PMV BLD AUTO: 9.6 FL (ref 8.1–13.5)
POTASSIUM SERPL-SCNC: 4.3 MMOL/L (ref 3.7–5.3)
RBC # BLD: 3.45 M/UL (ref 3.95–5.11)
SEG NEUTROPHILS: 95 % (ref 36–66)
SEGMENTED NEUTROPHILS ABSOLUTE COUNT: 27.74 K/UL (ref 1.8–7.7)
SERUM OSMOLALITY: 264 MOSM/KG (ref 275–295)
SODIUM BLD-SCNC: 127 MMOL/L (ref 135–144)
SODIUM BLD-SCNC: 127 MMOL/L (ref 135–144)
SODIUM BLD-SCNC: 128 MMOL/L (ref 135–144)
SODIUM BLD-SCNC: 129 MMOL/L (ref 135–144)
SOURCE: NORMAL
STREP PNEUMONIAE ANTIGEN: NEGATIVE
TROPONIN, HIGH SENSITIVITY: 10 NG/L (ref 0–14)
TROPONIN, HIGH SENSITIVITY: 11 NG/L (ref 0–14)
TSH SERPL DL<=0.05 MIU/L-ACNC: 0.5 UIU/ML (ref 0.3–5)
WBC # BLD: 29.2 K/UL (ref 3.5–11.3)

## 2022-04-06 PROCEDURE — 94761 N-INVAS EAR/PLS OXIMETRY MLT: CPT

## 2022-04-06 PROCEDURE — 6370000000 HC RX 637 (ALT 250 FOR IP): Performed by: STUDENT IN AN ORGANIZED HEALTH CARE EDUCATION/TRAINING PROGRAM

## 2022-04-06 PROCEDURE — 84295 ASSAY OF SERUM SODIUM: CPT

## 2022-04-06 PROCEDURE — 84484 ASSAY OF TROPONIN QUANT: CPT

## 2022-04-06 PROCEDURE — 82947 ASSAY GLUCOSE BLOOD QUANT: CPT

## 2022-04-06 PROCEDURE — 83874 ASSAY OF MYOGLOBIN: CPT

## 2022-04-06 PROCEDURE — 87449 NOS EACH ORGANISM AG IA: CPT

## 2022-04-06 PROCEDURE — 86738 MYCOPLASMA ANTIBODY: CPT

## 2022-04-06 PROCEDURE — 97530 THERAPEUTIC ACTIVITIES: CPT

## 2022-04-06 PROCEDURE — 82533 TOTAL CORTISOL: CPT

## 2022-04-06 PROCEDURE — 84443 ASSAY THYROID STIM HORMONE: CPT

## 2022-04-06 PROCEDURE — APPSS30 APP SPLIT SHARED TIME 16-30 MINUTES: Performed by: NURSE PRACTITIONER

## 2022-04-06 PROCEDURE — 6360000002 HC RX W HCPCS: Performed by: NURSE PRACTITIONER

## 2022-04-06 PROCEDURE — 87086 URINE CULTURE/COLONY COUNT: CPT

## 2022-04-06 PROCEDURE — 94640 AIRWAY INHALATION TREATMENT: CPT

## 2022-04-06 PROCEDURE — 97162 PT EVAL MOD COMPLEX 30 MIN: CPT

## 2022-04-06 PROCEDURE — 6360000002 HC RX W HCPCS: Performed by: STUDENT IN AN ORGANIZED HEALTH CARE EDUCATION/TRAINING PROGRAM

## 2022-04-06 PROCEDURE — 99222 1ST HOSP IP/OBS MODERATE 55: CPT | Performed by: INTERNAL MEDICINE

## 2022-04-06 PROCEDURE — 6370000000 HC RX 637 (ALT 250 FOR IP): Performed by: NURSE PRACTITIONER

## 2022-04-06 PROCEDURE — 83930 ASSAY OF BLOOD OSMOLALITY: CPT

## 2022-04-06 PROCEDURE — 2700000000 HC OXYGEN THERAPY PER DAY

## 2022-04-06 PROCEDURE — 85025 COMPLETE CBC W/AUTO DIFF WBC: CPT

## 2022-04-06 PROCEDURE — 36415 COLL VENOUS BLD VENIPUNCTURE: CPT

## 2022-04-06 PROCEDURE — 1200000000 HC SEMI PRIVATE

## 2022-04-06 PROCEDURE — 99232 SBSQ HOSP IP/OBS MODERATE 35: CPT | Performed by: INTERNAL MEDICINE

## 2022-04-06 PROCEDURE — 2580000003 HC RX 258: Performed by: NURSE PRACTITIONER

## 2022-04-06 PROCEDURE — 6370000000 HC RX 637 (ALT 250 FOR IP): Performed by: INTERNAL MEDICINE

## 2022-04-06 PROCEDURE — 97166 OT EVAL MOD COMPLEX 45 MIN: CPT

## 2022-04-06 PROCEDURE — 80048 BASIC METABOLIC PNL TOTAL CA: CPT

## 2022-04-06 PROCEDURE — 97535 SELF CARE MNGMENT TRAINING: CPT

## 2022-04-06 PROCEDURE — 87899 AGENT NOS ASSAY W/OPTIC: CPT

## 2022-04-06 RX ORDER — SODIUM CHLORIDE 9 MG/ML
INJECTION, SOLUTION INTRAVENOUS CONTINUOUS
Status: DISCONTINUED | OUTPATIENT
Start: 2022-04-06 | End: 2022-04-06

## 2022-04-06 RX ORDER — SODIUM CHLORIDE 1000 MG
1 TABLET, SOLUBLE MISCELLANEOUS
Status: DISCONTINUED | OUTPATIENT
Start: 2022-04-06 | End: 2022-04-07

## 2022-04-06 RX ORDER — SODIUM CHLORIDE 1000 MG
2 TABLET, SOLUBLE MISCELLANEOUS
Status: DISCONTINUED | OUTPATIENT
Start: 2022-04-06 | End: 2022-04-06

## 2022-04-06 RX ADMIN — METOPROLOL SUCCINATE 25 MG: 25 TABLET, FILM COATED, EXTENDED RELEASE ORAL at 08:35

## 2022-04-06 RX ADMIN — CLOPIDOGREL 75 MG: 75 TABLET, FILM COATED ORAL at 08:35

## 2022-04-06 RX ADMIN — INSULIN GLARGINE 5 UNITS: 100 INJECTION, SOLUTION SUBCUTANEOUS at 21:11

## 2022-04-06 RX ADMIN — SODIUM CHLORIDE TAB 1 GM 2 G: 1 TAB at 08:35

## 2022-04-06 RX ADMIN — LEVOTHYROXINE SODIUM 200 MCG: 100 TABLET ORAL at 05:36

## 2022-04-06 RX ADMIN — CEFTRIAXONE SODIUM 1000 MG: 1 INJECTION, POWDER, FOR SOLUTION INTRAMUSCULAR; INTRAVENOUS at 21:12

## 2022-04-06 RX ADMIN — SODIUM CHLORIDE TAB 1 GM 1 G: 1 TAB at 18:47

## 2022-04-06 RX ADMIN — IPRATROPIUM BROMIDE AND ALBUTEROL SULFATE 1 AMPULE: .5; 3 SOLUTION RESPIRATORY (INHALATION) at 11:20

## 2022-04-06 RX ADMIN — MAGNESIUM SULFATE 2000 MG: 2 INJECTION INTRAVENOUS at 01:10

## 2022-04-06 RX ADMIN — INSULIN LISPRO 2 UNITS: 100 INJECTION, SOLUTION INTRAVENOUS; SUBCUTANEOUS at 21:11

## 2022-04-06 RX ADMIN — Medication 81 MG: at 08:35

## 2022-04-06 RX ADMIN — ENOXAPARIN SODIUM 40 MG: 40 INJECTION SUBCUTANEOUS at 08:35

## 2022-04-06 RX ADMIN — INSULIN LISPRO 3 UNITS: 100 INJECTION, SOLUTION INTRAVENOUS; SUBCUTANEOUS at 17:03

## 2022-04-06 RX ADMIN — PRAVASTATIN SODIUM 40 MG: 20 TABLET ORAL at 17:02

## 2022-04-06 RX ADMIN — POTASSIUM CHLORIDE 40 MEQ: 1500 TABLET, EXTENDED RELEASE ORAL at 17:02

## 2022-04-06 RX ADMIN — AZITHROMYCIN 500 MG: 250 TABLET, FILM COATED ORAL at 21:11

## 2022-04-06 RX ADMIN — POTASSIUM CHLORIDE 40 MEQ: 1500 TABLET, EXTENDED RELEASE ORAL at 08:34

## 2022-04-06 RX ADMIN — SODIUM CHLORIDE, PRESERVATIVE FREE 10 ML: 5 INJECTION INTRAVENOUS at 21:12

## 2022-04-06 RX ADMIN — IPRATROPIUM BROMIDE AND ALBUTEROL SULFATE 1 AMPULE: .5; 3 SOLUTION RESPIRATORY (INHALATION) at 15:44

## 2022-04-06 RX ADMIN — IPRATROPIUM BROMIDE AND ALBUTEROL SULFATE 1 AMPULE: .5; 3 SOLUTION RESPIRATORY (INHALATION) at 20:26

## 2022-04-06 RX ADMIN — INSULIN LISPRO 3 UNITS: 100 INJECTION, SOLUTION INTRAVENOUS; SUBCUTANEOUS at 08:35

## 2022-04-06 RX ADMIN — SERTRALINE 100 MG: 50 TABLET, FILM COATED ORAL at 08:35

## 2022-04-06 RX ADMIN — SODIUM CHLORIDE TAB 1 GM 2 G: 1 TAB at 12:14

## 2022-04-06 RX ADMIN — IPRATROPIUM BROMIDE AND ALBUTEROL SULFATE 1 AMPULE: .5; 3 SOLUTION RESPIRATORY (INHALATION) at 07:33

## 2022-04-06 RX ADMIN — SODIUM CHLORIDE TAB 1 GM 2 G: 1 TAB at 01:40

## 2022-04-06 ASSESSMENT — ENCOUNTER SYMPTOMS
ABDOMINAL PAIN: 0
SHORTNESS OF BREATH: 1
CONSTIPATION: 0
RHINORRHEA: 0
DIARRHEA: 0
VOMITING: 0
BACK PAIN: 0
SORE THROAT: 0
COUGH: 1
NAUSEA: 0

## 2022-04-06 ASSESSMENT — PAIN SCALES - GENERAL
PAINLEVEL_OUTOF10: 0
PAINLEVEL_OUTOF10: 0

## 2022-04-06 ASSESSMENT — PAIN DESCRIPTION - LOCATION: LOCATION: CHEST

## 2022-04-06 NOTE — PROGRESS NOTES
[facility] P[facility] [J18.9] . Vitals:    04/05/22 2229   BP: 121/62   Pulse: 71   Resp: 16   Temp: 97.9 °F (36.6 °C)   SpO2: 95%   . Patients home meds are   Prior to Admission medications    Medication Sig Start Date End Date Taking?  Authorizing Provider   pantoprazole (PROTONIX) 20 MG tablet Take 1 tablet by mouth every morning (before breakfast) ** stop omeprazole** 3/24/22   Belvin Cabot, MD   clopidogrel (PLAVIX) 75 MG tablet Take 1 tablet by mouth daily 3/8/22   Alcides Julian MD   metoprolol succinate (TOPROL XL) 25 MG extended release tablet Take 1 tablet by mouth daily 3/8/22   Alcides Julian MD   glipiZIDE (GLUCOTROL) 10 MG tablet Take 2 tablets by mouth 2 times daily Dose increased 12/3/2021 2/8/22   Belvin Cabot, MD   levothyroxine (SYNTHROID) 200 MCG tablet TAKE 1 TABLET BY MOUTH EVERY DAY BEFORE BREAKFAST 1/3/22   Belvin Cabot, MD   metFORMIN (GLUCOPHAGE-XR) 500 MG extended release tablet Take 1 tablet by mouth daily (with breakfast) Dose decreased 12/3/2021 12/3/21   Belvin Cabot, MD   simethicone (MYLICON) 80 MG chewable tablet Take 1 tablet by mouth 4 times daily as needed for Flatulence  Patient not taking: Reported on 4/5/2022 11/2/21   Jeffery Portillo, APRN - CNP   Respiratory Therapy Supplies (NEBULIZER/TUBING/MOUTHPIECE) KIT Dx. COPD, needs nebulizer supplies 9/23/21   Belvin Cabot, MD   albuterol (PROVENTIL) (2.5 MG/3ML) 0.083% nebulizer solution Take 3 mLs by nebulization every 6 hours as needed for Wheezing or Shortness of Breath 9/23/21   Belvin Cabot, MD   sertraline (ZOLOFT) 100 MG tablet Take 1 tablet by mouth daily 9/16/21   Belvin Cabot, MD   irbesartan-hydroCHLOROthiazide (AVALIDE) 150-12.5 MG per tablet TAKE 1 TABLET BY MOUTH DAILY 6/25/21   Belvin Cabot, MD   blood glucose test strips (ASCENSIA AUTODISC VI;ONE TOUCH ULTRA TEST VI) strip 1 each by In Vitro route daily As needed.  3/3/21   Stephanie Strickland MD   pravastatin (PRAVACHOL) 40 MG tablet Take 1 tablet by mouth every evening For cholesterol  Patient taking differently: Take 40 mg by mouth every evening For cholesterol  Takes every other day per patient 2/16/21   Stephanie Strickland MD   vitamin D (ERGOCALCIFEROL) 1.25 MG (70888 UT) CAPS capsule TAKE 1 CAPSULE BY MOUTH ONE TIME PER WEEK  Patient not taking: Reported on 4/5/2022 9/29/20   Stephanie Strickland MD   albuterol sulfate HFA (VENTOLIN HFA) 108 (90 Base) MCG/ACT inhaler Inhale 2 puffs into the lungs every 6 hours as needed for Wheezing or Shortness of Breath (cough) INHALE 2 PUFFS INTO THE LUNGS 2 TIMES DAILY AS NEEDED FOR WHEEZING 9/14/20   Stephanie Strickland MD   aspirin EC 81 MG EC tablet Take 1 tablet by mouth daily 7/2/20   Stephanie Strickland MD   fluticasone (FLONASE) 50 MCG/ACT nasal spray 2 sprays by Each Nostril route daily  Patient not taking: Reported on 4/5/2022 2/10/20   Gustavo Paul MD   .  Recent Surgical History: None = 0     Assessment         RR 18  Breath Sounds: diminished t/o      · Bronchodilator assessment at level  3    · []    Bronchodilator Assessment  BRONCHODILATOR ASSESSMENT SCORE  Score 0 1 2 3 4 5   Breath Sounds   []  Patient Baseline []  No Wheeze good aeration []  Faint, scattered wheezing, good aeration [x]  Expiratory Wheezing and or moderately diminished []  Insp/Exp wheeze and/or very diminished []  Insp/Exp and/ or marked distress   Respiratory Rate   []  Patient Baseline []  Less than 20 [x]  Less than 20 []  20-25 []  Greater than 25 []  Greater than 25   Peak flow % of Pred or PB []  NA   []  Greater than 90%  []  81-90% []  71-80% []  Less than or equal to 70%  or unable to perform []  Unable due to Respiratory Distress   Dyspnea re []  Patient Baseline []  No SOB []  No SOB [x]  SOB on exertion []  SOB min activity []  At rest/acute   e FEV% Predicted       []  NA []  Above 69%  []  Unable []  Above 60-69%  []  Unable []  Above 50-59%  []  Unable []  Above 35-49%  []  Unable []  Less than 35%  []  Unable                       GISELA LOYOLA, CHRISTINA  7:48 AM                            FEMALE                                  MALE                            FEV1 Predicted Normal Values                        FEV1 Predicted Normal Values          Age                                     Height in Feet and Inches       Age                                     Height in Feet and Inches       4' 11\" 5' 1\" 5' 3\" 5' 5\" 5' 7\" 5' 9\" 5' 11\" 6' 1\"  4' 11\" 5' 1\" 5' 3\" 5' 5\" 5' 7\" 5' 9\" 5' 11\" 6' 1\"   43 - 45 2.49 2.66 2.84 3.03 3.22 3.42 3.62 3.83 42 - 45 2.82 3.03 3.26 3.49 3.72 3.96 4.22 4.47   46 - 49 2.40 2.57 2.76 2.94 3.14 3.33 3.54 3.75 46 - 49 2.70 2.92 3.14 3.37 3.61 3.85 4.10 4.36   50 - 53 2.31 2.48 2.66 2.85 3.04 3.24 3.45 3.66 50 - 53 2.58 2.80 3.02 3.25 3.49 3.73 3.98 4.24   54 - 57 2.21 2.38 2.57 2.75 2.95 3.14 3.35 3.56 54 - 57 2.46 2.67 2.89 3.12 3.36 3.60 3.85 4.11   58 - 61 2.10 2.28 2.46 2.65 2.84 3.04 3.24 3.45 58 - 61 2.32 2.54 2.76 2.99 3.23 3.47 3.72 3.98   62 - 65 1.99 2.17 2.35 2.54 2.73 2.93 3.13 3.34 62 - 65 2.19 2.40 2.62 2.85 3.09 3.33 3.58 3.84   66 - 69 1.88 2.05 2.23 2.42 2.61 2.81 3.02 3.23 66 - 69 2.04 2.26 2.48 2.71 2.95 3.19 3.44 3.70   70+ 1.82 1.99 2.17 2.36 2.55 2.75 2.95 3.16 70+ 1.97 2.19 2.41 2.64 2.87 3.12 3.37 3.62             Predicted Peak Expiratory Flow Rate                                       Height (in)  Female       Height (in) Male           Age 58 63 61 63 56 77 78 74 Age            20 048 331 363 790 827 107 510 651  93 78 56 74 96 70 72 74 76   25 337 352 366 381 396 411 426 441 25 447 476 505 533 562 591 619 648 677   30 329 344 359 374 389 404 419 434 30 437 466 494 523 552 580 609 639 667   35 322 337 351 366 381 396 411 426 35 426 455 484 512 541 570 598 627 657   40 314 329 344 359 374 389 404 419 40 416 445 473 502 531 559 588 617 647   45 307 322 336 351 366 381 668 186 77 779 313 366 596 509 946 171 295 387   12 299 793 711 880 959 322 640 100 30 464 164 520 516 917 384 411 901 297   42 307 459 273 731 972 473 718 948 38 029 706 815 205 087 560 799 616 219   87 245 953 041 895 255 011 798 326 07 006 794 899 431 489 517 546 575 605   65 277 292 306 321 336 351 366 381 65 363 392 421 449 478 507 535 564 594   70 269 284 299 314 329 344 359 374 70 353 382 410 439 468 496 525 554 583   75 261 274 289 305 319 334 348 364 75 344 372 400 429 458 487 515 544 573   80 253 266 282 296 312 327 342 356 80 335 362 390 419 448 [phone] 562

## 2022-04-06 NOTE — ED PROVIDER NOTES
[facility]     Emergency Department     Faculty Attestation    I performed a history and physical examination of the patient and discussed management with the resident. I reviewed the residents note and agree with the documented findings including all diagnostic interpretations and plan of care. Any areas of disagreement are noted on the chart. I was personally present for the key portions of any procedures. I have documented in the chart those procedures where I was not present during the key portions. I have reviewed the emergency nurses triage note. I agree with the chief complaint, past medical history, past surgical history, allergies, medications, social and family history as documented unless otherwise noted below. Documentation of the HPI, Physical Exam and Medical Decision Making performed by scribes is based on my personal performance of the HPI, PE and MDM. For Physician Assistant/ Nurse Practitioner cases/documentation I have personally evaluated this patient and have completed at least one if not all key elements of the E/M (history, physical exam, and MDM). Additional findings are as noted. This patient was evaluated in the Emergency Department for symptoms described in the history of present illness. He/she was evaluated in the context of the global COVID-19 pandemic, which necessitated consideration that the patient might be at risk for infection with the SARS-CoV-2 virus that causes COVID-19. Institutional protocols and algorithms that pertain to the evaluation of patients at risk for COVID-19 are in a state of rapid change based on information released by regulatory bodies including the CDC and federal and state organizations. These policies and algorithms were followed during the patient's care in the ED. Primary Care Physician: Dante Pickens MD    History:  This is a 78 y.o. female who presents to the Emergency Department with complaint of chest pain shortness of breath and cough. 2 days. Febrile. No vomiting. Covid vaccinated. Physical:     height is 5' 2\" (1.575 m) and weight is 154 lb (69.9 kg). Her oral temperature is 97.9 °F (36.6 °C). Her blood pressure is 121/62 and her pulse is 71. Her respiration is 16 and oxygen saturation is 95%.    78 y.o. female no acute distress, cardiac exam regular rate and rhythm no murmurs rubs gallops, pulmonary shows Rales in the right lower lobe abdomen soft nontender nondistended calves nontender nonswollen.     Impression: Chest pain, suspicious for pneumonia    Plan: Chest x-ray labs cultures, antibiotics, admission    EKG Interpretation    Interpreted by emergency department physician    Rhythm: Paced rhythm  Rate: normal  Axis: normal  Ectopy: none  Conduction: normal  ST Segments: no acute change  T Waves: no acute change  Q Waves: none    EKG  Impression: Paced rhythm    MD Zenaida Kaur MD, Michael Bishop  Attending Emergency Physician        Olena Islas MD  04/05/22 0644       Olena Islas MD  04/05/22 2012

## 2022-04-06 NOTE — RESULT ENCOUNTER NOTE
Addressed in ED   Currently admitted, pneumonia  Future Appointments  5/10/2022  9:00 AM    Otf Castillo MD     [facility]

## 2022-04-06 NOTE — CARE COORDINATION
Case Management Initial Discharge Plan  Raheem Huff,             Met with:patient to discuss discharge plans. Information verified: address, contacts, phone number, , insurance Yes  Insurance Provider: [facility]     Emergency Contact/Next of Kin name & number: Ignacia Kirk and Danette Rossi [phone]  Who are involved in patient's support system? Son and daughter in law    PCP: Emanuel Lunsford MD  Date of last visit: Dec      Discharge Planning    Living Arrangements:  Spouse/Significant Other     Home has 1 stories  2 stairs to climb to get into front doorLocation of bedroom/bathroom in home main    Patient able to perform ADL's:Independent    Current Services (outpatient & in home) None  DME equipment: nebulizer, shower chair  DME provider: [facility]     Is patient receiving oral anticoagulation therapy? Yes    If indicated:   Physician managing anticoagulation treatment: Dr. Jorge Trejo or Dr. Nicko Eugene   Where does patient obtain lab work for ATC treatment? Does patient have any issues/concerns obtaining medications? No  If yes, what are patient's concerns? Is there a preferred Pharmacy after hours or on weekends? Yes    If yes, which pharmacy? [facility]     Potential Assistance Needed:  N/A    Patient agreeable to home care: No  [place] of choice provided:  n/a    Prior SNF/Rehab Placement and Facility: None   Agreeable to SNF/Rehab: No  [place] of choice provided: n/a     Evaluation: no    Expected Discharge date:  22    Patient expects to be discharged to:   Home     If home: is the family and/or caregiver wiling & able to provide support at home? Son and daughter in law  Who will be providing this support?  Son and daughter in law    Follow Up Appointment: Best Day/ Time: Monday AM    Transportation provider: Son  Transportation arrangements needed for discharge: No    Readmission Risk              Risk of Unplanned Readmission:  15             Does patient have a readmission risk score greater than 14?: Yes  If yes, follow-up appointment must be made within 7 days of discharge. Goals of Care: Safe discharge plan,   Educated patient on transitional options, provided freedom of choice      Educated patient on transitional options, provided freedom of choice and are agreeable with plan      Discharge Plan: Home no needs.            Electronically signed by Jessica Serrano RN on 4/6/22 at 4:56 PM EDT

## 2022-04-06 NOTE — FLOWSHEET NOTE
Assessment: Patient was awake and alert when  visited. Family was present and open to spiritual care. When asked how she was feeling, patient responded; \"I am okay. \" Patient said she was raised Buddhism but not affiliated with any [employer]. Patient declined sacrament of anointing of the sick. Intervention:  provided presence, offered support and prayed with patient and family. Outcome: Patient and family expressed appreciation for the spiritual support they received. Plan: Follow up visits recommended for ongoing assessment of patient's condition and for more prayers and support. 04/06/22 1523   Encounter Summary   Services provided to: Patient and family together   Support System Family members   Place of [address] [facility]   (04/06/2022)   Complexity of Encounter Moderate   Length of Encounter 15 minutes   Spiritual Assessment Completed Yes   Routine   Type Initial   Assessment Calm; Approachable; Hopeful   Intervention Active listening;Prayer;[place];Nurtured hope;Empowerment   Outcome Expressed gratitude   Spiritual/Sikh   Type Spiritual support

## 2022-04-06 NOTE — RESULT ENCOUNTER NOTE
Addressed in ED , currently admitted with pneumonia    Future Appointments  5/10/2022  9:00 AM    Belvin Cabot, MD     [facility]

## 2022-04-06 NOTE — H&P
[facility]  Office: [address], DO, Tommy Gear, DO, Sri Field, DO, Chucho Dejah Blood, DO, Washington Blunt MD, Delfino Mckeon MD, Martin Mitchell MD, [place] MD, Katie Brady MD, Fanta Camara MD, Lorna Bundy MD, Terra Fragmin, DO, Jannette Pagan, DO, Eryn Evans MD,  Yvonne Calero, DO, Kyle Winchester MD, Lety Nieves MD, Rose Mccann MD, Gwen Rene, DO, Dea Garcia MD, Angelica Contreras MD, Danette Nunes, CNP, [facility] [place], CNP, Radha Maki, CNP, Luiz [place], CNP, Skye Can, CNP, Lilly Dub, CNP, Ben Piper, CNP, Lendel Nissen, PA-C, Ras Blood, DNP, Rosemarie Abreu, CNP, Blair Punch, CNP, Gemma Fly, CNP, Alicia Mask, CNS, Rocio [place], DNP, Betty Postal, CNP, Lucie Donald, CNP, Tracie Dean, CNP         [address]    HISTORY AND PHYSICAL EXAMINATION            Date:   4/5/2022  Patient name:  Jason Alston  Date of admission:  4/5/2022  6:57 PM  MRN:   [id number]  Account:  [de-identified]  YOB: 1955  PCP:    Jesika Leonard MD  Room:   [address] [place], [place] [address]-  Code Status:    Full Code    Chief Complaint:     Chief Complaint   Patient presents with    Chest Pain    Shortness of Breath       History Obtained From:     patient    History of Present Illness:     Jason Alston is a 78 y.o. Non- / non  female who presents with Chest Pain and Shortness of Breath   and is admitted to the hospital for the management of Pneumonia. 48-year-old female with history of coronary artery disease with recent stents, pacemaker placement, type 2 diabetes mellitus, hypertension, hyperlipidemia, CKD stage I, hypothyroidism, COPD, smoker presented to the hospital with 2-day history of chills, sweats, shortness of breath. Patient had 2 cardiac stents placed last month. She had increased shortness of breath the last 2 days.   Also reported right-sided chest pain more under the breast and radiating to the side. Pain is intermittent, no relieving factor. Does not report any association with deep breaths. Patient also reports cough with some clear phlegm. Patient continues to smoke however has cut down to around 6 cigarettes a day. She has been using her nebulizer treatments at home. In the ER patient was noted to have fever of temperature 101.3, pulse 80, blood pressure 157/69. Sodium of 121, potassium 3.3, chloride 85, magnesium 1.2, blood sugar 252, proBNP 749, troponin XI. Bilirubin 1.34. WBC count elevated at 25.3, hemoglobin 12.1. Chest x-ray shows right upper lobe opacity. Patient was admitted for sepsis with pneumonia. Past Medical History:     Past Medical History:   Diagnosis Date    Arthritis     Asthma     CAD (coronary artery disease) 03/08/2022    stent    Diabetes mellitus ([address]     Dizzy     Family history of breast cancer in sister 07/02/2020    Holter monitor, abnormal     Hyperlipidemia     Hypertension     Osteopenia determined by x-ray 01/19/2021    Research study patient 02/28/2022    [facility]    SOB (shortness of breath)     Thyroid condition         Past Surgical History:     Past Surgical History:   Procedure Laterality Date    CARDIAC CATHETERIZATION  02/28/2022    CARDIAC PACEMAKER PLACEMENT  02/28/2022    CORONARY ANGIOPLASTY  03/08/2022    DILATION AND CURETTAGE OF UTERUS      HYSTEROSCOPY  10/07/14    D&C poypectomy with myosure    PACEMAKER PLACEMENT Left 02/28/2022    Dr. Harley Cruz      16yrs ago. Medications Prior to Admission:     Prior to Admission medications    Medication Sig Start Date End Date Taking?  Authorizing Provider   pantoprazole (PROTONIX) 20 MG tablet Take 1 tablet by mouth every morning (before breakfast) ** stop omeprazole** 3/24/22   John Howard MD   clopidogrel (PLAVIX) 75 MG tablet Take 1 tablet by mouth daily 3/8/22 Melody Jean MD   metoprolol succinate (TOPROL XL) 25 MG extended release tablet Take 1 tablet by mouth daily 3/8/22   Melody Jean MD   glipiZIDE (GLUCOTROL) 10 MG tablet Take 2 tablets by mouth 2 times daily Dose increased 12/3/2021 2/8/22   Isabel Edwards MD   levothyroxine (SYNTHROID) 200 MCG tablet TAKE 1 TABLET BY MOUTH EVERY DAY BEFORE BREAKFAST 1/3/22   Isabel Edwards MD   metFORMIN (GLUCOPHAGE-XR) 500 MG extended release tablet Take 1 tablet by mouth daily (with breakfast) Dose decreased 12/3/2021 12/3/21   Isabel Edwards MD   simethicone (MYLICON) 80 MG chewable tablet Take 1 tablet by mouth 4 times daily as needed for Flatulence  Patient not taking: Reported on 4/5/2022 11/2/21   Jeffery Portillo, APRN - CNP   Respiratory Therapy Supplies (NEBULIZER/TUBING/MOUTHPIECE) KIT Dx. COPD, needs nebulizer supplies 9/23/21   Isabel Edwards MD   albuterol (PROVENTIL) (2.5 MG/3ML) 0.083% nebulizer solution Take 3 mLs by nebulization every 6 hours as needed for Wheezing or Shortness of Breath 9/23/21   Isabel Edwards MD   sertraline (ZOLOFT) 100 MG tablet Take 1 tablet by mouth daily 9/16/21   Isabel Edwards MD   irbesartan-hydroCHLOROthiazide (AVALIDE) 150-12.5 MG per tablet TAKE 1 TABLET BY MOUTH DAILY 6/25/21   Isabel Edwards MD   blood glucose test strips (ASCENSIA AUTODISC VI;ONE TOUCH ULTRA TEST VI) strip 1 each by In Vitro route daily As needed.  3/3/21   Isabel Edwards MD   pravastatin (PRAVACHOL) 40 MG tablet Take 1 tablet by mouth every evening For cholesterol  Patient taking differently: Take 40 mg by mouth every evening For cholesterol  Takes every other day per patient 2/16/21   Isabel Edwards MD   vitamin D (ERGOCALCIFEROL) 1.25 MG (41701 UT) CAPS capsule TAKE 1 CAPSULE BY MOUTH ONE TIME PER WEEK  Patient not taking: Reported on 4/5/2022 9/29/20   Isabel Edwards MD   albuterol sulfate HFA (VENTOLIN HFA) 108 (90 Base) MCG/ACT inhaler Inhale 2 puffs into the lungs every 6 hours as needed for Wheezing or Shortness of Breath (cough) INHALE 2 PUFFS INTO THE LUNGS 2 TIMES DAILY AS NEEDED FOR WHEEZING 9/14/20   Samantha Quinn MD   aspirin EC 81 MG EC tablet Take 1 tablet by mouth daily 7/2/20   Samantha Quinn MD   fluticasone (FLONASE) 50 MCG/ACT nasal spray 2 sprays by Each Nostril route daily  Patient not taking: Reported on 4/5/2022 2/10/20   Tommie Jimenez MD        Allergies:     Lisinopril    Social History:     Tobacco:    reports that she has been smoking cigarettes. She has a 15.00 pack-year smoking history. She has never used smokeless tobacco.  Alcohol:      reports current alcohol use. Drug Use:  reports no history of drug use. Family History:     Family History   Problem Relation Age of Onset    Diabetes Mother     Diabetes Father     Breast Cancer Sister         after age 48        Review of Systems:     Positive and Negative as described in HPI. Review of Systems   Constitutional: Positive for activity change, appetite change, chills, diaphoresis, fatigue and fever. HENT: Positive for congestion. Negative for dental problem and drooling. Hoarse voice   Eyes: Negative for discharge. Respiratory: Positive for cough and shortness of breath. Negative for wheezing and stridor. Cardiovascular: Positive for chest pain. Negative for palpitations and leg swelling. Gastrointestinal: Negative for abdominal distention, abdominal pain, constipation, diarrhea and vomiting. Genitourinary: Negative for difficulty urinating, dysuria and enuresis. Musculoskeletal: Negative for arthralgias and back pain. Skin: Negative for color change and pallor. Neurological: Negative for dizziness, facial asymmetry and headaches. Psychiatric/Behavioral: Negative for agitation and behavioral problems.        Physical Exam:   /62   Pulse 71   Temp 97.9 °F (36.6 °C) (Oral)   Resp 16   Ht 5' 2\" (1.575 m)   Wt 154 lb (69.9 kg)   SpO2 95%   BMI 28.17 kg/m²   Temp (24hrs), Av.6 °F (37.6 °C), Min:97.9 °F (36.6 °C), Max:101.3 °F (38.5 °C)    Recent Labs     220   POCGLU 248*     No intake or output data in the 24 hours ending 226    Physical Exam  Constitutional:       General: She is not in acute distress. Appearance: Normal appearance. She is obese. She is ill-appearing. HENT:      Head: Normocephalic and atraumatic. Right Ear: External ear normal.      Left Ear: External ear normal.      Nose: Nose normal.      Mouth/Throat:      Mouth: Mucous membranes are dry. Eyes:      Pupils: Pupils are equal, round, and reactive to light. Cardiovascular:      Rate and Rhythm: Normal rate and regular rhythm. Pulses: Normal pulses. Heart sounds: No murmur heard. Pulmonary:      Effort: Pulmonary effort is normal.      Breath sounds: Rhonchi present. No wheezing. Comments: Rt lung lower and middle area crackles  Left lung normal breath sounds  Abdominal:      General: Abdomen is flat. Bowel sounds are normal. There is no distension. Palpations: Abdomen is soft. Tenderness: There is no abdominal tenderness. Musculoskeletal:         General: No swelling. Normal range of motion. Cervical back: Normal range of motion. Right lower leg: No edema. Left lower leg: No edema. Skin:     General: Skin is warm. Coloration: Skin is not jaundiced. Findings: No bruising. Neurological:      General: No focal deficit present. Mental Status: She is alert and oriented to person, place, and time.    Psychiatric:         Mood and Affect: Mood normal.         Investigations:      Laboratory Testing:  Recent Results (from the past 24 hour(s))   CBC with Auto Differential    Collection Time: 22  7:35 PM   Result Value Ref Range    WBC 25.3 (H) 3.5 - 11.3 k/uL    RBC 3.90 (L) 3.95 - 5.11 m/uL    Hemoglobin 12.1 11.9 - 15.1 g/dL    Hematocrit 34.5 (L) 36.3 - 47.1 %    MCV 88.5 82.6 - 102.9 fL    MCH 31.0 25.2 - 33.5 pg    MCHC 35.1 (H) 28.4 - 34.8 g/dL    RDW 12.8 11.8 - 14.4 %    Platelets 512 078 - 884 k/uL    MPV 9.4 8.1 - 13.5 fL    NRBC Automated 0.0 0.0 per 100 WBC    Immature Granulocytes 0 0 %    Seg Neutrophils 89 (H) 36 - 66 %    Lymphocytes 5 (L) 24 - 44 %    Monocytes 6 1 - 7 %    Eosinophils % 0 (L) 1 - 4 %    Basophils 0 0 - 2 %    Absolute Immature Granulocyte 0.00 0.00 - 0.30 k/uL    Segs Absolute 22.51 (H) 1.8 - 7.7 k/uL    Absolute Lymph # 1.27 1.0 - 4.8 k/uL    Absolute Mono # 1.52 (H) 0.1 - 0.8 k/uL    Absolute Eos # 0.00 0.0 - 0.4 k/uL    Basophils Absolute 0.00 0.0 - 0.2 k/uL    Morphology Normal    Comprehensive Metabolic Panel    Collection Time: 04/05/22  7:35 PM   Result Value Ref Range    Glucose 252 (H) 70 - 99 mg/dL    BUN 18 8 - 23 mg/dL    CREATININE 0.65 0.50 - 0.90 mg/dL    Calcium 8.9 8.6 - 10.4 mg/dL    Sodium 121 (L) 135 - 144 mmol/L    Potassium 3.3 (L) 3.7 - 5.3 mmol/L    Chloride 85 (L) 98 - 107 mmol/L    CO2 20 20 - 31 mmol/L    Anion Gap 16 9 - 17 mmol/L    Alkaline Phosphatase 99 35 - 104 U/L    ALT 8 5 - 33 U/L    AST 14 <32 U/L    Total Bilirubin 1.34 (H) 0.3 - 1.2 mg/dL    Total Protein 7.1 6.4 - 8.3 g/dL    Albumin 3.8 3.5 - 5.2 g/dL    Albumin/Globulin Ratio 1.2 1.0 - 2.5    GFR Non-African American >60 >60 mL/min    GFR African American >60 >60 mL/min    GFR Comment         Magnesium    Collection Time: 04/05/22  7:35 PM   Result Value Ref Range    Magnesium 1.2 (L) 1.6 - 2.6 mg/dL   Troponin    Collection Time: 04/05/22  7:35 PM   Result Value Ref Range    Troponin, High Sensitivity 11 0 - 14 ng/L   Brain Natriuretic Peptide    Collection Time: 04/05/22  7:35 PM   Result Value Ref Range    Pro- (H) <300 pg/mL   COVID-19, Rapid    Collection Time: 04/05/22  7:39 PM    Specimen: Nasopharyngeal Swab   Result Value Ref Range    Specimen Description . NASOPHARYNGEAL SWAB     SARS-CoV-2, Rapid Not Detected Not Detected RAPID INFLUENZA A/B ANTIGENS    Collection Time: 04/05/22  8:03 PM    Specimen: Nasopharyngeal   Result Value Ref Range    Flu A Antigen NEGATIVE NEGATIVE    Flu B Antigen NEGATIVE NEGATIVE   Culture, Blood 2    Collection Time: 04/05/22  8:20 PM    Specimen: Blood   Result Value Ref Range    Specimen Description . BLOOD     Special Requests RAC, 10ML     Culture NO GROWTH <24 HRS    Culture, Blood 1    Collection Time: 04/05/22  8:27 PM    Specimen: Blood   Result Value Ref Range    Specimen Description . BLOOD     Special Requests RHAND, 10ML     Culture NO GROWTH <24 HRS    Lactate, Sepsis    Collection Time: 04/05/22  8:47 PM   Result Value Ref Range    Lactic Acid, Sepsis, Whole Blood 1.5 0.5 - 1.9 mmol/L   Troponin    Collection Time: 04/05/22  9:09 PM   Result Value Ref Range    Troponin, High Sensitivity 11 0 - 14 ng/L   POC Glucose Fingerstick    Collection Time: 04/05/22 10:30 PM   Result Value Ref Range    POC Glucose 248 (H) 65 - 105 mg/dL   Urinalysis with Microscopic    Collection Time: 04/05/22 11:03 PM   Result Value Ref Range    Color, UA Yellow Yellow    Turbidity UA Hazy (A) Clear    Glucose, Ur NEGATIVE NEGATIVE    Bilirubin Urine NEGATIVE NEGATIVE    Ketones, Urine TRACE (A) NEGATIVE    Specific Gravity, UA 1.025 1.005 - 1.030    Urine Hgb NEGATIVE NEGATIVE    pH, UA 5.5 5.0 - 8.0    Protein, UA 1+ (A) NEGATIVE    Urobilinogen, Urine Normal Normal    Nitrite, Urine NEGATIVE NEGATIVE    Leukocyte Esterase, Urine TRACE (A) NEGATIVE    - PENDING     WBC, UA PENDING /HPF    RBC, UA PENDING /HPF    Casts UA PENDING /LPF    Crystals, UA PENDING None /HPF    Epithelial Cells UA PENDING /HPF    Renal Epithelial, UA PENDING 0 /HPF    Bacteria, UA PENDING None    Mucus, UA PENDING None    Trichomonas, UA PENDING None    Amorphous, UA PENDING None    Other Observations UA PENDING NOT REQ.     Yeast, UA PENDING None       Imaging/Diagnostics:  XR CHEST PORTABLE    Result Date: 4/5/2022  Right upper lobe infiltrate suggesting pneumonia. Recommend follow-up exams until the infiltrate clears       Assessment :      Hospital Problems           Last Modified POA    * (Principal) Pneumonia 4/5/2022 Yes    Essential hypertension 4/5/2022 Yes    Pacemaker 4/5/2022 Yes    Hyperlipidemia with target LDL less than 100 4/5/2022 Yes    Hypothyroidism 4/5/2022 Yes    Type 2 diabetes mellitus with microalbuminuria, without long-term current use of insulin ([facility] 4/5/2022 Yes    AV block, 2nd degree 4/5/2022 Yes    Recurrent major depressive disorder, in partial remission ([address] 4/5/2022 Yes    Chronic obstructive pulmonary disease ([facility] 4/5/2022 Yes    Electrolyte imbalance 4/5/2022 Yes    Hoarseness of voice 4/5/2022 Yes    CKD (chronic kidney disease), stage I 4/5/2022 Yes    S/P placement of cardiac pacemaker 4/5/2022 Yes    CAD S/P percutaneous coronary angioplasty 4/5/2022 Yes    Sepsis without acute organ dysfunction ([facility] 4/5/2022 Yes    Hyponatremia 4/5/2022 Yes          Plan:     Patient status inpatient in the [facility]    Sepsis with right-sided pneumonia-received 1 L fluid bolus, continue gentle hydration, send Legionella, mycoplasma, strep pneumoniae, send blood cultures. Flu and Covid negative. Start Rocephin and azithromycin. Monitor leukocytosis.       Hyponatremia-likely secondary to poor oral intake, sepsis, hydrochlorothiazide use, baseline around 130, start gentle hydration with normal saline at 75 mils an hour, sodium checks every 6, send urine sodium, urine osmolarity    Coronary artery disease s/p recent stents-continue aspirin, Plavix    Type 2 diabetes mellitus-hold home oral hypoglycemics, give Lantus 5 units, medium dose sliding scale insulin, blood sugar checks with meals and at bedtime    COPD-continue breathing treatments with DuoNeb    Hypertension- on toprol xl and cozaar    ckd stage 1- at baseline    continue home zoloft    Tobacco abuse- recommend to quit, education given    dvt prop- lovenox    Consultations:   IP CONSULT TO HOSPITALIST     Patient is admitted as inpatient status because of co-morbidities listed above, severity of signs and symptoms as outlined, requirement for current medical therapies and most importantly because of direct risk to patient if care not provided in a hospital setting. Expected length of stay > 48 hours.     Dipika Amaro MD  4/5/2022  11:26 PM    Copy sent to Dr. Beth Ross MD

## 2022-04-06 NOTE — RESULT ENCOUNTER NOTE
Addressed in ED   Currently admitted  Future Appointments  5/10/2022  9:00 AM    Jerrica Hyman MD     [facility]

## 2022-04-06 NOTE — PROGRESS NOTES
Physical Therapy    Facility/Department: [facility]MED [facility]  Initial Assessment    NAME: Arvin Telles  : 1955  MRN: [id number]    Date of Service: 2022  Chief Complaint   Patient presents with    Chest Pain    Shortness of Breath     Discharge Recommendations:    No further therapy required at discharge. Assessment   Body structures, Functions, Activity limitations: Decreased functional mobility ; Decreased strength;Decreased endurance  Assessment: The pt ambulated 100 ft with a RW x CGA. She was mildly SOB following ambulation. Will continue with gait strengthening  Prognosis: Good  Decision Making: Medium Complexity  PT Education: Goals;PT Role;Plan of Care  REQUIRES PT FOLLOW UP: Yes  Activity Tolerance  Activity Tolerance: Patient limited by fatigue;Patient limited by endurance       Patient Diagnosis(es): The primary encounter diagnosis was Pneumonia of right middle lobe due to infectious organism. A diagnosis of Hyponatremia was also pertinent to this visit. has a past medical history of Arthritis, Asthma, CAD (coronary artery disease), Diabetes mellitus ([address] Dizzy, Family history of breast cancer in sister, Holter monitor, abnormal, Hyperlipidemia, Hypertension, Osteopenia determined by x-ray, Research study patient, SOB (shortness of breath), and Thyroid condition. has a past surgical history that includes Tubal ligation; Dilation and curettage of uterus; Tonsillectomy; hysteroscopy (10/07/14); pacemaker placement (Left, 2022); Cardiac catheterization (2022); Cardiac pacemaker placement (2022); and Coronary angioplasty (2022).     Restrictions  Restrictions/Precautions  Restrictions/Precautions: Up as Tolerated  Required Braces or Orthoses?: No  Vision/Hearing  Vision: Within Functional Limits  Hearing: Within functional limits     Subjective  General  Patient assessed for rehabilitation services?: Yes  Response To Previous Treatment: Not applicable  Family / Caregiver Present: No  Follows Commands: Within Functional Limits  Pain Assessment  Patient's Stated Pain Goal: 2  Pain Location: Chest     Orientation  Orientation  Overall Orientation Status: Within Normal Limits  Social/Functional History  Social/Functional History  Lives With: Significant other  Type of Home: House  Home Layout: One level,Laundry in basement  Home Access: Stairs to enter without rails  Bathroom Shower/Tub: Tub/Shower unit  Bathroom Equipment: Shower chair  ADL Assistance: Independent  Homemaking Assistance: Independent  Homemaking Responsibilities: Yes  Ambulation Assistance: Independent  Transfer Assistance: Independent  Active : Yes  Mode of Transportation: Car  Occupation: Retired  Type of occupation: [employer]  Additional Comments: Significant other able to be at home 24/7  Cognition      Objective     AROM RLE (degrees)  RLE AROM: WNL  AROM LLE (degrees)  LLE AROM : WNL  AROM RUE (degrees)  RUE AROM : WNL  AROM LUE (degrees)  LUE AROM : WNL  Strength RLE  Strength RLE: WNL  Strength LLE  Strength LLE: WNL  Strength RUE  Strength RUE: WFL  Strength LUE  Strength LUE: WFL     Sensation  Overall Sensation Status: Impaired (Reports numbness in her L hand)  Bed mobility  Supine to Sit: Stand by assistance  Sit to Supine: Stand by assistance  Scooting: Stand by assistance  Transfers  Sit to Stand: Contact guard assistance  Stand to sit: Contact guard assistance  Ambulation  Ambulation?: Yes  Ambulation 1  Surface: level tile  Device: No Device  Assistance: Contact guard assistance  Distance: amb 100 ft without a device x CGA     Balance  Posture: Good  Sitting - Static: Good  Sitting - Dynamic: Fair  Standing - Static: Good  Standing - Dynamic: [address]  Times per week: 5-6x wk  Current Treatment Recommendations: Strengthening,Functional Mobility Training,Gait Training,Safety Education & [employer] Training,Stair training  Safety Devices  Type of devices: Nurse notified,Left in bed,Call light within reach    G-Code    OutComes Score       AM-PAC Score  AM-PAC Inpatient Mobility Raw Score : 20 (04/06/22 1459)  AM-PAC Inpatient T-Scale Score : 47.67 (04/06/22 1459)  Mobility Inpatient CMS 0-100% Score: 35.83 (04/06/22 1459)  Mobility Inpatient CMS G-Code Modifier : CJ (04/06/22 1459)     Goals  Short term goals  Time Frame for Short term goals: 10 visits  Short term goal 1: amb 250 ft without a device x SBA  Short term goal 2: ascend/descend 4 steps with SBA  Short term goal 3: 20 min strengthening exercise program x SBA  Patient Goals   Patient goals : Return home     Therapy Time   Individual Concurrent Group Co-treatment   Time In 1330         Time Out 1353         Minutes 23             1 of [facility], PT

## 2022-04-06 NOTE — PLAN OF CARE
Problem: Fluid Volume:  Goal: Ability to achieve a balanced intake and output will improve  Description: Ability to achieve a balanced intake and output will improve  Outcome: Ongoing     Problem: Physical Regulation:  Goal: Ability to maintain clinical measurements within normal limits will improve  Description: Ability to maintain clinical measurements within normal limits will improve  Outcome: Ongoing  Goal: Will show no signs and symptoms of electrolyte imbalance  Description: Will show no signs and symptoms of electrolyte imbalance  Outcome: Ongoing     Problem: Infection:  Goal: Will remain free from infection  Description: Will remain free from infection  Outcome: Ongoing     Problem: Safety:  Goal: Free from accidental physical injury  Description: Free from accidental physical injury  Outcome: Ongoing  Goal: Free from intentional harm  Description: Free from intentional harm  Outcome: Ongoing     Problem: Daily Care:  Goal: Daily care needs are met  Description: Daily care needs are met  Outcome: Ongoing     Problem: Pain:  Goal: Patient's pain/discomfort is manageable  Description: Patient's pain/discomfort is manageable  Outcome: Ongoing     Problem: Skin Integrity:  Goal: Skin integrity will stabilize  Description: Skin integrity will stabilize  Outcome: Ongoing     Problem: Discharge Planning:  Goal: Patients continuum of care needs are met  Description: Patients continuum of care needs are met  Outcome: Ongoing

## 2022-04-06 NOTE — PROGRESS NOTES
Occupational Therapy   Occupational Therapy Initial Assessment    Date: 2022   Patient Name: Garth Wiggins  MRN: [id number]     : 1955    Date of Service: 2022    Chief Complaint   Patient presents with    Chest Pain    Shortness of Breath     Discharge Recommendations:     OT Equipment Recommendations  Equipment Needed: No    Assessment   Performance deficits / Impairments: Decreased endurance;Decreased functional mobility ; Decreased ADL status; Decreased balance;Decreased strength;Decreased high-level IADLs  Assessment: Pt previously functioned Independently / Mod I for all ADLs and Mobility. At this time, she has impairments including: SOB, decreased endurance, decreased strength, increased fatigue - which impact her ability to regain her PLOF. Pt would benefit from continued (acute) OT services, but will not require additional OT services at time of Discharge. Prognosis: Good  Decision Making: Medium Complexity  OT Education: OT Role;Plan of Care;ADL Adaptive Strategies;Transfer Training;Energy Conservation  Barriers to Learning: Good return by Pt  REQUIRES OT FOLLOW UP:  (Acute OT services only. No OT services at / after DC.)  Activity Tolerance  Activity Tolerance: Patient Tolerated treatment well;Patient limited by fatigue  Activity Tolerance: Increased SOB with minimal activity        Patient Diagnosis(es): The primary encounter diagnosis was Pneumonia of right middle lobe due to infectious organism. A diagnosis of Hyponatremia was also pertinent to this visit. has a past medical history of Arthritis, Asthma, CAD (coronary artery disease), Diabetes mellitus ([address] Dizzy, Family history of breast cancer in sister, Holter monitor, abnormal, Hyperlipidemia, Hypertension, Osteopenia determined by x-ray, Research study patient, SOB (shortness of breath), and Thyroid condition. has a past surgical history that includes Tubal ligation; Dilation and curettage of uterus;  Tonsillectomy; hysteroscopy (10/07/14); pacemaker placement (Left, 02/28/2022); Cardiac catheterization (02/28/2022); Cardiac pacemaker placement (02/28/2022); and Coronary angioplasty (03/08/2022). Subjective   General  Patient assessed for rehabilitation services?: Yes  Family / Caregiver Present: No  Diagnosis: Pneumonia  Subjective  Subjective: RN approved Pt to be seen for OT eval.  Pt was agreeable and cooperative throughout. Patient Currently in Pain: Denies  Pain Assessment  Pain Assessment: 0-10  Pain Level: 0  Patient's Stated Pain Goal: No pain  Vital Signs  Patient Currently in Pain: Denies     Social/Functional History  Social/Functional History  Lives With: Significant other  Type of Home: House  Home Layout: One level,Laundry in basement (Full flight to basement (no HRs currently))  Home Access: Stairs to enter without rails  Bathroom Shower/Tub: Tub/Shower unit  Bathroom Toilet: Handicap height  Bathroom Equipment: Shower chair  ADL Assistance: [address] [place] [address] Avenue: Independent (Pt completes all IADLs for herself and her boyfriend (reports boyfriend is chronically ill, receives palliative in-home services, denies physically assisting him))  Homemaking Responsibilities: Yes  Ambulation Assistance: Independent (No DME)  Transfer Assistance: Independent (No DME)  Active : Yes  Mode of Transportation: Car  Occupation: Retired  Type of occupation: [employer] [address]: [employer]  Additional Comments: Significant other able to be at home 24/7      Objective   Vision: Within Functional Limits  Hearing: Within functional limits    Orientation  Overall Orientation Status: Within Functional Limits     Balance  Sitting Balance: Modified independent   Standing Balance: Supervision  Standing Balance  Time: 3-4 trials (~1-3 mins each)  Activity: Static and dynamic standing balance  Comment: Without use of DME - Supervision Assist.  Good maintenance of balance.     Functional Mobility  Functional - Mobility Device: No device  Assist Level: Stand by assistance  Functional Mobility Comments: Functional mobility in-room / in-bathroom without use of DME - SBA. Min VCs for integration of Ax pacing and safety. Toilet Transfers  Toilet - Technique: Ambulating (without RW)  Equipment Used: Grab bars  Toilet Transfer: Supervision  Toilet Transfers Comments: Without use of DME - Supervision Assist.  Good maintenance of balance. ADL  Feeding: Independent  Grooming: Supervision; Increased time to complete (standing at sink without DME)  UE Dressing: Modified independent   LE Dressing: Verbal cueing;Supervision; Increased time to complete (seated for socks / slippers; standing pants over hips)  Toileting: Supervision; Increased time to complete  Additional Comments: Min VCs for integration of Ax pacing and safety. Tone RUE  RUE Tone: Normotonic  Tone LUE  LUE Tone: Normotonic  Coordination  Movements Are Fluid And Coordinated: Yes     Bed mobility  Comment: Pt up walking in room at start of therapy session. Pt was left sitting upright in chair at end of therapy session. Transfers  Sit to stand: Supervision  Stand to sit: Supervision  Transfer Comments: Without use of DME - Supervision Assist.  Good maintenance of balance.      Cognition  Overall Cognitive Status: WFL     Sensation  Overall Sensation Status: Impaired (Numbness L Hand)    LUE AROM (degrees)  LUE AROM : WFL  Left Hand AROM (degrees)  Left Hand AROM: WFL  RUE AROM (degrees)  RUE AROM : WFL  Right Hand AROM (degrees)  Right Hand AROM: WFL  LUE Strength  Gross LUE Strength: WFL  L Hand General: 4/5  LUE Strength Comment: 4/5  RUE Strength  Gross RUE Strength: WFL  R Hand General: 4/5  RUE Strength Comment: 4/5     Plan   Plan  Times per week: 2-3x/week  Current Treatment Recommendations: Strengthening,Patient/Caregiver Education & Training,Home Management Training,Equipment Evaluation, Education, & procurement,Balance Training,Functional Mobility Training,Endurance Training,Safety Education & Training,Self-Care / ADL    AM-PAC Score  AM-PAC Inpatient Daily Activity Raw Score: 20 (04/06/22 1655)  AM-PAC Inpatient ADL T-Scale Score : 42.03 (04/06/22 1655)  ADL Inpatient CMS 0-100% Score: 38.32 (04/06/22 1655)  ADL Inpatient CMS G-Code Modifier : CJ (04/06/22 1655)    Goals  Short term goals  Time Frame for Short term goals: By Discharge  Short term goal 1: Pt will verbalize / demo Mod I and Good Carryover with EC / Ax pacing during all functional tasks. Short term goal 2: Pt will verbalize / demo Mod I and Good Carryover with progressive BUE / Core Strengthening HEP. Short term goal 3: Pt will demo Good Dynamic Standing Balance (18-20 mins) while participating in Functional / Leisure Tasks. Short term goal 4: Pt will participate in Functional Mobility / Item Retrieval and Transport with Mod I with Good Safety Awareness.        Therapy Time   Individual Concurrent Group Co-treatment   Time In 7697         Time Out 1632         Minutes 17         Timed Code Treatment Minutes: 10 Minutes (ADL)       ELSA Olson, OTR/L

## 2022-04-06 NOTE — CONSULTS
Nephrology Consult Note    Reason for Consult: Hyponatremia  Requesting Physician:  Dr. Sri Colon    Chief Complaint: Tired and not feeling well. History Obtained From: Patient    History of Present Illness: This is a 78 y.o. female who presented to ER with chest pain and shortness of breath. Patient has a past medical history significant for hypothyroidism, COPD, active smoker still smokes 5 to 8 cigarettes a day, type 2 diabetes and hypertension, she also on sertraline for an extended period of time. Patient recently had a pacemaker placed by Dr. Arlene Rodgers. Patient is not on hydrochlorothiazide. Patient presented to hospital because of increasing shortness of breath for 2 days duration. And right-sided chest pain. A chest x-ray shows right upper lobe pneumonia. She was started on antibiotic. Patient states that she started feeling better. The reason nephrology was consulted because at the time of admission her creatinine was 121. Which dropped down to 119. Dr. Jeffry Butler who was on call last night was consulted. He recommended salt tablets and fluid restriction. Her first sodium of 121 was around 730 last night. Over the.  Of 12 hours her correction was 6 mEq at 730 this morning her sodium was 127. Her most recent sodium is 128. Since admission her urine output is close to 300 mL. Patient was completely asymptomatic neurologically from hyponatremia. Reviewing her old chart. Patient runs low sodium. Her sodium was in the range of 1 30-1 31 most of the time. She never had clinically significant hyponatremia which requires hospitalization. She drinks 4 bottles of water a day with coffee. There is no history of excessive compulsive water consumption. She has a history of hypothyroidism and on supplement. There is no history suggestive of hypercortisolism.                Past Medical History:        Diagnosis Date    Arthritis     Asthma     CAD (coronary artery disease) 03/08/2022 stent    Diabetes mellitus ([facility] [address]     Dizzy     Family history of breast cancer in sister 07/02/2020    Holter monitor, abnormal     Hyperlipidemia     Hypertension     Osteopenia determined by x-ray 01/19/2021    Research study patient 02/28/2022    [facility]    SOB (shortness of breath)     Thyroid condition        Past Surgical History:        Procedure Laterality Date    CARDIAC CATHETERIZATION  02/28/2022    CARDIAC PACEMAKER PLACEMENT  02/28/2022    CORONARY ANGIOPLASTY  03/08/2022    DILATION AND CURETTAGE OF UTERUS      HYSTEROSCOPY  10/07/14    D&C poypectomy with myosure    PACEMAKER PLACEMENT Left 02/28/2022    Dr. Lisandra Carey      16yrs ago.         Current Medications:    sodium chloride tablet 2 g, TID WC  insulin lispro (HUMALOG) injection vial 0-18 Units, TID WC  insulin lispro (HUMALOG) injection vial 0-9 Units, Nightly  aspirin EC tablet 81 mg, Daily  clopidogrel (PLAVIX) tablet 75 mg, Daily  fluticasone (FLONASE) 50 MCG/ACT nasal spray 2 spray, Daily PRN  levothyroxine (SYNTHROID) tablet 200 mcg, Daily  metoprolol succinate (TOPROL XL) extended release tablet 25 mg, Daily  pravastatin (PRAVACHOL) tablet 40 mg, QPM  sertraline (ZOLOFT) tablet 100 mg, Daily  glucose (GLUTOSE) 40 % oral gel 15 g, PRN  dextrose 50 % IV solution, PRN  glucagon (rDNA) injection 1 mg, PRN  dextrose 5 % solution, PRN  sodium chloride flush 0.9 % injection 5-40 mL, 2 times per day  sodium chloride flush 0.9 % injection 10 mL, PRN  0.9 % sodium chloride infusion, PRN  enoxaparin (LOVENOX) injection 40 mg, Daily  ondansetron (ZOFRAN-ODT) disintegrating tablet 4 mg, Q8H PRN   Or  ondansetron (ZOFRAN) injection 4 mg, Q6H PRN  magnesium hydroxide (MILK OF MAGNESIA) 400 MG/5ML suspension 30 mL, Daily PRN  acetaminophen (TYLENOL) tablet 650 mg, Q6H PRN   Or  acetaminophen (TYLENOL) suppository 650 mg, Q6H PRN  albuterol (PROVENTIL) nebulizer solution 2.5 mg, Q2H PRN  ipratropium-albuterol (DUONEB) nebulizer solution 1 ampule, Q4H WA  cefTRIAXone (ROCEPHIN) 1,000 mg in sterile water 10 mL IV syringe, Q24H   And  azithromycin (ZITHROMAX) tablet 500 mg, Q24H  [Held by provider] losartan (COZAAR) tablet 50 mg, Daily  potassium chloride (KLOR-CON M) extended release tablet 40 mEq, PRN   Or  potassium bicarb-citric acid (EFFER-K) effervescent tablet 40 mEq, PRN   Or  potassium chloride 10 mEq/100 mL IVPB (Peripheral Line), PRN  magnesium sulfate 1000 mg in dextrose 5% 100 mL IVPB, PRN  insulin glargine (LANTUS) injection vial 5 Units, Nightly  potassium chloride (KLOR-CON M) extended release tablet 40 mEq, BID WC        Allergies:  Lisinopril    Social History:   Social History     Socioeconomic History    Marital status:      Spouse name: Not on file    Number of children: Not on file    Years of education: Not on file    Highest education level: Not on file   Occupational History    Not on file   Tobacco Use    Smoking status: Current Every Day Smoker     Packs/day: 0.50     Years: 30.00     Pack years: 15.00     Types: Cigarettes    Smokeless tobacco: Never Used   Vaping Use    Vaping Use: Former   Substance and Sexual Activity    Alcohol use: Yes     Comment: OCCASIONAL    Drug use: No    Sexual activity: Yes   Other Topics Concern    Not on file   Social History Narrative    Not on file     Social Determinants of Health     Financial Resource Strain: Low Risk     Difficulty of Paying Living Expenses: Not hard at all   Food Insecurity: No Food Insecurity    Worried About Running Out of Food in the Last Year: Never true    Aston of Food in the Last Year: Never true   Transportation Needs: No Transportation Needs    Lack of Transportation (Medical): No    Lack of Transportation (Non-Medical):  No   Physical Activity:     Days of Exercise per Week: Not on file    Minutes of Exercise per Session: Not on file   Stress:     Feeling of Stress : Not on file   Social Connections:     Frequency of Communication with Friends and Family: Not on file    Frequency of Social Gatherings with Friends and Family: Not on file    Attends Advent Services: Not on file    Active Member of Clubs or Organizations: Not on file    Attends Club or Organization Meetings: Not on file    Marital Status: Not on file   Intimate Partner Violence:     Fear of Current or Ex-Partner: Not on file    Emotionally Abused: Not on file    Physically Abused: Not on file    Sexually Abused: Not on file   Housing Stability: Unknown    Unable to Pay for Housing in the Last Year: No    Number of [place] in the Last Year: Not on file    Unstable Housing in the Last Year: No       Family History:   Family History   Problem Relation Age of Onset    Diabetes Mother     Diabetes Father     Breast Cancer Sister         after age 48        Review of Systems:    Constitutional: No weight gain or weight loss  HEENT:  No headache   cardiac:  As described in HPI  Chest:  Patient has chronic cough and complaining of shortness of breath at admission  Abdomen:  No abdominal pain or dyspnea ing. Neuro:  No focal weakness   skin:   No rashes, no itching. :   No hematuria, no pyuria, no dysuria, no flank pain.   Extremities:  No leg swelling      Objective:  CURRENT TEMPERATURE:  Temp: 97.7 °F (36.5 °C)  MAXIMUM TEMPERATURE OVER 24HRS:  Temp (24hrs), Av °F (37.2 °C), Min:97.7 °F (36.5 °C), Max:101.3 °F (38.5 °C)    CURRENT RESPIRATORY RATE:  Resp: 16  CURRENT PULSE:  Pulse: 66  CURRENT BLOOD PRESSURE:  BP: 131/62  24HR BLOOD PRESSURE RANGE:  Systolic (68ZXW), FXE:812 , Min:121 , TCV:435   ; Diastolic (31IEF), SXX:88, Min:62, Max:69    24HR INTAKE/OUTPUT:      Intake/Output Summary (Last 24 hours) at 2022 1305  Last data filed at 2022 4205  Gross per 24 hour   Intake 400.55 ml   Output 300 ml   Net 100.55 ml     Patient Vitals for the past 96 hrs (Last 3 readings):   Weight 04/06/22 0530 164 lb 7.4 oz (74.6 kg)   04/05/22 2245 154 lb (69.9 kg)     Physical Exam:  General appearance: Patient was sitting comfortably. She was alert and awake. She was able to maintain normal conversation. Skin: Warm to touch  Eyes: Conjunctiva was pink  ENT: : No thrush or pharyngeal congestion  Neck: No carotid bruit or lymphadenopathy  Pulmonary: no wheezing or rhonchi. No rales heard. Cardiovascular: S1 and S2 audible no S3   abdomen: soft nontender, bowel sounds present, no organomegaly,  no ascites  Extremities: No edema    Labs:   CBC:  Recent Labs     04/05/22 1935 04/06/22  0742   WBC 25.3* 29.2*   RBC 3.90* 3.45*   HGB 12.1 11.0*   HCT 34.5* 31.4*   MCV 88.5 91.0   MCH 31.0 31.9   MCHC 35.1* 35.0*   RDW 12.8 12.9    247   MPV 9.4 9.6      BMP:   Recent Labs     04/05/22 1935 04/05/22 1935 04/05/22  2300 04/06/22  0742 04/06/22  1003   *   < > 119* 127* 128*   K 3.3*  --  3.5* 4.3  --    CL 85*  --  84* 92*  --    CO2 20  --  21 23  --    BUN 18  --  19 19  --    CREATININE 0.65  --  0.83 0.66  --    GLUCOSE 252*  --  220* 167*  --    CALCIUM 8.9  --  8.6 8.8  --     < > = values in this interval not displayed. Radiology:  Reviewed as available. Assessment:  1. Hyponatremia with a sodium of 121. Patient has chronic hyponatremia her usual sodium levels are in the range of 1 30-1 31. Patient is on sertraline for extended period of time. He presented with sodium of 121 most likely due to pneumonia poor oral intake and elevated LDH level secondary to that. She was started on salt tablet and now her sodium is 128.  2.  Chronic active smoking  3. Diabetes  4. Chronic active smoking  5. Status post pacemaker placement  6. Hypertension blood pressure is under good control. Cozaar is on hold. Plan:  1. Discontinue salt tablet for now  2. Follow sodium levels  3. Aim for correction is 8 mEq in first 24 hours  4.   We will follow with you    Thank you for the consultation. Please do not hesitate to call with questions.     Electronically signed by Mary Ellen Lambert MD on 4/6/2022 at 1:05 PM

## 2022-04-06 NOTE — ED PROVIDER NOTES
[facility]  ED  Emergency Department Encounter  Emergency Medicine Resident     Pt Name: Kodi Truong  MRN: [id number]  [place] 1955  Date of evaluation: 4/6/22  PCP:  Loan Centeno MD    CHIEF COMPLAINT       Chief Complaint   Patient presents with    Chest Pain    Shortness of Breath       HISTORY OFPRESENT ILLNESS  (Location/Symptom, Timing/Onset, Context/Setting, Quality, Duration, Modifying Roshni Flirt.)      Kodi Truong is a 78 y.o. female with past medical history of coronary artery disease status post 2 stents placed last month, COPD, diabetes, hypertension, hyperlipidemia, recent pacemaker placement who presents with 2 days of chest pain, shortness of breath and diaphoresis. Patient describes the chest pain as right-sided and radiating under the right breast.  Pain is intermittent but she denies exacerbating or relieving factors pain is also associated with diaphoresis but no nausea or vomiting. Patient states she has been short of breath over the same period of time and has a cough productive for clear phlegm. She has been using her home inhalers more frequently and has been unable to smoke as much as she usually does. Patient also reports hot and cold flashes, chills but denies rhinorrhea, congestion, abdominal pain, diarrhea or urinary symptoms. Patient has received her Covid vaccine. PAST MEDICAL / SURGICAL / SOCIAL / FAMILY HISTORY      has a past medical history of Arthritis, Asthma, CAD (coronary artery disease), Diabetes mellitus ([address] Dizzy, Family history of breast cancer in sister, Holter monitor, abnormal, Hyperlipidemia, Hypertension, Osteopenia determined by x-ray, Research study patient, SOB (shortness of breath), and Thyroid condition. has a past surgical history that includes Tubal ligation; Dilation and curettage of uterus; Tonsillectomy; hysteroscopy (10/07/14); pacemaker placement (Left, 02/28/2022);  Cardiac catheterization (02/28/2022); Cardiac pacemaker placement (02/28/2022); and Coronary angioplasty (03/08/2022). Social:  reports that she has been smoking cigarettes. She has a 15.00 pack-year smoking history. She has never used smokeless tobacco. She reports current alcohol use. She reports that she does not use drugs. Family Hx:   Family History   Problem Relation Age of Onset    Diabetes Mother     Diabetes Father     Breast Cancer Sister         after age 48         Allergies:  Lisinopril    Home Medications:  Prior to Admission medications    Medication Sig Start Date End Date Taking?  Authorizing Provider   pantoprazole (PROTONIX) 20 MG tablet Take 1 tablet by mouth every morning (before breakfast) ** stop omeprazole** 3/24/22   Izabel Frank MD   clopidogrel (PLAVIX) 75 MG tablet Take 1 tablet by mouth daily 3/8/22   Carol Douglas MD   metoprolol succinate (TOPROL XL) 25 MG extended release tablet Take 1 tablet by mouth daily 3/8/22   Carol Douglas MD   glipiZIDE (GLUCOTROL) 10 MG tablet Take 2 tablets by mouth 2 times daily Dose increased 12/3/2021 2/8/22   Izabel Frank MD   levothyroxine (SYNTHROID) 200 MCG tablet TAKE 1 TABLET BY MOUTH EVERY DAY BEFORE BREAKFAST 1/3/22   Izabel Frank MD   metFORMIN (GLUCOPHAGE-XR) 500 MG extended release tablet Take 1 tablet by mouth daily (with breakfast) Dose decreased 12/3/2021 12/3/21   Izabel Frank MD   simethicone (MYLICON) 80 MG chewable tablet Take 1 tablet by mouth 4 times daily as needed for Flatulence  Patient not taking: Reported on 4/5/2022 11/2/21   Jeffery Portillo, APRN - CNP   Respiratory Therapy Supplies (NEBULIZER/TUBING/MOUTHPIECE) KIT Dx. COPD, needs nebulizer supplies 9/23/21   Izabel Frank MD   albuterol (PROVENTIL) (2.5 MG/3ML) 0.083% nebulizer solution Take 3 mLs by nebulization every 6 hours as needed for Wheezing or Shortness of Breath 9/23/21   Izabel Frank MD   sertraline (ZOLOFT) 100 MG tablet Take 1 tablet by mouth daily 9/16/21   Tarah Frederick MD   irbesartan-hydroCHLOROthiazide (AVALIDE) 150-12.5 MG per tablet TAKE 1 TABLET BY MOUTH DAILY 6/25/21   Tarah Frederick MD   blood glucose test strips (ASCENSIA AUTODISC VI;ONE TOUCH ULTRA TEST VI) strip 1 each by In Vitro route daily As needed. 3/3/21   Tarah Frederick MD   pravastatin (PRAVACHOL) 40 MG tablet Take 1 tablet by mouth every evening For cholesterol  Patient taking differently: Take 40 mg by mouth every evening For cholesterol  Takes every other day per patient 2/16/21   Tarah Frederick MD   vitamin D (ERGOCALCIFEROL) 1.25 MG (58676 UT) CAPS capsule TAKE 1 CAPSULE BY MOUTH ONE TIME PER WEEK  Patient not taking: Reported on 4/5/2022 9/29/20   Tarah Frederick MD   albuterol sulfate HFA (VENTOLIN HFA) 108 (90 Base) MCG/ACT inhaler Inhale 2 puffs into the lungs every 6 hours as needed for Wheezing or Shortness of Breath (cough) INHALE 2 PUFFS INTO THE LUNGS 2 TIMES DAILY AS NEEDED FOR WHEEZING 9/14/20   Tarah Frederick MD   aspirin EC 81 MG EC tablet Take 1 tablet by mouth daily 7/2/20   Tarah Frederick MD   fluticasone (FLONASE) 50 MCG/ACT nasal spray 2 sprays by Each Nostril route daily  Patient not taking: Reported on 4/5/2022 2/10/20   Zbigniew Benítez MD       REVIEW OFSYSTEMS    (2-9 systems for level 4, 10 or more for level 5)      Review of Systems   Constitutional: Negative for chills and fever. HENT: Negative for congestion, rhinorrhea and sore throat. Eyes: Negative for visual disturbance. Respiratory: Positive for cough and shortness of breath. Cardiovascular: Positive for chest pain. Negative for leg swelling. Gastrointestinal: Negative for abdominal pain, constipation, diarrhea, nausea and vomiting. Genitourinary: Negative for dysuria, frequency and hematuria. Musculoskeletal: Negative for arthralgias, back pain and neck pain. Skin: Negative for rash.    Neurological: Negative for weakness, light-headedness, numbness and headaches. Psychiatric/Behavioral: Negative for confusion. All other systems reviewed and are negative. PHYSICAL EXAM   (up to 7 for level 4, 8 or more forlevel 5)      INITIAL VITALS:   Vitals:    04/05/22 1910 04/05/22 2229 04/05/22 2245   BP: (!) 157/69 121/62    Pulse: 80 71    Resp: 16 16    Temp: 101.3 °F (38.5 °C) 97.9 °F (36.6 °C)    TempSrc: Oral Oral    SpO2: 95% 95%    Weight:   154 lb (69.9 kg)   Height:   5' 2\" (1.575 m)        Physical Exam  Vitals and nursing note reviewed. Constitutional:       General: She is not in acute distress. Appearance: She is not toxic-appearing. Comments: Adult female sitting in stretcher awake and alert and in no acute distress. Speaks in full sentences and answers questions appropriately. HENT:      Head: Normocephalic and atraumatic. Nose: Nose normal.      Mouth/Throat:      Mouth: Mucous membranes are moist.      Pharynx: Oropharynx is clear. Eyes:      Conjunctiva/sclera: Conjunctivae normal.      Pupils: Pupils are equal, round, and reactive to light. Cardiovascular:      Rate and Rhythm: Normal rate and regular rhythm. Pulses: Normal pulses. Heart sounds: No murmur heard. No friction rub. No gallop. Pulmonary:      Effort: Pulmonary effort is normal. No respiratory distress. Breath sounds: Rales present. No wheezing or rhonchi. Comments: Diminished breath sounds throughout with rales in right mid and lower lobe  Abdominal:      General: There is no distension. Palpations: Abdomen is soft. Tenderness: There is no abdominal tenderness. Musculoskeletal:      Cervical back: Neck supple. No rigidity. Right lower leg: No edema. Left lower leg: No edema. Skin:     General: Skin is warm and dry. Capillary Refill: Capillary refill takes less than 2 seconds. Findings: No rash. Neurological:      General: No focal deficit present.       Mental Status: She is alert. Psychiatric:         Mood and Affect: Mood normal.         Behavior: Behavior normal.         DIFFERENTIAL  DIAGNOSIS     DDX: Influenza, Covid, other viral illness, pneumonia, pleural effusion, pulmonary edema, CHF, COPD exacerbation, ACS, MI, stent occlusion    Initial MDM/Plan: 78 y.o. female with past medical history of coronary artery disease status post 2 stents placed last month, COPD, diabetes, hypertension, hyperlipidemia, recent pacemaker placement who presents with 2 days of chest pain, shortness of breath, diaphoresis, hot and cold flashes and productive cough. On physical exam, patient is nontoxic-appearing. It does have a temp of 101.3F. Remainder of vital signs are unremarkable. She has coarse rales to the right mid and lower lobe. Given the temperature, I am concerned about pneumonia, as well as influenza and Covid. Given the chest pain, ACS is also considered as well as stent occlusion. Will obtain cardiac work-up including labs, EKG and chest x-ray to evaluate. Will treat symptomatically and reassess. Will await chest x-ray before starting antibiotics. If normal, will broaden search and urinalysis and potential  CT scan progressive infection. Sepsis Times and Checklist  Vital Signs:   (!) 157/69   80   16   101.3 °F (38.5 °C)   Oral   95%     SIRS (>2)   Temp > 38.3C or < 36C   HR > 90   RR > 20   WBC > 12 or < 4 or >10% bands  SIRS (>2) and confirmed or suspected source of infection = Sepsis  Is Sepsis due to likely bacterial infection?: Yes  If not, then sepsis is due to likely viral etiology. Sepsis Identified:  Date: 4/5/22   time: 2000  Sepsis Orders:  ·  CBC(required): Yes  ·  CMP: Yes  ·  PT/PTT: No  ·  Blood Cultures x2(required): Yes  ·  Urinalysis and Urine Culture: yes  ·  Lactate(required):  Yes  ·  Antibiotics Given (within 3 hours of sepsis identification, after blood cultures):  Yes    (If unable to obtain IV access for IV antibiotics within 3 hours of identification of sepsis, IM antibiotics is acceptable.)  ·             If lactate >2.0 MUST repeat within 6 hours    If elevated, is elevated lactate from a likely infectious source?: Yes. IV Fluid Bolus:  Is lactate > 4.0:  No  If lactate >  4.0 OR hypotension (MAP<65 mmHg) (with 2 BP readings) 30ml/kg crystalloid MUST be ordered: no     Septic Shock Identified (Initial lactate > 4.0 or 2 Hypotensive Blood Pressure readings within the first 6 hours):  no    For persistent hypotension after 30ml/kg fluid bolus vasopressors must be started (within 6 hours)      DIAGNOSTIC RESULTS / EMERGENCYDEPARTMENT COURSE / MDM     LABS:  Results for orders placed or performed during the hospital encounter of 04/05/22   COVID-19, Rapid    Specimen: Nasopharyngeal Swab   Result Value Ref Range    Specimen Description . NASOPHARYNGEAL SWAB     SARS-CoV-2, Rapid Not Detected Not Detected   RAPID INFLUENZA A/B ANTIGENS    Specimen: Nasopharyngeal   Result Value Ref Range    Flu A Antigen NEGATIVE NEGATIVE    Flu B Antigen NEGATIVE NEGATIVE   Culture, Blood 1    Specimen: Blood   Result Value Ref Range    Specimen Description . BLOOD     Special Requests RHAND, 10ML     Culture NO GROWTH <24 HRS    Culture, Blood 2    Specimen: Blood   Result Value Ref Range    Specimen Description . BLOOD     Special Requests RAC, 10ML     Culture NO GROWTH <24 HRS    CBC with Auto Differential   Result Value Ref Range    WBC 25.3 (H) 3.5 - 11.3 k/uL    RBC 3.90 (L) 3.95 - 5.11 m/uL    Hemoglobin 12.1 11.9 - 15.1 g/dL    Hematocrit 34.5 (L) 36.3 - 47.1 %    MCV 88.5 82.6 - 102.9 fL    MCH 31.0 25.2 - 33.5 pg    MCHC 35.1 (H) 28.4 - 34.8 g/dL    RDW 12.8 11.8 - 14.4 %    Platelets 236 141 - 462 k/uL    MPV 9.4 8.1 - 13.5 fL    NRBC Automated 0.0 0.0 per 100 WBC    Immature Granulocytes 0 0 %    Seg Neutrophils 89 (H) 36 - 66 %    Lymphocytes 5 (L) 24 - 44 %    Monocytes 6 1 - 7 %    Eosinophils % 0 (L) 1 - 4 %    Basophils 0 0 - 2 %    Absolute Immature Granulocyte 0.00 0.00 - 0.30 k/uL    Segs Absolute 22.51 (H) 1.8 - 7.7 k/uL    Absolute Lymph # 1.27 1.0 - 4.8 k/uL    Absolute Mono # 1.52 (H) 0.1 - 0.8 k/uL    Absolute Eos # 0.00 0.0 - 0.4 k/uL    Basophils Absolute 0.00 0.0 - 0.2 k/uL    Morphology Normal    Comprehensive Metabolic Panel   Result Value Ref Range    Glucose 252 (H) 70 - 99 mg/dL    BUN 18 8 - 23 mg/dL    CREATININE 0.65 0.50 - 0.90 mg/dL    Calcium 8.9 8.6 - 10.4 mg/dL    Sodium 121 (L) 135 - 144 mmol/L    Potassium 3.3 (L) 3.7 - 5.3 mmol/L    Chloride 85 (L) 98 - 107 mmol/L    CO2 20 20 - 31 mmol/L    Anion Gap 16 9 - 17 mmol/L    Alkaline Phosphatase 99 35 - 104 U/L    ALT 8 5 - 33 U/L    AST 14 <32 U/L    Total Bilirubin 1.34 (H) 0.3 - 1.2 mg/dL    Total Protein 7.1 6.4 - 8.3 g/dL    Albumin 3.8 3.5 - 5.2 g/dL    Albumin/Globulin Ratio 1.2 1.0 - 2.5    GFR Non-African American >60 >60 mL/min    GFR African American >60 >60 mL/min    GFR Comment         Magnesium   Result Value Ref Range    Magnesium 1.2 (L) 1.6 - 2.6 mg/dL   Troponin   Result Value Ref Range    Troponin, High Sensitivity 11 0 - 14 ng/L   Brain Natriuretic Peptide   Result Value Ref Range    Pro- (H) <300 pg/mL   Urinalysis with Microscopic   Result Value Ref Range    Color, UA Yellow Yellow    Turbidity UA Hazy (A) Clear    Glucose, Ur NEGATIVE NEGATIVE    Bilirubin Urine NEGATIVE NEGATIVE    Ketones, Urine TRACE (A) NEGATIVE    Specific Gravity, UA 1.025 1.005 - 1.030    Urine Hgb NEGATIVE NEGATIVE    pH, UA 5.5 5.0 - 8.0    Protein, UA 1+ (A) NEGATIVE    Urobilinogen, Urine Normal Normal    Nitrite, Urine NEGATIVE NEGATIVE    Leukocyte Esterase, Urine TRACE (A) NEGATIVE    -          WBC, UA 5 TO 10 0 - 5 /HPF    RBC, UA 0 TO 2 0 - 2 /HPF    Casts UA 10 TO 20 0 - 2 /LPF    Casts UA HYALINE 0 - 2 /LPF    Epithelial Cells UA 10 TO 20 0 - 5 /HPF    Mucus, UA 1+ (A) None   Lactate, Sepsis   Result Value Ref Range    Lactic Acid, Sepsis, Whole Blood 1.5 0.5 - 1.9 mmol/L   Troponin   Result Value Ref Range    Troponin, High Sensitivity 11 0 - 14 ng/L   TROP/MYOGLOBIN   Result Value Ref Range    Troponin, High Sensitivity 12 0 - 14 ng/L    Myoglobin 108 (H) 25 - 58 ng/mL     RADIOLOGY:  XR CHEST PORTABLE    Result Date: 4/5/2022  EXAMINATION: ONE XRAY VIEW OF THE CHEST 4/5/2022 4:41 pm COMPARISON: 02/28/2022 HISTORY: ORDERING SYSTEM PROVIDED HISTORY: chest pain TECHNOLOGIST PROVIDED HISTORY: chest pain Reason for Exam: upr,chest pain,sob FINDINGS: Stable ICD leads. .The cardiac size is normal.  Wedge-shaped consolidative infiltrate in the right upper lobe. No pleural effusions are seen. Pulmonary vascularity appears unremarkable. .  . No acute bony abnormalities. The hilar structures are normal.     Right upper lobe infiltrate suggesting pneumonia.   Recommend follow-up exams until the infiltrate clears       EKG  Ventricular paced rhythm, rate: 83  MI: None  QRS: 200  QT/QTc: 440/517  No ST elevation or depression  No T wave abnormality    All EKG's are interpreted by the Emergency Department Physician who either signs or Co-signs this chart in the absence of a cardiologist.      MEDICATIONS ORDERED:  Orders Placed This Encounter   Medications    aspirin chewable tablet 324 mg    ketorolac (TORADOL) injection 15 mg    ondansetron (ZOFRAN) injection 4 mg    DISCONTD: cefTRIAXone (ROCEPHIN) 1000 mg IVPB in NS 50ml minibag     Order Specific Question:   Antimicrobial Indications     Answer:   Pneumonia (CAP)    azithromycin (ZITHROMAX) 500 mg in dextrose 5% 250 mL IVPB     Order Specific Question:   Antimicrobial Indications     Answer:   Pneumonia (CAP)    acetaminophen (TYLENOL) tablet 1,000 mg    0.9 % sodium chloride bolus    cefTRIAXone (ROCEPHIN) injection 1,000 mg     Order Specific Question:   Antimicrobial Indications     Answer:   Pneumonia (CAP)    magnesium sulfate 2000 mg in 50 mL IVPB premix       PROCEDURES:  None      CONSULTS:  IP CONSULT TO HOSPITALIST        EMERGENCY DEPARTMENT COURSE:  ED Course as of 04/06/22 0407   Tue Apr 05, 2022 1957 WBC(!): 25.3 [KA]     1957 2010 XR CHEST PORTABLE  Right upper lobe infiltrate suggesting pneumonia. Recommend follow-up exams  until the infiltrate clears [KA]    Patient is febrile with WBC of 25.3, meeting SIRS criteria. Chest x-ray also positive for pneumonia. Patient technically meets sepsis criteria. No evidence for severe sepsis at this time, however will add on blood cultures and lactic. Will initiate additional sepsis labs and treat with Rocephin and azithromycin. Will give patient a liter of fluids and plan for admission. [KA]     2053 2100 Troponin, High Sensitivity: 11 [KA]  Will repeat. Discussed the patient with [facility] who accepts her for admission at this time  [KA]     2204 2210 Troponin, High Sensitivity: 11  [KA]    Sodium (!!): 121  Even corrected for slight hyperglycemia, sodium is only 123. Review of records shows patient does have hyponatremia but usually not to this degree. Will give a liter of fluid here but hold off on any aggressive treatment in the emergency department for concern of sodium too rapidly. Patient will likely warrant further evaluation of her hyponatremia and further treatment inpatient. This does raise suspicion for legionnaires disease [KA]          ED Course User Index  [KA] Natalia Palacio DO          FINAL IMPRESSION      1. Pneumonia of right middle lobe due to infectious organism    2. Hyponatremia          DISPOSITION / PLAN     DISPOSITION Admitted 04/05/2022 09:06:40 PM      PATIENT REFERRED TO:  No follow-up provider specified.     DISCHARGE MEDICATIONS:  Current Discharge Medication List          Natalia Palacio DO  Emergency Medicine Resident    (Please note that portions of this note were completed with a voice recognition program.Efforts were made to edit the dictations but occasionally words are mis-transcribed.) Ben Redd, DO  Resident  04/06/22 [id number]

## 2022-04-06 NOTE — ED NOTES
The following labs labeled with pt sticker and tubed to lab:     [] Blue     [] Lavender   [] on ice  [] Green/yellow  [x] Green/black [x] on ice  [] Yellow  [] Red  [] Pink      [] COVID-19 swab    [] Rapid  [] PCR  [] Flu swab  [] Peds Viral Panel     [] Urine Sample  [] Pelvic Cultures  [x] Blood Cultures            Rasta Walsh RN  04/05/22 2040

## 2022-04-06 NOTE — PLAN OF CARE
Patient's repeat sodium came down to 119  Urine osmolarity is 414, urine sodium less than 20 with hydrochlorothiazide use  Serum osmolality still pending     Discussed with Dr. Yajaira Gurrola with nephrology, will stop IV fluids  Fluid restriction to 1200 mL  Salt tablet 2 g 3 times a day  Sodium checks every 4  RN updated    Mirela Robertson MD  [facility]  [facility]  4/5/2716,51:25 AM        .

## 2022-04-06 NOTE — PROGRESS NOTES
[facility]  Office: [address], DO, Sophy Silva, DO, Quiana Pandey, DO, Marianne Briones Blood, DO, Inés Salinas MD, Ruby Stover MD, Ronel Eisenberg MD, Mica Daily MD, Essence Beltran MD, Rich Martinez MD, Lee Ackerman MD, Law Belcher, DO, Irma Kim, DO, Jose Lyon MD,  Isabel Sheridan DO, Chris Kothari MD, Nevaeh Hernandez MD, Heather Osman MD, Kassidy Peoples, DO, Alejandra Barrios MD, Sandra De Los Santos MD, Radha Min, [facility], [facility], [facility], Mariano Mcguire, CNP, Leelee Parmar, CNP, Harley Posadas, CNP, Kwadwo Lieberman, CNP, Sharona Washington, CNP, BENY De La VegaC, Alisha Zelaya, DNP, Primo Jane, CNP, Patrick Loaiza, CNP, Mercedes Patel, CNP, Diana Zelaya, CNS, Kris Ramon, DNP, Theo Tinoco, CNP, Jeff Madrid, CNP, Maggie Rome, CNP         [facility]    Progress Note    4/6/2022    7:58 AM    Name:   Ana Lilia Ma  MRN:     [id number]     Acct:      [de-identified]   Room:   [address] [place] [address] Day:  1  Admit Date:  4/5/2022  6:57 PM    PCP:   Carol Crowley MD  Code Status:  Full Code    Subjective:     C/C:   Chief Complaint   Patient presents with    Chest Pain    Shortness of Breath     Interval History Status: not changed. Patient seen and evaluated in room resting in bed on 2 L low flow nasal cannula. States she is feeling little bit better than when previously admitted but not back to normal.  No current fever or chills  Tolerating antibiotics for right upper lobe pna, no diarrhea or constipation    Brief History: This is a 68-year-old female who presented to the emergency department with a 1 week history of worsening shortness of breath/poor p.o. intake. Found to have a right upper lobe pneumonia, started on supplemental O2. Complicated by hyponatremia for which nephrology was consulted. Patient was started on antibiotics.   Fluid restriction was initiated and sodium tablets were given. Review of Systems:     Constitutional:  negative for chills, fevers, sweats, +fatigue  Respiratory:  + cough, +dyspnea on exertion, +shortness of breath, wheezing  Cardiovascular:  negative for chest pain, chest pressure/discomfort, lower extremity edema, palpitations  Gastrointestinal:  negative for abdominal pain, constipation, diarrhea, nausea, vomiting  Neurological:  negative for dizziness, headache    Medications: Allergies:     Allergies   Allergen Reactions    Lisinopril Other (See Comments)     cough       Current Meds:   Scheduled Meds:    sodium chloride  2 g Oral TID WC    aspirin EC  81 mg Oral Daily    clopidogrel  75 mg Oral Daily    levothyroxine  200 mcg Oral Daily    metoprolol succinate  25 mg Oral Daily    pravastatin  40 mg Oral QPM    sertraline  100 mg Oral Daily    sodium chloride flush  5-40 mL IntraVENous 2 times per day    enoxaparin  40 mg SubCUTAneous Daily    ipratropium-albuterol  1 ampule Inhalation Q4H WA    cefTRIAXone (ROCEPHIN) IV  1,000 mg IntraVENous Q24H    And    azithromycin  500 mg Oral Q24H    [Held by provider] losartan  50 mg Oral Daily    insulin glargine  5 Units SubCUTAneous Nightly    insulin lispro  0-12 Units SubCUTAneous TID WC    insulin lispro  0-6 Units SubCUTAneous Nightly    potassium chloride  40 mEq Oral BID WC     Continuous Infusions:    dextrose      sodium chloride       PRN Meds: fluticasone, glucose, dextrose, glucagon (rDNA), dextrose, sodium chloride flush, sodium chloride, ondansetron **OR** ondansetron, magnesium hydroxide, acetaminophen **OR** acetaminophen, albuterol, potassium chloride **OR** potassium alternative oral replacement **OR** potassium chloride, magnesium sulfate    Data:     Past Medical History:   has a past medical history of Arthritis, Asthma, CAD (coronary artery disease), Diabetes mellitus ([facility] [address] Dizzy, Family history of breast cancer in sister, Holter monitor, abnormal, Hyperlipidemia, Hypertension, Osteopenia determined by x-ray, Research study patient, SOB (shortness of breath), and Thyroid condition. Social History:   reports that she has been smoking cigarettes. She has a 15.00 pack-year smoking history. She has never used smokeless tobacco. She reports current alcohol use. She reports that she does not use drugs. Family History:   Family History   Problem Relation Age of Onset    Diabetes Mother     Diabetes Father     Breast Cancer Sister         after age 48        Vitals:  /62   Pulse 71   Temp 97.9 °F (36.6 °C) (Oral)   Resp 16   Ht 5' 2\" (1.575 m)   Wt 164 lb 7.4 oz (74.6 kg)   SpO2 95%   BMI 30.08 kg/m²   Temp (24hrs), Av.6 °F (37.6 °C), Min:97.9 °F (36.6 °C), Max:101.3 °F (38.5 °C)    Recent Labs     22   POCGLU 248*       I/O (24Hr):     Intake/Output Summary (Last 24 hours) at 2022 0758  Last data filed at 2022 2141  Gross per 24 hour   Intake 400.55 ml   Output 300 ml   Net 100.55 ml       Labs:  Hematology:  Recent Labs     22   WBC 25.3*   RBC 3.90*   HGB 12.1   HCT 34.5*   MCV 88.5   MCH 31.0   MCHC 35.1*   RDW 12.8      MPV 9.4     Chemistry:  Recent Labs     22  2300   *  --  119*   K 3.3*  --  3.5*   CL 85*  --  84*   CO2 20  --  21   GLUCOSE 252*  --  220*   BUN 18  --  19   CREATININE 0.65  --  0.83   MG 1.2*  --  1.8   ANIONGAP 16  --  14   LABGLOM >60  --  >60   GFRAA >60  --  >60   CALCIUM 8.9  --  8.6   PROBNP 749*  --   --    TROPHS 11 11 12   MYOGLOBIN  --   --  108*     Recent Labs     22  2230   PROT 7.1  --    LABALBU 3.8  --    AST 14  --    ALT 8  --    ALKPHOS 99  --    BILITOT 1.34*  --    POCGLU  --  248*       Lab Results   Component Value Date/Time    SPECIAL Arun MAZARIEGOSML 2022 08:27 PM     Lab Results   Component Value Date/Time    CULTURE NO GROWTH <24 HRS 2022 08:27 PM       Radiology:  XR CHEST PORTABLE    Result Date: 4/5/2022  Right upper lobe infiltrate suggesting pneumonia. Recommend follow-up exams until the infiltrate clears       Physical Examination:        General appearance:  alert, cooperative and no distress  Mental Status:  oriented to person, place and time and normal affect  Lungs:  clear to auscultation bilaterally, prolonged expiratory effort, on 2 L low flow nasal cannula with intermittent exp wheezinb  Heart:  regular rate and rhythm, no murmur  Abdomen:  soft, nontender, nondistended, normal bowel sounds  Extremities:  no edema, redness, tenderness in the calves  Skin:  no gross lesions, rashes, induration    Assessment:        Hospital Problems           Last Modified POA    * (Principal) Pneumonia 4/5/2022 Yes    Essential hypertension 4/5/2022 Yes    Pacemaker 4/5/2022 Yes    Hyperlipidemia with target LDL less than 100 4/5/2022 Yes    Hypothyroidism 4/5/2022 Yes    Type 2 diabetes mellitus with microalbuminuria, without long-term current use of insulin ([address] 4/5/2022 Yes    AV block, 2nd degree 4/5/2022 Yes    Recurrent major depressive disorder, in partial remission ([address] 4/5/2022 Yes    Chronic obstructive pulmonary disease ([address] 4/5/2022 Yes    Electrolyte imbalance 4/5/2022 Yes    Hoarseness of voice 4/5/2022 Yes    CKD (chronic kidney disease), stage I 4/5/2022 Yes    S/P placement of cardiac pacemaker 4/5/2022 Yes    CAD S/P percutaneous coronary angioplasty 4/5/2022 Yes    Sepsis without acute organ dysfunction ([address] 4/5/2022 Yes    Hyponatremia 4/5/2022 Yes          Plan:        1. Right upper lobe pneumonia:   -Continue low-flow O2 as needed. - On Rocephin and Zithromax. - White count uptrending    2. Sepsis: Secondary to #1    3. Hyponatremia:   - Serum Osmo decreased. - Urine sodium reviewed less than 20.    - Nephrology following.   - Continue sodium chloride tabs, fluid restriction, per their recs.      - Urine osmolality 414.     4. Hypomagnesemia:   - Replaced in the emergency department  -  Continue to monitor daily    5. Hypokalemia:   - Replace again today  - recheck at 1400    6. Primary hypertension:   - Fluctuating  - Cozaar on hold secondary to above    7. Hypothyroidism:   - Check TSH/T4.    - Continue levothyroxine 200 mcg daily, titrate accordingly if warranted    8. DMT2:   - A.m. blood sugar elevated     - Patient on Lantus 5 units nightly, MI-ISS. -  Uptitrate sliding scale coverage to high intensity.    - Monitor patient for hyper/hypoglycemic event    9. History of second-degree AV block status post pacer:   - Heart rate stable. - Continue ASA, BB, Plavix    10. CAD: Without active signs or symptoms    11. Proteinuria +1 with trace ketones    12. GI/DVT prophylaxis: No GI prophylaxis warranted, continue Lovenox      On this date 04/06/22 I have spent 24 mins  in direct patient care, reviewing notes, test results and diagnosis and plan of care discussions with the patient, as well as coordination of care.   Plan of care made with DO Dominic Kelly APRN - NP  4/6/2022  7:58 AM

## 2022-04-07 VITALS
TEMPERATURE: 100 F | HEIGHT: 62 IN | BODY MASS INDEX: 30.26 KG/M2 | OXYGEN SATURATION: 88 % | SYSTOLIC BLOOD PRESSURE: 137 MMHG | RESPIRATION RATE: 16 BRPM | HEART RATE: 84 BPM | DIASTOLIC BLOOD PRESSURE: 80 MMHG | WEIGHT: 164.46 LBS

## 2022-04-07 PROBLEM — A41.9 SEPSIS WITHOUT ACUTE ORGAN DYSFUNCTION (HCC): Status: RESOLVED | Noted: 2022-04-05 | Resolved: 2022-04-07

## 2022-04-07 PROBLEM — E87.1 HYPONATREMIA: Status: RESOLVED | Noted: 2022-04-05 | Resolved: 2022-04-07

## 2022-04-07 PROBLEM — E87.8 ELECTROLYTE IMBALANCE: Status: RESOLVED | Noted: 2020-07-13 | Resolved: 2022-04-07

## 2022-04-07 LAB
ANION GAP SERPL CALCULATED.3IONS-SCNC: 9 MMOL/L (ref 9–17)
BUN BLDV-MCNC: 15 MG/DL (ref 8–23)
CALCIUM SERPL-MCNC: 8.6 MG/DL (ref 8.6–10.4)
CHLORIDE BLD-SCNC: 97 MMOL/L (ref 98–107)
CO2: 24 MMOL/L (ref 20–31)
CREAT SERPL-MCNC: 0.41 MG/DL (ref 0.5–0.9)
EKG ATRIAL RATE: 79 BPM
EKG Q-T INTERVAL: 440 MS
EKG QRS DURATION: 200 MS
EKG QTC CALCULATION (BAZETT): 517 MS
EKG R AXIS: -46 DEGREES
EKG T AXIS: 109 DEGREES
EKG VENTRICULAR RATE: 83 BPM
GFR AFRICAN AMERICAN: >60 ML/MIN
GFR NON-AFRICAN AMERICAN: >60 ML/MIN
GFR SERPL CREATININE-BSD FRML MDRD: ABNORMAL ML/MIN/{1.73_M2}
GLUCOSE BLD-MCNC: 107 MG/DL (ref 70–99)
GLUCOSE BLD-MCNC: 110 MG/DL (ref 65–105)
GLUCOSE BLD-MCNC: 142 MG/DL (ref 65–105)
HCT VFR BLD CALC: 31.2 % (ref 36.3–47.1)
HEMOGLOBIN: 11 G/DL (ref 11.9–15.1)
MCH RBC QN AUTO: 31.5 PG (ref 25.2–33.5)
MCHC RBC AUTO-ENTMCNC: 35.3 G/DL (ref 28.4–34.8)
MCV RBC AUTO: 89.4 FL (ref 82.6–102.9)
NRBC AUTOMATED: 0 PER 100 WBC
PDW BLD-RTO: 13.2 % (ref 11.8–14.4)
PLATELET # BLD: 272 K/UL (ref 138–453)
PMV BLD AUTO: 9.5 FL (ref 8.1–13.5)
POTASSIUM SERPL-SCNC: 4.6 MMOL/L (ref 3.7–5.3)
RBC # BLD: 3.49 M/UL (ref 3.95–5.11)
SODIUM BLD-SCNC: 127 MMOL/L (ref 135–144)
SODIUM BLD-SCNC: 128 MMOL/L (ref 135–144)
SODIUM BLD-SCNC: 128 MMOL/L (ref 135–144)
SODIUM BLD-SCNC: 130 MMOL/L (ref 135–144)
WBC # BLD: 16.1 K/UL (ref 3.5–11.3)

## 2022-04-07 PROCEDURE — 97116 GAIT TRAINING THERAPY: CPT

## 2022-04-07 PROCEDURE — 36415 COLL VENOUS BLD VENIPUNCTURE: CPT

## 2022-04-07 PROCEDURE — 94640 AIRWAY INHALATION TREATMENT: CPT

## 2022-04-07 PROCEDURE — 6370000000 HC RX 637 (ALT 250 FOR IP): Performed by: INTERNAL MEDICINE

## 2022-04-07 PROCEDURE — 99232 SBSQ HOSP IP/OBS MODERATE 35: CPT | Performed by: INTERNAL MEDICINE

## 2022-04-07 PROCEDURE — APPSS30 APP SPLIT SHARED TIME 16-30 MINUTES: Performed by: NURSE PRACTITIONER

## 2022-04-07 PROCEDURE — 85027 COMPLETE CBC AUTOMATED: CPT

## 2022-04-07 PROCEDURE — 82947 ASSAY GLUCOSE BLOOD QUANT: CPT

## 2022-04-07 PROCEDURE — 6370000000 HC RX 637 (ALT 250 FOR IP): Performed by: NURSE PRACTITIONER

## 2022-04-07 PROCEDURE — 93010 ELECTROCARDIOGRAM REPORT: CPT | Performed by: INTERNAL MEDICINE

## 2022-04-07 PROCEDURE — 80048 BASIC METABOLIC PNL TOTAL CA: CPT

## 2022-04-07 PROCEDURE — 6360000002 HC RX W HCPCS: Performed by: NURSE PRACTITIONER

## 2022-04-07 PROCEDURE — 84295 ASSAY OF SERUM SODIUM: CPT

## 2022-04-07 PROCEDURE — 2700000000 HC OXYGEN THERAPY PER DAY

## 2022-04-07 PROCEDURE — 97530 THERAPEUTIC ACTIVITIES: CPT

## 2022-04-07 RX ORDER — AZITHROMYCIN 500 MG/1
500 TABLET, FILM COATED ORAL DAILY
Qty: 5 TABLET | Refills: 0 | Status: SHIPPED | OUTPATIENT
Start: 2022-04-07 | End: 2022-04-12

## 2022-04-07 RX ORDER — CEFUROXIME AXETIL 250 MG/1
250 TABLET ORAL 2 TIMES DAILY
Qty: 10 TABLET | Refills: 0 | Status: SHIPPED | OUTPATIENT
Start: 2022-04-07 | End: 2022-04-12

## 2022-04-07 RX ADMIN — Medication 81 MG: at 08:29

## 2022-04-07 RX ADMIN — METOPROLOL SUCCINATE 25 MG: 25 TABLET, FILM COATED, EXTENDED RELEASE ORAL at 08:29

## 2022-04-07 RX ADMIN — SERTRALINE 100 MG: 50 TABLET, FILM COATED ORAL at 08:29

## 2022-04-07 RX ADMIN — ENOXAPARIN SODIUM 40 MG: 40 INJECTION SUBCUTANEOUS at 08:29

## 2022-04-07 RX ADMIN — IPRATROPIUM BROMIDE AND ALBUTEROL SULFATE 1 AMPULE: .5; 3 SOLUTION RESPIRATORY (INHALATION) at 09:12

## 2022-04-07 RX ADMIN — IPRATROPIUM BROMIDE AND ALBUTEROL SULFATE 1 AMPULE: .5; 3 SOLUTION RESPIRATORY (INHALATION) at 12:48

## 2022-04-07 RX ADMIN — IPRATROPIUM BROMIDE AND ALBUTEROL SULFATE 1 AMPULE: .5; 3 SOLUTION RESPIRATORY (INHALATION) at 16:02

## 2022-04-07 RX ADMIN — SODIUM CHLORIDE TAB 1 GM 1 G: 1 TAB at 08:28

## 2022-04-07 RX ADMIN — LEVOTHYROXINE SODIUM 200 MCG: 100 TABLET ORAL at 08:28

## 2022-04-07 RX ADMIN — CLOPIDOGREL 75 MG: 75 TABLET, FILM COATED ORAL at 08:29

## 2022-04-07 ASSESSMENT — ENCOUNTER SYMPTOMS
SHORTNESS OF BREATH: 1
DIARRHEA: 0
CONSTIPATION: 0

## 2022-04-07 NOTE — PROGRESS NOTES
Physical Therapy  Facility/Department: [facility] RENAL//MED SURG  Daily Treatment Note  NAME: Sammy Marks  : 1955  MRN: [id number]    Date of Service: 2022  Chief Complaint   Patient presents with    Chest Pain    Shortness of Breath     Discharge Recommendations:  Patient would benefit from continued therapy after discharge   PT Equipment Recommendations  Equipment Needed: No    Assessment   Body structures, Functions, Activity limitations: Decreased functional mobility ; Decreased strength;Decreased endurance  Assessment: The pt ambulated 200 ft without AD x CGA. She was mildly SOB following ambulation. Will continue with gait strengthening to regain functional independence and improve endurance  PT Education: Goals;PT Role;Plan of Care  REQUIRES PT FOLLOW UP: Yes  Activity Tolerance  Activity Tolerance: Patient limited by fatigue;Patient limited by endurance     Patient Diagnosis(es): The primary encounter diagnosis was Pneumonia of right middle lobe due to infectious organism. A diagnosis of Hyponatremia was also pertinent to this visit. has a past medical history of Arthritis, Asthma, CAD (coronary artery disease), Diabetes mellitus ([address] Dizzy, Family history of breast cancer in sister, Holter monitor, abnormal, Hyperlipidemia, Hypertension, Osteopenia determined by x-ray, Research study patient, SOB (shortness of breath), and Thyroid condition. has a past surgical history that includes Tubal ligation; Dilation and curettage of uterus; Tonsillectomy; hysteroscopy (10/07/14); pacemaker placement (Left, 2022); Cardiac catheterization (2022); Cardiac pacemaker placement (2022); and Coronary angioplasty (2022).     Restrictions  Restrictions/Precautions  Restrictions/Precautions: Up as Tolerated  Required Braces or Orthoses?: No  Position Activity Restriction  Other position/activity restrictions: O2 Via NC @ 2L , dyspnea on exertion  Subjective   General  Chart Reviewed: Yes  Response To Previous Treatment: Not applicable  Family / Caregiver Present: No  Subjective  Subjective: Pt presetns up at side of bed no complaint of discomfort  General Comment  Comments: Okay per RN and Pt for PT to evaluate  Pain Screening  Patient Currently in Pain: Denies  Vital Signs  Patient Currently in Pain: Denies       Orientation  Orientation  Overall Orientation Status: Within Normal Limits  Cognition   Cognition  Overall Cognitive Status: WNL  Objective   Bed mobility  Rolling to Right: Independent  Supine to Sit: Independent  Scooting: Independent  Comment: Pt up walking ad jaida  in room at start of therapy session. Pt was left sitting upright in chair at end of therapy session  Transfers  Sit to Stand: Independent  Stand to sit:  Independent  Bed to Chair: Independent  Ambulation  Ambulation?: Yes  More Ambulation?: No  Ambulation 1  Surface: level tile  Device: No Device  Assistance: Stand by assistance  Quality of Gait: normal step length, width and mercy  Gait Deviations: None  Distance: amb 220 ft without a device x CGA  Stairs/Curb  Stairs?: Yes  Stairs  # Steps : 4  Stairs Height: 4\"  Rails: Right ascending  Assistance: Contact guard assistance  Comment: normal reciprical pattern     Balance  Posture: Good  Sitting - Static: Good  Sitting - Dynamic: Good  Standing - Static: Good  Standing - Dynamic: Good  Functional Reach Test  Fall Risk (Score of <10 inches is fall risk): No  Exercises  Comments: Pt provided verbal, written and  practical instruction in bilateral LE rom and strengthening exercises to be completed on own as tolerated  Other exercises  Other exercises?: No      Strength Other  Other: genearl weakness and deconditioning                                                                 AM-PAC Score  AM-PAC Inpatient Mobility Raw Score : 23 (04/07/22 1617)  AM-PAC Inpatient T-Scale Score : 56.93 (04/07/22 1617)  Mobility Inpatient CMS 0-100% Score: 11.2 (04/07/22 1617)  Mobility Inpatient CMS G-Code Modifier : CI (04/07/22 1617)          Goals  Short term goals  Time Frame for Short term goals: 10 visits  Short term goal 1: amb 250 ft without a device x SBA  Short term goal 2: ascend/descend 4 steps with SBA  Short term goal 3: 20 min strengthening exercise program x SBA  Patient Goals   Patient goals : Return home    Plan    Plan  Times per week: 5-6x wk  Current Treatment Recommendations: Strengthening,Functional Mobility Training,Gait Training,Safety Education & Training,Endurance Training,Stair training  Safety Devices  Type of devices: Nurse notified,Left in bed,Call light within reach,Gait belt  Restraints  Initially in place: No     Therapy Time   Individual Concurrent Group Co-treatment   Time In 1250         Time Out 1330         Minutes 40         Timed Code Treatment Minutes: 23 Minutes       Hermes Crespo PT

## 2022-04-07 NOTE — PROGRESS NOTES
Home Oxygen Evaluation    Home Oxygen Evaluation completed. Patient is on 2 liters per minute via nasal cannula. Resting SpO2 = 94%  Resting SpO2 on room air = 90%    SpO2 on room air with exercise = 84%  SpO2 on oxygen as above with exercise = 92%    Nocturnal Oximetry with patient on room air is recommended is SpO2 is between 89% and 95% (requires additional order).     Mack Del Valle RCP  11:02 AM

## 2022-04-07 NOTE — DISCHARGE SUMMARY
[facility]  Office: [address], DO, [facility] Beronica, DO, Ethan Dumont, DO, Margaret Rodriguez, DO, Chanda Mckinney MD, Meghana Cardoza MD, Jamia Rashid MD, Karishma Bridges MD, Alfredo Mahmood MD, Charleen Keller MD, Timi Pereira MD, Lexii Reis, DO, Stephenie Payne, DO, Hattie Brooks MD,  Rochelle Forbes, DO, Taryn Summers MD, Lidia Barraza MD, Marcelina Eugene MD, Carlee Samuel DO, Ashlee Galeas MD, Yosef Jacobsen MD, Diego Washington, [facility], [facility], [facility], Derrell Pitt, CNP, Angel Nascimento, CNP, Prem Campa, CNP, Jennifer Bolton, CNP, Marty Alexis, CNP, Rebecca Solis PA-C, Manisha Blankenship, [facility], Tommie Rabago, CNP, Gustavo Zapien, CNP, Lencho Corea, CNP, Nicole Walton, CNS, Panchito Díaz, [facility], Tricia Hurtado, CNP, Kassidy Michelle, CNP, Tiarra Navas, [facility] 19    Discharge Summary     Patient ID: Jennifer Madison  :  1955   MRN: [id number]     ACCOUNT:  [de-identified]   Patient's PCP: Tarah Frederick MD  Admit Date: 2022   Discharge Date: 2022     Length of Stay: 2  Code Status:  Full Code  Admitting Physician: Yvonne Tavera DO  Discharge Physician: Manisha Blankenship APRN - NP     Active Discharge Diagnoses:     Hospital Problem Lists:  Principal Problem:    Pneumonia  Active Problems:    Essential hypertension    Pacemaker    Hyperlipidemia with target LDL less than 100    Hypothyroidism    Type 2 diabetes mellitus with microalbuminuria, without long-term current use of insulin (HCC)    AV block, 2nd degree    Recurrent major depressive disorder, in partial remission (HCC)    Chronic obstructive pulmonary disease (HCC)    Hoarseness of voice    CKD (chronic kidney disease), stage I    S/P placement of cardiac pacemaker    CAD S/P percutaneous coronary angioplasty    COPD exacerbation ([facility]    Uncontrolled type 2 diabetes mellitus with hyperglycemia [facility]  Resolved Problems:    Electrolyte imbalance    Sepsis without acute organ dysfunction (HCC)    Hyponatremia      Admission Condition:  fair     Discharged Condition: stable    Hospital Stay:     Hospital Course:  Yamilet Klein is a 78 y.o. female who was admitted for the management of   Pneumonia , presented to ER with Chest Pain and Shortness of Breath      This is a 27-year-old female who presented to the emergency department with a 1 week history of worsening shortness of breath/poor p.o. intake. Found to have a right upper lobe pneumonia, started on supplemental O2. Complicated by hyponatremia for which nephrology was consulted. Patient was started on antibiotics. Fluid restriction was initiated and sodium tablets were given. Significant therapeutic interventions:     1. Right upper lobe pneumonia:   · Continue ceftriaxone & azithromycin -> transition to ceftin and zithromax at d/c  · BCX2 NGTD  · Recheck CBC this morning pending   · O2 at discharge  · Encourage incentive spirometry    · Low grade fevers this morning, Tylenol PRN  2. Hyponatremia:   · Nephrology following--they are okay with discharge   · Sodium up trending, 130 this morning   ·  sodium tablets discontinued  · 1.2L fluid restriction   · Signed off  3. Hypokalemia:   · Resolved   4. Primary hypertension:   · Continue Troprol XL   5. Hypothyroidism:   · Continue synthroid   · TSH 0.5 on admit  · Follow up with PCP for further management   6. Diabetes mellitis type 2:   · Continue Lantus   · Continue sliding scale   · A1c 8.1  7. History of second degree AV block s/t pacemaker:   · Continue aspirin, Toprol XL, and plavix   8. CAD:  · Continue current home regimen   9. GI/DVT:  · No GI prophylaxis warranted   · Lovenox   10.  Dispo:  · Discharge home today with home o2       Significant Diagnostic Studies:   Labs / Micro:  CBC:   Lab Results   Component Value Date    WBC 16.1 04/07/2022    RBC 3.49 04/07/2022    HGB 11.0 04/07/2022    HCT 31.2 04/07/2022    MCV 89.4 04/07/2022    MCH 31.5 04/07/2022    MCHC 35.3 04/07/2022    RDW 13.2 04/07/2022     04/07/2022     BMP:    Lab Results   Component Value Date    GLUCOSE 107 04/07/2022    GLUCOSE 212 04/26/2019     04/07/2022    K 4.6 04/07/2022    CL 97 04/07/2022    CO2 24 04/07/2022    ANIONGAP 9 04/07/2022    BUN 15 04/07/2022    CREATININE 0.41 04/07/2022    BUNCRER NOT REPORTED 03/15/2021    CALCIUM 8.6 04/07/2022    LABGLOM >60 04/07/2022    GFRAA >60 04/07/2022    GFR      04/07/2022     HFP:    Lab Results   Component Value Date    PROT 7.1 04/05/2022     CMP:    Lab Results   Component Value Date    GLUCOSE 107 04/07/2022    GLUCOSE 212 04/26/2019     04/07/2022    K 4.6 04/07/2022    CL 97 04/07/2022    CO2 24 04/07/2022    BUN 15 04/07/2022    CREATININE 0.41 04/07/2022    ANIONGAP 9 04/07/2022    ALKPHOS 99 04/05/2022    ALT 8 04/05/2022    AST 14 04/05/2022    BILITOT 1.34 04/05/2022    LABALBU 3.8 04/05/2022    ALBUMIN 1.2 04/05/2022    LABGLOM >60 04/07/2022    GFRAA >60 04/07/2022    GFR      04/07/2022    PROT 7.1 04/05/2022    CALCIUM 8.6 04/07/2022     PT/INR:    Lab Results   Component Value Date    PROTIME 10.5 09/30/2014    INR 1.0 09/30/2014     PTT:   Lab Results   Component Value Date    APTT 25.9 09/30/2014     FLP:    Lab Results   Component Value Date    CHOL 187 03/15/2021    CHOL 216 05/27/2015    TRIG 149 03/15/2021    HDL 60 03/15/2021     U/A:    Lab Results   Component Value Date    COLORU Yellow 04/05/2022    TURBIDITY Hazy 04/05/2022    SPECGRAV 1.025 04/05/2022    HGBUR NEGATIVE 04/05/2022    PHUR 5.5 04/05/2022    PROTEINU 1+ 04/05/2022    GLUCOSEU NEGATIVE 04/05/2022    KETUA TRACE 04/05/2022    BILIRUBINUR NEGATIVE 04/05/2022    UROBILINOGEN Normal 04/05/2022    NITRU NEGATIVE 04/05/2022    LEUKOCYTESUR TRACE 04/05/2022     TSH:    Lab Results   Component Value Date    TSH 0.50 04/06/2022        Radiology:  XR CHEST PORTABLE    Result Date: 4/5/2022  Right upper lobe infiltrate suggesting pneumonia. Recommend follow-up exams until the infiltrate clears       Consultations:    Consults:     Final Specialist Recommendations/Findings:   IP CONSULT TO HOSPITALIST  IP CONSULT TO NEPHROLOGY      The patient was seen and examined on day of discharge and this discharge summary is in conjunction with any daily progress note from day of discharge. Discharge plan:     Disposition: Home    Physician Follow Up:     Alana Aggarwal MD  [address] [facility] [address] [facility] [address]  [address] [facility]  [phone]    In 1 week  for follow up after hospitalization       Requiring Further Evaluation/Follow Up POST HOSPITALIZATION/Incidental Findings:  Your Avalide has been discontinued, follow-up with your primary care provider regarding blood pressure management    Diet: regular diet and diabetic diet    Activity: As tolerated    Instructions to Patient: see attached     Discharge Medications:      Medication List      START taking these medications    azithromycin 500 MG tablet  Commonly known as: Zithromax  Take 1 tablet by mouth daily for 5 days     cefUROXime 250 MG tablet  Commonly known as: CEFTIN  Take 1 tablet by mouth 2 times daily for 5 days        CHANGE how you take these medications    pravastatin 40 MG tablet  Commonly known as: PRAVACHOL  Take 1 tablet by mouth every evening For cholesterol  What changed: additional instructions        CONTINUE taking these medications    * albuterol sulfate  (90 Base) MCG/ACT inhaler  Commonly known as: Ventolin HFA  Inhale 2 puffs into the lungs every 6 hours as needed for Wheezing or Shortness of Breath (cough) INHALE 2 PUFFS INTO THE LUNGS 2 TIMES DAILY AS NEEDED FOR WHEEZING     * albuterol (2.5 MG/3ML) 0.083% nebulizer solution  Commonly known as: PROVENTIL  Take 3 mLs by nebulization every 6 hours as needed for Wheezing or Shortness of Breath     aspirin EC 81 MG EC tablet  Take 1 tablet by mouth daily     blood glucose test strips strip  Commonly known as: ASCENSIA AUTODISC VI;ONE TOUCH ULTRA TEST VI  1 each by In Vitro route daily As needed. clopidogrel 75 MG tablet  Commonly known as: Plavix  Take 1 tablet by mouth daily     fluticasone 50 MCG/ACT nasal spray  Commonly known as: FLONASE  2 sprays by Each Nostril route daily     glipiZIDE 10 MG tablet  Commonly known as: GLUCOTROL  Take 2 tablets by mouth 2 times daily Dose increased 12/3/2021     levothyroxine 200 MCG tablet  Commonly known as: SYNTHROID  TAKE 1 TABLET BY MOUTH EVERY DAY BEFORE BREAKFAST     metFORMIN 500 MG extended release tablet  Commonly known as: GLUCOPHAGE-XR  Take 1 tablet by mouth daily (with breakfast) Dose decreased 12/3/2021     metoprolol succinate 25 MG extended release tablet  Commonly known as: TOPROL XL  Take 1 tablet by mouth daily     Nebulizer/Tubing/Mouthpiece Kit  Dx. COPD, needs nebulizer supplies     pantoprazole 20 MG tablet  Commonly known as: PROTONIX  Take 1 tablet by mouth every morning (before breakfast) ** stop omeprazole**     sertraline 100 MG tablet  Commonly known as: ZOLOFT  Take 1 tablet by mouth daily     simethicone 80 MG chewable tablet  Commonly known as: MYLICON  Take 1 tablet by mouth 4 times daily as needed for Flatulence     vitamin D 1.25 MG (29251 UT) Caps capsule  Commonly known as: ERGOCALCIFEROL  TAKE 1 CAPSULE BY MOUTH ONE TIME PER WEEK         * This list has 2 medication(s) that are the same as other medications prescribed for you. Read the directions carefully, and ask your doctor or other care provider to review them with you.             STOP taking these medications    irbesartan-hydroCHLOROthiazide 150-12.5 MG per tablet  Commonly known as: AVALIDE           Where to Get Your Medications      These medications were sent to [facility] [address] [facility], [address] [place] [address]  [address], [address] [address]    Phone: [phone]   · azithromycin 500 MG tablet  · cefUROXime 250 MG tablet         No discharge procedures on file. Time Spent on discharge is  35 mins in patient examination, evaluation, counseling as well as medication reconciliation, prescriptions for required medications, discharge plan and follow up. Discussed with attending  Electronically signed by   SHARDA Dawkins NP  4/7/2022  12:40 PM      Thank you Dr. Lennox Oregon, MD for the opportunity to be involved in this patient's care.

## 2022-04-07 NOTE — PLAN OF CARE
Problem: Fluid Volume:  Goal: Ability to achieve a balanced intake and output will improve  Description: Ability to achieve a balanced intake and output will improve  Outcome: Ongoing     Problem: Physical Regulation:  Goal: Ability to maintain clinical measurements within normal limits will improve  Description: Ability to maintain clinical measurements within normal limits will improve  Outcome: Ongoing  Goal: Will show no signs and symptoms of electrolyte imbalance  Description: Will show no signs and symptoms of electrolyte imbalance  Outcome: Ongoing     Problem: Infection:  Goal: Will remain free from infection  Description: Will remain free from infection  Outcome: Ongoing     Problem: Daily Care:  Goal: Daily care needs are met  Description: Daily care needs are met  Outcome: Ongoing     Problem: Pain:  Goal: Patient's pain/discomfort is manageable  Description: Patient's pain/discomfort is manageable  Outcome: Ongoing     Problem: Skin Integrity:  Goal: Skin integrity will stabilize  Description: Skin integrity will stabilize  Outcome: Ongoing     Problem: Discharge Planning:  Goal: Patients continuum of care needs are met  Description: Patients continuum of care needs are met  Outcome: Ongoing     Problem: Musculor/Skeletal Functional Status  Goal: Highest potential functional level  Outcome: Ongoing     Problem: Falls - Risk of:  Goal: Will remain free from falls  Description: Will remain free from falls  Outcome: Ongoing  Goal: Absence of physical injury  Description: Absence of physical injury  Outcome: Ongoing

## 2022-04-07 NOTE — PROGRESS NOTES
Renal Progress Note    Patient :  Lexi Malik; 78 y.o. MRN# [id number]  Location:  9278/0906-60  Attending:  Ryan Lara DO  Admit Date:  4/5/2022   Hospital Day: 2      Subjective:     Patient alert oriented x 3   Hemodynamically stable. Wants  to go home   Sodium 130  Currently on salt tablets     Outpatient Medications:     Medications Prior to Admission: pantoprazole (PROTONIX) 20 MG tablet, Take 1 tablet by mouth every morning (before breakfast) ** stop omeprazole**  clopidogrel (PLAVIX) 75 MG tablet, Take 1 tablet by mouth daily  metoprolol succinate (TOPROL XL) 25 MG extended release tablet, Take 1 tablet by mouth daily  glipiZIDE (GLUCOTROL) 10 MG tablet, Take 2 tablets by mouth 2 times daily Dose increased 12/3/2021  levothyroxine (SYNTHROID) 200 MCG tablet, TAKE 1 TABLET BY MOUTH EVERY DAY BEFORE BREAKFAST  metFORMIN (GLUCOPHAGE-XR) 500 MG extended release tablet, Take 1 tablet by mouth daily (with breakfast) Dose decreased 12/3/2021  simethicone (MYLICON) 80 MG chewable tablet, Take 1 tablet by mouth 4 times daily as needed for Flatulence (Patient not taking: Reported on 4/5/2022)  Respiratory Therapy Supplies (NEBULIZER/TUBING/MOUTHPIECE) KIT, Dx. COPD, needs nebulizer supplies  albuterol (PROVENTIL) (2.5 MG/3ML) 0.083% nebulizer solution, Take 3 mLs by nebulization every 6 hours as needed for Wheezing or Shortness of Breath  sertraline (ZOLOFT) 100 MG tablet, Take 1 tablet by mouth daily  irbesartan-hydroCHLOROthiazide (AVALIDE) 150-12.5 MG per tablet, TAKE 1 TABLET BY MOUTH DAILY  blood glucose test strips (ASCENSIA AUTODISC VI;ONE TOUCH ULTRA TEST VI) strip, 1 each by In Vitro route daily As needed.   pravastatin (PRAVACHOL) 40 MG tablet, Take 1 tablet by mouth every evening For cholesterol (Patient taking differently: Take 40 mg by mouth every evening For cholesterol Takes every other day per patient)  vitamin D (ERGOCALCIFEROL) 1.25 MG (47853 UT) CAPS capsule, TAKE 1 CAPSULE BY MOUTH ONE TIME PER WEEK (Patient not taking: Reported on 2022)  albuterol sulfate HFA (VENTOLIN HFA) 108 (90 Base) MCG/ACT inhaler, Inhale 2 puffs into the lungs every 6 hours as needed for Wheezing or Shortness of Breath (cough) INHALE 2 PUFFS INTO THE LUNGS 2 TIMES DAILY AS NEEDED FOR WHEEZING  aspirin EC 81 MG EC tablet, Take 1 tablet by mouth daily  fluticasone (FLONASE) 50 MCG/ACT nasal spray, 2 sprays by Each Nostril route daily (Patient not taking: Reported on 2022)    Current Medications:     Scheduled Meds:    insulin lispro  0-18 Units SubCUTAneous TID WC    insulin lispro  0-9 Units SubCUTAneous Nightly    sodium chloride  1 g Oral TID WC    aspirin EC  81 mg Oral Daily    clopidogrel  75 mg Oral Daily    levothyroxine  200 mcg Oral Daily    metoprolol succinate  25 mg Oral Daily    pravastatin  40 mg Oral QPM    sertraline  100 mg Oral Daily    sodium chloride flush  5-40 mL IntraVENous 2 times per day    enoxaparin  40 mg SubCUTAneous Daily    ipratropium-albuterol  1 ampule Inhalation Q4H WA    cefTRIAXone (ROCEPHIN) IV  1,000 mg IntraVENous Q24H    And    azithromycin  500 mg Oral Q24H    [Held by provider] losartan  50 mg Oral Daily    insulin glargine  5 Units SubCUTAneous Nightly     Continuous Infusions:    dextrose      sodium chloride           Input/Output:       No intake or output data in the 24 hours ending 22 1043. Patient Vitals for the past 96 hrs (Last 3 readings):   Weight   22 0530 164 lb 7.4 oz (74.6 kg)   22 2245 154 lb (69.9 kg)       Vital Signs:   Temperature:  Temp: 100 °F (37.8 °C)  TMax:   Temp (24hrs), Av.9 °F (37.2 °C), Min:97.8 °F (36.6 °C), Max:100 °F (37.8 °C)    Respirations:  Resp: 16  Pulse:   Pulse: 84  BP:    BP: 137/80  BP Range: Systolic (38HLF), MWF:552 , Min:132 , WRS:377       Diastolic (50GVW), ESM:07, Min:80, Max:91      Physical Examination:     Review of Systems   Respiratory: Positive for shortness of breath. Gastrointestinal: Negative for constipation and diarrhea. Musculoskeletal: Negative for arthralgias. Neurological: Negative for dizziness and headaches. Physical Exam  Constitutional:       Appearance: Normal appearance. HENT:      Head: Normocephalic and atraumatic. Eyes:      Extraocular Movements: Extraocular movements intact. Pulmonary:      Breath sounds: Wheezing present. Abdominal:      Palpations: Abdomen is soft. There is no mass. Hernia: No hernia is present. Neurological:      General: No focal deficit present. Mental Status: She is alert and oriented to person, place, and time. Psychiatric:         Mood and Affect: Mood normal.         Behavior: Behavior normal.         Labs:       Recent Labs     04/05/22 1935 04/06/22 0742   WBC 25.3* 29.2*   RBC 3.90* 3.45*   HGB 12.1 11.0*   HCT 34.5* 31.4*   MCV 88.5 91.0   MCH 31.0 31.9   MCHC 35.1* 35.0*   RDW 12.8 12.9    247   MPV 9.4 9.6      BMP:   Recent Labs     04/05/22  2300 04/05/22  2300 04/06/22  0742 04/06/22  1003 04/06/22  2352 04/07/22  0652 04/07/22  0814   *   < > 127*   < > 127* 128* 130*   K 3.5*  --  4.3  --   --   --  4.6   CL 84*  --  92*  --   --   --  97*   CO2 21  --  23  --   --   --  24   BUN 19  --  19  --   --   --  15   CREATININE 0.83  --  0.66  --   --   --  0.41*   GLUCOSE 220*  --  167*  --   --   --  107*   CALCIUM 8.6  --  8.8  --   --   --  8.6    < > = values in this interval not displayed.       Magnesium:    Recent Labs     04/05/22 1935 04/05/22  2300   MG 1.2* 1.8     Albumin:    Recent Labs     04/05/22 1935   LABALBU 3.8     SPEP:  Lab Results   Component Value Date    PROT 7.1 04/05/2022     Hep C AB:          Lab Results   Component Value Date    HEPCAB NONREACTIVE 07/13/2020       Urinalysis/Chemistries:      Lab Results   Component Value Date    NITRU NEGATIVE 04/05/2022    COLORU Yellow 04/05/2022    PHUR 5.5 04/05/2022    WBCUA 5 TO 10 04/05/2022    RBCUA 0 TO 2 04/05/2022    MUCUS 1+ 04/05/2022    TRICHOMONAS NOT REPORTED 07/13/2020    YEAST NOT REPORTED 07/13/2020    BACTERIA FEW 07/13/2020    SPECGRAV 1.025 04/05/2022    LEUKOCYTESUR TRACE 04/05/2022    UROBILINOGEN Normal 04/05/2022    BILIRUBINUR NEGATIVE 04/05/2022    GLUCOSEU NEGATIVE 04/05/2022    KETUA TRACE 04/05/2022    AMORPHOUS 1+ 07/13/2020     Urine Sodium:     Lab Results   Component Value Date    BULMARO <20 04/05/2022     Urine Osmolarity:   Lab Results   Component Value Date    OSMOU 414 04/05/2022       Urine Creatinine:     Lab Results   Component Value Date    LABCREA 254.4 03/15/2021         Assessment:       1. Hyponatremia with a sodium of 121. Patient has chronic hyponatremia her usual sodium levels are in the range of 1 30-1 31. Patient is on sertraline for extended period of time. He presented with sodium of 121 most likely due to pneumonia poor oral intake and elevated ADH level secondary to that. She was started on salt tablet and now her sodium is 130.  2.  Chronic active smoking  3. Diabetes  4. Chronic active smoking  5. Status post pacemaker placement  6. Hypertension blood pressure is under good control. Cozaar is on hold. Plan:     1. Sodium 130 .   2. Dc salt tablet . encourage to increase solute/protein  intake   3. Follow out pt lab    4. Ok to discharge from nephro stand point . 5. We will follow     Nutrition   Keep patient on renal diet/TF. Avoid nephrotoxic drugs/contrast exposure. We will continue to follow this patient along with you. Jonas Berrios MD  Internal Medicine Resident, PGY2  [facility]  4/7/2022,10:43 AM   Attending Physician Statement  I have discussed the care of Jennifer Madison, including pertinent history and exam findings with the resident/fellow. I have reviewed the key elements of all parts of the encounter with the resident/fellow. I have seen and examined the patient with the resident/fellow.   I agree with the assessment and plan and status of the problem list as documented.       Josse Chu MD , MD

## 2022-04-07 NOTE — PROGRESS NOTES
[facility]  Office: [address], DO, Tommy Gear, DO, Sri Vázquez, DO, Chucho Dejah Blood, DO, [place] Blunt MD, Delfino Mckeon MD, Martin Mitchell MD, [place] MD, Katie Brady MD, Fanta Camara MD, Lorna Bundy MD, Terra Fragmin, DO, Jannette Pagan, DO, Eryn Evans MD,  Yvonne Calero, DO, Kyle Winchester MD, Lety Neives MD, Rose Mccann MD, Gwen Rene, DO, Dea Garcia MD, Angelica Contreras MD, Danette Nunes, CNP, [facility] NAN Kate, CNP, Radha Gambino, CNP, Luiz Rowe, CNP, Skye Busch, CNP, Lilly Buckner, CNP, Ben Piper, CNP, Lendel Nissen, PA-C, Ras Blood, DNP, Rosemarie Abreu, CNP, Blair Punch, CNP, Consuelo Fly, CNP, Alicia Mask, CNS, Rocio Woodsones, DNP, Betty Postal, CNP, Lucie Donald, CNP, Tracie Dean, CNP         [facility]   [address] [facility]    Progress Note    4/7/2022    8:26 AM    Name:   Jason Alston  MRN:     [id number]     Acct:      [de-identified]   Room:   200/1[facility] Day:  2  Admit Date:  4/5/2022  6:57 PM    PCP:   Jesika Leonard MD  Code Status:  Full Code    Subjective:     C/C:   Chief Complaint   Patient presents with    Chest Pain    Shortness of Breath     Interval History Status: not changed. Patient was seen and evaluated at the bedside this morning, she is on 2L and using her incentive spirometer. She denies any shortness or breath, but does endorse a productive cough. Overall, she is feeling much better and is eager to be discharged home. Brief History: This is a 68-year-old female who presented to the emergency department with a 1 week history of worsening shortness of breath/poor p.o. intake. Found to have a right upper lobe pneumonia, started on supplemental O2. Complicated by hyponatremia for which nephrology was consulted. Patient was started on antibiotics. Fluid restriction was initiated and sodium tablets were given.     Review of Systems:     Constitutional:  negative for chills, fevers, sweats, + improving fatigue  Respiratory:  + cough, +dyspnea on exertion, +shortness of breath, wheezing  Cardiovascular:  negative for chest pain, chest pressure/discomfort, lower extremity edema, palpitations  Gastrointestinal:  negative for abdominal pain, constipation, diarrhea, nausea, vomiting  Neurological:  negative for dizziness, headache    Medications: Allergies:     Allergies   Allergen Reactions    Lisinopril Other (See Comments)     cough       Current Meds:   Scheduled Meds:    insulin lispro  0-18 Units SubCUTAneous TID WC    insulin lispro  0-9 Units SubCUTAneous Nightly    sodium chloride  1 g Oral TID WC    aspirin EC  81 mg Oral Daily    clopidogrel  75 mg Oral Daily    levothyroxine  200 mcg Oral Daily    metoprolol succinate  25 mg Oral Daily    pravastatin  40 mg Oral QPM    sertraline  100 mg Oral Daily    sodium chloride flush  5-40 mL IntraVENous 2 times per day    enoxaparin  40 mg SubCUTAneous Daily    ipratropium-albuterol  1 ampule Inhalation Q4H WA    cefTRIAXone (ROCEPHIN) IV  1,000 mg IntraVENous Q24H    And    azithromycin  500 mg Oral Q24H    [Held by provider] losartan  50 mg Oral Daily    insulin glargine  5 Units SubCUTAneous Nightly     Continuous Infusions:    dextrose      sodium chloride       PRN Meds: fluticasone, glucose, dextrose, glucagon (rDNA), dextrose, sodium chloride flush, sodium chloride, ondansetron **OR** ondansetron, magnesium hydroxide, acetaminophen **OR** acetaminophen, albuterol, potassium chloride **OR** potassium alternative oral replacement **OR** potassium chloride, magnesium sulfate    Data:     Past Medical History:   has a past medical history of Arthritis, Asthma, CAD (coronary artery disease), Diabetes mellitus ([facility] [address] Dizzy, Family history of breast cancer in sister, Holter monitor, abnormal, Hyperlipidemia, Hypertension, Osteopenia determined by x-ray, Research study patient, SOB (shortness of breath), and Thyroid condition. Social History:   reports that she has been smoking cigarettes. She has a 15.00 pack-year smoking history. She has never used smokeless tobacco. She reports current alcohol use. She reports that she does not use drugs. Family History:   Family History   Problem Relation Age of Onset    Diabetes Mother     Diabetes Father     Breast Cancer Sister         after age 48        Vitals:  /80   Pulse 84   Temp 100 °F (37.8 °C) (Oral)   Resp 16   Ht 5' 2\" (1.575 m)   Wt 164 lb 7.4 oz (74.6 kg)   SpO2 (!) 88%   BMI 30.08 kg/m²   Temp (24hrs), Av.9 °F (37.2 °C), Min:97.8 °F (36.6 °C), Max:100 °F (37.8 °C)    Recent Labs     22  1204 22  1600 22  21022  0731   POCGLU 134* 179* 154* 110*       I/O (24Hr):   No intake or output data in the 24 hours ending 22 0826    Labs:  Hematology:  Recent Labs     22  0742   WBC 25.3* 29.2*   RBC 3.90* 3.45*   HGB 12.1 11.0*   HCT 34.5* 31.4*   MCV 88.5 91.0   MCH 31.0 31.9   MCHC 35.1* 35.0*   RDW 12.8 12.9    247   MPV 9.4 9.6     Chemistry:  Recent Labs     22  21022  2300 22  2300 22  0742 22  0742 22  1003 22  1703 22  2352 22  0652   *   < > 119*   < > 127*   < > 128*   < > 129* 127* 128*   K 3.3*  --  3.5*  --  4.3  --   --   --   --   --   --    CL 85*  --  84*  --  92*  --   --   --   --   --   --    CO2 20  --  21  --  23  --   --   --   --   --   --    GLUCOSE 252*  --  220*  --  167*  --   --   --   --   --   --    BUN 18  --  19  --  19  --   --   --   --   --   --    CREATININE 0.65  --  0.83  --  0.66  --   --   --   --   --   --    MG 1.2*  --  1.8  --   --   --   --   --   --   --   --    ANIONGAP 16  --  14  --  12  --   --   --   --   --   --    LABGLOM >60  --  >60  --  >60  --   --   --   --   --   --    GFRAA >60  --  >60  --  >60  -- --   --   --   --   --    CALCIUM 8.9  --  8.6  --  8.8  --   --   --   --   --   --    PROBNP 749*  --   --   --   --   --   --   --   --   --   --    TROPHS 11   < > 12  --  11  --  10  --   --   --   --    MYOGLOBIN  --   --  108*  --  48  --  49  --   --   --   --     < > = values in this interval not displayed. Recent Labs     04/05/22  1935 04/05/22  2230 04/05/22  2300 04/06/22  0742 04/06/22  0804 04/06/22  1204 04/06/22  1600 04/06/22  2101 04/07/22  0731   PROT 7.1  --   --   --   --   --   --   --   --    LABALBU 3.8  --   --   --   --   --   --   --   --    LABA1C  --   --  8.1*  --   --   --   --   --   --    TSH  --   --   --  0.50  --   --   --   --   --    AST 14  --   --   --   --   --   --   --   --    ALT 8  --   --   --   --   --   --   --   --    ALKPHOS 99  --   --   --   --   --   --   --   --    BILITOT 1.34*  --   --   --   --   --   --   --   --    POCGLU  --  248*  --   --  170* 134* 179* 154* 110*       Lab Results   Component Value Date/Time    SPECIAL RHAND, 10ML 04/05/2022 08:27 PM     Lab Results   Component Value Date/Time    CULTURE NO GROWTH 1 DAY 04/05/2022 08:27 PM       Radiology:  XR CHEST PORTABLE    Result Date: 4/5/2022  Right upper lobe infiltrate suggesting pneumonia.   Recommend follow-up exams until the infiltrate clears       Physical Examination:        General appearance:  alert, cooperative and no distress  Mental Status:  oriented to person, place and time and normal affect  Lungs:  clear to auscultation bilaterally, prolonged expiratory effort, on 2 L low flow nasal cannula   Heart:  regular rate and rhythm, no murmur  Abdomen:  soft, nontender, nondistended, normal bowel sounds  Extremities:  no edema, redness, tenderness in the calves  Skin:  no gross lesions, rashes, induration    Assessment:        Hospital Problems           Last Modified POA    * (Principal) Pneumonia 4/5/2022 Yes    Essential hypertension 4/5/2022 Yes    Pacemaker 4/5/2022 Yes Hyperlipidemia with target LDL less than 100 4/5/2022 Yes    Hypothyroidism 4/5/2022 Yes    Type 2 diabetes mellitus with microalbuminuria, without long-term current use of insulin ([facility] [place] [address] 4/5/2022 Yes    AV block, 2nd degree 4/5/2022 Yes    Recurrent major depressive disorder, in partial remission ([facility] [place] [address] 4/5/2022 Yes    Chronic obstructive pulmonary disease ([facility] [address] 4/5/2022 Yes    Electrolyte imbalance 4/5/2022 Yes    Hoarseness of voice 4/5/2022 Yes    CKD (chronic kidney disease), stage I 4/5/2022 Yes    S/P placement of cardiac pacemaker 4/5/2022 Yes    CAD S/P percutaneous coronary angioplasty 4/5/2022 Yes    Sepsis without acute organ dysfunction ([facility] [place] [address] 4/5/2022 Yes    Hyponatremia 4/5/2022 Yes    COPD exacerbation ([facility] [place] [address] 4/6/2022 Yes    Uncontrolled type 2 diabetes mellitus with hyperglycemia ([facility] [place] [address] 4/6/2022 Yes          Plan:        1. Right upper lobe pneumonia:   · Continue ceftriaxone & azithromycin -> transition to ceftin and zithromax at d/c  · BCX2 NGTD  · Recheck CBC this morning pending   · O2 at discharge  · Encourage incentive spirometry    · Low grade fevers this morning, Tylenol PRN  2. Hyponatremia:   · Nephrology following--they are okay with discharge   · Sodium up trending, 130 this morning   ·  sodium tablets discontinued  · 1.2L fluid restriction   · Signed off  3. Hypokalemia:   · Resolved   4. Primary hypertension:   · Continue Troprol XL   5. Hypothyroidism:   · Continue synthroid   · TSH 0.5 on admit  · Follow up with PCP for further management   6. Diabetes mellitis type 2:   · Continue Lantus   · Continue sliding scale   · A1c 8.1  7. History of second degree AV block s/t pacemaker:   · Continue aspirin, Toprol XL, and plavix   8. CAD:  · Continue current home regimen   9. GI/DVT:  · No GI prophylaxis warranted   · Lovenox   10. Dispo:  · Discharge home today with home o2     Patient was evaluated today for the diagnosis of COPD.   I entered a DME order for home oxygen because the diagnosis and testing requires the patient to have supplemental oxygen. Condition will improve or be benefited by oxygen use. The patient is  able to perform good mobility in a home setting and therefore does require the use of a portable oxygen system. The need for this equipment was discussed with the patient and she understands and is in agreement. On this date 04/07/22 I have spent 24 mins  in direct patient care, reviewing notes, test results and diagnosis and plan of care discussions with the patient, as well as coordination of care.   Plan of care made with DO Hubert Mckeon APRN - NP  4/7/2022  8:26 AM

## 2022-04-07 NOTE — CARE COORDINATION
Discharge [facility]  Written by: Ann Marie Hunt RN    Patient Name: Renuka Pena  Attending Provider: Ainsley Gonzalez DO  Admit Date: 2022  6:57 PM  MRN: [id number]  Account: [de-identified]                     : 1955  Discharge Date: 22  Dr Lundberg December rounding ok to dc    09:55 ps remigio rodrigues for home o2 order    12:12 Tested and qualified for home o2  Met with patient updated on home o2 process. Agreeable to any provider that accepts her insurance.        16:30 spoke to hunter with [facility] to give portable tank to patient with instructions to call for delivery of the rest of the equipment      Disposition: home    Ann Marie Hunt RN

## 2022-04-07 NOTE — PROGRESS NOTES
CLINICAL PHARMACY NOTE: MEDS TO BEDS    Total # of Prescriptions Filled: 2   The following medications were delivered to the patient:  · Azithromycin 500mg  · Cefuroxime 250mg    Additional Documentation: delivered to patient in room 329 4/7 at 5:15pm. Co-pay paid with cash.

## 2022-04-08 LAB
CULTURE: NORMAL
MYCOPLASMA PNEUMONIAE IGM: 0.25
SPECIMEN DESCRIPTION: NORMAL

## 2022-04-10 LAB
CULTURE: NORMAL
CULTURE: NORMAL
Lab: NORMAL
Lab: NORMAL
SPECIMEN DESCRIPTION: NORMAL
SPECIMEN DESCRIPTION: NORMAL

## 2022-04-15 ENCOUNTER — OFFICE VISIT (OUTPATIENT)
Dept: FAMILY MEDICINE CLINIC | Age: 67
End: 2022-04-15
Payer: MEDICARE

## 2022-04-15 VITALS
TEMPERATURE: 98.4 F | SYSTOLIC BLOOD PRESSURE: 158 MMHG | WEIGHT: 155.8 LBS | DIASTOLIC BLOOD PRESSURE: 112 MMHG | HEART RATE: 69 BPM | HEIGHT: 62 IN | BODY MASS INDEX: 28.67 KG/M2 | OXYGEN SATURATION: 95 %

## 2022-04-15 DIAGNOSIS — E87.1 HYPONATREMIA: ICD-10-CM

## 2022-04-15 DIAGNOSIS — Z12.11 SCREEN FOR COLON CANCER: ICD-10-CM

## 2022-04-15 DIAGNOSIS — E78.5 HYPERLIPIDEMIA WITH TARGET LDL LESS THAN 100: ICD-10-CM

## 2022-04-15 DIAGNOSIS — J44.9 CHRONIC OBSTRUCTIVE PULMONARY DISEASE, UNSPECIFIED COPD TYPE (HCC): Primary | ICD-10-CM

## 2022-04-15 DIAGNOSIS — I10 ESSENTIAL HYPERTENSION: ICD-10-CM

## 2022-04-15 DIAGNOSIS — F33.41 RECURRENT MAJOR DEPRESSIVE DISORDER, IN PARTIAL REMISSION (HCC): ICD-10-CM

## 2022-04-15 DIAGNOSIS — E55.9 VITAMIN D DEFICIENCY: ICD-10-CM

## 2022-04-15 DIAGNOSIS — E11.29 TYPE 2 DIABETES MELLITUS WITH MICROALBUMINURIA, WITHOUT LONG-TERM CURRENT USE OF INSULIN (HCC): ICD-10-CM

## 2022-04-15 DIAGNOSIS — Z12.31 ENCOUNTER FOR SCREENING MAMMOGRAM FOR BREAST CANCER: ICD-10-CM

## 2022-04-15 DIAGNOSIS — R80.9 TYPE 2 DIABETES MELLITUS WITH MICROALBUMINURIA, WITHOUT LONG-TERM CURRENT USE OF INSULIN (HCC): ICD-10-CM

## 2022-04-15 DIAGNOSIS — I25.10 CORONARY ARTERY DISEASE INVOLVING NATIVE CORONARY ARTERY OF NATIVE HEART WITHOUT ANGINA PECTORIS: ICD-10-CM

## 2022-04-15 PROCEDURE — 3052F HG A1C>EQUAL 8.0%<EQUAL 9.0%: CPT | Performed by: FAMILY MEDICINE

## 2022-04-15 PROCEDURE — 1111F DSCHRG MED/CURRENT MED MERGE: CPT | Performed by: FAMILY MEDICINE

## 2022-04-15 PROCEDURE — 99215 OFFICE O/P EST HI 40 MIN: CPT | Performed by: FAMILY MEDICINE

## 2022-04-15 RX ORDER — NITROGLYCERIN 0.4 MG/1
TABLET SUBLINGUAL
COMMUNITY
Start: 2022-03-23 | End: 2022-08-22 | Stop reason: CLARIF

## 2022-04-15 RX ORDER — IRBESARTAN 150 MG/1
150 TABLET ORAL DAILY
Qty: 90 TABLET | Refills: 3 | Status: SHIPPED | OUTPATIENT
Start: 2022-04-15

## 2022-04-15 ASSESSMENT — PATIENT HEALTH QUESTIONNAIRE - PHQ9
9. THOUGHTS THAT YOU WOULD BE BETTER OFF DEAD, OR OF HURTING YOURSELF: 0
SUM OF ALL RESPONSES TO PHQ QUESTIONS 1-9: 0
SUM OF ALL RESPONSES TO PHQ9 QUESTIONS 1 & 2: 0
4. FEELING TIRED OR HAVING LITTLE ENERGY: 0
7. TROUBLE CONCENTRATING ON THINGS, SUCH AS READING THE NEWSPAPER OR WATCHING TELEVISION: 0
6. FEELING BAD ABOUT YOURSELF - OR THAT YOU ARE A FAILURE OR HAVE LET YOURSELF OR YOUR FAMILY DOWN: 0
3. TROUBLE FALLING OR STAYING ASLEEP: 0
SUM OF ALL RESPONSES TO PHQ QUESTIONS 1-9: 0
10. IF YOU CHECKED OFF ANY PROBLEMS, HOW DIFFICULT HAVE THESE PROBLEMS MADE IT FOR YOU TO DO YOUR WORK, TAKE CARE OF THINGS AT HOME, OR GET ALONG WITH OTHER PEOPLE: 0
5. POOR APPETITE OR OVEREATING: 0
1. LITTLE INTEREST OR PLEASURE IN DOING THINGS: 0
SUM OF ALL RESPONSES TO PHQ QUESTIONS 1-9: 0
SUM OF ALL RESPONSES TO PHQ QUESTIONS 1-9: 0
8. MOVING OR SPEAKING SO SLOWLY THAT OTHER PEOPLE COULD HAVE NOTICED. OR THE OPPOSITE, BEING SO FIGETY OR RESTLESS THAT YOU HAVE BEEN MOVING AROUND A LOT MORE THAN USUAL: 0
2. FEELING DOWN, DEPRESSED OR HOPELESS: 0

## 2022-04-15 ASSESSMENT — ENCOUNTER SYMPTOMS
DIARRHEA: 0
CONSTIPATION: 0
VOMITING: 0
NAUSEA: 0
COUGH: 1
ABDOMINAL DISTENTION: 0
WHEEZING: 0
ABDOMINAL PAIN: 0
CHEST TIGHTNESS: 0
SHORTNESS OF BREATH: 1

## 2022-04-15 NOTE — PROGRESS NOTES
Post-Discharge Transitional Care Follow Up      Divine Hsu   YOB: 1955    Date of Office Visit:  4/15/2022  Date of Hospital Admission: 4/5/22  Date of Hospital Discharge: 4/7/22  Readmission Risk Score (high >=14%. Medium >=10%):Readmission Risk Score: 12.1 ( )      Care management risk score Rising risk (score 2-5) and Complex Care (Scores >=6): 6     Non face to face  following discharge, date last encounter closed (first attempt may have been earlier): *No documented post hospital discharge outreach found in the last 14 days     Call initiated 2 business days of discharge: *No response recorded in the last 14 days         Chronic obstructive pulmonary disease, unspecified COPD type ([facility]  Worsening  Strongly counseled to quit smoking  Increase nebulizer treatments to every 6-8 hours, start Incruse Ellipta, referred to pulmonologist, advised to use the oxygen as needed throughout the day and at 2 L/min at night  -     [place] Alfaro MD, Pulmonology, [place]  -To start   Umeclidinium Bromide 62.5 MCG/INH AEPB; Inhale 1 each into the lungs daily, Disp-30 each, R-3Normal    Will need repeat of the chest x-ray, will leave it up to the pulmonologist.  Type 2 diabetes mellitus with microalbuminuria, without long-term current use of insulin ([facility]  Improving, but poorly controlled    A1c 8.1    -     [place] DISCHARGE MEDS RECONCILED W/ CURRENT OUTPATIENT MED LIST  Recheck labs  -     Microalbumin / Creatinine Urine Ratio; Future  -     CBC; Future  -     Comprehensive Metabolic Panel; Future  -     Vitamin B12 & Folate; Future  -     Protein Electrophoresis, Urine; Future  -     Electrophoresis Protein, Serum; Future  -advised home blood glucose testing  daily  -daily feet exam, Foot care: advised to wash feet daily, pat dry and apply lotion at night, avoiding between toes.  Need to look at feet daily and report to a physician any signs of inflammation or skin damage  -annual dilated eye exam  -Low carb, low fat diet, increase fruits and vegetables, and exercise 4-5 times a day 30-40 minutes a day discussed  -continue current treatment  -continue Aspirin 81 mg  -continue statin pravastatin 40 mg  To restart ARB/Irbesartan  Improve diet, low-carb diet discussed in detail    Essential hypertension  Inadequately controlled    -     [place] DISCHARGE MEDS RECONCILED W/ CURRENT OUTPATIENT MED LIST  -   start  irbesartan (AVAPRO) 150 MG tablet; Take 1 tablet by mouth daily, Disp-90 tablet, R-3Normal  -     CBC; Future  -     Comprehensive Metabolic Panel; Future  -     Uric Acid; Future  Discussed low salt diet and BP and pulse monitoring. Continue metoprolol XL 25 mg daily  Coronary artery disease involving native coronary artery of native heart without angina pectoris  Stable  Failed to follow-up with cardiologist, received pacemaker 2/28/2022 and 2 stents 3/8/2022  Continue aspirin 81 mg, pravastatin 40 mg daily, restart ARB, continue metoprolol XL 25 mg daily, improve blood pressure control, strongly counseled to quit smoking  -     Flavio Rowan MD, Cardiology, [facility]  Hyperlipidemia with target LDL less than 100  Improved  Continue pravastatin 40 mg daily  Lab Results   Component Value Date    LDLCALC 102 04/26/2019    LDLCHOLESTEROL 97 03/15/2021       -     Lipid, Fasting; Future  -     [place] DISCHARGE MEDS RECONCILED W/ CURRENT OUTPATIENT MED LIST  Vitamin D deficiency  Unsure if improving or not. Will recheck level  Continue supplementation.    -     IN DISCHARGE MEDS RECONCILED W/ CURRENT OUTPATIENT MED LIST  -     Vitamin D 25 Hydroxy;  Future  Hyponatremia  Improved at discharge  Absolutely stop drinking beer  Hydrochlorothiazide was discontinued  Zoloft can also cause hyponatremia, recheck sodium level  Water restriction 1.2-1.6 liters of water per day discussed  -     [place] DISCHARGE MEDS RECONCILED W/ CURRENT OUTPATIENT MED LIST  -     Comprehensive Metabolic Panel; Future  Recurrent major depressive disorder, in partial remission ([facility] [address] [facility]  Stable  Continue Zoloft  We will continue to monitor  -     [place] DISCHARGE MEDS RECONCILED W/ CURRENT OUTPATIENT MED LIST  Encounter for screening mammogram for breast cancer  -     [place] DISCHARGE MEDS RECONCILED W/ CURRENT OUTPATIENT MED LIST  -     SHARYN CHANDRAKANT DIGITAL SCREEN BILATERAL; Future  Screen for colon cancer  -     POCT Fecal Immunochemical Test (FIT); Future  -     [place] DISCHARGE MEDS RECONCILED W/ CURRENT OUTPATIENT MED LIST      Medical Decision Making: high complexity  Return in 1 month (on 5/15/2022), or 30 mins, COPD, for KEEP APPT. On this date 4/15/2022 I have spent 45 minutes reviewing previous notes, test results and face to face with the patient discussing the diagnosis and importance of compliance with the treatment plan as well as documenting on the day of the visit.          Orders Placed This Encounter   Procedures    SHARYN CHANDRAKANT DIGITAL SCREEN BILATERAL     Standing Status:   Future     Standing Expiration Date:   6/15/2023     Order Specific Question:   Reason for exam:     Answer:   has pacemaker    Lipid, Fasting     Standing Status:   Future     Standing Expiration Date:   4/15/2023    Microalbumin / Creatinine Urine Ratio     Standing Status:   Future     Standing Expiration Date:   4/15/2023    CBC     Standing Status:   Future     Standing Expiration Date:   4/15/2023    Comprehensive Metabolic Panel     Standing Status:   Future     Standing Expiration Date:   6/12/2022    Uric Acid     Standing Status:   Future     Standing Expiration Date:   4/15/2023    Vitamin D 25 Hydroxy     Standing Status:   Future     Standing Expiration Date:   4/15/2023    Vitamin B12 & Folate     Standing Status:   Future     Standing Expiration Date:   6/12/2022    Protein Electrophoresis, Urine     Standing Status: Future     Standing Expiration Date:   4/15/2023    Electrophoresis Protein, Serum     Standing Status:   Future     Standing Expiration Date:   4/15/2023    SHELIA - Maggy Tomas MD, Pulmonology, [place]     Referral Priority:   Routine     Referral Type:   Eval and Treat     Referral Reason:   Specialty Services Required     Referred to Provider:   Joslyn Hairston MD     Requested Specialty:   Pulmonology     Number of Visits Requested:   Griselda Furl, MD, Cardiology, [facility]     Referral Priority:   Routine     Referral Type:   Eval and Treat     Referral Reason:   Specialty Services Required     Referred to Provider:   Wing Sachin MD     Requested Specialty:   Cardiology     Number of Visits Requested:   1    POCT Fecal Immunochemical Test (FIT)     Standing Status:   Future     Standing Expiration Date:   4/15/2023    [place] DISCHARGE MEDS RECONCILED W/ CURRENT OUTPATIENT MED LIST       Orders Placed This Encounter   Medications    Umeclidinium Bromide 62.5 MCG/INH AEPB     Sig: Inhale 1 each into the lungs daily     Dispense:  30 each     Refill:  3    irbesartan (AVAPRO) 150 MG tablet     Sig: Take 1 tablet by mouth daily     Dispense:  90 tablet     Refill:  3       Future Appointments   Date Time Provider Kathy Davis   4/22/2022  2:00 PM [facility]ED[facility]   5/10/2022  9:00 AM [facility]         Subjective:   HPI    Inpatient course: Discharge summary reviewed- see chart. Interval history/Current status: improved     Patient was admitted 4/5/2022 through 4/7/2022 at [facility] due to right upper lobe pneumonia, was treated with azithromycin and Ceftin, bronchodilators, for hyponatremia, she did have renal evaluation, she was discharged home with oxygen, azithromycin and cefuroxime oral at discharge. She was told to stop  irbesartan and hydrochlorothiazide due to hyponatremia.       Today, she reports dyspnea on exertion and cough productive of white mucus. She says, they sent her with oxygen but hasn't been using it. Her pulse ox is mildly hypoxic. SpO2 Readings from Last 4 Encounters:   04/15/22 95%   04/07/22 (!) 88%   03/08/22 97%   03/01/22 91%         COPD:  Current treatment includes short-acting beta agonist inhaler, nebulized beta agonist, which has been somewhat effective. Has no other inhalers as not covered or too expensive for her to afford. Residual symptoms: chronic dyspnea and cough productive of white sputum in moderate amounts. dyspnea on exertion when going from one room to another  She denies purulent sputum, wheezing, chest pain, fever. She says she has been using the nebulizer treatment once a day. She does not follow-up with pulmonologist.      Nicotine dependence. Smoker, counseling given to quit smoking. 4-5 cigarettes/day    Ready to quit: Yes  Counseling given: Yes        Diabetes  mellitus type II  She reports compliance with glipizide and Metformin  Home Blood Glucose 150-160, has been checking home blood glucose every other day   Denies hypoglycemia  A1c improving but still high  Lab Results   Component Value Date    LABA1C 8.1 (H) 04/05/2022    LABA1C 8.4 12/03/2021    LABA1C 7.8 07/15/2021     Very high microalbuminuria and proteins in the urine  Lab Results   Component Value Date    LABMICR 680 (H) 03/15/2021               Hypertension, coronary artery disease :    she  is not exercising and is adherent to low salt diet. Blood pressure is not well controlled at home. Cardiac symptoms dyspnea and fatigue. Patient denies chest pain, chest pressure/discomfort, claudication, exertional chest pressure/discomfort, irregular heart beat, lower extremity edema, near-syncope, orthopnea, palpitations, paroxysmal nocturnal dyspnea, syncope and tachypnea.   Cardiovascular risk factors: advanced age (older than 54 for men, 72 for women), dyslipidemia, hypertension, microalbuminuria and smoking/ tobacco exposure. Use of agents associated with hypertension: thyroid hormones. History of target organ damage: angina/ prior myocardial infarction and prior coronary revascularization. Pacemaker 2/28/22 for second degree AVB   received 2 stents on 3/8/22  I advised Romero Oppenheim to make appointment with cardiology, failed to follow up , but had pacemaker checked. The patient verbalizes understanding and agrees with the plan. Stopped irbesartan HCTZ in the hospital but BP has been high since then    blood pressure is Elevated. BP Readings from Last 3 Encounters:   04/15/22 (!) 158/112   04/07/22 137/80   03/08/22 124/83        Pulse is Normal.    Pulse Readings from Last 3 Encounters:   04/15/22 69   04/07/22 84   03/08/22 65       Weight is fluctuating    Wt Readings from Last 3 Encounters:   04/15/22 155 lb 12.8 oz (70.7 kg)   04/06/22 164 lb 7.4 oz (74.6 kg)   03/08/22 157 lb (71.2 kg)     Lab Results   Component Value Date    LVEF 58 03/07/2019           Hyperlipidemia:  No new myalgias or GI upset on pravastatin (Pravachol). Medication compliance: compliant all of the time. Patient is not following a low fat, low cholesterol diet. LDL is normal  Lab Results   Component Value Date    LDLCALC 102 04/26/2019    LDLCHOLESTEROL 97 03/15/2021     Lab Results   Component Value Date    TRIG 149 03/15/2021    TRIG 120 07/13/2020    TRIG 165 (H) 04/26/2019     Gem has Vitamin D deficiency. Jeaneth King  is  taking Vitamin D supplementation   she feels tired. Lab Results   Component Value Date    VITD25 11.7 (L) 03/15/2021       Hyponatremia  Patient did have severe hyponatremia 119 on 4/5/2022 for which nephrologist was consulted. Sodium at discharge was 128 on 4/7/2022. I discussed with her that Zoloft can give hyponatremia.   I discussed with her that beer can give hyponatremia and she does admit to drinking beer over the weekend \" a couple of beer over the weekends\" advised to stop drinking. Also hydrochlorothiazide was stopped. She denies muscle cramps or confusion. Depression is better  Taking Zoloft, Tolerates medications well, denies side effects. PHQ-2 Over the past 2 weeks, how often have you been bothered by any of the following problems? Little interest or pleasure in doing things: Not at all  Feeling down, depressed, or hopeless: Not at all  PHQ-2 Score: 0  PHQ-9 Over the past 2 weeks, how often have you been bothered by any of the following problems? Trouble falling or staying asleep, or sleeping too much: Not at all  Feeling tired or having little energy: Not at all  Poor appetite or overeating: Not at all  Feeling bad about yourself - or that you are a failure or have let yourself or your family down: Not at all  Trouble concentrating on things, such as reading the newspaper or watching television: Not at all  Moving or speaking so slowly that other people could have noticed.  Or the opposite - being so fidgety or restless that you have been moving around a lot more than usual: Not at all  Thoughts that you would be better off dead, or of hurting yourself in some way: Not at all  If you checked off any problems, how difficult have these problems made it for you to do your work, take care of things at home, or get along with other people?: Not difficult at all  PHQ-9 Total Score: 0  PHQ-9 Total Score: 0      PHQ Scores 4/15/2022 12/3/2021 7/15/2021 3/3/2021 11/3/2020 7/2/2020 2/10/2020   PHQ2 Score 0 0 0 1 0 1 0   PHQ9 Score 0 0 0 1 0 1 0         Patient Active Problem List   Diagnosis    Essential hypertension    Uterine polyp    Hyperlipidemia with target LDL less than 100    Hypothyroidism    Type 2 diabetes mellitus with microalbuminuria, without long-term current use of insulin ([address]    Left axis deviation    AV block, 2nd degree    Arthritis    Vitamin D deficiency    Recurrent major depressive disorder, in partial remission ([address]    Chronic obstructive pulmonary disease ([facility]    Colonoscopy refused    Family history of breast cancer in sister    Osteopenia determined by x-ray    Hoarseness of voice    Acquired elevated hemidiaphragm    Proteinuria    CKD (chronic kidney disease), stage I    Polycythemia    Pacemaker    S/P placement of cardiac pacemaker    CAD S/P percutaneous coronary angioplasty    Pneumonia    COPD exacerbation ([facility]    Uncontrolled type 2 diabetes mellitus with hyperglycemia ([facility]       Medications listed as ordered at the time of discharge from hospital     Medication List          Accurate as of April 15, 2022 10:55 AM. If you have any questions, ask your nurse or doctor. CHANGE how you take these medications    pravastatin 40 MG tablet  Commonly known as: PRAVACHOL  Take 1 tablet by mouth every evening For cholesterol  What changed: additional instructions        CONTINUE taking these medications    * albuterol sulfate  (90 Base) MCG/ACT inhaler  Commonly known as: Ventolin HFA  Inhale 2 puffs into the lungs every 6 hours as needed for Wheezing or Shortness of Breath (cough) INHALE 2 PUFFS INTO THE LUNGS 2 TIMES DAILY AS NEEDED FOR WHEEZING     * albuterol (2.5 MG/3ML) 0.083% nebulizer solution  Commonly known as: PROVENTIL  Take 3 mLs by nebulization every 6 hours as needed for Wheezing or Shortness of Breath     aspirin EC 81 MG EC tablet  Take 1 tablet by mouth daily     blood glucose test strips strip  Commonly known as: [employer] AUTODISC VI;ONE TOUCH ULTRA TEST VI  1 each by In Vitro route daily As needed.      clopidogrel 75 MG tablet  Commonly known as: Plavix  Take 1 tablet by mouth daily     fluticasone 50 MCG/ACT nasal spray  Commonly known as: FLONASE  2 sprays by Each Nostril route daily     glipiZIDE 10 MG tablet  Commonly known as: GLUCOTROL  Take 2 tablets by mouth 2 times daily Dose increased 12/3/2021     levothyroxine 200 MCG tablet  Commonly known as: SYNTHROID  TAKE 1 TABLET BY MOUTH EVERY DAY BEFORE BREAKFAST     metFORMIN 500 MG extended release tablet  Commonly known as: GLUCOPHAGE-XR  Take 1 tablet by mouth daily (with breakfast) Dose decreased 12/3/2021     metoprolol succinate 25 MG extended release tablet  Commonly known as: TOPROL XL  Take 1 tablet by mouth daily     Nebulizer/Tubing/Mouthpiece Kit  Dx. COPD, needs nebulizer supplies     nitroGLYCERIN 0.4 MG SL tablet  Commonly known as: NITROSTAT     pantoprazole 20 MG tablet  Commonly known as: PROTONIX  Take 1 tablet by mouth every morning (before breakfast) ** stop omeprazole**     sertraline 100 MG tablet  Commonly known as: ZOLOFT  Take 1 tablet by mouth daily     simethicone 80 MG chewable tablet  Commonly known as: MYLICON  Take 1 tablet by mouth 4 times daily as needed for Flatulence     vitamin D 1.25 MG (81827 UT) Caps capsule  Commonly known as: ERGOCALCIFEROL  TAKE 1 CAPSULE BY MOUTH ONE TIME PER WEEK         * This list has 2 medication(s) that are the same as other medications prescribed for you. Read the directions carefully, and ask your doctor or other care provider to review them with you.                  Medications marked \"taking\" at this time  Outpatient Medications Marked as Taking for the 4/15/22 encounter (Office Visit) with Ayla Aguilar MD   Medication Sig Dispense Refill    nitroGLYCERIN (NITROSTAT) 0.4 MG SL tablet PLACE 1 TABLET UNDER TONGUE AS DIRECTED EVERY 5 MINUTES FOR 3 DOSES AS NEEDED FOR CHEST PAIN      pantoprazole (PROTONIX) 20 MG tablet Take 1 tablet by mouth every morning (before breakfast) ** stop omeprazole** 90 tablet 1    clopidogrel (PLAVIX) 75 MG tablet Take 1 tablet by mouth daily 30 tablet 3    metoprolol succinate (TOPROL XL) 25 MG extended release tablet Take 1 tablet by mouth daily 30 tablet 0    glipiZIDE (GLUCOTROL) 10 MG tablet Take 2 tablets by mouth 2 times daily Dose increased 12/3/2021 180 tablet 3    levothyroxine (SYNTHROID) 200 MCG tablet TAKE 1 TABLET BY MOUTH EVERY DAY BEFORE BREAKFAST 90 tablet 3    metFORMIN (GLUCOPHAGE-XR) 500 MG extended release tablet Take 1 tablet by mouth daily (with breakfast) Dose decreased 12/3/2021 90 tablet 3    simethicone (MYLICON) 80 MG chewable tablet Take 1 tablet by mouth 4 times daily as needed for Flatulence 180 tablet 3    Respiratory Therapy Supplies (NEBULIZER/TUBING/MOUTHPIECE) KIT Dx. COPD, needs nebulizer supplies 1 kit 0    albuterol (PROVENTIL) (2.5 MG/3ML) 0.083% nebulizer solution Take 3 mLs by nebulization every 6 hours as needed for Wheezing or Shortness of Breath 125 each 3    sertraline (ZOLOFT) 100 MG tablet Take 1 tablet by mouth daily 90 tablet 3    blood glucose test strips (ASCENSIA AUTODISC VI;ONE TOUCH ULTRA TEST VI) strip 1 each by In Vitro route daily As needed. 100 each 3    pravastatin (PRAVACHOL) 40 MG tablet Take 1 tablet by mouth every evening For cholesterol (Patient taking differently: Take 40 mg by mouth every evening For cholesterol  Takes every other day per patient) 90 tablet 3    vitamin D (ERGOCALCIFEROL) 1.25 MG (71022 UT) CAPS capsule TAKE 1 CAPSULE BY MOUTH ONE TIME PER WEEK 12 capsule 0    albuterol sulfate HFA (VENTOLIN HFA) 108 (90 Base) MCG/ACT inhaler Inhale 2 puffs into the lungs every 6 hours as needed for Wheezing or Shortness of Breath (cough) INHALE 2 PUFFS INTO THE LUNGS 2 TIMES DAILY AS NEEDED FOR WHEEZING 1 Inhaler 3    aspirin EC 81 MG EC tablet Take 1 tablet by mouth daily 90 tablet 1    fluticasone (FLONASE) 50 MCG/ACT nasal spray 2 sprays by Each Nostril route daily 1 Bottle 0        Medications patient taking as of now reconciled against medications ordered at time of hospital discharge: Yes    Review of Systems   Constitutional: Positive for fatigue and unexpected weight change. Negative for activity change, appetite change, chills, diaphoresis and fever. HENT: Positive for voice change (chronic).     Respiratory: Positive for cough (with white mucus ) and shortness of breath. Negative for chest tightness and wheezing. Cardiovascular: Negative for chest pain, palpitations and leg swelling. Gastrointestinal: Negative for abdominal distention, abdominal pain, constipation, diarrhea, nausea and vomiting. Endocrine: Negative for cold intolerance, heat intolerance, polydipsia, polyphagia and polyuria. Genitourinary: Negative for decreased urine volume. Neurological: Negative for numbness. Hematological: Does not bruise/bleed easily. Psychiatric/Behavioral: Negative for dysphoric mood, self-injury, sleep disturbance and suicidal ideas. The patient is nervous/anxious. Objective:    BP (!) 158/112   Pulse 69   Temp 98.4 °F (36.9 °C)   Ht 5' 2\" (1.575 m)   Wt 155 lb 12.8 oz (70.7 kg)   SpO2 95%   BMI 28.50 kg/m²      Vital signs within normal limits except high blood pressure, mild hypoxemia, overweight per BMI    Physical Exam  Vitals and nursing note reviewed. Constitutional:       General: She is not in acute distress. Appearance: Normal appearance. She is well-developed. She is not diaphoretic. HENT:      Head: Normocephalic and atraumatic. Comments: hoarseness, same as before. Right Ear: External ear normal.      Left Ear: External ear normal.      Mouth/Throat:      Comments: I did not examine the mouth due to coronavirus pandemic and wearing masks    Eyes:      General: Lids are normal. No scleral icterus. Right eye: No discharge. Left eye: No discharge. Extraocular Movements: Extraocular movements intact. Conjunctiva/sclera: Conjunctivae normal.   Neck:      Thyroid: No thyromegaly. Cardiovascular:      Rate and Rhythm: Normal rate and regular rhythm. Heart sounds: Normal heart sounds. No murmur heard. Comments: Left upper chest pacemaker  Pulmonary:      Effort: Pulmonary effort is normal. No respiratory distress.       Breath sounds: Examination of the right-middle field reveals decreased breath sounds. Examination of the left-middle field reveals decreased breath sounds. Examination of the right-lower field reveals decreased breath sounds. Examination of the left-lower field reveals decreased breath sounds. Decreased breath sounds present. No wheezing or rales. Comments: Coughing during the encounter. Chest:      Chest wall: No tenderness. Abdominal:      General: Bowel sounds are normal. There is no distension. Palpations: Abdomen is soft. There is no hepatomegaly or splenomegaly. Tenderness: There is no abdominal tenderness. Musculoskeletal:         General: No tenderness. Normal range of motion. Cervical back: Normal range of motion and neck supple. Right lower leg: No edema. Left lower leg: No edema. Skin:     General: Skin is warm and dry. Capillary Refill: Capillary refill takes less than 2 seconds. Findings: No rash. Neurological:      Mental Status: She is alert and oriented to person, place, and time. Cranial Nerves: No cranial nerve deficit. Motor: No abnormal muscle tone. Psychiatric:         Mood and Affect: Mood is anxious. Behavior: Behavior normal.         Thought Content: Thought content normal.         Judgment: Judgment normal.       Most recent labs reviewed with the patient and all questions fully answered.   Leukocytosis  Anemia  Hyperglycemia controlled  Hyponatremia  Vitamin D deficiency  Increased bilirubin level  Increased microalbumin  Otherwise labs within normal limits      Lab Results   Component Value Date    WBC 16.1 (H) 04/07/2022    HGB 11.0 (L) 04/07/2022    HCT 31.2 (L) 04/07/2022    MCV 89.4 04/07/2022     04/07/2022       Lab Results   Component Value Date     04/07/2022    K 4.6 04/07/2022    CL 97 04/07/2022    CO2 24 04/07/2022    BUN 15 04/07/2022    CREATININE 0.41 04/07/2022    GLUCOSE 107 04/07/2022    GLUCOSE 212 04/26/2019    CALCIUM 8.6 04/07/2022        Lab Results   Component Value Date    ALT 8 04/05/2022    AST 14 04/05/2022    ALKPHOS 99 04/05/2022    BILITOT 1.34 (H) 04/05/2022       Lab Results   Component Value Date    TSH 0.50 04/06/2022       Lab Results   Component Value Date    CHOL 187 03/15/2021    CHOL 195 07/13/2020    CHOL 184 04/26/2019     Lab Results   Component Value Date    TRIG 149 03/15/2021    TRIG 120 07/13/2020    TRIG 165 (H) 04/26/2019     Lab Results   Component Value Date    HDL 60 03/15/2021    HDL 53 07/13/2020    HDL 49 04/26/2019     Lab Results   Component Value Date    LDLCALC 102 04/26/2019    LDLCALC 92 05/25/2018    LDLCALC 96 07/28/2017    LDLCHOLESTEROL 97 03/15/2021    LDLCHOLESTEROL 118 07/13/2020       Lab Results   Component Value Date    CHOLHDLRATIO 3.1 03/15/2021    CHOLHDLRATIO 3.7 07/13/2020    CHOLHDLRATIO 3.8 04/26/2019       Lab Results   Component Value Date    LABA1C 8.1 (H) 04/05/2022       Lab Results   Component Value Date    LGKRMGOT00 538 03/15/2021       Lab Results   Component Value Date    FOLATE 15.7 03/15/2021       Lab Results   Component Value Date    LABMICR 680 (H) 03/15/2021         No results found for: IRON, TIBC, FERRITIN    Lab Results   Component Value Date    VITD25 11.7 (L) 03/15/2021     Chest x-ray on 4/5/2022 showed right upper lobe infiltrate suggesting pneumonia    Electronically signed by Olga Adams MD on 4/17/22 at 4:57 PM EDT

## 2022-04-15 NOTE — PATIENT INSTRUCTIONS
Patient Education        Stopping Smoking: Care Instructions  Your Care Instructions     Cigarette smokers crave the nicotine in cigarettes. Giving it up is much harder than simply changing a habit. Your body has to stop craving the nicotine. It is hard to quit, but you can do it. There are many tools that people use to quitsmoking. You may find that combining tools works best for you. There are several steps to quitting. First you get ready to quit. Then you get support to help you. After that, you learn new skills and behaviors to become anonsmoker. For many people, a necessary step is getting and using medicine. Your doctor will help you set up the plan that best meets your needs. You may want to attend a smoking cessation program to help you quit smoking. When you choose a program, look for one that has proven success. Ask your doctor for ideas. You will greatly increase your chances of success if you take medicineas well as get counseling or join a cessation program.  Some of the changes you feel when you first quit tobacco are uncomfortable. Your body will miss the nicotine at first, and you may feel short-tempered and grumpy. You may have trouble sleeping or concentrating. Medicine can help you deal with these symptoms. You may struggle with changing your smoking habits and rituals. The last step is the tricky one: Be prepared for the smoking urge to continue for a time. This is a lot to deal with, but keep at it. You willfeel better. Follow-up care is a key part of your treatment and safety. Be sure to make and go to all appointments, and call your doctor if you are having problems. It's also a good idea to know your test results and keep alist of the medicines you take. How can you care for yourself at home?  Ask your family, friends, and coworkers for support. You have a better chance of quitting if you have help and support.    Join a support group, such as Nicotine Anonymous, for people who are trying to quit smoking.  Consider signing up for a smoking cessation program, such as the American Lung Association's Freedom from Smoking program.   Get text messaging support. Go to the website at www.smokefree. gov to sign up for the [facility] program.   Set a quit date. Pick your date carefully so that it is not right in the middle of a big deadline or stressful time. Once you quit, do not even take a puff. Get rid of all ashtrays and lighters after your last cigarette. Clean your house and your clothes so that they do not smell of smoke.  Learn how to be a nonsmoker. Think about ways you can avoid those things that make you reach for a cigarette. ? Avoid situations that put you at greatest risk for smoking. For some people, it is hard to have a drink with friends without smoking. For others, they might skip a coffee break with coworkers who smoke. ? Change your daily routine. Take a different route to work or eat a meal in a different place.  Cut down on stress. Calm yourself or release tension by doing an activity you enjoy, such as reading a book, taking a hot bath, or gardening.  Talk to your doctor or pharmacist about nicotine replacement therapy, which replaces the nicotine in your body. You still get nicotine but you do not use tobacco. Nicotine replacement products help you slowly reduce the amount of nicotine you need. These products come in several forms, many of them available over-the-counter:  ? Nicotine patches  ? Nicotine gum and lozenges  ? Nicotine inhaler   Ask your doctor about bupropion (Wellbutrin) or varenicline (Chantix), which are prescription medicines. They do not contain nicotine. They help you by reducing withdrawal symptoms, such as stress and anxiety.  Some people find hypnosis, acupuncture, and massage helpful for ending the smoking habit.  Eat a healthy diet and get regular exercise.  Having healthy habits will help your body move past its craving for nicotine.  Be prepared to keep trying. Most people are not successful the first few times they try to quit. Do not get mad at yourself if you smoke again. Make a list of things you learned and think about when you want to try again, such as next week, next month, or next year. Where can you learn more? Go to https://SoundOutpemodestoewmercedes.redBus.in. org and sign in to your [employer] account. Enter P119 in the MamboCar box to learn more about \"Stopping Smoking: Care Instructions. \"     If you do not have an account, please click on the \"Sign Up Now\" link. Current as of: October 28, 2021               Content Version: 13.2  © 2006-2022 Healthwise, Incorporated. Care instructions adapted under license by [facility] ([facility]). If you have questions about a medical condition or this instruction, always ask your healthcare professional. [employer] Ends any warranty or liability for your use of this information.

## 2022-04-17 ASSESSMENT — ENCOUNTER SYMPTOMS: VOICE CHANGE: 1

## 2022-04-22 ENCOUNTER — HOSPITAL ENCOUNTER (OUTPATIENT)
Age: 67
Discharge: HOME | End: 2022-04-22
Payer: MEDICARE

## 2022-04-22 DIAGNOSIS — E55.9 VITAMIN D DEFICIENCY: ICD-10-CM

## 2022-04-22 DIAGNOSIS — E11.29 TYPE 2 DIABETES MELLITUS WITH MICROALBUMINURIA, WITHOUT LONG-TERM CURRENT USE OF INSULIN (HCC): ICD-10-CM

## 2022-04-22 DIAGNOSIS — E87.1 HYPONATREMIA: ICD-10-CM

## 2022-04-22 DIAGNOSIS — E78.5 HYPERLIPIDEMIA WITH TARGET LDL LESS THAN 100: ICD-10-CM

## 2022-04-22 DIAGNOSIS — R80.9 TYPE 2 DIABETES MELLITUS WITH MICROALBUMINURIA, WITHOUT LONG-TERM CURRENT USE OF INSULIN (HCC): ICD-10-CM

## 2022-04-22 DIAGNOSIS — I10 ESSENTIAL HYPERTENSION: ICD-10-CM

## 2022-04-22 LAB
ALBUMIN SERPL-MCNC: 4.3 G/DL (ref 3.5–5.2)
ALP BLD-CCNC: 119 U/L (ref 35–104)
ALT SERPL-CCNC: 8 U/L (ref 5–33)
ANION GAP SERPL CALCULATED.3IONS-SCNC: 12 MMOL/L (ref 9–17)
AST SERPL-CCNC: 12 U/L
BILIRUB SERPL-MCNC: 0.69 MG/DL (ref 0.3–1.2)
BUN BLDV-MCNC: 11 MG/DL (ref 8–23)
CALCIUM SERPL-MCNC: 9.9 MG/DL (ref 8.6–10.4)
CHLORIDE BLD-SCNC: 93 MMOL/L (ref 98–107)
CHOLESTEROL, FASTING: 198 MG/DL
CHOLESTEROL/HDL RATIO: 3.9
CO2: 27 MMOL/L (ref 20–31)
CREAT SERPL-MCNC: 0.57 MG/DL (ref 0.5–0.9)
CREATININE URINE: 174.3 MG/DL (ref 28–217)
FOLATE: 10.6 NG/ML
GFR AFRICAN AMERICAN: >60 ML/MIN
GFR NON-AFRICAN AMERICAN: >60 ML/MIN
GFR SERPL CREATININE-BSD FRML MDRD: ABNORMAL ML/MIN/{1.73_M2}
GLUCOSE BLD-MCNC: 236 MG/DL (ref 70–99)
HCT VFR BLD CALC: 38.4 % (ref 36–46)
HDLC SERPL-MCNC: 51 MG/DL
HEMOGLOBIN: 13.1 G/DL (ref 12–16)
LDL CHOLESTEROL: 122 MG/DL (ref 0–130)
MCH RBC QN AUTO: 30.5 PG (ref 26–34)
MCHC RBC AUTO-ENTMCNC: 34.1 G/DL (ref 31–37)
MCV RBC AUTO: 89.4 FL (ref 80–100)
MICROALBUMIN/CREAT 24H UR: 431 MG/L
MICROALBUMIN/CREAT UR-RTO: 247 MCG/MG CREAT
PDW BLD-RTO: 13.9 % (ref 11.5–14.9)
PLATELET # BLD: 485 K/UL (ref 150–450)
PMV BLD AUTO: 6.8 FL (ref 6–12)
POTASSIUM SERPL-SCNC: 5.6 MMOL/L (ref 3.7–5.3)
RBC # BLD: 4.29 M/UL (ref 4–5.2)
SODIUM BLD-SCNC: 132 MMOL/L (ref 135–144)
TOTAL PROTEIN: 7.2 G/DL (ref 6.4–8.3)
TRIGLYCERIDE, FASTING: 127 MG/DL
URIC ACID: 2.5 MG/DL (ref 2.4–5.7)
VITAMIN B-12: 600 PG/ML (ref 232–1245)
VITAMIN D 25-HYDROXY: 10.7 NG/ML
WBC # BLD: 7.2 K/UL (ref 3.5–11)

## 2022-04-22 PROCEDURE — 82607 VITAMIN B-12: CPT

## 2022-04-22 PROCEDURE — 82746 ASSAY OF FOLIC ACID SERUM: CPT

## 2022-04-22 PROCEDURE — 85027 COMPLETE CBC AUTOMATED: CPT

## 2022-04-22 PROCEDURE — 84166 PROTEIN E-PHORESIS/URINE/CSF: CPT

## 2022-04-22 PROCEDURE — 82570 ASSAY OF URINE CREATININE: CPT

## 2022-04-22 PROCEDURE — 84550 ASSAY OF BLOOD/URIC ACID: CPT

## 2022-04-22 PROCEDURE — 80061 LIPID PANEL: CPT

## 2022-04-22 PROCEDURE — 36415 COLL VENOUS BLD VENIPUNCTURE: CPT

## 2022-04-22 PROCEDURE — 80053 COMPREHEN METABOLIC PANEL: CPT

## 2022-04-22 PROCEDURE — 84155 ASSAY OF PROTEIN SERUM: CPT

## 2022-04-22 PROCEDURE — 84156 ASSAY OF PROTEIN URINE: CPT

## 2022-04-22 PROCEDURE — 82306 VITAMIN D 25 HYDROXY: CPT

## 2022-04-22 PROCEDURE — 82043 UR ALBUMIN QUANTITATIVE: CPT

## 2022-04-22 PROCEDURE — 84165 PROTEIN E-PHORESIS SERUM: CPT

## 2022-04-25 DIAGNOSIS — E11.29 TYPE 2 DIABETES MELLITUS WITH MICROALBUMINURIA, WITHOUT LONG-TERM CURRENT USE OF INSULIN (HCC): ICD-10-CM

## 2022-04-25 DIAGNOSIS — R80.9 TYPE 2 DIABETES MELLITUS WITH MICROALBUMINURIA, WITHOUT LONG-TERM CURRENT USE OF INSULIN (HCC): ICD-10-CM

## 2022-04-25 LAB
ALBUMIN (CALCULATED): 4.3 G/DL (ref 3.2–5.2)
ALBUMIN PERCENT: 60 % (ref 45–65)
ALPHA 1 PERCENT: 3 % (ref 3–6)
ALPHA 2 PERCENT: 12 % (ref 6–13)
ALPHA-1-GLOBULIN: 0.2 G/DL (ref 0.1–0.4)
ALPHA-2-GLOBULIN: 0.8 G/DL (ref 0.5–0.9)
BETA GLOBULIN: 0.8 G/DL (ref 0.5–1.1)
BETA PERCENT: 11 % (ref 11–19)
GAMMA GLOBULIN %: 14 % (ref 9–20)
GAMMA GLOBULIN: 1 G/DL (ref 0.5–1.5)
PATHOLOGIST: NORMAL
PROTEIN ELECTROPHORESIS, SERUM: NORMAL
TOTAL PROT. SUM,%: 100 % (ref 98–102)
TOTAL PROT. SUM: 7.1 G/DL (ref 6.3–8.2)
TOTAL PROTEIN: 7.1 G/DL (ref 6.4–8.3)

## 2022-04-25 RX ORDER — GLIPIZIDE 10 MG/1
TABLET ORAL
Qty: 270 TABLET | Refills: 2 | Status: SHIPPED | OUTPATIENT
Start: 2022-04-25 | End: 2022-08-22 | Stop reason: SDUPTHER

## 2022-04-26 DIAGNOSIS — R80.9 PROTEINURIA, UNSPECIFIED TYPE: ICD-10-CM

## 2022-04-26 DIAGNOSIS — E55.9 VITAMIN D DEFICIENCY: ICD-10-CM

## 2022-04-26 DIAGNOSIS — E87.5 HYPERKALEMIA: Primary | ICD-10-CM

## 2022-04-26 DIAGNOSIS — E87.1 HYPONATREMIA: ICD-10-CM

## 2022-04-26 LAB
P E INTERPRETATION, U: NORMAL
PATHOLOGIST: NORMAL
URINE TOTAL PROTEIN: 88 MG/DL

## 2022-04-26 RX ORDER — SODIUM POLYSTYRENE SULFONATE 15 G/60ML
15 SUSPENSION ORAL; RECTAL DAILY
Qty: 120 ML | Refills: 0 | Status: SHIPPED | OUTPATIENT
Start: 2022-04-26 | End: 2022-05-10 | Stop reason: ALTCHOICE

## 2022-04-26 RX ORDER — ERGOCALCIFEROL 1.25 MG/1
50000 CAPSULE ORAL WEEKLY
Qty: 12 CAPSULE | Refills: 0 | Status: SHIPPED | OUTPATIENT
Start: 2022-04-26 | End: 2022-07-25

## 2022-05-02 ENCOUNTER — TELEPHONE (OUTPATIENT)
Dept: FAMILY MEDICINE CLINIC | Age: 67
End: 2022-05-02

## 2022-05-02 NOTE — TELEPHONE ENCOUNTER
Renal services called in regards to a referral they received for patient to establish care. Office  wanted to make sure patient wasn't being seen elsewhere for nephrology or if she scheduled an appt with nephrologist she seen in hospital.     I did call patient to verify if she was following up elsewhere. She stated \"no\" she will be calling renal services to schedule an appt. On her time when she finds the right time since she is so busy.

## 2022-05-05 ENCOUNTER — HOSPITAL ENCOUNTER (OUTPATIENT)
Age: 67
Discharge: HOME | End: 2022-05-05
Payer: MEDICARE

## 2022-05-05 DIAGNOSIS — E87.1 HYPONATREMIA: ICD-10-CM

## 2022-05-05 DIAGNOSIS — I10 ESSENTIAL HYPERTENSION: Primary | ICD-10-CM

## 2022-05-05 DIAGNOSIS — E87.5 HYPERKALEMIA: ICD-10-CM

## 2022-05-05 DIAGNOSIS — F33.41 RECURRENT MAJOR DEPRESSIVE DISORDER, IN PARTIAL REMISSION (HCC): ICD-10-CM

## 2022-05-05 DIAGNOSIS — R80.9 PROTEINURIA, UNSPECIFIED TYPE: ICD-10-CM

## 2022-05-05 LAB
ANION GAP SERPL CALCULATED.3IONS-SCNC: 12 MMOL/L (ref 9–17)
BUN BLDV-MCNC: 13 MG/DL (ref 8–23)
CALCIUM SERPL-MCNC: 9.3 MG/DL (ref 8.6–10.4)
CHLORIDE BLD-SCNC: 93 MMOL/L (ref 98–107)
CO2: 25 MMOL/L (ref 20–31)
CREAT SERPL-MCNC: 0.58 MG/DL (ref 0.5–0.9)
GFR AFRICAN AMERICAN: >60 ML/MIN
GFR NON-AFRICAN AMERICAN: >60 ML/MIN
GFR SERPL CREATININE-BSD FRML MDRD: ABNORMAL ML/MIN/{1.73_M2}
GLUCOSE BLD-MCNC: 201 MG/DL (ref 70–99)
POTASSIUM SERPL-SCNC: 4.2 MMOL/L (ref 3.7–5.3)
SODIUM BLD-SCNC: 130 MMOL/L (ref 135–144)

## 2022-05-05 PROCEDURE — 80048 BASIC METABOLIC PNL TOTAL CA: CPT

## 2022-05-05 PROCEDURE — 36415 COLL VENOUS BLD VENIPUNCTURE: CPT

## 2022-05-05 NOTE — RESULT ENCOUNTER NOTE
Please notify patient: Worsening low sodium, to absolutely avoid any alcoholic beverages . to only drink total 6 x 8 ounce glasses of water or fluids every day   Also to avoid pop due to very high sugar  Blood glucose high 201  Only Zoloft can cause her to have low sodium, we can decrease the dosage of Zoloft from 100 mg to 50 mg if she agrees    Recheck sodium level in a week, new blood work labs I will place    If any confusion over the weekend to go to the emergency room  Patient was previously referred to nephrologist, did not make appointment with nephrologist yet please give her contact information to schedule ,for her low sodium    Otherwise labs within normal limits  continue current treatment    Future Appointments  5/10/2022  9:00 AM    MD goran Grider sc               [facility]

## 2022-05-05 NOTE — RESULT ENCOUNTER NOTE
Please notify patient: Zoloft 50 mg sent to the pharmacy  Recheck labs in a week new order placed  Follow-up with nephrologist as referred    Future Appointments  5/10/2022  9:00 AM    MD goran Molina [facility]

## 2022-05-09 DIAGNOSIS — J44.9 CHRONIC OBSTRUCTIVE PULMONARY DISEASE, UNSPECIFIED COPD TYPE (HCC): ICD-10-CM

## 2022-05-09 RX ORDER — ALBUTEROL SULFATE 90 UG/1
2 AEROSOL, METERED RESPIRATORY (INHALATION) EVERY 6 HOURS PRN
Qty: 1 EACH | Refills: 5 | Status: SHIPPED | OUTPATIENT
Start: 2022-05-09

## 2022-05-09 NOTE — TELEPHONE ENCOUNTER
Please Approve or Refuse.   Send to Pharmacy per Pt's Request:      Next Visit Date:  5/10/2022   Last Visit Date: 4/15/2022    Hemoglobin A1C (%)   Date Value   04/05/2022 8.1 (H)   12/03/2021 8.4   07/15/2021 7.8             ( goal A1C is < 7)   BP Readings from Last 3 Encounters:   04/15/22 (!) 158/112   04/07/22 137/80   03/08/22 124/83          (goal 120/80)  BUN   Date Value Ref Range Status   05/05/2022 13 8 - 23 mg/dL Final     CREATININE   Date Value Ref Range Status   05/05/2022 0.58 0.50 - 0.90 mg/dL Final     Potassium   Date Value Ref Range Status   05/05/2022 4.2 3.7 - 5.3 mmol/L Final

## 2022-05-10 ENCOUNTER — OFFICE VISIT (OUTPATIENT)
Dept: FAMILY MEDICINE CLINIC | Age: 67
End: 2022-05-10
Payer: MEDICARE

## 2022-05-10 VITALS
BODY MASS INDEX: 28.34 KG/M2 | WEIGHT: 154 LBS | SYSTOLIC BLOOD PRESSURE: 136 MMHG | OXYGEN SATURATION: 96 % | DIASTOLIC BLOOD PRESSURE: 76 MMHG | HEIGHT: 62 IN | TEMPERATURE: 96.8 F | HEART RATE: 80 BPM

## 2022-05-10 DIAGNOSIS — Z00.00 MEDICARE ANNUAL WELLNESS VISIT, SUBSEQUENT: Primary | ICD-10-CM

## 2022-05-10 DIAGNOSIS — R93.89 ABNORMAL CXR: ICD-10-CM

## 2022-05-10 DIAGNOSIS — E87.1 HYPONATREMIA: ICD-10-CM

## 2022-05-10 DIAGNOSIS — J44.9 CHRONIC OBSTRUCTIVE PULMONARY DISEASE, UNSPECIFIED COPD TYPE (HCC): ICD-10-CM

## 2022-05-10 DIAGNOSIS — R80.9 TYPE 2 DIABETES MELLITUS WITH MICROALBUMINURIA, WITHOUT LONG-TERM CURRENT USE OF INSULIN (HCC): ICD-10-CM

## 2022-05-10 DIAGNOSIS — E11.29 TYPE 2 DIABETES MELLITUS WITH MICROALBUMINURIA, WITHOUT LONG-TERM CURRENT USE OF INSULIN (HCC): ICD-10-CM

## 2022-05-10 DIAGNOSIS — Z12.31 ENCOUNTER FOR SCREENING MAMMOGRAM FOR BREAST CANCER: ICD-10-CM

## 2022-05-10 DIAGNOSIS — Z12.11 SCREEN FOR COLON CANCER: ICD-10-CM

## 2022-05-10 LAB
CONTROL: PRESENT
HEMOCCULT STL QL: NEGATIVE

## 2022-05-10 PROCEDURE — 82274 ASSAY TEST FOR BLOOD FECAL: CPT | Performed by: FAMILY MEDICINE

## 2022-05-10 PROCEDURE — G0439 PPPS, SUBSEQ VISIT: HCPCS | Performed by: FAMILY MEDICINE

## 2022-05-10 PROCEDURE — 3052F HG A1C>EQUAL 8.0%<EQUAL 9.0%: CPT | Performed by: FAMILY MEDICINE

## 2022-05-10 RX ORDER — METFORMIN HYDROCHLORIDE 500 MG/1
500 TABLET, EXTENDED RELEASE ORAL 2 TIMES DAILY
Qty: 180 TABLET | Refills: 3
Start: 2022-05-10 | End: 2022-06-16

## 2022-05-10 ASSESSMENT — PATIENT HEALTH QUESTIONNAIRE - PHQ9
8. MOVING OR SPEAKING SO SLOWLY THAT OTHER PEOPLE COULD HAVE NOTICED. OR THE OPPOSITE, BEING SO FIGETY OR RESTLESS THAT YOU HAVE BEEN MOVING AROUND A LOT MORE THAN USUAL: 3
SUM OF ALL RESPONSES TO PHQ9 QUESTIONS 1 & 2: 0
SUM OF ALL RESPONSES TO PHQ QUESTIONS 1-9: 10
SUM OF ALL RESPONSES TO PHQ QUESTIONS 1-9: 10
7. TROUBLE CONCENTRATING ON THINGS, SUCH AS READING THE NEWSPAPER OR WATCHING TELEVISION: 0
1. LITTLE INTEREST OR PLEASURE IN DOING THINGS: 0
3. TROUBLE FALLING OR STAYING ASLEEP: 2
10. IF YOU CHECKED OFF ANY PROBLEMS, HOW DIFFICULT HAVE THESE PROBLEMS MADE IT FOR YOU TO DO YOUR WORK, TAKE CARE OF THINGS AT HOME, OR GET ALONG WITH OTHER PEOPLE: 1
6. FEELING BAD ABOUT YOURSELF - OR THAT YOU ARE A FAILURE OR HAVE LET YOURSELF OR YOUR FAMILY DOWN: 0
4. FEELING TIRED OR HAVING LITTLE ENERGY: 3
5. POOR APPETITE OR OVEREATING: 2
SUM OF ALL RESPONSES TO PHQ QUESTIONS 1-9: 10
9. THOUGHTS THAT YOU WOULD BE BETTER OFF DEAD, OR OF HURTING YOURSELF: 0
2. FEELING DOWN, DEPRESSED OR HOPELESS: 0
SUM OF ALL RESPONSES TO PHQ QUESTIONS 1-9: 10

## 2022-05-10 ASSESSMENT — LIFESTYLE VARIABLES
HOW MANY STANDARD DRINKS CONTAINING ALCOHOL DO YOU HAVE ON A TYPICAL DAY: 3 OR 4
HOW OFTEN DO YOU HAVE A DRINK CONTAINING ALCOHOL: MONTHLY OR LESS

## 2022-05-10 NOTE — PROGRESS NOTES
Medicare Annual Wellness Visit    Marc Salazar is here for Medicare AWV    Assessment & Plan   Medicare annual wellness visit, subsequent  Encounter for screening mammogram for breast cancer  -     SHARYN CHANDRAKANT DIGITAL SCREEN BILATERAL; Future  Encounter for immunization will return for Prevnar 20    Type 2 diabetes mellitus with microalbuminuria, without long-term current use of insulin (HCC)  Improving  Home blood glucose daily testing  Yearly eye exam  Continue current medications  Lab Results   Component Value Date    LABA1C 8.1 (H) 04/05/2022    LABA1C 8.4 12/03/2021    LABA1C 7.8 07/15/2021     Continue metformin 500 mg twice a day, glipizide 20 mg in the morning and 10 mg in the evening, baby aspirin 81 mg, pravastatin 40 mg, irbesartan 150 mg daily  -     metFORMIN (GLUCOPHAGE-XR) 500 MG extended release tablet; Take 1 tablet by mouth in the morning and at bedtime Dose increased 5/10/2022, Disp-180 tablet, R-3NO PRINT  Daily foot exam discussed  Chronic obstructive pulmonary disease, unspecified COPD type ([facility] [address]  Stable  I told the patient that she smokes so later that she can quit smoking, patient says she will continue to cut down  Continue Umeclinidium 1 inhalation daily, albuterol as needed  Abnormal CXR with prior pneumonia right upper lobe infiltrate 4/5/2022   we need to check if resolved    -     XR CHEST (2 VW); Future  Hyponatremia  Slightly worsening  I reprinted the referral to nephrology and advised her to make appointment, The patient verbalizes understanding and agrees with the plan. Advised to continue to cut down on beer, patient says she already did.   I cut down the dosage of Zoloft, she will start today  Repeat BMP in 1 week      Recommendations for Preventive Services Due: see orders and patient instructions/AVS.  Recommended screening schedule for the next 5-10 years is provided to the patient in written form: see Patient Instructions/AVS.    follow up with nephrology, pulmonology and BMP in 1 week         Return in 1 year (on 5/10/2023) for Medicare Annual Wellness Visit in 1 year, [address] [facility], [address] [place] [address], [address] Carolyn Balderrama [place]. Future Appointments   Date Time Provider Kathy Alanisi   5/17/2022 11:00 AM [facility] DIGITAL RM STCZ MAMMO [facility] Radiolog   8/17/2022  2:00 PM [facility]   5/10/2023 11:30 AM [facility]          Subjective   The following on chronic problems were also addressed today:    Hyponatremia slightly worsening, sodium level 132 on 4/22/2022, was 130 most recently. We contacted her and suggested to decrease the dosage of Zoloft and cut down on drinking. Will  Zoloft 50 mg today  Didn't see nephrology yet    Lab Results   Component Value Date     05/05/2022    K 4.2 05/05/2022    CL 93 05/05/2022    CO2 25 05/05/2022    BUN 13 05/05/2022    CREATININE 0.58 05/05/2022    GLUCOSE 201 05/05/2022    GLUCOSE 212 04/26/2019    CALCIUM 9.3 05/05/2022      Saw cardiology she has left upper chest pacemaker and 2 stents. Didn't see pulmonology  Prior chest x-ray did show right upper lobe pneumonia  She reports shortness of breath, and cough improved. She denies chest pain. She is cutting down on smoking. FIT negative today, praise given    DM type 2 type II  She reports compliance with glipizide 20 mg +10 mg, Metformin BID  She does not recall her blood glucose reading  Advised self monitoring daily  A1c improving fairly controlled  Lab Results   Component Value Date    LABA1C 8.1 (H) 04/05/2022    LABA1C 8.4 12/03/2021    LABA1C 7.8 07/15/2021   Urine microalbumin very high, she is on ARB  Lab Results   Component Value Date    LABMICR 247 (H) 04/22/2022       Patient's complete Health Risk Assessment and screening values have been reviewed and are found in Flowsheets. The following problems were reviewed today and where indicated follow up appointments were made and/or referrals ordered.     Positive Risk Factor Screenings with Interventions:    Fall Risk:  Do you feel unsteady or are you worried about falling? : (!) yes  2 or more falls in past year?: no  Fall with injury in past year?: no     Fall Risk Interventions:    · Home safety tips provided  · Patient declines any further evaluation/treatment for this issue, she says occasionally she feels unsteady, she absolutely declines physical therapy, home exercising for her joints discussed, including sitting exercises, she says she will do. Depression:  PHQ-2 Score: 0  PHQ-9 Total Score: 10    Severity:1-4 = minimal depression, 5-9 = mild depression, 10-14 = moderate depression, 15-19 = moderately severe depression, 20-27 = severe depression  Depression is stable, PHQ 2 is 0  Zoloft will decrease to 50 mg, she will start today    PHQ-2 Over the past 2 weeks, how often have you been bothered by any of the following problems? Little interest or pleasure in doing things: Not at all  Feeling down, depressed, or hopeless: Not at all  PHQ-2 Score: 0  PHQ-9 Over the past 2 weeks, how often have you been bothered by any of the following problems? Trouble falling or staying asleep, or sleeping too much: More than half the days  Feeling tired or having little energy: Nearly every day  Poor appetite or overeating: More than half the days  Feeling bad about yourself - or that you are a failure or have let yourself or your family down: Not at all  Trouble concentrating on things, such as reading the newspaper or watching television: Not at all  Moving or speaking so slowly that other people could have noticed.  Or the opposite - being so fidgety or restless that you have been moving around a lot more than usual: Nearly every day  Thoughts that you would be better off dead, or of hurting yourself in some way: Not at all  If you checked off any problems, how difficult have these problems made it for you to do your work, take care of things at home, or get along with other people?: Somewhat difficult  PHQ-9 Total Score: 10  PHQ-9 Total Score: 10    Mild depression  PHQ Scores 5/10/2022 4/15/2022 12/3/2021 7/15/2021 3/3/2021 11/3/2020 7/2/2020   PHQ2 Score 0 0 0 0 1 0 1   PHQ9 Score 10 0 0 0 1 0 1     Depression Interventions:  · Continue Zoloft we will decrease dosage to 50 mg to see if hyponatremia improves. Patient says she has been keeping her mind busy. Tobacco Use:     Tobacco Use: High Risk    Smoking Tobacco Use: Current Every Day Smoker    Smokeless Tobacco Use: Never Used     E-Cigarettes/Vaping Use     Questions Responses    E-Cigarette/Vaping Use Former User    Start Date     Passive Exposure     Quit Date     Counseling Given     Comments         Substance Use - Tobacco Interventions:  tobacco cessation tips and resources provided, patient agrees to try the following tobacco cessation strategies:  gradual reduction of tobacco use: cutting down 1/2 PPD from 1 PPD, praise given      Alcohol Screening:  AUDIT-C:   Alcohol Use: Heavy Drinker    Frequency of Alcohol Consumption: Monthly or less    Average Number of Drinks: 3 or 4    Frequency of Binge Drinking: Less than monthly        A total score of 8 or more is associated with harmful or hazardous drinking. A score of 13 or more in women, and 15 or more in men, is likely to indicate alcohol dependence. Not drinking everyday anymore only over the weekends, praise given, advised to continue to cut down and not to drink more than 1 or 2 drinks at the time, she says she will try.             General Health and ACP:  General  In general, how would you say your health is?: Fair  In the past 7 days, have you experienced any of the following: New or Increased Pain, New or Increased Fatigue, Loneliness, Social Isolation, Stress or Anger?: No  Do you get the social and emotional support that you need?: Yes  Do you have a Living Will?: (!) No    Advance Directives     Power of [address] [place] [address] Will ACP-Advance Directive ACP-Power of     Not on File Not on File Not on File Not on File      General Health Risk Interventions:  · No Living Will: Patient wants to have her paperwork given to her, she will discuss with her family    Health Habits/Nutrition:     Physical Activity: Insufficiently Active    Days of Exercise per Week: 4 days    Minutes of Exercise per Session: 10 min     Have you lost any weight without trying in the past 3 months?: (!) Yes  Body mass index: (!) 28.16  Have you seen the dentist within the past year?: (!) No    Health Habits/Nutrition Interventions:  · Nutritional issues:  educational materials for healthy, well-balanced diet provided  There is unintentional weight loss of 16 pounds in 1 year, advised to increase proteins in diet. Patient says she does not want to gain the weight back. Wt Readings from Last 3 Encounters:   05/10/22 154 lb (69.9 kg)   04/15/22 155 lb 12.8 oz (70.7 kg)   04/06/22 164 lb 7.4 oz (74.6 kg)   Wt 170 lb 6.4 oz (77.3 kg)   3/3/2021      · Dental exam overdue:  patient declines dental evaluation, has DENTURES    Hearing/Vision:  Do you or your family notice any trouble with your hearing that hasn't been managed with hearing aids?: No  Do you have difficulty driving, watching TV, or doing any of your daily activities because of your eyesight?: No  Have you had an eye exam within the past year?: (!) No   Visual Acuity Screening    Right eye Left eye Both eyes   Without correction: 20/40 20/40 20/40   With correction:        Hearing/Vision Interventions:  · Vision concerns:  patient encouraged to make appointment with his/her eye specialist            Objective   Vitals:    05/10/22 0914   BP: 136/76   Pulse: 80   Temp: 96.8 °F (36 °C)   SpO2: 96%   Weight: 154 lb (69.9 kg)   Height: 5' 2\" (1.575 m)      Body mass index is 28.17 kg/m².      Vital signs within normal limits, except mildly overweight per BMI     General Appearance: alert and oriented to person, place and time, well-developed and well-nourished, in no acute distress  ENT: bilateral tympanic membrane normal, left TMJ tenderness, advised lidocaine or heating pad  Pulmonary/Chest:  Decreased breath sounds moderately bilateral     Cardiovascular: normal rate, regular rhythm, normal S1 and S2 and pacemaker left upper chest    Abdomen: soft, non-tender, non-distended, normal bowel sounds, no masses or organomegaly  Extremities: no edema bilateral legs      Allergies   Allergen Reactions    Lisinopril Other (See Comments)     cough     Prior to Visit Medications    Medication Sig Taking?  Authorizing Provider   albuterol sulfate HFA (VENTOLIN HFA) 108 (90 Base) MCG/ACT inhaler Inhale 2 puffs into the lungs every 6 hours as needed for Wheezing or Shortness of Breath (cough) INHALE 2 PUFFS INTO THE LUNGS 2 TIMES DAILY AS NEEDED FOR WHEEZING Yes Kathy Cervantes MD   vitamin D (ERGOCALCIFEROL) 1.25 MG (86249 UT) CAPS capsule Take 1 capsule by mouth once a week Yes Mathew Padilla MD   glipiZIDE (GLUCOTROL) 10 MG tablet TAKE 2 TABLETS IN THE MORNING AND 1 TABLET IN THE EVENING Yes Kathy Cervantes MD   Umeclidinium Bromide 62.5 MCG/INH AEPB Inhale 1 each into the lungs daily Yes Mathew Padilla MD   pantoprazole (PROTONIX) 20 MG tablet Take 1 tablet by mouth every morning (before breakfast) ** stop omeprazole** Yes Mathew Padilla MD   clopidogrel (PLAVIX) 75 MG tablet Take 1 tablet by mouth daily Yes Milagro Little MD   metoprolol succinate (TOPROL XL) 25 MG extended release tablet Take 1 tablet by mouth daily Yes Milagro Little MD   levothyroxine (SYNTHROID) 200 MCG tablet TAKE 1 TABLET BY [place] Yes Mathew Padilla MD   metFORMIN (GLUCOPHAGE-XR) 500 MG extended release tablet Take 1 tablet by mouth daily (with breakfast) Dose decreased 12/3/2021 Yes Mathew Padilla MD   simethicone (MYLICON) 80 MG chewable tablet Take 1 tablet by mouth 4 times daily as needed for Flatulence Yes Jeffery Portillo APRN - CNP   Respiratory Therapy Supplies (NEBULIZER/TUBING/MOUTHPIECE) KIT Dx. COPD, needs nebulizer supplies Yes Dante Pickens MD   albuterol (PROVENTIL) (2.5 MG/3ML) 0.083% nebulizer solution Take 3 mLs by nebulization every 6 hours as needed for Wheezing or Shortness of Breath Yes Dante Pickens MD   blood glucose test strips (ASCENSIA AUTODISC VI;ONE TOUCH ULTRA TEST VI) strip 1 each by In Vitro route daily As needed. Yes Dante Pickens MD   pravastatin (PRAVACHOL) 40 MG tablet Take 1 tablet by mouth every evening For cholesterol  Patient taking differently: Take 40 mg by mouth every evening For cholesterol  Takes every other day per patient Yes Dante Pickens MD   aspirin EC 81 MG EC tablet Take 1 tablet by mouth daily Yes Dante Pickens MD   sertraline (ZOLOFT) 50 MG tablet Take 1 tablet by mouth daily Dose decreased on 5/5/2022  Patient not taking: Reported on 5/10/2022  Dante Pickens MD   sodium polystyrene (SPS) 15 GM/60ML suspension Take 60 mLs by mouth daily for 2 doses For high potassium.  Take today and tomorrow  Dante Pickens MD   nitroGLYCERIN (NITROSTAT) 0.4 MG SL tablet PLACE 1 TABLET UNDER TONGUE AS DIRECTED EVERY 5 MINUTES FOR 3 DOSES AS NEEDED FOR CHEST PAIN  Patient not taking: Reported on 5/10/2022  Historical Provider, MD   irbesartan (AVAPRO) 150 MG tablet Take 1 tablet by mouth daily  Dante Pickens MD   fluticasone (FLONASE) 50 MCG/ACT nasal spray 2 sprays by Each Nostril route daily  Patient not taking: Reported on 5/10/2022  MD Theresa Reynolds (Including outside providers/suppliers regularly involved in providing care):   Patient Care Team:  Dante Pickens MD as PCP - General (Family Medicine)  Dante Pickens MD as PCP - [facility]  Saeed Gomes DO as Consulting Physician (Obstetrics & Gynecology)  Daniel Montero MD as Consulting Physician (Pulmonology)  Kari Silva MD as Surgeon (Cardiology)    Reviewed and updated this visit:  Tobacco  Allergies  Meds  Problems  Med Hx  Surg Hx  Soc Hx  Fam Hx

## 2022-05-10 NOTE — RESULT ENCOUNTER NOTE
FIT negative, repeat in 1 year, discussed at appointment today      2022  11:00 AM   [facility] DIGITAL RM             STCZ MAMMO          [facility] Radiolog  2022  2:00 PM    Lynann Apgar, MD     fp sc               [facility]  5/10/2023  11:30 AM   Lynann Apgar, MD     fp kaylene Lazcano

## 2022-05-10 NOTE — PATIENT INSTRUCTIONS
Personalized Preventive Plan for Tori Galindo - 5/10/2022  Medicare offers a range of preventive health benefits. Some of the tests and screenings are paid in full while other may be subject to a deductible, co-insurance, and/or copay. Some of these benefits include a comprehensive review of your medical history including lifestyle, illnesses that may run in your family, and various assessments and screenings as appropriate. After reviewing your medical record and screening and assessments performed today your provider may have ordered immunizations, labs, imaging, and/or referrals for you. A list of these orders (if applicable) as well as your Preventive Care list are included within your After Visit Summary for your review. Other Preventive Recommendations:    · A preventive eye exam performed by an eye specialist is recommended every 1-2 years to screen for glaucoma; cataracts, macular degeneration, and other eye disorders. · A preventive dental visit is recommended every 6 months. · Try to get at least 150 minutes of exercise per week or 10,000 steps per day on a pedometer . · Order or download the FREE \"Exercise & Physical Activity: Your Everyday Guide\" from [employer]. Call 8-180.701.8381 or search [employer] online. · You need 5248-5335 mg of calcium and 0174-6272 IU of vitamin D per day. It is possible to meet your calcium requirement with diet alone, but a vitamin D supplement is usually necessary to meet this goal.  · When exposed to the sun, use a sunscreen that protects against both UVA and UVB radiation with an SPF of 30 or greater. Reapply every 2 to 3 hours or after sweating, drying off with a towel, or swimming. · Always wear a seat belt when traveling in a car. Always wear a helmet when riding a bicycle or motorcycle.     Preventing Osteoporosis: After Your Visit  Your Care Instructions  Osteoporosis means the bones are weak and thin enough that they can break easily. The older you are, the more likely you are to get osteoporosis. But with plenty of calcium, vitamin D, and exercise, you can help prevent osteoporosis. The preteen and teen years are a key time for bone building. With the help of calcium, vitamin D, and exercise in those early years and beyond, the bones reach their peak density and strength by age 27. After age 27, your bones naturally start to thin and weaken. The stronger your bones are at around age 27, the lower your risk for osteoporosis. But no matter what your age and risk are, your bones still need calcium, vitamin D, and exercise to stay strong. Also avoid smoking, and limit alcohol. Smoking and heavy alcohol use can make your bones thinner. Talk to your doctor about any special risks you might have, such as having a close relative with osteoporosis or taking a medicine that can weaken bones. Your doctor can tell you the best ways to protect your bones from thinning. Follow-up care is a key part of your treatment and safety. Be sure to make and go to all appointments, and call your doctor if you are having problems. It's also a good idea to know your test results and keep a list of the medicines you take. How can you care for yourself at home? Get enough calcium and vitamin D. The [employer] recommends adults younger than age 46 need 1,000 mg of calcium and 600 IU of vitamin D each day. Women ages 46 to 79 need 1,200 mg of calcium and 600 IU of vitamin D each day. Men ages 46 to 79 need 1,000 mg of calcium and 600 IU of vitamin D each day. Adults 71 and older need 1,200 mg of calcium and 800 IU of vitamin D each day. Eat foods rich in calcium, like yogurt, cheese, milk, and dark green vegetables. Eat foods rich in vitamin D, like eggs, fatty fish, cereal, and fortified milk. Get some sunshine. Your body uses sunshine to make its own vitamin D.  The safest time to be out in the sun is before 10 a.m. or after 3 p.m. Avoid getting sunburned. Sunburn can increase your risk of skin cancer. Talk to your doctor about taking a calcium plus vitamin D supplement. Ask about what type of calcium is right for you, and how much to take at a time. Adults ages 23 to 48 should not get more than 2,500 mg of calcium and 4,000 IU of vitamin D each day, whether it is from supplements and/or food. Adults ages 46 and older should not get more than 2,000 mg of calcium and 4,000 IU of vitamin D each day from supplements and/or food. Get regular bone-building exercise. Weight-bearing and resistance exercises keep bones healthy by working the muscles and bones against gravity. Start out at an exercise level that feels right for you. Add a little at a time until you can do the following:  Do 30 minutes of weight-bearing exercise on most days of the week. Walking, jogging, stair climbing, and dancing are good choices. Do resistance exercises with weights or elastic bands 2 to 3 days a week. Limit alcohol. Drink no more than 1 alcohol drink a day if you are a woman. Drink no more than 2 alcohol drinks a day if you are a man. Do not smoke. Smoking can make bones thin faster. If you need help quitting, talk to your doctor about stop-smoking programs and medicines. These can increase your chances of quitting for good. When should you call for help? Watch closely for changes in your health, and be sure to contact your doctor if:  You need help with a healthy eating plan. You need help with an exercise plan    © 6980-9691 [employer]. Care instructions adapted under license by [facility]. This care instruction is for use with your licensed healthcare professional. If you have questions about a medical condition or this instruction, always ask your healthcare professional. [place] [address] any warranty or liability for your use of this information. Content Version: 9.4.73202;  Last Revised: June 20, 2011              ·     Alcohol Abuse and Alcoholism   (Alcohol Dependence; Alcohol Use Disorder)       Definition   Alcohol abuse is excessive or problematic alcohol consumption. It can progress to alcoholism. Alcoholism is chronic alcohol abuse that results in a physical dependence on alcohol (withdrawal symptoms) and an inability to stop or limit drinking. Causes   Several factors can contribute to alcohol abuse and alcoholism, including:   Genes   Brain chemicals that may be different than normal   Social pressure   Emotional stress   Pain   Depression and other mental health problems   Problem drinking behaviors learned from family or friends   Risk Factors   These factors increase your chance of developing alcoholism. Tell your doctor if you have any of these risk factors:   Sex: male   Family members who abuse alcohol (especially men whose fathers or brothers are alcoholic)   Starting to use alcohol at an early age (younger than 15)   Using illicit drugs or non-medical use of prescription drugs   Peer pressure   Easy access to alcoholic beverages   Psychiatric disorders, such as depression or anxiety   Smoking   Symptoms   It is common to deny an alcohol problem. Alcohol abuse can occur without physical dependence.    Alcohol abuse symptoms include:   Repeated work, school, or home problems due to drinking   Risking physical safety   Recurring trouble with the law, often including drinking and driving   Continuing to drink despite alcohol-related difficulties   Symptoms of alcoholism include:   Craving a drink   Unable to stop or limit drinking   Needing greater amounts of alcohol to feel the same effect   Giving up activities in order to drink or recover from alcohol   Drinking that continues even when it causes or worsens health problems   Wanting to stop or reduce drinking, but not being able   Withdrawal symptoms if alcohol is stopped include:   Nausea   Sweating   Shaking   Anxiety   Increased blood pressure   Seizures ( delirium tremens [DTs])   The brain, nervous system, heart, liver, stomach, gastrointestinal tract, and pancreas can all be damaged by alcoholism. Some of the Organs Damaged in Alcohol Abuse        [address] [place] [address].   Diagnosis   Doctors ask a series of questions to assess possible alcohol-related problems, including:   Have you tried to reduce your drinking? Have you felt bad about drinking? Have you been annoyed by another person's criticism of your drinking? Do you drink in the morning to steady your nerves or cure a hangover? Do you have problems with a job, your family, or the law? Do you drive under the influence of alcohol? Blood tests may be done to:   Look at the size of your red blood cells and to check for a substance called carbohydrate-deficient transferrin   Check for alcohol-related liver disease and other health problems   Treatment   Treatment for alcohol abuse or dependence is aimed at teaching patients how to manage the disease. Most professionals believe that this means giving up alcohol completely and permanently. The first and most important step is recognizing a problem exists. Successful treatment depends on your desire to change. Your doctor can help you withdraw from alcohol safely. This could require hospitalization in a detoxification center. They will carefully monitor you for side effects. You may need medication while you are undergoing detoxification. Treatments include:   Medications    Drugs can help relieve some of the symptoms of withdrawal and help prevent relapse. The doctor may prescribe medication to reduce cravings for alcohol.    Medications used to treat alcoholism and to try to prevent drinking include:   Naltrexone (ReVia, Vivitrol)blocks the high that makes you crave alcohol   Disulfiram (Antabuse)makes you very sick if you drink alcohol   Acamprosate (Campral)reduces your craving for alcohol   A study showed that an anticonvulsant drug, topiramate (Topamax), may reduce alcohol dependence. Education and Counseling    Therapy helps you to recognize alcohol's dangers. Education raises awareness of underlying issues and lifestyles that promote drinking. In therapy, you work to improve coping skills and learn other ways of dealing with stress or pain. Mentoring and Community Help    Alcoholics Anonymous (AA) helps many people to stop drinking and stay sober. Members meet regularly and support each other. Your family members may also benefit from attending meetings of [employer]. Living with an alcoholic can be a painful, stressful situation. Here are some general statistics on treatment outcomes of individuals one year after attempting to stop drinkin/3 remained abstinent   1/3 resumed drinking but at a lower level   1/3 relapsed completely   If you are diagnosed with alcohol abuse or alcoholism, follow your doctor's instructions . Prevention   Realizing that alcohol causes problems helps some people avoid it. Suggestions to decrease the risk of alcohol abuse and dependence include:   Socialize without alcohol. Avoid going to bars. Do not keep alcohol in your home. Avoid situations and people that encourage drinking. Make new nondrinking friends. Do fun things that do not involve alcohol. Avoid reaching for a drink when stressed or upset. Limit your alcohol intake to a moderate level. Moderate is two or fewer drinks per day for men and one or fewer for women and older adults   A 12-ounce bottle of beer, a five-ounce glass of wine, or 1.5 ounces of liquor is considered one drink   If you are a parent, having a good relationship with your children may reduce their risk of alcohol abuse. Most professionals who treat alcohol abuse and dependence believe that complete abstinence is the only effective prevention.      Last Reviewed: 2010 Morteza Rodriguez MD, PhD, MPH   Updated: 2010 ·   Smoking Cessation  This document explains the best ways for you to quit smoking and new treatments to help. It lists new medicines that can double or triple your chances of quitting and quitting for good. It also considers ways to avoid relapses and concerns you may have about quitting, including weight gain. NICOTINE: A POWERFUL ADDICTION  If you have tried to quit smoking, you know how hard it can be. It is hard because nicotine is a very addictive drug. For some people, it can be as addictive as heroin or cocaine. Usually, people make 2 or 3 tries, or more, before finally being able to quit. Each time you try to quit, you can learn about what helps and what hurts. Quitting takes hard work and a lot of effort, but you can quit smoking. QUITTING SMOKING IS ONE OF THE MOST IMPORTANT THINGS YOU WILL EVER DO. You will live longer, feel better, and live better. The impact on your body of quitting smoking is felt almost immediately:  Within 20 minutes, blood pressure decreases. Pulse returns to its normal level. After 8 hours, carbon monoxide levels in the blood return to normal. Oxygen level increases. After 24 hours, chance of heart attack starts to decrease. Breath, hair, and body stop smelling like smoke. After 48 hours, damaged nerve endings begin to recover. Sense of taste and smell improve. After 72 hours, the body is virtually free of nicotine. Bronchial tubes relax and breathing becomes easier. After 2 to 12 weeks, lungs can hold more air. Exercise becomes easier and circulation improves. Quitting will reduce your risk of having a heart attack, stroke, cancer, or lung disease:  After 1 year, the risk of coronary heart disease is cut in half. After 5 years, the risk of stroke falls to the same as a nonsmoker. After 10 years, the risk of lung cancer is cut in half and the risk of other cancers decreases significantly.   After 15 years, the risk of coronary heart disease drops, usually to the level of a nonsmoker. If you are pregnant, quitting smoking will improve your chances of having a healthy baby. The people you live with, especially your children, will be healthier. You will have extra money to spend on things other than cigarettes. FIVE KEYS TO QUITTING  Studies have shown that these 5 steps will help you quit smoking and quit for good. You have the best chances of quitting if you use them together:  Get ready. Get support and encouragement. Learn new skills and behaviors. Get medicine to reduce your nicotine addiction and use it correctly. Be prepared for relapse or difficult situations. Be determined to continue trying to quit, even if you do not succeed at first.  1. GET READY  Set a quit date. Change your environment. Get rid of ALL cigarettes, ashtrays, matches, and lighters in your home, car, and place of work. Do not let people smoke in your home. Review your past attempts to quit. Think about what worked and what did not. Once you quit, do not smoke. NOT EVEN A PUFF! 2. GET SUPPORT AND ENCOURAGEMENT  Studies have shown that you have a better chance of being successful if you have help. You can get support in many ways. Tell your family, friends, and coworkers that you are going to quit and need their support. Ask them not to smoke around you. Talk to your caregivers (doctor, dentist, nurse, pharmacist, psychologist, and/or smoking counselor). Get individual, group, or telephone counseling and support. The more counseling you have, the better your chances are of quitting. Programs are available at [facility]. Call your local health department for information about programs in your area. Spiritual beliefs and practices may help some smokers quit. Quit meters are small computer programs online or downloadable that keep track of quit statistics, such as amount of \"quit-time,\" cigarettes not smoked, and money saved.   Many smokers find one or more of the many self-help books available useful in helping them quit and stay off tobacco.  3. LEARN NEW SKILLS AND BEHAVIORS  Try to distract yourself from urges to smoke. Talk to someone, go for a walk, or occupy your time with a task. When you first try to quit, change your routine. Take a different route to work. Drink tea instead of coffee. Eat breakfast in a different place. Do something to reduce your stress. Take a hot bath, exercise, or read a book. Plan something enjoyable to do every day. Reward yourself for not smoking. Explore interactive web-based programs that specialize in helping you quit. 4. GET MEDICINE AND USE IT CORRECTLY  Medicines can help you stop smoking and decrease the urge to smoke. Combining medicine with the above behavioral methods and support can quadruple your chances of successfully quitting smoking. The [place] Food and Drug Administration (FDA) has approved 7 medicines to help you quit smoking. These medicines fall into 3 categories. Nicotine replacement therapy (delivers nicotine to your body without the negative effects and risks of smoking):  Nicotine gum: Available over-the-counter. Nicotine lozenges: Available over-the-counter. Nicotine inhaler: Available by prescription. Nicotine nasal spray: Available by prescription. Nicotine skin patches (transdermal): Available by prescription and over-the-counter. Antidepressant medicine (helps people abstain from smoking, but how this works is unknown): Bupropion sustained-release (SR) tablets: Available by prescription. Nicotinic receptor partial agonist (simulates the effect of nicotine in your brain):  Varenicline tartrate tablets: Available by prescription. Ask your caregiver for advice about which medicines to use and how to use them. Carefully read the information on the package. Everyone who is trying to quit may benefit from using a medicine.  If you are pregnant or trying to become pregnant, nursing an infant, you are under age 25, or you smoke fewer than 10 cigarettes per day, talk to your caregiver before taking any nicotine replacement medicines. You should stop using a nicotine replacement product and call your caregiver if you experience nausea, dizziness, weakness, vomiting, fast or irregular heartbeat, mouth problems with the lozenge or gum, or redness or swelling of the skin around the patch that does not go away. Do not use any other product containing nicotine while using a nicotine replacement product. Talk to your caregiver before using these products if you have diabetes, heart disease, asthma, stomach ulcers, you had a recent heart attack, you have high blood pressure that is not controlled with medicine, a history of irregular heartbeat, or you have been prescribed medicine to help you quit smoking. 5. BE PREPARED FOR RELAPSE OR DIFFICULT SITUATIONS  Most relapses occur within the first 3 months after quitting. Do not be discouraged if you start smoking again. Remember, most people try several times before they finally quit. You may have symptoms of withdrawal because your body is used to nicotine. You may crave cigarettes, be irritable, feel very hungry, cough often, get headaches, or have difficulty concentrating. The withdrawal symptoms are only temporary. They are strongest when you first quit, but they will go away within 10 to 14 days. Here are some difficult situations to watch for:  Alcohol. Avoid drinking alcohol. Drinking lowers your chances of successfully quitting. Caffeine. Try to reduce the amount of caffeine you consume. It also lowers your chances of successfully quitting. Other smokers. Being around smoking can make you want to smoke. Avoid smokers. Weight gain. Many smokers will gain weight when they quit, usually less than 10 pounds. Eat a healthy diet and stay active. Do not let weight gain distract you from your main goal, quitting smoking.  Some medicines that help you quit smoking may also help delay weight gain. You can always lose the weight gained after you quit. Bad mood or depression. There are a lot of ways to improve your mood other than smoking. If you are having problems with any of these situations, talk to your caregiver. SPECIAL SITUATIONS AND CONDITIONS  Studies suggest that everyone can quit smoking. Your situation or condition can give you a special reason to quit. Pregnant women/new mothers: By quitting, you protect your baby's health and your own. Hospitalized patients: By quitting, you reduce health problems and help healing. Heart attack patients: By quitting, you reduce your risk of a second heart attack. Lung, head, and neck cancer patients: By quitting, you reduce your chance of a second cancer. Parents of children and adolescents: By quitting, you protect your children from illnesses caused by secondhand smoke. QUESTIONS TO THINK ABOUT  Think about the following questions before you try to stop smoking. You may want to talk about your answers with your caregiver. Why do you want to quit? If you tried to quit in the past, what helped and what did not? What will be the most difficult situations for you after you quit? How will you plan to handle them? Who can help you through the tough times? Your family? Friends? Caregiver? What pleasures do you get from smoking? What ways can you still get pleasure if you quit? Here are some questions to ask your caregiver: How can you help me to be successful at quitting? What medicine do you think would be best for me and how should I take it? What should I do if I need more help? What is smoking withdrawal like? How can I get information on withdrawal?  Quitting takes hard work and a lot of effort, but you can quit smoking. FOR MORE INFORMATION   Smokefree. gov ([employer]) provides free, accurate, evidence-based information and professional assistance to help support the immediate and long-term needs of people trying to quit smoking. Document Released: 12/12/2002 Document Revised: 12/06/2012 Document Reviewed: 10/04/2010  SMITA DELEON [facility] Patient Information ©2012 Dimitry George.     ·

## 2022-05-10 NOTE — PROGRESS NOTES
Visit Information    Have you changed or started any medications since your last visit including any over-the-counter medicines, vitamins, or herbal medicines? no   Have you stopped taking any of your medications? Is so, why? -  no  Are you having any side effects from any of your medications? - no    Have you seen any other physician or provider since your last visit? yes -    Have you had any other diagnostic tests since your last visit? yes -    Have you been seen in the emergency room and/or had an admission in a hospital since we last saw you?  yes -    Have you had your routine dental cleaning in the past 6 months?  no     Do you have an active MyChart account? If no, what is the barrier?   Yes    Patient Care Team:  Lul Baltazar MD as PCP - General (Family Medicine)  Lul Baltazar MD as PCP - [facility] Provider  Travis Zheng DO as Consulting Physician (Obstetrics & Gynecology)    Medical History Review  Past Medical, Family, and Social History reviewed and does contribute to the patient presenting condition    Health Maintenance   Topic Date Due    DTaP/Tdap/Td vaccine (1 - Tdap) Never done    Pneumococcal 65+ years Vaccine (2 - PCV) 09/25/2018    Shingles vaccine (2 of 2) 12/29/2020    Annual Wellness Visit (AWV)  11/04/2021    Breast cancer screen  01/19/2022    Diabetic retinal exam  06/07/2022    Diabetic foot exam  07/15/2022    DEXA (modify frequency per FRAX score)  01/19/2023    A1C test (Diabetic or Prediabetic)  04/05/2023    TSH  04/06/2023    Depression Monitoring  04/15/2023    Lipids  04/22/2023    Potassium  05/05/2023    Creatinine  05/05/2023    Colorectal Cancer Screen  05/10/2023    Flu vaccine  Completed    COVID-19 Vaccine  Completed    Hepatitis C screen  Completed    Hepatitis A vaccine  Aged Out    Hib vaccine  Aged Out    Meningococcal (ACWY) vaccine  Aged Out

## 2022-05-17 DIAGNOSIS — E78.5 HYPERLIPIDEMIA WITH TARGET LDL LESS THAN 100: ICD-10-CM

## 2022-05-17 RX ORDER — PRAVASTATIN SODIUM 40 MG
40 TABLET ORAL EVERY EVENING
Qty: 90 TABLET | Refills: 3 | Status: SHIPPED | OUTPATIENT
Start: 2022-05-17

## 2022-05-23 ENCOUNTER — HOSPITAL ENCOUNTER (OUTPATIENT)
Age: 67
Discharge: HOME | End: 2022-05-23
Payer: MEDICARE

## 2022-05-23 DIAGNOSIS — E11.29 TYPE 2 DIABETES MELLITUS WITH MICROALBUMINURIA, WITHOUT LONG-TERM CURRENT USE OF INSULIN (HCC): ICD-10-CM

## 2022-05-23 DIAGNOSIS — E55.9 VITAMIN D DEFICIENCY: ICD-10-CM

## 2022-05-23 DIAGNOSIS — E87.1 HYPONATREMIA: Primary | ICD-10-CM

## 2022-05-23 DIAGNOSIS — R80.9 TYPE 2 DIABETES MELLITUS WITH MICROALBUMINURIA, WITHOUT LONG-TERM CURRENT USE OF INSULIN (HCC): ICD-10-CM

## 2022-05-23 DIAGNOSIS — R80.9 MICROALBUMINURIA: ICD-10-CM

## 2022-05-23 DIAGNOSIS — N18.1 SECONDARY DIABETES MELLITUS WITH STAGE 1 CHRONIC KIDNEY DISEASE (HCC): ICD-10-CM

## 2022-05-23 DIAGNOSIS — N18.1 CKD (CHRONIC KIDNEY DISEASE), STAGE I: ICD-10-CM

## 2022-05-23 DIAGNOSIS — E13.22 SECONDARY DIABETES MELLITUS WITH STAGE 1 CHRONIC KIDNEY DISEASE (HCC): ICD-10-CM

## 2022-05-23 DIAGNOSIS — N18.1 BENIGN HYPERTENSION WITH CKD (CHRONIC KIDNEY DISEASE) STAGE I: ICD-10-CM

## 2022-05-23 DIAGNOSIS — I12.9 BENIGN HYPERTENSION WITH CKD (CHRONIC KIDNEY DISEASE) STAGE I: ICD-10-CM

## 2022-05-23 DIAGNOSIS — I10 ESSENTIAL HYPERTENSION: ICD-10-CM

## 2022-05-23 DIAGNOSIS — E83.42 HYPOMAGNESEMIA: ICD-10-CM

## 2022-05-23 DIAGNOSIS — E87.1 HYPONATREMIA: ICD-10-CM

## 2022-05-23 LAB
ABSOLUTE EOS #: 0 K/UL (ref 0–0.4)
ABSOLUTE LYMPH #: 1.9 K/UL (ref 1–4.8)
ABSOLUTE MONO #: 0.6 K/UL (ref 0.1–1.3)
ANION GAP SERPL CALCULATED.3IONS-SCNC: 11 MMOL/L (ref 9–17)
BACTERIA: ABNORMAL
BASOPHILS # BLD: 1 % (ref 0–2)
BASOPHILS ABSOLUTE: 0 K/UL (ref 0–0.2)
BILIRUBIN URINE: ABNORMAL
BUN BLDV-MCNC: 11 MG/DL (ref 8–23)
CALCIUM SERPL-MCNC: 9.7 MG/DL (ref 8.6–10.4)
CASTS UA: ABNORMAL /LPF
CHLORIDE BLD-SCNC: 92 MMOL/L (ref 98–107)
CO2: 28 MMOL/L (ref 20–31)
COLOR: ABNORMAL
CREAT SERPL-MCNC: 0.61 MG/DL (ref 0.5–0.9)
EOSINOPHILS RELATIVE PERCENT: 0 % (ref 0–4)
EPITHELIAL CELLS UA: ABNORMAL /HPF
GFR AFRICAN AMERICAN: >60 ML/MIN
GFR NON-AFRICAN AMERICAN: >60 ML/MIN
GFR SERPL CREATININE-BSD FRML MDRD: ABNORMAL ML/MIN/{1.73_M2}
GLUCOSE BLD-MCNC: 211 MG/DL (ref 70–99)
GLUCOSE URINE: NEGATIVE
HCT VFR BLD CALC: 41 % (ref 36–46)
HEMOGLOBIN: 14 G/DL (ref 12–16)
KETONES, URINE: ABNORMAL
LEUKOCYTE ESTERASE, URINE: ABNORMAL
LYMPHOCYTES # BLD: 23 % (ref 24–44)
MAGNESIUM: 1.6 MG/DL (ref 1.6–2.6)
MCH RBC QN AUTO: 30.8 PG (ref 26–34)
MCHC RBC AUTO-ENTMCNC: 34.1 G/DL (ref 31–37)
MCV RBC AUTO: 90.5 FL (ref 80–100)
MONOCYTES # BLD: 7 % (ref 1–7)
NITRITE, URINE: NEGATIVE
PDW BLD-RTO: 14.4 % (ref 11.5–14.9)
PH UA: 5.5 (ref 5–8)
PHOSPHORUS: 4.5 MG/DL (ref 2.6–4.5)
PLATELET # BLD: 400 K/UL (ref 150–450)
PMV BLD AUTO: 7.2 FL (ref 6–12)
POTASSIUM SERPL-SCNC: 5.3 MMOL/L (ref 3.7–5.3)
PROTEIN UA: ABNORMAL
RBC # BLD: 4.53 M/UL (ref 4–5.2)
RBC UA: ABNORMAL /HPF
SEG NEUTROPHILS: 69 % (ref 36–66)
SEGMENTED NEUTROPHILS ABSOLUTE COUNT: 5.9 K/UL (ref 1.3–9.1)
SODIUM BLD-SCNC: 131 MMOL/L (ref 135–144)
SPECIFIC GRAVITY UA: 1.02 (ref 1–1.03)
TURBIDITY: CLEAR
URINE HGB: NEGATIVE
UROBILINOGEN, URINE: NORMAL
WBC # BLD: 8.4 K/UL (ref 3.5–11)
WBC UA: ABNORMAL /HPF

## 2022-05-23 PROCEDURE — 36415 COLL VENOUS BLD VENIPUNCTURE: CPT

## 2022-05-23 PROCEDURE — 85025 COMPLETE CBC W/AUTO DIFF WBC: CPT

## 2022-05-23 PROCEDURE — 83735 ASSAY OF MAGNESIUM: CPT

## 2022-05-23 PROCEDURE — 84100 ASSAY OF PHOSPHORUS: CPT

## 2022-05-23 PROCEDURE — 80048 BASIC METABOLIC PNL TOTAL CA: CPT

## 2022-05-23 PROCEDURE — 81001 URINALYSIS AUTO W/SCOPE: CPT

## 2022-05-23 RX ORDER — MULTIVITAMIN/IRON/FOLIC ACID 18MG-0.4MG
250 TABLET ORAL DAILY
Qty: 90 TABLET | Refills: 3 | Status: SHIPPED | OUTPATIENT
Start: 2022-05-23

## 2022-05-23 NOTE — RESULT ENCOUNTER NOTE
Please notify patient: Mildly low magnesium 1.6 sodium 131 slightly improved from 2 weeks ago, sugar very high 211  continue current treatment    I would like her to repeat sodium and magnesium again in 1 week I will place new orders, not fasting    I will also send magnesium to the pharmacy      Future Appointments  7/21/2022  9:30 AM    Angela Hart MD   [facility]        [facility]  8/17/2022  2:00 PM    Balwinder Easley MD     [facility]  5/10/2023  11:30 AM   Balwinder Easley MD     [facility]

## 2022-05-25 NOTE — RESULT ENCOUNTER NOTE
Noted  Future Appointments  7/21/2022  9:30 AM    Adria Rios MD   [facility]        [facility]  8/17/2022  2:00 PM    Zainab Carroll MD     [facility]  5/10/2023  11:30 AM   Zainab Carroll MD     [facility]

## 2022-06-08 ENCOUNTER — HOSPITAL ENCOUNTER (OUTPATIENT)
Dept: GENERAL RADIOLOGY | Age: 67
Discharge: HOME | End: 2022-06-10
Payer: MEDICARE

## 2022-06-08 ENCOUNTER — HOSPITAL ENCOUNTER (OUTPATIENT)
Age: 67
Discharge: HOME | End: 2022-06-10
Payer: MEDICARE

## 2022-06-08 DIAGNOSIS — R93.89 ABNORMAL CXR: ICD-10-CM

## 2022-06-08 PROCEDURE — 71046 X-RAY EXAM CHEST 2 VIEWS: CPT

## 2022-06-09 NOTE — RESULT ENCOUNTER NOTE
Please notify patient: Right upper lobe possible infiltrate or scar  follow up .with Dr. Lavinia Lorenzo     Will need recheck CXR in 4-6 weeks or event CT lung.    Referral is still open  [facility]- Sonia Alvarez MD   [address] [place], [place] [phone]    Future Appointments  8/17/2022  9:00 AM    Miguel Sung MD   [facility]        [facility]  8/17/2022  2:00 PM    Carol Crowley MD     [facility]               [id number]  5/10/2023  11:30 AM   Carol Crowley MD     [facility]               [facility]

## 2022-06-16 DIAGNOSIS — E11.29 TYPE 2 DIABETES MELLITUS WITH MICROALBUMINURIA, WITHOUT LONG-TERM CURRENT USE OF INSULIN (HCC): ICD-10-CM

## 2022-06-16 DIAGNOSIS — R80.9 TYPE 2 DIABETES MELLITUS WITH MICROALBUMINURIA, WITHOUT LONG-TERM CURRENT USE OF INSULIN (HCC): ICD-10-CM

## 2022-06-16 RX ORDER — METFORMIN HYDROCHLORIDE 500 MG/1
TABLET, EXTENDED RELEASE ORAL
Qty: 180 TABLET | Refills: 3 | Status: SHIPPED | OUTPATIENT
Start: 2022-06-16

## 2022-06-27 DIAGNOSIS — K21.9 GASTROESOPHAGEAL REFLUX DISEASE WITHOUT ESOPHAGITIS: ICD-10-CM

## 2022-06-27 RX ORDER — PANTOPRAZOLE SODIUM 20 MG/1
20 TABLET, DELAYED RELEASE ORAL
Qty: 90 TABLET | Refills: 1 | Status: SHIPPED | OUTPATIENT
Start: 2022-06-27

## 2022-07-25 DIAGNOSIS — E55.9 VITAMIN D DEFICIENCY: ICD-10-CM

## 2022-07-25 RX ORDER — ERGOCALCIFEROL 1.25 MG/1
CAPSULE ORAL
Qty: 12 CAPSULE | Refills: 0 | Status: SHIPPED | OUTPATIENT
Start: 2022-07-25 | End: 2022-10-13

## 2022-08-16 NOTE — PROGRESS NOTES
Visit Information    Have you changed or started any medications since your last visit including any over-the-counter medicines, vitamins, or herbal medicines? no   Have you stopped taking any of your medications? Is so, why? -  no  Are you having any side effects from any of your medications? - no    Have you seen any other physician or provider since your last visit? yes -    Have you had any other diagnostic tests since your last visit?  no   Have you been seen in the emergency room and/or had an admission in a hospital since we last saw you?  no   Have you had your routine dental cleaning in the past 6 months?  no     Do you have an active MyChart account? If no, what is the barrier?   Yes    Patient Care Team:  Neida Apodaca MD as PCP - General (Family Medicine)  Neida Apodaca MD as PCP - [address] [place] [address] Provider  Mohit Bear DO as Consulting Physician (Obstetrics & Gynecology)  Brenden Freitas MD as Consulting Physician (Pulmonology)  Yevonne Olszewski, MD as Surgeon (Cardiology)    Medical History Review  Past Medical, Family, and Social History reviewed and does contribute to the patient presenting condition    Health Maintenance   Topic Date Due    DTaP/Tdap/Td vaccine (1 - Tdap) Never done    Pneumococcal 65+ years Vaccine (2 - PCV) 09/25/2018    Shingles vaccine (2 of 2) 12/29/2020    Breast cancer screen  01/19/2022    COVID-19 Vaccine (4 - Booster for Laura Most series) 04/10/2022    Diabetic retinal exam  06/07/2022    Diabetic foot exam  07/15/2022    Flu vaccine (1) 09/01/2022    DEXA (modify frequency per FRAX score)  01/19/2023    A1C test (Diabetic or Prediabetic)  04/05/2023    Lipids  04/22/2023    Depression Monitoring  05/10/2023    Colorectal Cancer Screen  05/10/2023    Annual Wellness Visit (AWV)  05/11/2023    Hepatitis C screen  Completed    Hepatitis A vaccine  Aged Out    Hib vaccine  Aged Out    Meningococcal (ACWY) vaccine  Aged Out

## 2022-08-17 ENCOUNTER — OFFICE VISIT (OUTPATIENT)
Dept: FAMILY MEDICINE CLINIC | Age: 67
End: 2022-08-17
Payer: MEDICARE

## 2022-08-17 DIAGNOSIS — E11.29 TYPE 2 DIABETES MELLITUS WITH MICROALBUMINURIA, WITHOUT LONG-TERM CURRENT USE OF INSULIN (HCC): ICD-10-CM

## 2022-08-17 DIAGNOSIS — I10 ESSENTIAL HYPERTENSION: Primary | ICD-10-CM

## 2022-08-17 DIAGNOSIS — R80.9 TYPE 2 DIABETES MELLITUS WITH MICROALBUMINURIA, WITHOUT LONG-TERM CURRENT USE OF INSULIN (HCC): ICD-10-CM

## 2022-08-17 DIAGNOSIS — F33.41 RECURRENT MAJOR DEPRESSIVE DISORDER, IN PARTIAL REMISSION (HCC): ICD-10-CM

## 2022-08-17 DIAGNOSIS — Z12.31 ENCOUNTER FOR SCREENING MAMMOGRAM FOR BREAST CANCER: ICD-10-CM

## 2022-08-17 DIAGNOSIS — J44.9 CHRONIC OBSTRUCTIVE PULMONARY DISEASE, UNSPECIFIED COPD TYPE (HCC): ICD-10-CM

## 2022-08-17 DIAGNOSIS — E78.5 HYPERLIPIDEMIA WITH TARGET LDL LESS THAN 100: ICD-10-CM

## 2022-08-17 DIAGNOSIS — Z87.891 PERSONAL HISTORY OF TOBACCO USE: ICD-10-CM

## 2022-08-17 DIAGNOSIS — E87.1 HYPONATREMIA: ICD-10-CM

## 2022-08-17 LAB — HBA1C MFR BLD: 7.3 %

## 2022-08-17 PROCEDURE — 1124F ACP DISCUSS-NO DSCNMKR DOCD: CPT | Performed by: FAMILY MEDICINE

## 2022-08-17 PROCEDURE — 99214 OFFICE O/P EST MOD 30 MIN: CPT | Performed by: FAMILY MEDICINE

## 2022-08-17 PROCEDURE — 3051F HG A1C>EQUAL 7.0%<8.0%: CPT | Performed by: FAMILY MEDICINE

## 2022-08-17 PROCEDURE — G0296 VISIT TO DETERM LDCT ELIG: HCPCS | Performed by: FAMILY MEDICINE

## 2022-08-17 PROCEDURE — 83036 HEMOGLOBIN GLYCOSYLATED A1C: CPT | Performed by: FAMILY MEDICINE

## 2022-08-17 RX ORDER — METOPROLOL SUCCINATE 50 MG/1
TABLET, EXTENDED RELEASE ORAL
COMMUNITY
Start: 2022-06-08 | End: 2022-08-17 | Stop reason: SDUPTHER

## 2022-08-17 RX ORDER — METOPROLOL SUCCINATE 50 MG/1
50 TABLET, EXTENDED RELEASE ORAL DAILY
Qty: 90 TABLET | Refills: 0
Start: 2022-08-17

## 2022-08-17 SDOH — ECONOMIC STABILITY: FOOD INSECURITY: WITHIN THE PAST 12 MONTHS, YOU WORRIED THAT YOUR FOOD WOULD RUN OUT BEFORE YOU GOT MONEY TO BUY MORE.: NEVER TRUE

## 2022-08-17 SDOH — ECONOMIC STABILITY: FOOD INSECURITY: WITHIN THE PAST 12 MONTHS, THE FOOD YOU BOUGHT JUST DIDN'T LAST AND YOU DIDN'T HAVE MONEY TO GET MORE.: NEVER TRUE

## 2022-08-17 ASSESSMENT — SOCIAL DETERMINANTS OF HEALTH (SDOH): HOW HARD IS IT FOR YOU TO PAY FOR THE VERY BASICS LIKE FOOD, HOUSING, MEDICAL CARE, AND HEATING?: NOT HARD AT ALL

## 2022-08-17 ASSESSMENT — PATIENT HEALTH QUESTIONNAIRE - PHQ9
1. LITTLE INTEREST OR PLEASURE IN DOING THINGS: 0
10. IF YOU CHECKED OFF ANY PROBLEMS, HOW DIFFICULT HAVE THESE PROBLEMS MADE IT FOR YOU TO DO YOUR WORK, TAKE CARE OF THINGS AT HOME, OR GET ALONG WITH OTHER PEOPLE: 0
5. POOR APPETITE OR OVEREATING: 0
SUM OF ALL RESPONSES TO PHQ QUESTIONS 1-9: 0
SUM OF ALL RESPONSES TO PHQ QUESTIONS 1-9: 0
6. FEELING BAD ABOUT YOURSELF - OR THAT YOU ARE A FAILURE OR HAVE LET YOURSELF OR YOUR FAMILY DOWN: 0
3. TROUBLE FALLING OR STAYING ASLEEP: 0
8. MOVING OR SPEAKING SO SLOWLY THAT OTHER PEOPLE COULD HAVE NOTICED. OR THE OPPOSITE, BEING SO FIGETY OR RESTLESS THAT YOU HAVE BEEN MOVING AROUND A LOT MORE THAN USUAL: 0
2. FEELING DOWN, DEPRESSED OR HOPELESS: 0
9. THOUGHTS THAT YOU WOULD BE BETTER OFF DEAD, OR OF HURTING YOURSELF: 0
SUM OF ALL RESPONSES TO PHQ QUESTIONS 1-9: 0
7. TROUBLE CONCENTRATING ON THINGS, SUCH AS READING THE NEWSPAPER OR WATCHING TELEVISION: 0
SUM OF ALL RESPONSES TO PHQ QUESTIONS 1-9: 0
4. FEELING TIRED OR HAVING LITTLE ENERGY: 0
SUM OF ALL RESPONSES TO PHQ9 QUESTIONS 1 & 2: 0

## 2022-08-17 ASSESSMENT — ENCOUNTER SYMPTOMS
WHEEZING: 0
DIARRHEA: 0
VOMITING: 0
CONSTIPATION: 0
NAUSEA: 0
VOICE CHANGE: 1
ABDOMINAL PAIN: 0
CHEST TIGHTNESS: 0
ABDOMINAL DISTENTION: 0
COUGH: 1

## 2022-08-17 NOTE — PROGRESS NOTES
Trina Day (:  1955) is a 78 y.o. female,Established patient, here for evaluation of the following chief complaint(s): Hypertension, COPD, Diabetes, and Hyperlipidemia      ASSESSMENT/PLAN:    1. Essential hypertension  Inadequately controlled  She will restart amlodipine 10 mg today  -   We will also increase metoprolol succinate (TOPROL XL) 50 MG extended release tablet; Take 1 tablet by mouth daily Dose increased 2022, Disp-90 tablet, R-0NO PRINT  Continue to irbesartan 150 Mg daily    Will do labs per nephrology  Will increase metoprolol and start Norvasc  She will make sure she is taking Irbesartan 150 mg  Needs nurse visit appointment BP check in 1 week. 2. Type 2 diabetes mellitus with microalbuminuria, without long-term current use of insulin ([facility])  Improved  Lab Results   Component Value Date    LABA1C 7.3 2022    LABA1C 8.1 (H) 2022    LABA1C 8.4 2021       -      DIABETES FOOT EXAM  -     POCT glycosylated hemoglobin (Hb A1C) 7.3, improved diabetes, well-controlled    -advised home blood glucose testing  daily  -daily feet exam, Foot care: advised to wash feet daily, pat dry and apply lotion at night, avoiding between toes. Need to look at feet daily and report to a physician any signs of inflammation or skin damage  -annual dilated eye exam  -Low carb, low fat diet, increase fruits and vegetables, and exercise 4-5 times a day 30-40 minutes a day discussed  -continue current treatment  -continue Aspirin 81 mg  -continue statin pravastatin 40 Mg  Continue  irbesartan    3. Hyperlipidemia with target LDL less than 100  Worsening  Continue pravastatin 40 Mg, will recheck lipid panel at the next appointment and if still not improving, then we will switch her to Crestor  Lab Results   Component Value Date    LDLCALC 102 2019    LDLCHOLESTEROL 122 2022       4.  Hyponatremia  Stable  Sodium 131 on 2022, she has blood work to do per nephrologist  Advised to cut down on drinking to no more than 1 drink at a time  Zoloft dosage was already decreased  5. Chronic obstructive pulmonary disease, unspecified COPD type (HCC)  Stable  Continue Umeclinidium 1 inhalation daily, albuterol as needed, strongly counseled to quit smoking  6. Personal history of tobacco use  -     KS VISIT TO DISCUSS LUNG CA SCREEN W LDCT  -     CT Lung Screen (Annual); Future    Low Dose CT (LDCT) Lung Screening criteria met:     Age 50-77(Medicare) or 50-80 ([facility])   Pack year smoking >20   Still smoking or less than 15 year since quit   No sign or symptoms of lung cancer   > 11 months since last LDCT     Risks and benefits of lung cancer screening with LDCT scans discussed:    Significance of positive screen - False-positive LDCT results often occur. 95% of all positive results do not lead to a diagnosis of cancer. Usually further imaging can resolve most false-positive results; however, some patients may require invasive procedures. Over diagnosis risk - 10% to 12% of screen-detected lung cancer cases are over diagnosed--that is, the cancer would not have been detected in the patient's lifetime without the screening. Need for follow up screens annually to continue lung cancer screening effectiveness     Risks associated with radiation from annual LDCT- Radiation exposure is about the same as for a mammogram, which is about 1/3 of the annual background radiation exposure from everyday life. Starting screening at age 54 is not likely to increase cancer risk from radiation exposure. Patients with comorbidities resulting in life expectancy of < 10 years, or that would preclude treatment of an abnormality identified on CT, should not be screened due to lack of benefit. To obtain maximal benefit from this screening, smoking cessation and long-term abstinence from smoking is critical    7.  Encounter for screening mammogram for breast cancer  -     [facility]; Future  8. Recurrent major depressive disorder, in partial remission ([address]  Stable  Will continue to monitor. Continue Zoloft 50 Mg        Christiano Yao received counseling on the following healthy behaviors: nutrition, exercise, medication adherence, tobacco cessation, and decrease in alcohol consumption  Reviewed prior labs and health maintenance  Discussed use, benefit, and side effects of prescribed medications. Barriers to medication compliance addressed. Patient given educational materials - see patient instructions  Was a self-tracking handout given in paper form or via [employer]? Yes  All patient questions answered. Patient voiced understanding. The patient's past medical,surgical, social, and family history as well as her current medications and allergies were reviewed as documented in today's encounter. Medications, labs, diagnostic studies, consultations and follow-up as documented in this encounter. Return in about 4 months (around 12/17/2022) for Visit type diabetes, **POC A1C, DM2, HTN, HLP, COPD. Data Unavailable    Future Appointments   Date Time Provider Kathy Davis   8/29/2022  8:00 AM [facility] VASCULAR RM [address]   8/29/2022 10:30 AM [facility] ULTRASOUND  STCZ US [facility] Radiolog   12/16/2022  2:00 PM Dylon Preston MD [facility]   5/10/2023 11:30 [facility]         SUBJECTIVE/OBJECTIVE:    Hypertension: Patient here for follow-up. She is not exercising, but staying active, and is adherent to low salt diet. Blood pressure is not well controlled at home. Cardiac symptoms dyspnea, fatigue, and headaches  . Patient denies chest pain, chest pressure/discomfort, claudication, exertional chest pressure/discomfort, irregular heart beat, lower extremity edema, near-syncope, orthopnea, palpitations, paroxysmal nocturnal dyspnea, syncope, and tachypnea.     Cardiovascular risk factors: advanced age (older than 54 for men, 72 for women), diabetes mellitus, dyslipidemia, hypertension, and smoking/ tobacco exposure. Use of agents associated with hypertension: thyroid hormones. History of target organ damage: angina/ prior myocardial infarction and prior coronary revascularization. 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 Patient reports she is up-to-date with the cardiologist appointment      Patient tells me she has seen nephrologist today who restarted Norvasc 10 mg, she will have to pick it up from the pharmacy today. Taking metoprol 1.5 tabs of 25 mg  once a day  Blood pressure is very high, not controlled  BP Readings from Last 3 Encounters:   08/17/22 (!) 196/110   08/17/22 (!) 200/100   05/10/22 136/76          Pulse is Normal.    Pulse Readings from Last 3 Encounters:   08/17/22 92   08/17/22 95   05/10/22 80     Patient has known type 2 diabetes mellitus  Weight has been decreasing  Wt Readings from Last 3 Encounters:   08/17/22 150 lb 6.4 oz (68.2 kg)   08/17/22 150 lb 3.2 oz (68.1 kg)   05/10/22 154 lb (69.9 kg)   A1c is improving and well controlled      Lab Results   Component Value Date    LABA1C 7.3 08/17/2022    LABA1C 8.1 (H) 04/05/2022    LABA1C 8.4 12/03/2021       Had eye exam on [place], I requested the report. Taking Metformin BID and Glipizide 20 mg and 10 mg, tolerates them well, denies side effects, denies hypoglycemic events    Home Blood Glucose 180, 140  Urine microalbumin high but improved from prior of 680 on 3/15/2021  Lab Results   Component Value Date    LABMICR 247 (H) 04/22/2022       Hyperlipidemia:  No new myalgias or GI upset on pravastatin (Pravachol). Medication compliance: compliant all of the time. Patient is not following a low fat, low cholesterol diet.     LDL is high  Lab Results   Component Value Date    LDLCALC 102 04/26/2019    LDLCHOLESTEROL 122 04/22/2022     Lab Results   Component Value Date    TRIG 149 03/15/2021 TRIG 120 07/13/2020    TRIG 165 (H) 04/26/2019     Hyponatremia  Patient was seen by nephrologist today. I also cut down her Zoloft to 50 mg only  Patient admits that she still drinks alcohol  Drinks beer, \"but not everyday\"  Drinks 4  at a time, counseling given not to do more than 1 at a time, patient says she will try. COPD:  Current treatment includes short-acting beta agonist inhaler, anticholinergic inhaler, which has been effective. Residual symptoms: chronic dyspnea and cough productive of white sputum in small amounts. She denies purulent sputum, wheezing, chest tightness. She requires her rescue inhaler 3 time(s) per week. Doesn't use oxygen anymore, patient's report. Patient says she was on 2 L/min when she had pneumonia but not using it anymore. Nicotine dependence. Smoker, counseling given to quit smoking. Ready to quit: No  Counseling given: Yes  Tobacco comments: Started smoking at 12 y/o, 1 PPD most of her life, quit once for 6 months,currently 6 cigarettes/day for 2-3 months        Depression  Patient says she is doing well, tolerating Zoloft well, with decreased dosage, symptoms did not get worse. PHQ-2 Over the past 2 weeks, how often have you been bothered by any of the following problems? Little interest or pleasure in doing things: Not at all  Feeling down, depressed, or hopeless: Not at all  PHQ-2 Score: 0  PHQ-9 Over the past 2 weeks, how often have you been bothered by any of the following problems? Trouble falling or staying asleep, or sleeping too much: Not at all  Feeling tired or having little energy: Not at all  Poor appetite or overeating: Not at all  Feeling bad about yourself - or that you are a failure or have let yourself or your family down: Not at all  Trouble concentrating on things, such as reading the newspaper or watching television: Not at all  Moving or speaking so slowly that other people could have noticed.  Or the opposite - being so fidgety or restless that you have been moving around a lot more than usual: Not at all  Thoughts that you would be better off dead, or of hurting yourself in some way: Not at all  If you checked off any problems, how difficult have these problems made it for you to do your work, take care of things at home, or get along with other people?: Not difficult at all  PHQ-9 Total Score: 0  PHQ-9 Total Score: 0    PHQ Scores 8/17/2022 5/10/2022 4/15/2022 12/3/2021 7/15/2021 3/3/2021 11/3/2020   PHQ2 Score 0 0 0 0 0 1 0   PHQ9 Score 0 10 0 0 0 1 0       She is due for Mammogram.   Denies breast pain, lumps or nipple discharge. She declines breast exam today. Prior to Visit Medications    Medication Sig Taking?  Authorizing Provider   sertraline (ZOLOFT) 100 MG tablet  Yes Historical Provider, MD   amLODIPine (NORVASC) 10 MG tablet Take 1 tablet by mouth daily Yes Wanda Roman MD   sodium zirconium cyclosilicate (LOKELMA) 10 g PACK oral suspension Take 10 g by mouth once a week Yes Wanda Roman MD   metoprolol succinate (TOPROL XL) 50 MG extended release tablet TAKE 1 1/2 TABLET BY MOUTH ONCE A DAY Yes Historical Provider, MD   vitamin D (ERGOCALCIFEROL) 1.25 MG (64462 UT) CAPS capsule TAKE 1 CAPSULE BY MOUTH ONE TIME PER WEEK Yes Humberto Johnson MD   pantoprazole (PROTONIX) 20 MG tablet TAKE 1 TABLET BY MOUTH EVERY MORNING (BEFORE BREAKFAST) ** STOP OMEPRAZOLE** Yes Humberto Johnson MD   metFORMIN (GLUCOPHAGE-XR) 500 MG extended release tablet TAKE 2 TABLETS BY MOUTH DAILY WITH SUPPER Yes Kathy Cervantes MD   Magnesium Oxide (MAGNESIUM-OXIDE) 250 MG TABS tablet Take 1 tablet by mouth daily Take with food, in the evening Yes Humberto Johnson MD   pravastatin (PRAVACHOL) 40 MG tablet Take 1 tablet by mouth every evening For cholesterol Yes Humberto Johnson MD   albuterol sulfate HFA (VENTOLIN HFA) 108 (90 Base) MCG/ACT inhaler Inhale 2 puffs into the lungs every 6 hours as needed for Wheezing or Shortness of Breath (cough) INHALE 2 PUFFS INTO THE LUNGS 2 TIMES DAILY AS NEEDED FOR WHEEZING Yes Christiana Cooper MD   sertraline (ZOLOFT) 50 MG tablet Take 1 tablet by mouth daily Dose decreased on 5/5/2022 Yes Christiana Cooper MD   glipiZIDE (GLUCOTROL) 10 MG tablet TAKE 2 TABLETS IN THE MORNING AND 1 TABLET IN THE EVENING Yes Kathy Cervantes MD   nitroGLYCERIN (NITROSTAT) 0.4 MG SL tablet PLACE 1 TABLET UNDER TONGUE AS DIRECTED EVERY 5 MINUTES FOR 3 DOSES AS NEEDED FOR CHEST PAIN Yes Alicia Hernandez MD   Umeclidinium Bromide 62.5 MCG/INH AEPB Inhale 1 each into the lungs daily Yes Christiana Cooper MD   irbesartan (AVAPRO) 150 MG tablet Take 1 tablet by mouth daily Yes Christiana Cooper MD   clopidogrel (PLAVIX) 75 MG tablet Take 1 tablet by mouth daily Yes Sergei Curry MD   metoprolol succinate (TOPROL XL) 25 MG extended release tablet Take 1 tablet by mouth daily Yes Sergei Curry MD   levothyroxine (SYNTHROID) 200 MCG tablet TAKE 1 TABLET BY MOUTH EVERY DAY BEFORE BREAKFAST Yes Christiana Cooper MD   simethicone (MYLICON) 80 MG chewable tablet Take 1 tablet by mouth 4 times daily as needed for Flatulence Yes Jeffery Portillo APRN - CNP   Respiratory Therapy Supplies (NEBULIZER/TUBING/MOUTHPIECE) KIT Dx. COPD, needs nebulizer supplies Yes Christiana Cooper MD   albuterol (PROVENTIL) (2.5 MG/3ML) 0.083% nebulizer solution Take 3 mLs by nebulization every 6 hours as needed for Wheezing or Shortness of Breath Yes Christiana Cooper MD   blood glucose test strips (ASCENSIA AUTODISC VI;ONE TOUCH ULTRA TEST VI) strip 1 each by In Vitro route daily As needed.  Yes Christiana Cooper MD   aspirin EC 81 MG EC tablet Take 1 tablet by mouth daily Yes Christiana Cooper MD   fluticasone (FLONASE) 50 MCG/ACT nasal spray 2 sprays by Each Nostril route daily Yes Frankey Shiley, MD       Social History     Tobacco Use    Smoking status: Every Day     Packs/day: 1.00     Years: 49.00     Pack years: 49.00     Types: Cigarettes    Smokeless tobacco: Never    Tobacco comments:     Started smoking at 12 y/o, 1 PPD most of her life, quit once for 6 months,currently 6 cigarettes/day for 2-3 months   Vaping Use    Vaping Use: Former   Substance Use Topics    Alcohol use: Yes     Comment: OCCASIONAL    Drug use: No         Review of Systems   Constitutional:  Positive for fatigue. Negative for activity change, appetite change, chills, diaphoresis, fever and unexpected weight change. HENT:  Positive for voice change (chronic). Eyes:  Positive for visual disturbance. Respiratory:  Positive for cough (clear mucus) and shortness of breath (SCHWARTZ). Negative for chest tightness and wheezing. Cardiovascular:  Negative for chest pain, palpitations and leg swelling. Gastrointestinal:  Negative for abdominal distention, abdominal pain, constipation, diarrhea, nausea and vomiting. Endocrine: Negative for cold intolerance, heat intolerance, polydipsia, polyphagia and polyuria. Genitourinary:  Negative for difficulty urinating and frequency. Neurological:  Positive for headaches. Psychiatric/Behavioral:  Negative for dysphoric mood, self-injury, sleep disturbance and suicidal ideas. The patient is nervous/anxious.        -vital signs stable and within normal limits except very high blood pressure and overweight per BMI    BP (!) 196/110   Pulse 92   Temp 97.3 °F (36.3 °C)   Ht 5' 2\" (1.575 m)   Wt 150 lb 6.4 oz (68.2 kg)   SpO2 97%   BMI 27.51 kg/m²        Physical Exam  Vitals and nursing note reviewed. Constitutional:       General: She is not in acute distress. Appearance: Normal appearance. She is well-developed. She is not diaphoretic. HENT:      Head: Normocephalic and atraumatic. Comments: hoarseness, same as before.      Right Ear: External ear normal.      Left Ear: External ear normal.      Mouth/Throat:      Comments: I did not examine the mouth due to coronavirus pandemic and wearing masks    Eyes:      General: Lids are normal. No scleral icterus. Right eye: No discharge. Left eye: No discharge. Extraocular Movements: Extraocular movements intact. Conjunctiva/sclera: Conjunctivae normal.   Neck:      Thyroid: No thyromegaly. Cardiovascular:      Rate and Rhythm: Normal rate and regular rhythm. Heart sounds: Normal heart sounds. No murmur heard. Comments: Left upper chest pacemaker  Pulmonary:      Effort: Pulmonary effort is normal. No respiratory distress. Breath sounds: Examination of the right-middle field reveals decreased breath sounds. Examination of the left-middle field reveals decreased breath sounds. Examination of the right-lower field reveals decreased breath sounds. Examination of the left-lower field reveals decreased breath sounds. Decreased breath sounds present. No wheezing or rales. Comments: Coughing during the encounter. Chest:      Chest wall: No tenderness. Abdominal:      General: Bowel sounds are normal. There is no distension. Palpations: Abdomen is soft. There is no hepatomegaly or splenomegaly. Tenderness: There is no abdominal tenderness. Musculoskeletal:         General: No tenderness. Normal range of motion. Cervical back: Normal range of motion and neck supple. Right lower leg: No edema. Left lower leg: No edema. Skin:     General: Skin is warm and dry. Capillary Refill: Capillary refill takes less than 2 seconds. Findings: No rash. Neurological:      Mental Status: She is alert and oriented to person, place, and time. Cranial Nerves: No cranial nerve deficit. Motor: No abnormal muscle tone. Psychiatric:         Mood and Affect: Mood is anxious. Behavior: Behavior normal.         Thought Content:  Thought content normal.         Judgment: Judgment normal.         I personally reviewed testing with patient and all questions fully answered.   Hyponatremia stable  Proteinuria 2+  Hyperglycemia  Hyperlipidemia  Vitamin D deficiency, taking vitamin D supplement    Otherwise labs within normal limits    Hospital Outpatient Visit on 05/23/2022   Component Date Value Ref Range Status    Magnesium 05/23/2022 1.6  1.6 - 2.6 mg/dL Final    Color, UA 05/23/2022 Dark Yellow (A) Yellow Final    Turbidity UA 05/23/2022 Clear  Clear Final    Glucose, Ur 05/23/2022 NEGATIVE  NEGATIVE Final    Bilirubin Urine 05/23/2022 NEGATIVE  Verified by ictotest. (A) NEGATIVE Final    Ketones, Urine 05/23/2022 TRACE (A) NEGATIVE Final    Specific Gravity, UA 05/23/2022 1.021  1.000 - 1.030 Final    Urine Hgb 05/23/2022 NEGATIVE  NEGATIVE Final    pH, UA 05/23/2022 5.5  5.0 - 8.0 Final    Protein, UA 05/23/2022 2+ (A) NEGATIVE Final    Urobilinogen, Urine 05/23/2022 Normal  Normal Final    Nitrite, Urine 05/23/2022 NEGATIVE  NEGATIVE Final    Leukocyte Esterase, Urine 05/23/2022 TRACE (A) NEGATIVE Final    WBC, UA 05/23/2022 0 TO 2  /HPF Final    RBC, UA 05/23/2022 3 to 5  /HPF Final    Casts UA 05/23/2022 6 TO 9  /LPF Final    Epithelial Cells UA 05/23/2022 3 to 5  /HPF Final    Bacteria, UA 05/23/2022 None  None Final    Phosphorus 05/23/2022 4.5  2.6 - 4.5 mg/dL Final    WBC 05/23/2022 8.4  3.5 - 11.0 k/uL Final    RBC 05/23/2022 4.53  4.0 - 5.2 m/uL Final    Hemoglobin 05/23/2022 14.0  12.0 - 16.0 g/dL Final    Hematocrit 05/23/2022 41.0  36 - 46 % Final    MCV 05/23/2022 90.5  80 - 100 fL Final    MCH 05/23/2022 30.8  26 - 34 pg Final    MCHC 05/23/2022 34.1  31 - 37 g/dL Final    RDW 05/23/2022 14.4  11.5 - 14.9 % Final    Platelets 98/06/4495 400  150 - 450 k/uL Final    MPV 05/23/2022 7.2  6.0 - 12.0 fL Final    Seg Neutrophils 05/23/2022 69 (A) 36 - 66 % Final    Lymphocytes 05/23/2022 23 (A) 24 - 44 % Final    Monocytes 05/23/2022 7  1 - 7 % Final    Eosinophils % 05/23/2022 0  0 - 4 % Final    Basophils 05/23/2022 1  0 - 2 % Final    Segs Absolute 05/23/2022 5.90  1.3 - 9.1 k/uL Final    Absolute Lymph # 05/23/2022 1.90  1.0 - 4.8 k/uL Final    Absolute Mono # 05/23/2022 0.60  0.1 - 1.3 k/uL Final    Absolute Eos # 05/23/2022 0.00  0.0 - 0.4 k/uL Final    Basophils Absolute 05/23/2022 0.00  0.0 - 0.2 k/uL Final    Glucose 05/23/2022 211 (A) 70 - 99 mg/dL Final    BUN 05/23/2022 11  8 - 23 mg/dL Final    Creatinine 05/23/2022 0.61  0.50 - 0.90 mg/dL Final    Calcium 05/23/2022 9.7  8.6 - 10.4 mg/dL Final    Sodium 05/23/2022 131 (A) 135 - 144 mmol/L Final    Potassium 05/23/2022 5.3  3.7 - 5.3 mmol/L Final    Chloride 05/23/2022 92 (A) 98 - 107 mmol/L Final    CO2 05/23/2022 28  20 - 31 mmol/L Final    Anion Gap 05/23/2022 11  9 - 17 mmol/L Final    GFR Non- 05/23/2022 >60  >60 mL/min Final    GFR  05/23/2022 >60  >60 mL/min Final    GFR Comment 05/23/2022        Final    Comment: Average GFR for 61-76 years old:   80 mL/min/1.73sq m  Chronic Kidney Disease:   <60 mL/min/1.73sq m  Kidney failure:   <15 mL/min/1.73sq m              eGFR calculated using average adult body mass.  Additional eGFR calculator available at:        [employer]                 Lab Results   Component Value Date    WBC 8.4 05/23/2022    HGB 14.0 05/23/2022    HCT 41.0 05/23/2022    MCV 90.5 05/23/2022     05/23/2022            Lab Results   Component Value Date    ALT 8 04/22/2022    AST 12 04/22/2022    ALKPHOS 119 (H) 04/22/2022    BILITOT 0.69 04/22/2022       Lab Results   Component Value Date    TSH 0.50 04/06/2022       Lab Results   Component Value Date    CHOL 187 03/15/2021    CHOL 195 07/13/2020    CHOL 184 04/26/2019     Lab Results   Component Value Date    TRIG 149 03/15/2021    TRIG 120 07/13/2020    TRIG 165 (H) 04/26/2019     Lab Results   Component Value Date    HDL 51 04/22/2022    HDL 60 03/15/2021    HDL 53 07/13/2020     Lab Results   Component Value Date    LDLCALC 102 04/26/2019    [facility] [address] Farris 92 05/25/2018    LDLCALC 96 07/28/2017    LDLCHOLESTEROL 122 04/22/2022    LDLCHOLESTEROL 97 03/15/2021    LDLCHOLESTEROL 118 07/13/2020     Lab Results   Component Value Date    CHOLHDLRATIO 3.9 04/22/2022    CHOLHDLRATIO 3.1 03/15/2021    CHOLHDLRATIO 3.7 07/13/2020       Lab Results   Component Value Date    CFLVPYLO54 600 04/22/2022     Lab Results   Component Value Date    FOLATE 10.6 04/22/2022     Lab Results   Component Value Date    VITD25 10.7 (L) 04/22/2022         Orders Placed This Encounter   Medications    metoprolol succinate (TOPROL XL) 50 MG extended release tablet     Sig: Take 1 tablet by mouth daily Dose increased 8/17/2022     Dispense:  90 tablet     Refill:  0       Orders Placed This Encounter   Procedures    [facility] DIGITAL SCREEN BILATERAL     Standing Status:   Future     Standing Expiration Date:   10/17/2023    CT Lung Screen (Annual)     Age: Patient is 78 y.o. Smoking History: Social History    Tobacco Use      Smoking status: Every Day        Packs/day: 1.00        Years: 49.00        Pack years: 52        Types: Cigarettes      Smokeless tobacco: Never      Tobacco comments: Started smoking at 12 y/o, 1 PPD most of her life, quit once for 6 months,currently 6 cigarettes/day for 2-3 months    Vaping Use      Vaping Use: Former    Alcohol use: Yes      Comment: OCCASIONAL    Drug use: No   Pack years: 52    Date of last lung cancer screening: No previous lung cancer screening exam     Standing Status:   Future     Standing Expiration Date:   2/17/2024     Order Specific Question:   Is there documentation of shared decision making? Answer:   Yes     Order Specific Question:   Is this a low dose CT or a routine CT? Answer:   Low Dose CT [1]     Order Specific Question:   Is this the first (baseline) CT or an annual exam?     Answer:   Baseline [1]     Order Specific Question:   Does the patient show any signs or symptoms of lung cancer?      Answer:   No     Order Specific Question:   Smoking Status? Answer:   Every Day [1]     Order Specific Question:   Smoking packs per day? Answer:   1     Order Specific Question:   Years smoking? Answer:   49    POCT glycosylated hemoglobin (Hb A1C)    [facility] DIABETES FOOT EXAM    MA VISIT TO DISCUSS LUNG CA SCREEN W LDCT       Medications Discontinued During This Encounter   Medication Reason    metoprolol succinate (TOPROL XL) 25 MG extended release tablet DOSE ADJUSTMENT    metoprolol succinate (TOPROL XL) 50 MG extended release tablet REORDER    nitroGLYCERIN (NITROSTAT) 0.4 MG SL tablet ERROR    sertraline (ZOLOFT) 100 MG tablet DOSE ADJUSTMENT         On this date 8/17/2022 I have spent 35 minutes reviewing previous notes, test results and face to face with the patient discussing the diagnosis and importance of compliance with the treatment plan as well as documenting on the day of the visit. This note was completed by using the assistance of a speech-recognition program. However, inadvertent computerized transcription errors may be present. Although every effort was made to ensure accuracy, no guarantees can be provided that every mistake has been identified and corrected by editing. An electronic signature was used to authenticate this note.   Electronically signed by Bjorn Murillo MD on 8/22/2022 at 9:07 PM

## 2022-08-22 ENCOUNTER — TELEPHONE (OUTPATIENT)
Dept: FAMILY MEDICINE CLINIC | Age: 67
End: 2022-08-22

## 2022-08-22 VITALS
BODY MASS INDEX: 27.68 KG/M2 | TEMPERATURE: 97.3 F | WEIGHT: 150.4 LBS | HEIGHT: 62 IN | SYSTOLIC BLOOD PRESSURE: 196 MMHG | DIASTOLIC BLOOD PRESSURE: 110 MMHG | OXYGEN SATURATION: 97 % | HEART RATE: 92 BPM

## 2022-08-22 DIAGNOSIS — R80.9 TYPE 2 DIABETES MELLITUS WITH MICROALBUMINURIA, WITHOUT LONG-TERM CURRENT USE OF INSULIN (HCC): ICD-10-CM

## 2022-08-22 DIAGNOSIS — E11.29 TYPE 2 DIABETES MELLITUS WITH MICROALBUMINURIA, WITHOUT LONG-TERM CURRENT USE OF INSULIN (HCC): ICD-10-CM

## 2022-08-22 PROBLEM — Z87.891 PERSONAL HISTORY OF TOBACCO USE: Status: ACTIVE | Noted: 2022-08-22

## 2022-08-22 RX ORDER — GLIPIZIDE 10 MG/1
TABLET ORAL
Qty: 270 TABLET | Refills: 2 | Status: SHIPPED | OUTPATIENT
Start: 2022-08-22

## 2022-08-22 ASSESSMENT — ENCOUNTER SYMPTOMS: SHORTNESS OF BREATH: 1

## 2022-08-22 NOTE — TELEPHONE ENCOUNTER
JACQUELINE  A review of pharmacy claims has identified this member as 15 days PAST DUE for refill of GLIPIZIDE as of [id number]. Action requested:     Please evaluate this member's refill history and discuss the importance of medication adherence if appropriate.

## 2022-08-24 ENCOUNTER — HOSPITAL ENCOUNTER (OUTPATIENT)
Age: 67
Discharge: HOME | End: 2022-08-24
Payer: MEDICARE

## 2022-08-24 DIAGNOSIS — I12.9 BENIGN HYPERTENSION WITH CKD (CHRONIC KIDNEY DISEASE) STAGE I: ICD-10-CM

## 2022-08-24 DIAGNOSIS — E87.1 HYPONATREMIA: ICD-10-CM

## 2022-08-24 DIAGNOSIS — E13.22 SECONDARY DIABETES MELLITUS WITH STAGE 1 CHRONIC KIDNEY DISEASE (HCC): ICD-10-CM

## 2022-08-24 DIAGNOSIS — N18.1 CKD (CHRONIC KIDNEY DISEASE), STAGE I: ICD-10-CM

## 2022-08-24 DIAGNOSIS — R80.9 PROTEINURIA, UNSPECIFIED TYPE: ICD-10-CM

## 2022-08-24 DIAGNOSIS — N18.1 SECONDARY DIABETES MELLITUS WITH STAGE 1 CHRONIC KIDNEY DISEASE (HCC): ICD-10-CM

## 2022-08-24 DIAGNOSIS — N18.1 BENIGN HYPERTENSION WITH CKD (CHRONIC KIDNEY DISEASE) STAGE I: ICD-10-CM

## 2022-08-24 LAB
ANION GAP SERPL CALCULATED.3IONS-SCNC: 11 MMOL/L (ref 9–17)
BUN BLDV-MCNC: 21 MG/DL (ref 8–23)
CALCIUM SERPL-MCNC: 9.4 MG/DL (ref 8.6–10.4)
CHLORIDE BLD-SCNC: 94 MMOL/L (ref 98–107)
CO2: 27 MMOL/L (ref 20–31)
COMPLEMENT C3: 127 MG/DL (ref 90–180)
COMPLEMENT C4: 28 MG/DL (ref 10–40)
CREAT SERPL-MCNC: 0.64 MG/DL (ref 0.5–0.9)
GFR AFRICAN AMERICAN: >60 ML/MIN
GFR NON-AFRICAN AMERICAN: >60 ML/MIN
GFR SERPL CREATININE-BSD FRML MDRD: ABNORMAL ML/MIN/{1.73_M2}
GLUCOSE BLD-MCNC: 221 MG/DL (ref 70–99)
POTASSIUM SERPL-SCNC: 4.5 MMOL/L (ref 3.7–5.3)
SODIUM BLD-SCNC: 132 MMOL/L (ref 135–144)

## 2022-08-24 PROCEDURE — 86225 DNA ANTIBODY NATIVE: CPT

## 2022-08-24 PROCEDURE — 82088 ASSAY OF ALDOSTERONE: CPT

## 2022-08-24 PROCEDURE — 84244 ASSAY OF RENIN: CPT

## 2022-08-24 PROCEDURE — 86038 ANTINUCLEAR ANTIBODIES: CPT

## 2022-08-24 PROCEDURE — 36415 COLL VENOUS BLD VENIPUNCTURE: CPT

## 2022-08-24 PROCEDURE — 80048 BASIC METABOLIC PNL TOTAL CA: CPT

## 2022-08-24 PROCEDURE — 83516 IMMUNOASSAY NONANTIBODY: CPT

## 2022-08-24 PROCEDURE — 86160 COMPLEMENT ANTIGEN: CPT

## 2022-08-24 NOTE — RESULT ENCOUNTER NOTE
Please let her know blood glucose high 221, sodium slightly improved 132  Has appointment to recheck blood pressure to keep the nurse visit for blood pressure recheck    Future Appointments  8/26/2022  10:30 AM   [facility]  8/29/2022  8:00 AM    ST VASCULAR RM [address]  8/29/2022  10:30 AM   ST ULTRASOUND  [facility] [address]             [address] [facility]  12/16/2022 2:00 PM    [facility]  5/10/2023  11:30 AM   MD goran [facility]

## 2022-08-26 ENCOUNTER — NURSE ONLY (OUTPATIENT)
Dept: FAMILY MEDICINE CLINIC | Age: 67
End: 2022-08-26
Payer: MEDICARE

## 2022-08-26 ENCOUNTER — TELEPHONE (OUTPATIENT)
Dept: ONCOLOGY | Age: 67
End: 2022-08-26

## 2022-08-26 VITALS
HEART RATE: 74 BPM | OXYGEN SATURATION: 92 % | SYSTOLIC BLOOD PRESSURE: 138 MMHG | TEMPERATURE: 97.1 F | DIASTOLIC BLOOD PRESSURE: 79 MMHG

## 2022-08-26 DIAGNOSIS — I10 ESSENTIAL HYPERTENSION: Primary | ICD-10-CM

## 2022-08-26 PROCEDURE — 99211 OFF/OP EST MAY X REQ PHY/QHP: CPT | Performed by: FAMILY MEDICINE

## 2022-08-26 NOTE — LETTER
8/26/2022        [address] [place] [address]    Dear Rema Snow: Your healthcare provider has ordered a low dose CT scan of the chest for lung cancer screening. Enclosed you will find information about CT lung screening and smoking cessation resources. If you are unable to keep you appointment for you CT lung screening, please call our scheduling department at [phone]    Keep in mind that CT lung screening does not take the place of smoking cessation. Please do not hesitate to contact me if you have any questions or concerns.     [address],      [address] [facility]  [phone]

## 2022-08-26 NOTE — PROGRESS NOTES
Chief Complaint   Patient presents with    Blood Pressure Check        Patient is here for blood pressure recheck. 1. Essential hypertension        Well controlled BP   Continue current treatment.        BP Readings from Last 3 Encounters:   08/26/22 138/79   08/17/22 (!) 196/110   08/17/22 (!) 200/100       Pulse Readings from Last 3 Encounters:   08/26/22 74   08/17/22 92   08/17/22 95       Future Appointments   Date Time Provider Kathy Davis   8/29/2022  8:00 AM STC VASCULAR RM [address]   8/29/2022 10:30 AM STC ULTRASOUND  [facility] US [facility] Radiolog   9/1/2022  9:30 AM STC DIGITAL RM [facility]CZ MAMMO ST Radiolog   9/1/2022 10:15 AM STC CT RM 1 STCZ CT SCAN [facility] Radiolog   12/16/2022  2:00 PM [facility]   5/10/2023 11:30 AM Licha Brown MD [facility]

## 2022-08-26 NOTE — PROGRESS NOTES
Jon Gautam is being seen today for a Blood Pressure Recheck.  BP: 150/88     Jon Lotus was seen 8/17/2022 and her Blood Pressure was:   BP Readings from Last 1 Encounters:   08/26/22 138/79           Ernesto Sanchez MA

## 2022-08-27 LAB
ALDOSTERONE COMMENT: NORMAL
ALDOSTERONE: 7.5 NG/DL

## 2022-08-28 LAB
RENIN ACTIVITY: 5.3 NG/ML/HR
RENIN COMMENT: NORMAL

## 2022-08-29 ENCOUNTER — HOSPITAL ENCOUNTER (OUTPATIENT)
Dept: ULTRASOUND IMAGING | Age: 67
Discharge: HOME | End: 2022-08-31
Payer: MEDICARE

## 2022-08-29 ENCOUNTER — HOSPITAL ENCOUNTER (OUTPATIENT)
Dept: VASCULAR LAB | Age: 67
Discharge: HOME | End: 2022-08-29
Payer: MEDICARE

## 2022-08-29 ENCOUNTER — APPOINTMENT (OUTPATIENT)
Dept: ULTRASOUND IMAGING | Age: 67
End: 2022-08-29
Payer: MEDICARE

## 2022-08-29 DIAGNOSIS — N18.1 CKD (CHRONIC KIDNEY DISEASE), STAGE I: ICD-10-CM

## 2022-08-29 LAB
ANCA MYELOPEROXIDASE: 0.3 AU/ML (ref 0–3.5)
ANCA PROTEINASE 3: <0.7 AU/ML (ref 0–2)
ANTI DNA DOUBLE STRANDED: 0.8 IU/ML
ANTI-NUCLEAR ANTIBODY (ANA): NEGATIVE
ENA ANTIBODIES SCREEN: 0.3 U/ML

## 2022-08-29 PROCEDURE — 76770 US EXAM ABDO BACK WALL COMP: CPT

## 2022-08-29 PROCEDURE — 93975 VASCULAR STUDY: CPT

## 2022-08-29 NOTE — RESULT ENCOUNTER NOTE
Please notify patient ultrasound within normal limits, no renal artery stenosis    Future Appointments  9/1/2022   9:30 AM    [facility] DIGITAL RM             STCZ MAMMO          [facility] Radiolog  9/1/2022   10:15 AM   [facility] CT RM 1                STCZ CT SCAN        [facility] Radiolog  12/16/2022 2:00 PM    Madison Hatchet, MD fp sc               [facility]  5/10/2023  11:30 AM   Madison Hatchet, MD fp sc Rupert Schiller

## 2022-08-29 NOTE — RESULT ENCOUNTER NOTE
Normal ultrasound of the kidneys  Future Appointments  9/1/2022   9:30 AM    [facility] DIGITAL RM             STCZ MAMMO          [facility] Radiolog  9/1/2022   10:15 AM   [facility] CT RM 1                STCZ CT SCAN        [facility] Radiolog  12/16/2022 2:00 PM    Cherri Spurling, MD     [facility]  5/10/2023  11:30 AM   Cherri Spurling, MD     [facility]

## 2022-08-29 NOTE — RESULT ENCOUNTER NOTE
Management per nephrology  Labs normal so far    Future Appointments  9/1/2022   9:30 AM    [facility] DIGITAL RM             STCZ MAMMO          [facility] Radiolog  9/1/2022   10:15 AM   [facility] CT RM 1                STCZ CT SCAN        [facility] Radiolog  12/16/2022 2:00 PM    MD goran Reddy               [facility]  5/10/2023  11:30 AM   MD goran Reddy               JAIP

## 2022-09-01 ENCOUNTER — HOSPITAL ENCOUNTER (OUTPATIENT)
Dept: CT IMAGING | Age: 67
Discharge: HOME | End: 2022-09-03
Payer: MEDICARE

## 2022-09-01 ENCOUNTER — HOSPITAL ENCOUNTER (OUTPATIENT)
Dept: WOMENS IMAGING | Age: 67
Discharge: HOME | End: 2022-09-03
Payer: MEDICARE

## 2022-09-01 VITALS — BODY MASS INDEX: 27.6 KG/M2 | HEIGHT: 62 IN | WEIGHT: 150 LBS

## 2022-09-01 DIAGNOSIS — Z87.891 PERSONAL HISTORY OF TOBACCO USE: ICD-10-CM

## 2022-09-01 DIAGNOSIS — Z12.31 ENCOUNTER FOR SCREENING MAMMOGRAM FOR BREAST CANCER: ICD-10-CM

## 2022-09-01 PROCEDURE — 77063 BREAST TOMOSYNTHESIS BI: CPT

## 2022-09-01 PROCEDURE — 71271 CT THORAX LUNG CANCER SCR C-: CPT

## 2022-09-02 NOTE — RESULT ENCOUNTER NOTE
Please let her know that her CT lung screening showed mild emphysema, a form of COPD and two lung nodules. Please PLEASE ADVISE PATIENT TO: her to see her pulmonologist Dr. Fredo Krishnan for follow up. We normally repeat this test on a yearly basis. Is she is a smoker. Try to quit smoking.

## 2022-10-13 DIAGNOSIS — E55.9 VITAMIN D DEFICIENCY: ICD-10-CM

## 2022-10-13 RX ORDER — ERGOCALCIFEROL 1.25 MG/1
CAPSULE ORAL
Qty: 12 CAPSULE | Refills: 0 | Status: SHIPPED | OUTPATIENT
Start: 2022-10-13

## 2022-10-17 DIAGNOSIS — F33.41 RECURRENT MAJOR DEPRESSIVE DISORDER, IN PARTIAL REMISSION (HCC): ICD-10-CM

## 2022-10-17 RX ORDER — SERTRALINE HYDROCHLORIDE 100 MG/1
50 TABLET, FILM COATED ORAL DAILY
Qty: 90 TABLET | Refills: 3 | Status: SHIPPED | OUTPATIENT
Start: 2022-10-17

## 2022-10-17 NOTE — TELEPHONE ENCOUNTER
Please Approve or Refuse.   Send to Pharmacy per Pt's Request:      Next Visit Date:  12/16/2022   Last Visit Date: 8/17/2022    Hemoglobin A1C (%)   Date Value   08/17/2022 7.3   04/05/2022 8.1 (H)   12/03/2021 8.4             ( goal A1C is < 7)   BP Readings from Last 3 Encounters:   08/26/22 138/79   08/17/22 (!) 196/110   08/17/22 (!) 200/100          (goal 120/80)  BUN   Date Value Ref Range Status   08/24/2022 21 8 - 23 mg/dL Final     Creatinine   Date Value Ref Range Status   08/24/2022 0.64 0.50 - 0.90 mg/dL Final     Potassium   Date Value Ref Range Status   08/24/2022 4.5 3.7 - 5.3 mmol/L Final

## 2022-12-07 ENCOUNTER — TRANSCRIBE ORDERS (OUTPATIENT)
Dept: ADMINISTRATIVE | Age: 67
End: 2022-12-07

## 2022-12-07 DIAGNOSIS — R91.1 PULMONARY NODULE: Primary | ICD-10-CM

## 2022-12-16 ENCOUNTER — OFFICE VISIT (OUTPATIENT)
Dept: FAMILY MEDICINE CLINIC | Age: 67
End: 2022-12-16
Payer: MEDICARE

## 2022-12-16 VITALS
HEART RATE: 63 BPM | DIASTOLIC BLOOD PRESSURE: 92 MMHG | SYSTOLIC BLOOD PRESSURE: 152 MMHG | TEMPERATURE: 97.8 F | OXYGEN SATURATION: 95 % | HEIGHT: 62 IN | BODY MASS INDEX: 27.31 KG/M2 | WEIGHT: 148.4 LBS

## 2022-12-16 DIAGNOSIS — E87.1 HYPONATREMIA: ICD-10-CM

## 2022-12-16 DIAGNOSIS — F33.0 MILD EPISODE OF RECURRENT MAJOR DEPRESSIVE DISORDER (HCC): ICD-10-CM

## 2022-12-16 DIAGNOSIS — Z98.61 CAD S/P PERCUTANEOUS CORONARY ANGIOPLASTY: ICD-10-CM

## 2022-12-16 DIAGNOSIS — E11.29 TYPE 2 DIABETES MELLITUS WITH MICROALBUMINURIA, WITHOUT LONG-TERM CURRENT USE OF INSULIN (HCC): Primary | ICD-10-CM

## 2022-12-16 DIAGNOSIS — E55.9 VITAMIN D DEFICIENCY: ICD-10-CM

## 2022-12-16 DIAGNOSIS — I25.10 CAD S/P PERCUTANEOUS CORONARY ANGIOPLASTY: ICD-10-CM

## 2022-12-16 DIAGNOSIS — E78.5 HYPERLIPIDEMIA WITH TARGET LDL LESS THAN 100: ICD-10-CM

## 2022-12-16 DIAGNOSIS — Z23 ENCOUNTER FOR IMMUNIZATION: ICD-10-CM

## 2022-12-16 DIAGNOSIS — I10 ESSENTIAL HYPERTENSION: ICD-10-CM

## 2022-12-16 DIAGNOSIS — R80.9 TYPE 2 DIABETES MELLITUS WITH MICROALBUMINURIA, WITHOUT LONG-TERM CURRENT USE OF INSULIN (HCC): Primary | ICD-10-CM

## 2022-12-16 DIAGNOSIS — Z95.0 S/P PLACEMENT OF CARDIAC PACEMAKER: ICD-10-CM

## 2022-12-16 LAB — HBA1C MFR BLD: 8 %

## 2022-12-16 PROCEDURE — 3052F HG A1C>EQUAL 8.0%<EQUAL 9.0%: CPT | Performed by: FAMILY MEDICINE

## 2022-12-16 PROCEDURE — 83036 HEMOGLOBIN GLYCOSYLATED A1C: CPT | Performed by: FAMILY MEDICINE

## 2022-12-16 PROCEDURE — 1124F ACP DISCUSS-NO DSCNMKR DOCD: CPT | Performed by: FAMILY MEDICINE

## 2022-12-16 PROCEDURE — 3074F SYST BP LT 130 MM HG: CPT | Performed by: FAMILY MEDICINE

## 2022-12-16 PROCEDURE — 3078F DIAST BP <80 MM HG: CPT | Performed by: FAMILY MEDICINE

## 2022-12-16 PROCEDURE — 99214 OFFICE O/P EST MOD 30 MIN: CPT | Performed by: FAMILY MEDICINE

## 2022-12-16 RX ORDER — METOPROLOL SUCCINATE 25 MG/1
TABLET, EXTENDED RELEASE ORAL
COMMUNITY
Start: 2022-09-30

## 2022-12-16 RX ORDER — BUDESONIDE AND FORMOTEROL FUMARATE DIHYDRATE 160; 4.5 UG/1; UG/1
AEROSOL RESPIRATORY (INHALATION)
COMMUNITY
Start: 2022-10-04

## 2022-12-16 RX ORDER — IRBESARTAN 300 MG/1
300 TABLET ORAL DAILY
Qty: 90 TABLET | Refills: 1 | Status: SHIPPED | OUTPATIENT
Start: 2022-12-16

## 2022-12-16 RX ORDER — SERTRALINE HYDROCHLORIDE 100 MG/1
100 TABLET, FILM COATED ORAL EVERY MORNING
Qty: 90 TABLET | Refills: 3 | Status: SHIPPED | OUTPATIENT
Start: 2022-12-16

## 2022-12-16 ASSESSMENT — PATIENT HEALTH QUESTIONNAIRE - PHQ9
1. LITTLE INTEREST OR PLEASURE IN DOING THINGS: 1
5. POOR APPETITE OR OVEREATING: 0
9. THOUGHTS THAT YOU WOULD BE BETTER OFF DEAD, OR OF HURTING YOURSELF: 0
8. MOVING OR SPEAKING SO SLOWLY THAT OTHER PEOPLE COULD HAVE NOTICED. OR THE OPPOSITE, BEING SO FIGETY OR RESTLESS THAT YOU HAVE BEEN MOVING AROUND A LOT MORE THAN USUAL: 0
2. FEELING DOWN, DEPRESSED OR HOPELESS: 0
SUM OF ALL RESPONSES TO PHQ QUESTIONS 1-9: 1
6. FEELING BAD ABOUT YOURSELF - OR THAT YOU ARE A FAILURE OR HAVE LET YOURSELF OR YOUR FAMILY DOWN: 0
SUM OF ALL RESPONSES TO PHQ QUESTIONS 1-9: 1
10. IF YOU CHECKED OFF ANY PROBLEMS, HOW DIFFICULT HAVE THESE PROBLEMS MADE IT FOR YOU TO DO YOUR WORK, TAKE CARE OF THINGS AT HOME, OR GET ALONG WITH OTHER PEOPLE: 0
SUM OF ALL RESPONSES TO PHQ QUESTIONS 1-9: 1
SUM OF ALL RESPONSES TO PHQ QUESTIONS 1-9: 1
SUM OF ALL RESPONSES TO PHQ9 QUESTIONS 1 & 2: 1
7. TROUBLE CONCENTRATING ON THINGS, SUCH AS READING THE NEWSPAPER OR WATCHING TELEVISION: 0
4. FEELING TIRED OR HAVING LITTLE ENERGY: 0
3. TROUBLE FALLING OR STAYING ASLEEP: 0

## 2022-12-16 ASSESSMENT — ENCOUNTER SYMPTOMS
DIARRHEA: 0
ABDOMINAL PAIN: 0
CHEST TIGHTNESS: 0
VOMITING: 0
COUGH: 1
NAUSEA: 0
CONSTIPATION: 0
VOICE CHANGE: 1
ABDOMINAL DISTENTION: 0
WHEEZING: 0

## 2022-12-16 NOTE — PROGRESS NOTES
Charissa Tillman (:  1955) is a 75 y.o. female,Established patient, here for evaluation of the following chief complaint(s): Diabetes, Hypertension, and Hyperlipidemia      ASSESSMENT/PLAN:    1. Type 2 diabetes mellitus with microalbuminuria, without long-term current use of insulin (HCC)  Worsening  -     POCT glycosylated hemoglobin (Hb A1C 8, worsening     Lab Results   Component Value Date    LABA1C 8.0 2022    LABA1C 7.3 2022    LABA1C 8.1 (H) 2022       -     CBC; Future  -     Magnesium; Future  -     TSH; Future  -     Uric Acid; Future  -     Vitamin B12 & Folate; Future  -advised home blood glucose testing  daily  -daily feet exam, Foot care: advised to wash feet daily, pat dry and apply lotion at night, avoiding between toes. Need to look at feet daily and report to a physician any signs of inflammation or skin damage  -annual dilated eye exam  -Low carb, low fat diet, increase fruits and vegetables, and exercise 4-5 times a day 30-40 minutes a day discussed  -continue current treatment, to take the second dosage of Glipizide which she is missing   -continue Aspirin 81 mg  -continue ARB Irbesartan and statin Pravachol    2. Hyperlipidemia with target LDL less than 100  Inadequately controlled  Continue pravastatin 40 Mg daily  Lab Results   Component Value Date    LDLCALC 102 2019    LDLCHOLESTEROL 122 2022       -     Lipid Panel; Future  3. Essential hypertension  Inadequately controlled    -Dose increased 2022       irbesartan (AVAPRO) 300 MG tablet; Take 1 tablet by mouth daily Dose increased 2022, Disp-90 tablet, R-1Normal  Labs in 1 week to monitor the kidney function and potassium  Amlodipine 10 mg, metoprolol XL 37.5 Mg daily  Will avoid hydrochlorothiazide or Lasix due to hyponatremia  Will avoid increasing metoprolol due to mild bradycardia    Needs nurse visit appointment BP check in 1 week. -     CBC;  Future  -     Magnesium; Future  -     TSH; Future  -     Uric Acid; Future  -     Urinalysis with Reflex to Culture; Future  4. Mild episode of recurrent major depressive disorder (HCC)  Worsening  -Increase dosage   sertraline (ZOLOFT) 100 MG tablet; Take 1 tablet by mouth every morning, Disp-90 tablet, R-3Normal  Declines counseling  Recheck blood work in 1 week to monitor for worsening hyponatremia  Patient says she already cut down tremendously on drinking and only drinking about 1-2 times a week  5. Hyponatremia  Stable  Recheck labs in 1 week  I explained to patient Zoloft can increase the risk of hyponatremia, or severe increase in the risk of hyponatremia, advised patient to continue to cut down on drinking   -     Comprehensive Metabolic Panel; Future  6. Vitamin D deficiency  Unsure if improving or not. Will recheck level  Continue supplementation.    -     Vitamin D 25 Hydroxy; Future  7. Encounter for immunization  -     cOVID-19mRNA bival vac moderna (MODERNA COVID-19 BIVAL BOOSTER) 50 MCG/0.5ML SUSP injection; Inject 0.5 mLs into the muscle once for 1 dose Last done 12/10/2021, Disp-1 mL, R-0Normal  8. CAD S/P percutaneous coronary angioplasty  Stable  follow up with cardiology as scheduled  Continue aspirin,Irbesartan, pravastatin, Plavix and metoprolol  9. S/P placement of cardiac pacemaker  Stable    Cut down sweets  Take glipizide at nighttime    Gem received counseling on the following healthy behaviors: nutrition, exercise, medication adherence, and tobacco cessation and alcohol cessation    Reviewed prior labs and health maintenance  Discussed use, benefit, and side effects of prescribed medications. Barriers to medication compliance addressed. Patient given educational materials - see patient instructions  Was a self-tracking handout given in paper form or via [email or web address]? Yes  All patient questions answered. Patient voiced understanding.   The patient's past medical,surgical, social, and family history as well as her current medications and allergies were reviewed as documented in today's encounter. Medications, labs, diagnostic studies, consultations and follow-up as documented in this encounter. Return for KEEP APPT. Please rescan the eye exam for  diabetic eye. Needs nurse visit appointment BP check in 1 week. Future Appointments   Date Time Provider Kathy Alanisi   12/23/2022  1:00 PM [facility]ED[facility]   1/16/2023  2:45 PM MD Maritza [facility]   3/8/2023 11:15 AM [facility] CT RM 1 [facility] CT SCAN [facility]   5/10/2023 11:30 AM [facility]         SUBJECTIVE/OBJECTIVE:    Diabetes Mellitus Type II, Follow-up:    Current symptoms/problems include hyperglycemia and visual disturbances. Symptoms have been present for several years. Known diabetic complications: nephropathy and cardiovascular disease  Cardiovascular risk factors: advanced age (older than 54 for men, 72 for women), diabetes mellitus, dyslipidemia, hypertension, and smoking/ tobacco exposure    Medication compliance: Compliant most of the times, forgetting to take second dosage of glipizide in the evening    Current diabetic medications include . Glipizide 20 mg in am and 10 mg in pm, Metformin 1000 mg twice a day  Patient admits that she has been forgetting glipizide second dosage, and not always taking at nighttime     Eye exam current (within one year): yes  Current diet: in general, a \"healthy\" diet    Patient says she is not drinking any pop  Doesn't use sugar  But she says \"I love cookies and cake\".         Barriers: impairment:  physical: COPD and mental health: Depression and anxiety    Current monitoring regimen: home blood tests - daily  Home blood sugar records: fasting range: 138, 150, 120, 200, reports blood glucose was a bit high around 200s [employer] and a few more times\"  Any episodes of hypoglycemia? no    Is She on ACE inhibitor or angiotensin II receptor blocker? YES     reports that she has been smoking cigarettes. She has a 49.00 pack-year smoking history. She has never used smokeless tobacco.     Ready to quit: No  Counseling given: Yes  Tobacco comments: Started smoking at 10 y/o, 1 PPD most of her life, quit once for 6 months,currently 6 cigarettes/day for 2-3 months      Daily Aspirin? Yes      A1c is worsening. Lab Results   Component Value Date    LABA1C 8.0 12/16/2022    LABA1C 7.3 08/17/2022    LABA1C 8.1 (H) 04/05/2022       Urine microabumin is Elevated. Taking ARB already  Lab Results   Component Value Date    LABMICR 247 (H) 04/22/2022      Weight is stable    Wt Readings from Last 3 Encounters:   12/16/22 148 lb 6.4 oz (67.3 kg)   09/01/22 150 lb (68 kg)   08/17/22 150 lb 6.4 oz (68.2 kg)         Hypertension, coronary artery disease s/p 2 stents placed 3/8/2022, history of pacemaker placed 2/28/2022 due to AV block type II  Herminio Barley  is not  exercising and is adherent to low salt diet. Cardiac symptoms  dyspnea and fatigue. Patient denies  chest pain, chest pressure/discomfort, claudication, exertional chest pressure/discomfort, irregular heart beat, lower extremity edema, near-syncope, orthopnea, palpitations, paroxysmal nocturnal dyspnea, syncope, and tachypnea. Use of agents associated with hypertension: thyroid hormones. History of target organ damage: angina/ prior myocardial infarction and prior coronary revascularization. Currently taking metoprolol 37. 5 mg, Irbesartan 150 mg, norvasc 10 mg    Blood pressure is high, she reports compliance with medications, denies side effects  BP Readings from Last 3 Encounters:   12/16/22 (!) 152/92   08/26/22 138/79   08/17/22 (!) 196/110        Pulse is  borderline low . Pulse Readings from Last 3 Encounters:   12/16/22 63   08/26/22 74   08/17/22 92         Hyperlipidemia:  No new myalgias or GI upset on pravastatin (Pravachol).  Medication compliance: compliant all of the time. Patient is not following a low fat, low cholesterol diet. LDL is Elevated. Lab Results   Component Value Date    LDLCALC 102 04/26/2019    LDLCHOLESTEROL 122 04/22/2022     Depression  Taking Zoloft 50 mg    She has she is drinking only 2 times a week and she has cut down. Has been more irritable and  snapping a lot. PHQ-2 Over the past 2 weeks, how often have you been bothered by any of the following problems? Little interest or pleasure in doing things: Several days  Feeling down, depressed, or hopeless: Not at all  PHQ-2 Score: 1  PHQ-9 Over the past 2 weeks, how often have you been bothered by any of the following problems? Trouble falling or staying asleep, or sleeping too much: Not at all  Feeling tired or having little energy: Not at all  Poor appetite or overeating: Not at all  Feeling bad about yourself - or that you are a failure or have let yourself or your family down: Not at all  Trouble concentrating on things, such as reading the newspaper or watching television: Not at all  Moving or speaking so slowly that other people could have noticed. Or the opposite - being so fidgety or restless that you have been moving around a lot more than usual: Not at all  Thoughts that you would be better off dead, or of hurting yourself in some way: Not at all  If you checked off any problems, how difficult have these problems made it for you to do your work, take care of things at home, or get along with other people?: Not difficult at all  PHQ-9 Total Score: 1  PHQ-9 Total Score: 1        PHQ Scores 12/16/2022 8/17/2022 5/10/2022 4/15/2022 12/3/2021 7/15/2021 3/3/2021   PHQ2 Score 1 0 0 0 0 0 1   PHQ9 Score 1 0 10 0 0 0 1     Swetha Villarreal has Vitamin D deficiency. Swetha Villarreal  is  taking Vitamin D supplementation   she feels tired.     Lab Results   Component Value Date    VITD25 10.7 (L) 04/22/2022     Hyponatremia stable    Lab Results   Component Value Date/Time     08/24/2022 07:33 AM    K 4.5 08/24/2022 07:33 AM    CL 94 08/24/2022 07:33 AM    CO2 27 08/24/2022 07:33 AM    BUN 21 08/24/2022 07:33 AM    CREATININE 0.64 08/24/2022 07:33 AM    GLUCOSE 221 08/24/2022 07:33 AM    GLUCOSE 212 04/26/2019 11:19 AM    CALCIUM 9.4 08/24/2022 07:33 AM        Prior to Visit Medications    Medication Sig Taking?  Authorizing Provider   SYMBICORT 160-4.5 MCG/ACT AERO INHALE 2 PUFFS INTO THE LUNGS TWICE A DAY FOR 30 DAYS Yes Historical Provider, MD   sertraline (ZOLOFT) 50 MG tablet TAKE 1 TABLET BY MOUTH DAILY Yes Historical Provider, MD   metoprolol succinate (TOPROL XL) 25 MG extended release tablet TAKE 1 AND 1/2 TABLET BY MOUTH ONCE A DAY Yes Historical Provider, MD   cOVID-19mRNA bival vac moderna (MODERNA COVID-19 BIVAL BOOSTER) 50 MCG/0.5ML SUSP injection Inject 0.5 mLs into the muscle once for 1 dose Last done 12/10/2021 Yes Miguelina Leos MD   amLODIPine (NORVASC) 10 MG tablet TAKE 1 TABLET BY MOUTH EVERY DAY Yes Irina Francois MD   vitamin D (ERGOCALCIFEROL) 1.25 MG (58489 UT) CAPS capsule TAKE 1 CAPSULE BY MOUTH ONE TIME PER WEEK Yes Miguelina Leos MD   glipiZIDE (GLUCOTROL) 10 MG tablet TAKE 2 TABLETS IN THE MORNING AND 1 TABLET IN THE EVENING Yes Miguelina Leos MD   sodium zirconium cyclosilicate (LOKELMA) 10 g PACK oral suspension Take 10 g by mouth once a week Yes Irina Francois MD   metoprolol succinate (TOPROL XL) 50 MG extended release tablet Take 1 tablet by mouth daily Dose increased 8/17/2022 Yes Miguelina Leos MD   pantoprazole (PROTONIX) 20 MG tablet TAKE 1 TABLET BY MOUTH EVERY MORNING (BEFORE BREAKFAST) ** STOP OMEPRAZOLE** Yes Miguelina Leos MD   metFORMIN (GLUCOPHAGE-XR) 500 MG extended release tablet TAKE 2 TABLETS BY MOUTH DAILY WITH SUPPER Yes Miguelina Leos MD   pravastatin (PRAVACHOL) 40 MG tablet Take 1 tablet by mouth every evening For cholesterol Yes Miguelina Leos MD   albuterol sulfate HFA (VENTOLIN HFA) 108 (90 Base) MCG/ACT inhaler Inhale 2 puffs into the lungs every 6 hours as needed for Wheezing or Shortness of Breath (cough) INHALE 2 PUFFS INTO THE LUNGS 2 TIMES DAILY AS NEEDED FOR WHEEZING Yes Jacklyn Deng MD   irbesartan (AVAPRO) 150 MG tablet Take 1 tablet by mouth daily Yes Jacklyn Deng MD   clopidogrel (PLAVIX) 75 MG tablet Take 1 tablet by mouth daily Yes Romelia Mcdonald MD   levothyroxine (SYNTHROID) 200 MCG tablet TAKE 1 TABLET BY MOUTH EVERY DAY BEFORE BREAKFAST Yes Jacklyn Deng MD   albuterol (PROVENTIL) (2.5 MG/3ML) 0.083% nebulizer solution Take 3 mLs by nebulization every 6 hours as needed for Wheezing or Shortness of Breath Yes Jacklyn Deng MD   blood glucose test strips (ASCENSIA AUTODISC VI;ONE TOUCH ULTRA TEST VI) strip 1 each by In Vitro route daily As needed.  Yes Jacklyn Deng MD   aspirin EC 81 MG EC tablet Take 1 tablet by mouth daily  Patient taking differently: Take 325 mg by mouth daily Yes Jacklyn Deng MD   Magnesium Oxide (MAGNESIUM-OXIDE) 250 MG TABS tablet Take 1 tablet by mouth daily Take with food, in the evening  Patient not taking: Reported on 12/16/2022  Jacklyn Deng MD   Umeclidinium Bromide 62.5 MCG/INH AEPB Inhale 1 each into the lungs daily  Patient not taking: Reported on 12/16/2022  Jacklyn Deng MD   simethicone (MYLICON) 80 MG chewable tablet Take 1 tablet by mouth 4 times daily as needed for Flatulence  Patient not taking: Reported on 12/16/2022  SHARDA Garduno - CNP   Respiratory Therapy Supplies (NEBULIZER/TUBING/MOUTHPIECE) KIT Dx. COPD, needs nebulizer supplies  Jacklyn Deng MD   fluticasone (FLONASE) 50 MCG/ACT nasal spray 2 sprays by Each Nostril route daily  Patient not taking: Reported on 12/16/2022  Coy Shankar MD            Social History     Tobacco Use    Smoking status: Every Day     Packs/day: 1.00     Years: 49.00     Pack years: 49.00     Types: Cigarettes    Smokeless tobacco: Never    Tobacco comments:     Started smoking at 12 y/o, 1 PPD most of her life, quit once for 6 months,currently 6 cigarettes/day for 2-3 months   Vaping Use    Vaping Use: Former   Substance Use Topics    Alcohol use: Yes     Comment: OCCASIONAL    Drug use: No         Review of Systems   Constitutional:  Positive for fatigue. Negative for activity change, appetite change, chills, diaphoresis, fever and unexpected weight change. HENT:  Positive for voice change (chronic). Eyes:  Positive for visual disturbance (stable, chronic). Respiratory:  Positive for cough and shortness of breath (SCHWARTZ stable). Negative for chest tightness and wheezing. Cardiovascular:  Negative for chest pain, palpitations and leg swelling. Gastrointestinal:  Negative for abdominal distention, abdominal pain, constipation, diarrhea, nausea and vomiting. Endocrine: Negative for cold intolerance, heat intolerance, polydipsia, polyphagia and polyuria. Genitourinary:  Negative for difficulty urinating and frequency. Neurological:  Negative for weakness, numbness and headaches. Hematological:  Does not bruise/bleed easily. Psychiatric/Behavioral:  Positive for dysphoric mood. Negative for self-injury, sleep disturbance and suicidal ideas. The patient is nervous/anxious.        -vital signs stable and within normal limits except high BP, Overweight per BMI and borderline low pulse    BP (!) 152/92   Pulse 63   Temp 97.8 °F (36.6 °C)   Ht 5' 2\" (1.575 m)   Wt 148 lb 6.4 oz (67.3 kg)   SpO2 95%   BMI 27.14 kg/m²         Physical Exam  Vitals and nursing note reviewed. Constitutional:       General: She is not in acute distress. Appearance: Normal appearance. She is well-developed. She is not diaphoretic. HENT:      Head: Normocephalic and atraumatic.       Right Ear: External ear normal.      Left Ear: External ear normal.      Mouth/Throat:      Comments: I did not examine the mouth due to coronavirus pandemic and wearing masks    Eyes:      General: Lids are normal. No scleral icterus. Right eye: No discharge. Left eye: No discharge. Extraocular Movements: Extraocular movements intact. Conjunctiva/sclera: Conjunctivae normal.   Neck:      Thyroid: No thyromegaly. Cardiovascular:      Rate and Rhythm: Normal rate and regular rhythm. Heart sounds: Normal heart sounds. No murmur heard. Pulmonary:      Effort: Pulmonary effort is normal. No respiratory distress. Breath sounds: Examination of the right-middle field reveals decreased breath sounds. Examination of the left-middle field reveals decreased breath sounds. Examination of the right-lower field reveals decreased breath sounds. Examination of the left-lower field reveals decreased breath sounds. Decreased breath sounds present. No wheezing or rales. Comments: Moderate decreased, to cut down on smoking  Chest:      Chest wall: No tenderness. Abdominal:      General: Bowel sounds are normal. There is no distension. Palpations: Abdomen is soft. There is no hepatomegaly or splenomegaly. Tenderness: There is no abdominal tenderness. Musculoskeletal:         General: No tenderness. Normal range of motion. Cervical back: Normal range of motion and neck supple. Right lower leg: No edema. Left lower leg: No edema. Skin:     General: Skin is warm and dry. Capillary Refill: Capillary refill takes less than 2 seconds. Findings: No rash. Neurological:      Mental Status: She is alert and oriented to person, place, and time. Cranial Nerves: No cranial nerve deficit. Motor: No abnormal muscle tone. Gait: Gait normal.   Psychiatric:         Attention and Perception: Attention normal.         Mood and Affect: Mood is anxious. Speech: Speech is rapid and pressured. Behavior: Behavior normal.         Thought Content:  Thought content normal.         Cognition and Harden Beech Harden Beech Harden Beech .......... Harden Beech Less than or equal to 16 ng/mL/hr  Plasma renin activity measures enzyme ability to convert   angiotensinogen to angiotensin I and is limited by the   availability of angiotensinogen. Plasma renin activity is not an   accurate indicator of enzyme acti                           vity when angiotensinogen is   decreased. This test was developed and its performance characteristics   determined by [facility]. It has not been cleared or   approved by the [employer] and Drug Administration. This test was   performed in a CLIA certified laboratory and is intended for   clinical purposes. Performed By: [facility]  : Wil Kaur MD, PhD      Renin Comment 08/24/2022 SITTING   Final    Aldosterone 08/24/2022 7.5  ng/dL Final    Comment: (NOTE)  INTERPRETIVE INFORMATION: Aldosterone, Serum  Reference intervals for age 13 and older:  Upright . ........  4.0 - 31.0 ng/dL  Supine . ........ Harden Beech Less than or equal to 16.0 ng/dL  Unspecified . Harden Beech ... Less than or equal to 31.0 ng/dL  Normal serum levels of aldosterone are dependent on the sodium   intake and whether the patient is upright or supine. High sodium   intake will tend to suppress serum aldosterone, whereas low sodium   intake will elevate serum aldosterone. The reference intervals for   serum aldosterone are based on normal sodium intake. Access complete set of age- and/or gender-specific reference   intervals for this test in the [facility] Test Directory   ([email or web address]).   Performed By: [facility] [address] [place] [address]  : Wil Kaur MD, PhD      Aldosterone Comment 08/24/2022 SITTING   Final    Glucose 08/24/2022 221 (A)  70 - 99 mg/dL Final    BUN 08/24/2022 21  8 - 23 mg/dL Final    Creatinine 08/24/2022 0.64  0.50 - 0.90 mg/dL Final    Calcium 08/24/2022 9.4  8.6 - 10.4 mg/dL Final    Sodium 08/24/2022 132 (A) 135 - 144 mmol/L Final    Potassium 08/24/2022 4.5  3.7 - 5.3 mmol/L Final    Chloride 08/24/2022 94 (A)  98 - 107 mmol/L Final    CO2 08/24/2022 27  20 - 31 mmol/L Final    Anion Gap 08/24/2022 11  9 - 17 mmol/L Final    GFR Non- 08/24/2022 >60  >60 mL/min Final    GFR  08/24/2022 >60  >60 mL/min Final    GFR Comment 08/24/2022        Final    Comment: Average GFR for 61-76 years old:   80 mL/min/1.73sq m  Chronic Kidney Disease:   <60 mL/min/1.73sq m  Kidney failure:   <15 mL/min/1.73sq m              eGFR calculated using average adult body mass.  Additional eGFR calculator available at:        [employer]               Lab Results   Component Value Date    WBC 8.4 05/23/2022    HGB 14.0 05/23/2022    HCT 41.0 05/23/2022    MCV 90.5 05/23/2022     05/23/2022            Lab Results   Component Value Date    ALT 8 04/22/2022    AST 12 04/22/2022    ALKPHOS 119 (H) 04/22/2022    BILITOT 0.69 04/22/2022       Lab Results   Component Value Date    TSH 0.50 04/06/2022       Lab Results   Component Value Date    CHOL 187 03/15/2021    CHOL 195 07/13/2020    CHOL 184 04/26/2019     Lab Results   Component Value Date    TRIG 149 03/15/2021    TRIG 120 07/13/2020    TRIG 165 (H) 04/26/2019     Lab Results   Component Value Date    HDL 51 04/22/2022    HDL 60 03/15/2021    HDL 53 07/13/2020     Lab Results   Component Value Date    LDLCALC 102 04/26/2019    LDLCALC 92 05/25/2018    LDLCALC 96 07/28/2017    LDLCHOLESTEROL 122 04/22/2022    LDLCHOLESTEROL 97 03/15/2021    LDLCHOLESTEROL 118 07/13/2020     Lab Results   Component Value Date    CHOLHDLRATIO 3.9 04/22/2022    CHOLHDLRATIO 3.1 03/15/2021    CHOLHDLRATIO 3.7 07/13/2020         Lab Results   Component Value Date    ADLFVBZW90 600 04/22/2022     Lab Results   Component Value Date    FOLATE 10.6 04/22/2022     Lab Results   Component Value Date    VITD25 10.7 (L) 04/22/2022         Orders Placed This Encounter   Medications    cOVID-19mRNA bival vac moderna (MODERNA COVID-19 BIVAL BOOSTER) 50 MCG/0.5ML SUSP injection     Sig: Inject 0.5 mLs into the muscle once for 1 dose Last done 12/10/2021     Dispense:  1 mL     Refill:  0    sertraline (ZOLOFT) 100 MG tablet     Sig: Take 1 tablet by mouth every morning     Dispense:  90 tablet     Refill:  3    irbesartan (AVAPRO) 300 MG tablet     Sig: Take 1 tablet by mouth daily Dose increased 12/16/2022     Dispense:  90 tablet     Refill:  1       Orders Placed This Encounter   Procedures    CBC     Standing Status:   Future     Standing Expiration Date:   12/16/2023    Comprehensive Metabolic Panel     Standing Status:   Future     Standing Expiration Date:   12/16/2023    Magnesium     Standing Status:   Future     Standing Expiration Date:   12/16/2023    TSH     Standing Status:   Future     Standing Expiration Date:   12/16/2023    Uric Acid     Standing Status:   Future     Standing Expiration Date:   12/16/2023    Urinalysis with Reflex to Culture     Standing Status:   Future     Standing Expiration Date:   12/16/2023     Order Specific Question:   SPECIFY(EX-CATH,MIDSTREAM,CYSTO,ETC)?      Answer:   MIDSTREAM    Vitamin D 25 Hydroxy     Standing Status:   Future     Standing Expiration Date:   12/16/2023    Vitamin B12 & Folate     Standing Status:   Future     Standing Expiration Date:   12/16/2023    Lipid Panel     Standing Status:   Future     Standing Expiration Date:   12/16/2023     Order Specific Question:   Is Patient Fasting?/# of Hours     Answer:   8-10 Hours, water ok to drink    POCT glycosylated hemoglobin (Hb A1C)       Medications Discontinued During This Encounter   Medication Reason    sertraline (ZOLOFT) 100 MG tablet DOSE ADJUSTMENT    sertraline (ZOLOFT) 50 MG tablet REORDER    metoprolol succinate (TOPROL XL) 50 MG extended release tablet DISCONTINUED BY ANOTHER CLINICIAN    irbesartan (AVAPRO) 150 MG tablet DOSE ADJUSTMENT Magnesium Oxide (MAGNESIUM-OXIDE) 250 MG TABS tablet     Umeclidinium Bromide 62.5 MCG/INH AEPB Patient Choice    simethicone (MYLICON) 80 MG chewable tablet Therapy completed    fluticasone (FLONASE) 50 MCG/ACT nasal spray Patient Choice         On this date 12/16/2022 I have spent 35 minutes reviewing previous notes, test results and face to face with the patient discussing the diagnosis and importance of compliance with the treatment plan as well as documenting on the day of the visit. This note was completed by using the assistance of a speech-recognition program. However, inadvertent computerized transcription errors may be present. Although every effort was made to ensure accuracy, no guarantees can be provided that every mistake has been identified and corrected by editing . An electronic signature was used to authenticate this note.   Electronically signed by Barrie Burnett MD on 12/18/2022 at 4:13 PM

## 2022-12-16 NOTE — RESULT ENCOUNTER NOTE
Noted  Future Appointments  12/23/2022 1:00 PM    [facility]  1/16/2023  2:45 PM    Kiley Boast, MD   AFL RenalSrv        AFL Renal Se  3/8/2023   11:15 AM   [facility] CT RM 1                STCZ CT SCAN        [facility] Radiolog  5/10/2023  11:30 AM   Cherri Spurling, MD     [facility]

## 2022-12-16 NOTE — PROGRESS NOTES
Visit Information    Have you changed or started any medications since your last visit including any over-the-counter medicines, vitamins, or herbal medicines? no   Have you stopped taking any of your medications? Is so, why? -  no  Are you having any side effects from any of your medications? - no    Have you seen any other physician or provider since your last visit? yes -    Have you had any other diagnostic tests since your last visit?  no   Have you been seen in the emergency room and/or had an admission in a hospital since we last saw you?  no   Have you had your routine dental cleaning in the past 6 months?  no     Do you have an active MyChart account? If no, what is the barrier?   Yes    Patient Care Team:  Shae Garcia MD as PCP - General (Family Medicine)  Shae Garcia MD as PCP - [facility]  Samantha Kern DO as Consulting Physician (Obstetrics & Gynecology)  Pablo Ocampo MD as Consulting Physician (Pulmonology)  Shayy Garcia MD as Surgeon (Cardiology)  [facility], MD as Consulting Physician (Nephrology)    Medical History Review  Past Medical, Family, and Social History reviewed and does contribute to the patient presenting condition    Health Maintenance   Topic Date Due    DTaP/Tdap/Td vaccine (1 - Tdap) Never done    Shingles vaccine (2 of 2) 12/29/2020    COVID-19 Vaccine (4 - Booster for Wendy Eagles series) 02/04/2022    Diabetic retinal exam  06/07/2022    DEXA (modify frequency per FRAX score)  01/19/2023    Lipids  04/22/2023    Colorectal Cancer Screen  05/10/2023    Annual Wellness Visit (AWV)  05/11/2023    Diabetic foot exam  08/17/2023    A1C test (Diabetic or Prediabetic)  08/17/2023    Depression Monitoring  08/17/2023    Breast cancer screen  09/01/2023    Low dose CT lung screening  09/01/2023    Flu vaccine  Completed    Pneumococcal 65+ years Vaccine  Completed    Hepatitis C screen  Completed    Hepatitis A vaccine  Aged Out    Hib vaccine  Aged Out    Meningococcal (ACWY) vaccine  Aged Out

## 2022-12-18 PROBLEM — F33.0 MILD EPISODE OF RECURRENT MAJOR DEPRESSIVE DISORDER (HCC): Status: ACTIVE | Noted: 2022-12-18

## 2022-12-18 ASSESSMENT — ENCOUNTER SYMPTOMS: SHORTNESS OF BREATH: 1

## 2022-12-19 DIAGNOSIS — E55.9 VITAMIN D DEFICIENCY: ICD-10-CM

## 2022-12-19 DIAGNOSIS — K21.9 GASTROESOPHAGEAL REFLUX DISEASE WITHOUT ESOPHAGITIS: ICD-10-CM

## 2022-12-19 RX ORDER — PANTOPRAZOLE SODIUM 20 MG/1
20 TABLET, DELAYED RELEASE ORAL
Qty: 90 TABLET | Refills: 1 | Status: SHIPPED | OUTPATIENT
Start: 2022-12-19

## 2022-12-19 RX ORDER — ERGOCALCIFEROL 1.25 MG/1
CAPSULE ORAL
Qty: 12 CAPSULE | Refills: 0 | Status: SHIPPED | OUTPATIENT
Start: 2022-12-19

## 2022-12-28 ENCOUNTER — NURSE ONLY (OUTPATIENT)
Dept: FAMILY MEDICINE CLINIC | Age: 67
End: 2022-12-28
Payer: MEDICARE

## 2022-12-28 VITALS — SYSTOLIC BLOOD PRESSURE: 138 MMHG | DIASTOLIC BLOOD PRESSURE: 88 MMHG | OXYGEN SATURATION: 98 % | HEART RATE: 110 BPM

## 2022-12-28 DIAGNOSIS — J20.9 ACUTE BRONCHITIS DUE TO INFECTION: ICD-10-CM

## 2022-12-28 DIAGNOSIS — I10 ESSENTIAL HYPERTENSION: Primary | ICD-10-CM

## 2022-12-28 PROCEDURE — 3078F DIAST BP <80 MM HG: CPT | Performed by: FAMILY MEDICINE

## 2022-12-28 PROCEDURE — 3074F SYST BP LT 130 MM HG: CPT | Performed by: FAMILY MEDICINE

## 2022-12-28 PROCEDURE — 99211 OFF/OP EST MAY X REQ PHY/QHP: CPT | Performed by: FAMILY MEDICINE

## 2022-12-28 RX ORDER — AZITHROMYCIN 250 MG/1
TABLET, FILM COATED ORAL
Qty: 6 TABLET | Refills: 0 | Status: SHIPPED | OUTPATIENT
Start: 2022-12-28 | End: 2023-01-02

## 2022-12-28 RX ORDER — METOPROLOL SUCCINATE 50 MG/1
50 TABLET, EXTENDED RELEASE ORAL DAILY
Qty: 90 TABLET | Refills: 3 | Status: SHIPPED | OUTPATIENT
Start: 2022-12-28

## 2022-12-28 NOTE — PROGRESS NOTES
Chief Complaint   Patient presents with    Hypertension    Blood Pressure Check      Bette Reddy is here for BP check    BP Readings from Last 3 Encounters:   12/28/22 138/88   12/16/22 (!) 152/92   08/26/22 138/79       BP is 138/88  Patient is here for her blood pressure reading and is c/o cough with green phlegm SOB started Sunday no there symptoms she is asking if something can be called in to her pharmacy.       Future Appointments   Date Time Provider Kathy Davis   12/28/2022 11:00 AM SCHEDULE, CHRISTINA ZIMMER  GILBERT zimmer [facility]   1/16/2023  2:45 PM Bianca Drummond MD AFL RenalSrv AFL Renal Se   3/8/2023 11:15 AM [facility] CT RM 1 STCZ CT SCAN [facility] Radiolog   5/10/2023 11:30 AM Ondina Avila MD [facility]

## 2022-12-28 NOTE — PROGRESS NOTES
Chief Complaint   Patient presents with    Hypertension    Blood Pressure Check    Cough    Shortness of Breath        Patient is here for blood pressure recheck. 1. Essential hypertension    2. Acute bronchitis due to infection        Well controlled BP        BP Readings from Last 3 Encounters:   22 138/88   22 (!) 152/92   22 138/79       Pulse Readings from Last 3 Encounters:   22 (!) 110   22 63   22 74     Regarding bronchitis, Z-Kayode, quit smoking for 7 days while sick, Symbicort 2 puffs twice a day, start nebulizer treatments every 6 hours. If she needs the albuterol refilled to let me know.     Worsening shortness of breath to go to the emergency room    I will also increased the metoprolol due to tachycardia    Orders Placed This Encounter   Medications    azithromycin (ZITHROMAX) 250 MG tablet     Si mg orally on day one followed by 250 mg daily on days two through five     Dispense:  6 tablet     Refill:  0    metoprolol succinate (TOPROL XL) 50 MG extended release tablet     Sig: Take 1 tablet by mouth daily Dose increased on 2022 due to tachycardia     Dispense:  90 tablet     Refill:  3       Future Appointments   Date Time Provider Kathy Davis   2023  2:45 PM Eladia Villafuerte MD AFL RenalSrv AFL Renal Se   3/8/2023 11:15 AM [facility] CT RM 1 STCZ CT SCAN [facility] Radiolog   5/10/2023 11:30 AM Shae Garcia MD fp sc [facility]

## 2023-01-12 ENCOUNTER — HOSPITAL ENCOUNTER (OUTPATIENT)
Age: 68
Discharge: HOME | End: 2023-01-12
Payer: MEDICAID

## 2023-01-12 DIAGNOSIS — N18.1 CKD (CHRONIC KIDNEY DISEASE), STAGE I: ICD-10-CM

## 2023-01-12 DIAGNOSIS — E87.1 HYPONATREMIA: ICD-10-CM

## 2023-01-12 DIAGNOSIS — E13.22 SECONDARY DIABETES MELLITUS WITH STAGE 1 CHRONIC KIDNEY DISEASE (HCC): ICD-10-CM

## 2023-01-12 DIAGNOSIS — R80.9 PROTEINURIA, UNSPECIFIED TYPE: ICD-10-CM

## 2023-01-12 DIAGNOSIS — N18.1 SECONDARY DIABETES MELLITUS WITH STAGE 1 CHRONIC KIDNEY DISEASE (HCC): ICD-10-CM

## 2023-01-12 DIAGNOSIS — I12.9 BENIGN HYPERTENSION WITH CKD (CHRONIC KIDNEY DISEASE) STAGE I: ICD-10-CM

## 2023-01-12 DIAGNOSIS — N18.1 BENIGN HYPERTENSION WITH CKD (CHRONIC KIDNEY DISEASE) STAGE I: ICD-10-CM

## 2023-01-12 DIAGNOSIS — E83.42 HYPOMAGNESEMIA: Primary | ICD-10-CM

## 2023-01-12 LAB
ABSOLUTE EOS #: 0.1 K/UL (ref 0–0.4)
ABSOLUTE LYMPH #: 2.5 K/UL (ref 1–4.8)
ABSOLUTE MONO #: 0.6 K/UL (ref 0.1–1.3)
ALT SERPL-CCNC: 9 U/L (ref 5–33)
ANION GAP SERPL CALCULATED.3IONS-SCNC: 10 MMOL/L (ref 9–17)
AST SERPL-CCNC: 13 U/L
BACTERIA: ABNORMAL
BASOPHILS # BLD: 1 % (ref 0–2)
BASOPHILS ABSOLUTE: 0.1 K/UL (ref 0–0.2)
BILIRUBIN URINE: NEGATIVE
BUN BLDV-MCNC: 13 MG/DL (ref 8–23)
CALCIUM SERPL-MCNC: 9.3 MG/DL (ref 8.6–10.4)
CASTS UA: ABNORMAL /LPF
CHLORIDE BLD-SCNC: 93 MMOL/L (ref 98–107)
CHOLESTEROL/HDL RATIO: 3.9
CHOLESTEROL: 173 MG/DL
CO2: 29 MMOL/L (ref 20–31)
COLOR: YELLOW
CREAT SERPL-MCNC: 0.57 MG/DL (ref 0.5–0.9)
EOSINOPHILS RELATIVE PERCENT: 1 % (ref 0–4)
EPITHELIAL CELLS UA: ABNORMAL /HPF
GFR SERPL CREATININE-BSD FRML MDRD: >60 ML/MIN/1.73M2
GLUCOSE BLD-MCNC: 218 MG/DL (ref 70–99)
GLUCOSE URINE: ABNORMAL
HCT VFR BLD CALC: 43.7 % (ref 36–46)
HDLC SERPL-MCNC: 44 MG/DL
HEMOGLOBIN: 14.9 G/DL (ref 12–16)
KETONES, URINE: NEGATIVE
LDL CHOLESTEROL: 97 MG/DL (ref 0–130)
LEUKOCYTE ESTERASE, URINE: ABNORMAL
LYMPHOCYTES # BLD: 28 % (ref 24–44)
MAGNESIUM: 1.5 MG/DL (ref 1.6–2.6)
MCH RBC QN AUTO: 30.7 PG (ref 26–34)
MCHC RBC AUTO-ENTMCNC: 34.1 G/DL (ref 31–37)
MCV RBC AUTO: 89.9 FL (ref 80–100)
MONOCYTES # BLD: 7 % (ref 1–7)
NITRITE, URINE: NEGATIVE
PDW BLD-RTO: 13.7 % (ref 11.5–14.9)
PH UA: 5.5 (ref 5–8)
PHOSPHORUS: 3.8 MG/DL (ref 2.6–4.5)
PLATELET # BLD: 308 K/UL (ref 150–450)
PMV BLD AUTO: 7.5 FL (ref 6–12)
POTASSIUM SERPL-SCNC: 4 MMOL/L (ref 3.7–5.3)
PROTEIN UA: ABNORMAL
RBC # BLD: 4.86 M/UL (ref 4–5.2)
RBC UA: ABNORMAL /HPF
SEG NEUTROPHILS: 63 % (ref 36–66)
SEGMENTED NEUTROPHILS ABSOLUTE COUNT: 5.6 K/UL (ref 1.3–9.1)
SODIUM BLD-SCNC: 132 MMOL/L (ref 135–144)
SPECIFIC GRAVITY UA: 1.02 (ref 1–1.03)
TRIGL SERPL-MCNC: 159 MG/DL
TURBIDITY: ABNORMAL
URINE HGB: NEGATIVE
UROBILINOGEN, URINE: NORMAL
WBC # BLD: 8.8 K/UL (ref 3.5–11)
WBC UA: ABNORMAL /HPF

## 2023-01-12 PROCEDURE — 84460 ALANINE AMINO (ALT) (SGPT): CPT

## 2023-01-12 PROCEDURE — 81001 URINALYSIS AUTO W/SCOPE: CPT

## 2023-01-12 PROCEDURE — 84450 TRANSFERASE (AST) (SGOT): CPT

## 2023-01-12 PROCEDURE — 80048 BASIC METABOLIC PNL TOTAL CA: CPT

## 2023-01-12 PROCEDURE — 84100 ASSAY OF PHOSPHORUS: CPT

## 2023-01-12 PROCEDURE — 36415 COLL VENOUS BLD VENIPUNCTURE: CPT

## 2023-01-12 PROCEDURE — 80061 LIPID PANEL: CPT

## 2023-01-12 PROCEDURE — 85025 COMPLETE CBC W/AUTO DIFF WBC: CPT

## 2023-01-12 PROCEDURE — 83735 ASSAY OF MAGNESIUM: CPT

## 2023-01-12 RX ORDER — LANOLIN ALCOHOL/MO/W.PET/CERES
400 CREAM (GRAM) TOPICAL EVERY EVENING
Qty: 90 TABLET | Refills: 0 | Status: SHIPPED | OUTPATIENT
Start: 2023-01-12

## 2023-01-12 NOTE — RESULT ENCOUNTER NOTE
stable sodium 132 mildly low, high blood glucose 218, low magnesium 1.5  Please let the patient know I will send magnesium to the pharmacy 400 Mg daily to take, her magnesium was too low    Regarding sugar 218, is she taking glipizide 2 tablets in the morning and 1 tablet in the evening? Is she drinking any pop or any other carbonated drinks?       Future Appointments  1/16/2023  2:45 PM    Anca Saunders MD   AFL RenalSrv        AFL Renal Se  3/8/2023   11:15 AM   [facility] CT RM 1                STCZ CT SCAN        [facility] Radiolog  5/10/2023  11:30 AM   Deedee Valadez MD     [facility]               [facility]

## 2023-01-12 NOTE — RESULT ENCOUNTER NOTE
Please notify patient: Improved lipids, normal liver test, increased triglycerides, she needs to cut down the carbs, continue pravastatin 40 Mg daily    Otherwise labs within normal limits  continue current treatment    Future Appointments  1/16/2023  2:45 PM    Omid Hatch MD   AFL RenalSrv        AFL Renal Se  3/8/2023   11:15 AM   [facility] CT RM 1                STCZ CT SCAN        [facility] Radiolog  5/10/2023  11:30 AM   Chris Preston MD     fp sc               Demetrio Chanel

## 2023-01-16 DIAGNOSIS — J44.9 CHRONIC OBSTRUCTIVE PULMONARY DISEASE, UNSPECIFIED COPD TYPE (HCC): ICD-10-CM

## 2023-01-16 RX ORDER — ALBUTEROL SULFATE 90 UG/1
AEROSOL, METERED RESPIRATORY (INHALATION)
Qty: 6.7 EACH | Refills: 5 | Status: SHIPPED | OUTPATIENT
Start: 2023-01-16

## 2023-01-16 NOTE — TELEPHONE ENCOUNTER
Please Approve or Refuse.   [facility] to [facility] per [facility]     Next Visit Date:  5/10/2023   Last Visit Date: 12/16/2022    Hemoglobin A1C (%)   Date Value   12/16/2022 8.0   08/17/2022 7.3   04/05/2022 8.1 (H)             ( goal A1C is < 7)   BP Readings from Last 3 Encounters:   12/28/22 138/88   12/16/22 (!) 152/92   08/26/22 138/79          (goal 120/80)  BUN   Date Value Ref Range Status   01/12/2023 13 8 - 23 mg/dL Final     Creatinine   Date Value Ref Range Status   01/12/2023 0.57 0.50 - 0.90 mg/dL Final     Potassium   Date Value Ref Range Status   01/12/2023 4.0 3.7 - 5.3 mmol/L Final

## 2023-03-08 ENCOUNTER — HOSPITAL ENCOUNTER (OUTPATIENT)
Dept: CT IMAGING | Age: 68
Discharge: HOME | End: 2023-03-10
Payer: MEDICARE

## 2023-03-08 DIAGNOSIS — R91.1 PULMONARY NODULE: ICD-10-CM

## 2023-03-08 PROCEDURE — 71250 CT THORAX DX C-: CPT

## 2023-03-09 ENCOUNTER — TELEPHONE (OUTPATIENT)
Dept: FAMILY MEDICINE CLINIC | Age: 68
End: 2023-03-09

## 2023-03-30 DIAGNOSIS — R80.9 TYPE 2 DIABETES MELLITUS WITH MICROALBUMINURIA, WITHOUT LONG-TERM CURRENT USE OF INSULIN (HCC): ICD-10-CM

## 2023-03-30 DIAGNOSIS — E55.9 VITAMIN D DEFICIENCY: ICD-10-CM

## 2023-03-30 DIAGNOSIS — E11.29 TYPE 2 DIABETES MELLITUS WITH MICROALBUMINURIA, WITHOUT LONG-TERM CURRENT USE OF INSULIN (HCC): ICD-10-CM

## 2023-03-30 RX ORDER — ERGOCALCIFEROL 1.25 MG/1
CAPSULE ORAL
Qty: 12 CAPSULE | Refills: 0 | Status: SHIPPED | OUTPATIENT
Start: 2023-03-30

## 2023-03-30 RX ORDER — GLIPIZIDE 10 MG/1
TABLET ORAL
Qty: 270 TABLET | Refills: 2 | Status: SHIPPED | OUTPATIENT
Start: 2023-03-30

## 2023-03-30 NOTE — TELEPHONE ENCOUNTER
Please Approve or Refuse.   Send to Pharmacy per Pt's Request:      Next Visit Date:  5/10/2023   Last Visit Date: 12/16/2022    Hemoglobin A1C (%)   Date Value   12/16/2022 8.0   08/17/2022 7.3   04/05/2022 8.1 (H)             ( goal A1C is < 7)   BP Readings from Last 3 Encounters:   01/16/23 132/84   12/28/22 138/88   12/16/22 (!) 152/92          (goal 120/80)  BUN   Date Value Ref Range Status   01/12/2023 13 8 - 23 mg/dL Final     Creatinine   Date Value Ref Range Status   01/12/2023 0.57 0.50 - 0.90 mg/dL Final     Potassium   Date Value Ref Range Status   01/12/2023 4.0 3.7 - 5.3 mmol/L Final

## 2023-04-25 ENCOUNTER — TELEMEDICINE (OUTPATIENT)
Dept: FAMILY MEDICINE CLINIC | Age: 68
End: 2023-04-25
Payer: MEDICARE

## 2023-04-25 DIAGNOSIS — H81.11 BPPV (BENIGN PAROXYSMAL POSITIONAL VERTIGO), RIGHT: Primary | ICD-10-CM

## 2023-04-25 DIAGNOSIS — J44.9 CHRONIC OBSTRUCTIVE PULMONARY DISEASE, UNSPECIFIED COPD TYPE (HCC): ICD-10-CM

## 2023-04-25 DIAGNOSIS — R49.0 HOARSENESS OF VOICE: ICD-10-CM

## 2023-04-25 DIAGNOSIS — R80.9 TYPE 2 DIABETES MELLITUS WITH MICROALBUMINURIA, WITHOUT LONG-TERM CURRENT USE OF INSULIN (HCC): ICD-10-CM

## 2023-04-25 DIAGNOSIS — H69.81 EUSTACHIAN TUBE DYSFUNCTION, RIGHT: ICD-10-CM

## 2023-04-25 DIAGNOSIS — I10 ESSENTIAL HYPERTENSION: ICD-10-CM

## 2023-04-25 DIAGNOSIS — E11.29 TYPE 2 DIABETES MELLITUS WITH MICROALBUMINURIA, WITHOUT LONG-TERM CURRENT USE OF INSULIN (HCC): ICD-10-CM

## 2023-04-25 DIAGNOSIS — J30.89 NON-SEASONAL ALLERGIC RHINITIS DUE TO OTHER ALLERGIC TRIGGER: ICD-10-CM

## 2023-04-25 PROBLEM — J30.9 ALLERGIC RHINITIS DUE TO ALLERGEN: Status: ACTIVE | Noted: 2023-04-25

## 2023-04-25 PROCEDURE — 99443 PR PHYS/QHP TELEPHONE EVALUATION 21-30 MIN: CPT | Performed by: FAMILY MEDICINE

## 2023-04-25 RX ORDER — FLUTICASONE PROPIONATE 50 MCG
2 SPRAY, SUSPENSION (ML) NASAL DAILY
Qty: 48 G | Refills: 1 | Status: SHIPPED | OUTPATIENT
Start: 2023-04-25

## 2023-04-25 RX ORDER — PRAVASTATIN SODIUM 80 MG/1
80 TABLET ORAL
COMMUNITY
Start: 2023-04-18

## 2023-04-25 RX ORDER — MECLIZINE HYDROCHLORIDE 25 MG/1
25 TABLET ORAL 3 TIMES DAILY PRN
Qty: 30 TABLET | Refills: 0 | Status: SHIPPED | OUTPATIENT
Start: 2023-04-25 | End: 2023-05-05

## 2023-04-25 RX ORDER — NEOMYCIN SULFATE, POLYMYXIN B SULFATE AND HYDROCORTISONE 10; 3.5; 1 MG/ML; MG/ML; [USP'U]/ML
3 SUSPENSION/ DROPS AURICULAR (OTIC) 4 TIMES DAILY
Qty: 10 ML | Refills: 0 | Status: SHIPPED | OUTPATIENT
Start: 2023-04-25

## 2023-05-03 ENCOUNTER — HOSPITAL ENCOUNTER (EMERGENCY)
Age: 68
Discharge: HOME | End: 2023-05-03
Attending: STUDENT IN AN ORGANIZED HEALTH CARE EDUCATION/TRAINING PROGRAM
Payer: MEDICARE

## 2023-05-03 VITALS
BODY MASS INDEX: 27.25 KG/M2 | HEART RATE: 109 BPM | OXYGEN SATURATION: 94 % | WEIGHT: 149 LBS | TEMPERATURE: 97 F | RESPIRATION RATE: 20 BRPM | DIASTOLIC BLOOD PRESSURE: 76 MMHG | SYSTOLIC BLOOD PRESSURE: 121 MMHG

## 2023-05-03 DIAGNOSIS — S61.211A LACERATION OF LEFT INDEX FINGER WITHOUT FOREIGN BODY WITHOUT DAMAGE TO NAIL, INITIAL ENCOUNTER: Primary | ICD-10-CM

## 2023-05-03 PROCEDURE — 99283 EMERGENCY DEPT VISIT LOW MDM: CPT

## 2023-05-03 PROCEDURE — 12001 RPR S/N/AX/GEN/TRNK 2.5CM/<: CPT

## 2023-05-03 RX ORDER — LIDOCAINE HYDROCHLORIDE 10 MG/ML
5 INJECTION, SOLUTION INFILTRATION; PERINEURAL ONCE
Status: DISCONTINUED | OUTPATIENT
Start: 2023-05-03 | End: 2023-05-04 | Stop reason: HOSPADM

## 2023-05-03 RX ORDER — CEPHALEXIN 500 MG/1
500 CAPSULE ORAL 2 TIMES DAILY
Qty: 14 CAPSULE | Refills: 0 | Status: SHIPPED | OUTPATIENT
Start: 2023-05-03 | End: 2023-05-10

## 2023-05-04 ENCOUNTER — HOSPITAL ENCOUNTER (EMERGENCY)
Age: 68
Discharge: HOME | End: 2023-05-04
Attending: EMERGENCY MEDICINE
Payer: MEDICARE

## 2023-05-04 ENCOUNTER — HOSPITAL ENCOUNTER (EMERGENCY)
Age: 68
Discharge: HOME | End: 2023-05-04
Attending: SURGERY
Payer: MEDICARE

## 2023-05-04 ENCOUNTER — TELEPHONE (OUTPATIENT)
Dept: FAMILY MEDICINE CLINIC | Age: 68
End: 2023-05-04

## 2023-05-04 VITALS
OXYGEN SATURATION: 95 % | SYSTOLIC BLOOD PRESSURE: 125 MMHG | DIASTOLIC BLOOD PRESSURE: 81 MMHG | TEMPERATURE: 98.2 F | WEIGHT: 149 LBS | RESPIRATION RATE: 17 BRPM | BODY MASS INDEX: 27.42 KG/M2 | HEIGHT: 62 IN | HEART RATE: 89 BPM

## 2023-05-04 VITALS
HEART RATE: 84 BPM | OXYGEN SATURATION: 98 % | DIASTOLIC BLOOD PRESSURE: 71 MMHG | TEMPERATURE: 97.4 F | HEIGHT: 62 IN | RESPIRATION RATE: 17 BRPM | WEIGHT: 149 LBS | BODY MASS INDEX: 27.42 KG/M2 | SYSTOLIC BLOOD PRESSURE: 142 MMHG

## 2023-05-04 DIAGNOSIS — R58 BLEEDING: Primary | ICD-10-CM

## 2023-05-04 DIAGNOSIS — Z51.89 VISIT FOR WOUND CHECK: ICD-10-CM

## 2023-05-04 DIAGNOSIS — Z51.89 VISIT FOR WOUND CHECK: Primary | ICD-10-CM

## 2023-05-04 PROBLEM — H69.81 EUSTACHIAN TUBE DYSFUNCTION, RIGHT: Status: ACTIVE | Noted: 2023-05-04

## 2023-05-04 PROBLEM — H69.91 EUSTACHIAN TUBE DYSFUNCTION, RIGHT: Status: ACTIVE | Noted: 2023-05-04

## 2023-05-04 PROCEDURE — 6370000000 HC RX 637 (ALT 250 FOR IP): Performed by: PHYSICIAN ASSISTANT

## 2023-05-04 PROCEDURE — 99283 EMERGENCY DEPT VISIT LOW MDM: CPT

## 2023-05-04 PROCEDURE — 99282 EMERGENCY DEPT VISIT SF MDM: CPT

## 2023-05-04 RX ORDER — THROMB-CAL-CELL-DRESSING,HEMOS 3" X 3"
1 PADS, MEDICATED (EA) TOPICAL PRN
Status: DISCONTINUED | OUTPATIENT
Start: 2023-05-04 | End: 2023-05-04 | Stop reason: HOSPADM

## 2023-05-04 RX ADMIN — Medication 1 EACH: at 17:33

## 2023-05-04 RX ADMIN — Medication 1 EACH: at 15:00

## 2023-05-04 RX ADMIN — Medication 1 EACH: at 18:01

## 2023-05-04 RX ADMIN — Medication 1 EACH: at 18:23

## 2023-05-04 ASSESSMENT — LIFESTYLE VARIABLES
HOW MANY STANDARD DRINKS CONTAINING ALCOHOL DO YOU HAVE ON A TYPICAL DAY: 1 OR 2
HOW OFTEN DO YOU HAVE A DRINK CONTAINING ALCOHOL: MONTHLY OR LESS
HOW MANY STANDARD DRINKS CONTAINING ALCOHOL DO YOU HAVE ON A TYPICAL DAY: 1 OR 2
HOW OFTEN DO YOU HAVE A DRINK CONTAINING ALCOHOL: 2-4 TIMES A MONTH

## 2023-05-04 ASSESSMENT — PAIN - FUNCTIONAL ASSESSMENT
PAIN_FUNCTIONAL_ASSESSMENT: NONE - DENIES PAIN

## 2023-05-04 NOTE — ED PROVIDER NOTES
[address]  Emergency Department Encounter  Emergency Medicine Resident     Pt Name:Gem Medrano  MRN: [id number]  [place] 1955  Date of evaluation: 5/3/23  PCP:  Ev Nagy MD    9:54 PM EDT     CHIEF COMPLAINT       Chief Complaint   Patient presents with    Laceration       HISTORY OF PRESENT ILLNESS  (Location/Symptom, Timing/Onset, Context/Setting, Quality, Duration, Modifying Factors, Severity.)      Billie Wagner is a 75 y.o. female who presents with laceration to her left pointer finger approximately 1 hour prior to arrival was making dinner for her  cooking chicken with a clean knife and then accidentally sliced her pointer finger. She is on blood thinners. She presents with a gauze wrap over the finger. Last tetanus in 2021. She states that she is about to go on a vacation trip to the [facility] and 9 to 10 days and wants to know when she will need to return for suture removal.  I did state within 7 days. Patient allergies were reviewed, she does not have any medication allergies. Patient states she has decreased feeling in her left hand at baseline. PAST MEDICAL / SURGICAL / SOCIAL / FAMILY HISTORY      has a past medical history of Arthritis, Asthma, CAD (coronary artery disease), Diabetes mellitus ([address] Dizzy, Family history of breast cancer in sister, Holter monitor, abnormal, Hyperlipidemia, Hypertension, Osteopenia determined by x-ray, Research study patient, SOB (shortness of breath), and Thyroid condition. has a past surgical history that includes Tubal ligation; Dilation and curettage of uterus; Tonsillectomy; hysteroscopy (10/07/14); pacemaker placement (Left, 02/28/2022); Cardiac catheterization (02/28/2022); Cardiac pacemaker placement (02/28/2022); and Coronary angioplasty (03/08/2022).       Social History     Socioeconomic History    Marital status:      Spouse name: Not on file    Number of children: Not on file

## 2023-05-04 NOTE — ED PROVIDER NOTES
EMERGENCY DEPARTMENT ENCOUNTER   ATTENDING ATTESTATION     Pt Name: Jayden Silva  MRN: [id number]  [place] 1955  Date of evaluation: 5/3/23       Jayden Silva is a 75 y.o. female who presents with Laceration    Cut with knife in kitchen    MDM:     Washed out and repaired by resident    Return precautions    Vitals:   Vitals:    05/03/23 2131   BP: 121/76   Pulse: (!) 109   Resp: 20   Temp: 97 °F (36.1 °C)   TempSrc: Temporal   SpO2: 94%   Weight: 149 lb (67.6 kg)         I personally saw and examined the patient. I have reviewed and agree with the resident's findings, including all diagnostic interpretations and treatment plan as written. I was present for the key portions of any procedures performed and the inclusive time noted for any critical care statement.     Cade Zazueta MD  Attending Emergency Physician           Cade Zazueta MD  05/03/23 7854

## 2023-05-04 NOTE — ED PROVIDER NOTES
[address]  eMERGENCY dEPARTMENT eNCOUnter      Pt Name: Philly Frank  MRN: [id number]  [place] 1955  Date of evaluation: 5/4/2023  Provider: Ras Garrido PA-C    CHIEF COMPLAINT       Chief Complaint   Patient presents with    Laceration     Left index finger stitches bleeding           HISTORY OF PRESENT ILLNESS  (Location/Symptom, Timing/Onset, Context/Setting, Quality, Duration, Modifying Factors, Severity.)   Philly Frank is a 75 y.o. female who presents to the emergency department for evaluation of left index finger bleeding. Pt states she cut her finger yesterday cutting chicken. She had sutures placed last night. States PTA she develop bleeding from the stitches. She is on [facility]. Denies injury. No other complaints. Nursing Notes were reviewed. REVIEW OF SYSTEMS    (2-9 systems for level 4, 10 or more for level 5)     Review of Systems   Finger bleeding       Except as noted above the remainder of the review of systems was reviewed and negative. PAST MEDICAL HISTORY     Past Medical History:   Diagnosis Date    Arthritis     Asthma     CAD (coronary artery disease) 03/08/2022    stent    Diabetes mellitus ([address]     Dizzy     Family history of breast cancer in sister 07/02/2020    Holter monitor, abnormal     Hyperlipidemia     Hypertension     Osteopenia determined by x-ray 01/19/2021    Research study patient 02/28/2022    [facility] Registry    SOB (shortness of breath)     Thyroid condition      None otherwise stated in nurses notes    SURGICAL HISTORY       Past Surgical History:   Procedure Laterality Date    CARDIAC CATHETERIZATION  02/28/2022    CARDIAC PACEMAKER PLACEMENT  02/28/2022    CORONARY ANGIOPLASTY  03/08/2022    DILATION AND CURETTAGE OF UTERUS      HYSTEROSCOPY  10/07/14    D&C poypectomy with myosure    PACEMAKER PLACEMENT Left 02/28/2022    Dr. Trina Lane      16yrs ago.       None otherwise stated in nurses

## 2023-05-04 NOTE — ED TRIAGE NOTES
Mode of arrival (squad #, walk in, police, etc) : walk in         Chief complaint(s): laceration         Arrival Note (brief scenario, treatment PTA, etc). : Lt pointer finger laceration from knife. Bleeding controlled. C= \"Have you ever felt that you should Cut down on your drinking? \"  No  A= \"Have people Annoyed you by criticizing your drinking? \"  No  G= \"Have you ever felt bad or Guilty about your drinking? \"  No  E= \"Have you ever had a drink as an Eye-opener first thing in the morning to steady your nerves or to help a hangover? \"  No      Deferred []      Reason for deferring: N/A    *If yes to two or more: probable alcohol abuse. *

## 2023-05-04 NOTE — ED NOTES
Mode of arrival (squad #, walk in, police, etc) : Walk in        Chief complaint(s): Bleeding stitches         Arrival Note (brief scenario, treatment PTA, etc). : pt returns to the ED due to finger laceration continuing to bleed despite pressure gauze being applied at last visit. C= \"Have you ever felt that you should Cut down on your drinking? \"  No  A= \"Have people Annoyed you by criticizing your drinking? \"  No  G= \"Have you ever felt bad or Guilty about your drinking? \"  No  E= \"Have you ever had a drink as an Eye-opener first thing in the morning to steady your nerves or to help a hangover? \"  No      Deferred []      Reason for deferring: N/A    *If yes to two or more: probable alcohol abuse. Kat Laguerre RN  05/04/23 0428

## 2023-05-04 NOTE — ED PROVIDER NOTES
eMERGENCY dEPARTMENT eNCOUnter   Independent Attestation     Pt Name: Taylor Richards  MRN: [id number]  [place] 1955  Date of evaluation: 5/4/23     Taylor Richards is a 75 y.o. female with CC: Laceration (Left index finger stitches bleeding)      Based on the medical record the care appears appropriate. I was personally available for consultation in the Emergency Department.     Karen Bauer MD  Attending Emergency Physician                  Karen Bauer MD  05/04/23 0962

## 2023-05-04 NOTE — DISCHARGE INSTRUCTIONS
Return in 7 days for suture removal.  If pus or worsening pain - return to the ED as you may have developed an infection. Take all antibiotics as prescribed. This was sent to your pharmacy.

## 2023-05-04 NOTE — TELEPHONE ENCOUNTER
[address]  ED Follow up Call    Reason for ED visit:  [address]         FU appts/Provider:    Future Appointments   Date Time Provider Kathy Davis   5/10/2023 11:30 AM Crystal Martin MD [facility]   5/11/2023  9:00 AM SCHEDULE, [facility]   6/8/2023 10:30 AM SCHEDULE, [facility] SCHEDULE [facility]       VOICEMAIL DOCUMENTATION - ERASE IF NOT USED  Hi, this message is for  STUART  This is KAROLINA from [address] [place] [address] office. Just calling to see how you are doing after your recent visit to the Emergency Room. [address] [place] [address] wants to make sure you were able to fill any prescriptions and that you understand your discharge instructions. Please return our call if you need to make a follow up appointment with your provider or have any further needs. Our phone number is [phone]*. Have a great day.

## 2023-05-05 NOTE — ED PROVIDER NOTES
eMERGENCY dEPARTMENT eNCOUnter   Independent Attestation     Pt Name: Joseph Dang  MRN: [id number]  [place] 1955  Date of evaluation: 5/5/23     Joseph Dang is a 75 y.o. female with CC: Laceration (Bleeding stitches)      Based on the medical record the care appears appropriate. I was personally available for consultation in the Emergency Department.     Clyde Gandhi MD  Attending Emergency Physician                  Clyde Gandhi MD  05/05/23 6222
care instructions and strict return instructions at the bedside. Case discussed with consulting clinician:  dr. Yasmine Velazquez     MIPS:      Social determinants of health impacting treatment or disposition:      Code Status Discussion:      CRITICAL CARE:       PROCEDURES:    Procedures      DATA FOR LAB AND RADIOLOGY TESTS ORDERED BELOW ARE REVIEWED BY THE ED CLINICIAN:    RADIOLOGY: All x-rays, CT, MRI, and formal ultrasound images (except ED bedside ultrasound) are read by the radiologist, see reports below, unless otherwise noted in MDM or here. Reports below are reviewed by myself. No orders to display       LABS: Lab orders shown below, the results are reviewed by myself, and all abnormals are listed below. Labs Reviewed - No data to display    Vitals Reviewed:    Vitals:    05/04/23 1704   BP: 125/81   Pulse: 89   Resp: 17   Temp: 98.2 °F (36.8 °C)   TempSrc: Oral   SpO2: 95%   Weight: 149 lb (67.6 kg)   Height: 5' 2\" (1.575 m)     MEDICATIONS GIVEN TO PATIENT THIS ENCOUNTER:  Orders Placed This Encounter   Medications    gelatin adsorbable (GELFOAM) sponge 1 each    silver nitrate applicators applicator 1 each    gelatin adsorbable (GELFOAM) sponge 1 each     DISCHARGE PRESCRIPTIONS:  Discharge Medication List as of 5/4/2023  6:29 PM        PHYSICIAN CONSULTS ORDERED THIS ENCOUNTER:  None  FINAL IMPRESSION      1.  Visit for wound check          DISPOSITION/PLAN   DISPOSITION Decision To Discharge 05/04/2023 06:28:16 PM      OUTPATIENT FOLLOW UP THE PATIENT:  Shell Sanford MD  [address] [place] [address].  [address] [facility] [address]  [id number] [place] [address] Luiz Watson, Hyun Hassan [address] [place] [address]  05/04/23 2005

## 2023-05-12 ENCOUNTER — HOSPITAL ENCOUNTER (EMERGENCY)
Age: 68
Discharge: HOME | End: 2023-05-12
Attending: EMERGENCY MEDICINE
Payer: MEDICARE

## 2023-05-12 VITALS
WEIGHT: 149 LBS | HEART RATE: 79 BPM | HEIGHT: 62 IN | OXYGEN SATURATION: 97 % | BODY MASS INDEX: 27.42 KG/M2 | RESPIRATION RATE: 18 BRPM | TEMPERATURE: 98.1 F

## 2023-05-12 DIAGNOSIS — Z51.89 VISIT FOR WOUND CHECK: Primary | ICD-10-CM

## 2023-05-12 PROCEDURE — 99282 EMERGENCY DEPT VISIT SF MDM: CPT

## 2023-05-12 NOTE — ED TRIAGE NOTES
Mode of arrival (squad #, walk in, police, etc) : walk-in        Chief complaint(s): suture removal        Arrival Note (brief scenario, treatment PTA, etc). : Pt here for suture removal.        C= \"Have you ever felt that you should Cut down on your drinking? \"  No  A= \"Have people Annoyed you by criticizing your drinking? \"  No  G= \"Have you ever felt bad or Guilty about your drinking? \"  No  E= \"Have you ever had a drink as an Eye-opener first thing in the morning to steady your nerves or to help a hangover? \"  No      Deferred []      Reason for deferring: N/A    *If yes to two or more: probable alcohol abuse. *

## 2023-05-12 NOTE — ED PROVIDER NOTES
eMERGENCY dEPARTMENT eNCOUnter   Independent Attestation     Pt Name: Sabrina Olmos  MRN: [id number]  [place] 1955  Date of evaluation: 5/12/23     Sabrina Olmos is a 75 y.o. female with CC: Suture / Staple Removal      Based on the medical record the care appears appropriate. I was personally available for consultation in the Emergency Department.     Linda Garcia MD  Attending Emergency Physician                  Linda Garcia MD  05/12/23 1996

## 2023-05-12 NOTE — ED PROVIDER NOTES
EMERGENCY DEPARTMENT ENCOUNTER    Pt Name: Terry Cochran  MRN: [id number]  [place] 1955  Date of evaluation: 5/12/23  CHIEF COMPLAINT       Chief Complaint   Patient presents with    Suture / Staple Removal     HISTORY OF PRESENT ILLNESS   HPI  Patient is 9 days out from lac repair to her left index finger. Overall seems to be doing okay. She has a history of diabetes and is a smoker. States generally doesn't have great feeling in that finger.     PASTMEDICAL HISTORY     Past Medical History:   Diagnosis Date    Arthritis     Asthma     CAD (coronary artery disease) 03/08/2022    stent    Diabetes mellitus ([facility]     Dizzy     Family history of breast cancer in sister 07/02/2020    Holter monitor, abnormal     Hyperlipidemia     Hypertension     Osteopenia determined by x-ray 01/19/2021    Research study patient 02/28/2022    [facility] Registry    SOB (shortness of breath)     Thyroid condition      Patient Active Problem List   Diagnosis Code    Essential hypertension I10    Uterine polyp N84.0    Hyperlipidemia with target LDL less than 100 E78.5    Hypothyroidism E03.9    Type 2 diabetes mellitus with microalbuminuria, without long-term current use of insulin ([facility]) E11.29, R80.9    Left axis deviation R94.31    AV block, 2nd degree I44.1    Arthritis M19.90    Vitamin D deficiency E55.9    Recurrent major depressive disorder, in partial remission ([facility]) F33.41    Chronic obstructive pulmonary disease (HCC) J44.9    Colonoscopy refused Z53.20    Family history of breast cancer in sister Z80.2    Osteopenia determined by x-ray M85.80    Hoarseness of voice R49.0    Acquired elevated hemidiaphragm J98.6    Proteinuria R80.9    CKD (chronic kidney disease), stage I N18.1    Polycythemia D75.1    Pacemaker Z95.0    S/P placement of cardiac pacemaker Z95.0    CAD S/P percutaneous coronary angioplasty I25.10, Z98.61    Pneumonia J18.9    Hyponatremia E87.1    COPD exacerbation ([facility] J44.1    Uncontrolled type 2

## 2023-05-22 ENCOUNTER — OFFICE VISIT (OUTPATIENT)
Dept: FAMILY MEDICINE CLINIC | Age: 68
End: 2023-05-22
Payer: MEDICARE

## 2023-05-22 VITALS
BODY MASS INDEX: 27.25 KG/M2 | TEMPERATURE: 97.8 F | HEART RATE: 68 BPM | SYSTOLIC BLOOD PRESSURE: 142 MMHG | OXYGEN SATURATION: 100 % | WEIGHT: 149 LBS | DIASTOLIC BLOOD PRESSURE: 85 MMHG

## 2023-05-22 DIAGNOSIS — R49.0 HOARSENESS OF VOICE: ICD-10-CM

## 2023-05-22 DIAGNOSIS — H69.83 DYSFUNCTION OF BOTH EUSTACHIAN TUBES: ICD-10-CM

## 2023-05-22 DIAGNOSIS — B37.0 ORAL THRUSH: ICD-10-CM

## 2023-05-22 DIAGNOSIS — R42 VESTIBULAR DIZZINESS INVOLVING BOTH INNER EARS: Primary | ICD-10-CM

## 2023-05-22 PROCEDURE — 3079F DIAST BP 80-89 MM HG: CPT

## 2023-05-22 PROCEDURE — 1124F ACP DISCUSS-NO DSCNMKR DOCD: CPT

## 2023-05-22 PROCEDURE — 99213 OFFICE O/P EST LOW 20 MIN: CPT

## 2023-05-22 PROCEDURE — 3077F SYST BP >= 140 MM HG: CPT

## 2023-05-22 RX ORDER — CEPHALEXIN 500 MG/1
500 CAPSULE ORAL 4 TIMES DAILY
COMMUNITY

## 2023-05-22 RX ORDER — MECLIZINE HYDROCHLORIDE 25 MG/1
25 TABLET ORAL 3 TIMES DAILY PRN
Qty: 30 TABLET | Refills: 0 | Status: SHIPPED | OUTPATIENT
Start: 2023-05-22 | End: 2023-06-01

## 2023-05-22 ASSESSMENT — ENCOUNTER SYMPTOMS
NAUSEA: 0
SORE THROAT: 0
EYE ITCHING: 0
VOMITING: 0
ABDOMINAL PAIN: 0
CHANGE IN BOWEL HABIT: 0
COUGH: 0
EYE PAIN: 0
DIARRHEA: 0
RHINORRHEA: 0

## 2023-05-22 NOTE — PROGRESS NOTES
tablet Take 1 tablet by mouth every morning 90 tablet 3    irbesartan (AVAPRO) 300 MG tablet Take 1 tablet by mouth daily Dose increased 12/16/2022 90 tablet 1    amLODIPine (NORVASC) 10 MG tablet TAKE 1 TABLET BY MOUTH EVERY DAY 90 tablet 1    sodium zirconium cyclosilicate (LOKELMA) 10 g PACK oral suspension Take 10 g by mouth once a week 30 packet 2    metFORMIN (GLUCOPHAGE-XR) 500 MG extended release tablet TAKE 2 TABLETS BY MOUTH DAILY WITH SUPPER 180 tablet 3    clopidogrel (PLAVIX) 75 MG tablet Take 1 tablet by mouth daily 30 tablet 3    levothyroxine (SYNTHROID) 200 MCG tablet TAKE 1 TABLET BY MOUTH EVERY DAY BEFORE BREAKFAST 90 tablet 3    Respiratory Therapy Supplies (NEBULIZER/TUBING/MOUTHPIECE) KIT Dx. COPD, needs nebulizer supplies 1 kit 0    albuterol (PROVENTIL) (2.5 MG/3ML) 0.083% nebulizer solution Take 3 mLs by nebulization every 6 hours as needed for Wheezing or Shortness of Breath 125 each 3    aspirin EC 81 MG EC tablet Take 1 tablet by mouth daily (Patient taking differently: Take 325 mg by mouth daily) 90 tablet 1    blood glucose test strips (ASCENSIA AUTODISC VI;ONE TOUCH ULTRA TEST VI) strip 1 each by In Vitro route daily As needed. (Patient not taking: Reported on 5/22/2023) 100 each 3     No current facility-administered medications for this visit. Allergies   Allergen Reactions    Lisinopril Other (See Comments)     cough       Subjective:      Review of Systems   Constitutional:  Negative for fatigue and fever. HENT:  Positive for hearing loss. Negative for congestion, ear discharge, rhinorrhea and sore throat. Eyes:  Negative for pain and itching. Respiratory:  Negative for cough. Gastrointestinal:  Negative for abdominal pain, change in bowel habit, diarrhea, nausea and vomiting. Musculoskeletal:  Negative for joint swelling, myalgias and neck pain. Skin:  Negative for rash. Neurological:  Positive for dizziness. Negative for weakness, numbness and headaches.    All

## 2023-06-01 ENCOUNTER — APPOINTMENT (OUTPATIENT)
Dept: CT IMAGING | Age: 68
DRG: 641 | End: 2023-06-01
Payer: MEDICARE

## 2023-06-01 ENCOUNTER — HOSPITAL ENCOUNTER (INPATIENT)
Age: 68
LOS: 4 days | Discharge: HOME HEALTH | DRG: 641 | End: 2023-06-05
Attending: EMERGENCY MEDICINE | Admitting: INTERNAL MEDICINE
Payer: MEDICARE

## 2023-06-01 ENCOUNTER — APPOINTMENT (OUTPATIENT)
Dept: GENERAL RADIOLOGY | Age: 68
DRG: 641 | End: 2023-06-01
Payer: MEDICARE

## 2023-06-01 DIAGNOSIS — R42 DIZZINESS AND GIDDINESS: ICD-10-CM

## 2023-06-01 DIAGNOSIS — E87.1 HYPONATREMIA: Primary | ICD-10-CM

## 2023-06-01 DIAGNOSIS — H69.83 DYSFUNCTION OF BOTH EUSTACHIAN TUBES: ICD-10-CM

## 2023-06-01 DIAGNOSIS — E83.42 HYPOMAGNESEMIA: ICD-10-CM

## 2023-06-01 DIAGNOSIS — R42 VESTIBULAR DIZZINESS INVOLVING BOTH INNER EARS: ICD-10-CM

## 2023-06-01 PROBLEM — M47.812 CERVICAL SPONDYLOSIS: Status: ACTIVE | Noted: 2023-06-01

## 2023-06-01 LAB
ANION GAP SERPL CALCULATED.3IONS-SCNC: 10 MMOL/L (ref 9–17)
ANION GAP SERPL CALCULATED.3IONS-SCNC: 11 MMOL/L (ref 9–17)
BASOPHILS # BLD: 0.1 K/UL (ref 0–0.2)
BASOPHILS NFR BLD: 1 % (ref 0–2)
BUN SERPL-MCNC: 8 MG/DL (ref 8–23)
CALCIUM SERPL-MCNC: 8.5 MG/DL (ref 8.6–10.4)
CHLORIDE SERPL-SCNC: 78 MMOL/L (ref 98–107)
CHLORIDE SERPL-SCNC: 79 MMOL/L (ref 98–107)
CO2 SERPL-SCNC: 28 MMOL/L (ref 20–31)
CO2 SERPL-SCNC: 30 MMOL/L (ref 20–31)
CREAT SERPL-MCNC: 0.55 MG/DL (ref 0.5–0.9)
EOSINOPHIL # BLD: 0.1 K/UL (ref 0–0.4)
EOSINOPHILS RELATIVE PERCENT: 1 % (ref 0–4)
ERYTHROCYTE [DISTWIDTH] IN BLOOD BY AUTOMATED COUNT: 16.1 % (ref 11.5–14.9)
GFR SERPL CREATININE-BSD FRML MDRD: >60 ML/MIN/1.73M2
GLUCOSE BLD-MCNC: 131 MG/DL (ref 65–105)
GLUCOSE BLD-MCNC: 195 MG/DL (ref 65–105)
GLUCOSE SERPL-MCNC: 138 MG/DL (ref 70–99)
HCT VFR BLD AUTO: 34.2 % (ref 36–46)
HGB BLD-MCNC: 11.7 G/DL (ref 12–16)
INR PPP: 0.9
LYMPHOCYTES # BLD: 16 % (ref 24–44)
LYMPHOCYTES NFR BLD: 1.4 K/UL (ref 1–4.8)
MAGNESIUM SERPL-MCNC: 1 MG/DL (ref 1.6–2.6)
MAGNESIUM SERPL-MCNC: 2.1 MG/DL (ref 1.6–2.6)
MCH RBC QN AUTO: 32 PG (ref 26–34)
MCHC RBC AUTO-ENTMCNC: 34.3 G/DL (ref 31–37)
MCV RBC AUTO: 93.2 FL (ref 80–100)
MONOCYTES NFR BLD: 0.4 K/UL (ref 0.1–1.3)
MONOCYTES NFR BLD: 5 % (ref 1–7)
NEUTROPHILS NFR BLD: 77 % (ref 36–66)
NEUTS SEG NFR BLD: 7.1 K/UL (ref 1.3–9.1)
PLATELET # BLD AUTO: 282 K/UL (ref 150–450)
PMV BLD AUTO: 6.9 FL (ref 6–12)
POTASSIUM SERPL-SCNC: 3.3 MMOL/L (ref 3.7–5.3)
POTASSIUM SERPL-SCNC: 3.5 MMOL/L (ref 3.7–5.3)
POTASSIUM SERPL-SCNC: 3.6 MMOL/L (ref 3.7–5.3)
PROTHROMBIN TIME: 12.6 SEC (ref 11.8–14.6)
RBC # BLD AUTO: 3.67 M/UL (ref 4–5.2)
SODIUM SERPL-SCNC: 117 MMOL/L (ref 135–144)
SODIUM SERPL-SCNC: 118 MMOL/L (ref 135–144)
SODIUM SERPL-SCNC: 118 MMOL/L (ref 135–144)
SODIUM UR-SCNC: 65 MMOL/L
TSH SERPL-MCNC: 158.1 UIU/ML (ref 0.3–5)
WBC OTHER # BLD: 9.1 K/UL (ref 3.5–11)

## 2023-06-01 PROCEDURE — 84300 ASSAY OF URINE SODIUM: CPT

## 2023-06-01 PROCEDURE — 84132 ASSAY OF SERUM POTASSIUM: CPT

## 2023-06-01 PROCEDURE — 71250 CT THORAX DX C-: CPT

## 2023-06-01 PROCEDURE — 96365 THER/PROPH/DIAG IV INF INIT: CPT

## 2023-06-01 PROCEDURE — 6370000000 HC RX 637 (ALT 250 FOR IP)

## 2023-06-01 PROCEDURE — 6360000002 HC RX W HCPCS

## 2023-06-01 PROCEDURE — 83935 ASSAY OF URINE OSMOLALITY: CPT

## 2023-06-01 PROCEDURE — 2000000000 HC ICU R&B

## 2023-06-01 PROCEDURE — 93005 ELECTROCARDIOGRAM TRACING: CPT | Performed by: EMERGENCY MEDICINE

## 2023-06-01 PROCEDURE — 85027 COMPLETE CBC AUTOMATED: CPT

## 2023-06-01 PROCEDURE — 36415 COLL VENOUS BLD VENIPUNCTURE: CPT

## 2023-06-01 PROCEDURE — 6360000002 HC RX W HCPCS: Performed by: EMERGENCY MEDICINE

## 2023-06-01 PROCEDURE — 80051 ELECTROLYTE PANEL: CPT

## 2023-06-01 PROCEDURE — 82947 ASSAY GLUCOSE BLOOD QUANT: CPT

## 2023-06-01 PROCEDURE — 94640 AIRWAY INHALATION TREATMENT: CPT

## 2023-06-01 PROCEDURE — 2580000003 HC RX 258: Performed by: EMERGENCY MEDICINE

## 2023-06-01 PROCEDURE — 72125 CT NECK SPINE W/O DYE: CPT

## 2023-06-01 PROCEDURE — 84439 ASSAY OF FREE THYROXINE: CPT

## 2023-06-01 PROCEDURE — 83735 ASSAY OF MAGNESIUM: CPT

## 2023-06-01 PROCEDURE — 84295 ASSAY OF SERUM SODIUM: CPT

## 2023-06-01 PROCEDURE — 99223 1ST HOSP IP/OBS HIGH 75: CPT | Performed by: INTERNAL MEDICINE

## 2023-06-01 PROCEDURE — 80048 BASIC METABOLIC PNL TOTAL CA: CPT

## 2023-06-01 PROCEDURE — 83930 ASSAY OF BLOOD OSMOLALITY: CPT

## 2023-06-01 PROCEDURE — 99285 EMERGENCY DEPT VISIT HI MDM: CPT

## 2023-06-01 PROCEDURE — 84443 ASSAY THYROID STIM HORMONE: CPT

## 2023-06-01 PROCEDURE — 70450 CT HEAD/BRAIN W/O DYE: CPT

## 2023-06-01 PROCEDURE — 85610 PROTHROMBIN TIME: CPT

## 2023-06-01 PROCEDURE — 2580000003 HC RX 258

## 2023-06-01 PROCEDURE — 82533 TOTAL CORTISOL: CPT

## 2023-06-01 PROCEDURE — 2060000000 HC ICU INTERMEDIATE R&B

## 2023-06-01 RX ORDER — DEXTROSE MONOHYDRATE 100 MG/ML
INJECTION, SOLUTION INTRAVENOUS CONTINUOUS PRN
Status: DISCONTINUED | OUTPATIENT
Start: 2023-06-01 | End: 2023-06-05 | Stop reason: HOSPADM

## 2023-06-01 RX ORDER — SODIUM CHLORIDE 9 MG/ML
INJECTION, SOLUTION INTRAVENOUS PRN
Status: DISCONTINUED | OUTPATIENT
Start: 2023-06-01 | End: 2023-06-05 | Stop reason: HOSPADM

## 2023-06-01 RX ORDER — MAGNESIUM SULFATE IN WATER 40 MG/ML
4000 INJECTION, SOLUTION INTRAVENOUS ONCE
Status: COMPLETED | OUTPATIENT
Start: 2023-06-01 | End: 2023-06-01

## 2023-06-01 RX ORDER — SODIUM CHLORIDE 0.9 % (FLUSH) 0.9 %
5-40 SYRINGE (ML) INJECTION EVERY 12 HOURS SCHEDULED
Status: DISCONTINUED | OUTPATIENT
Start: 2023-06-01 | End: 2023-06-05 | Stop reason: HOSPADM

## 2023-06-01 RX ORDER — LEVOTHYROXINE SODIUM 0.2 MG/1
200 TABLET ORAL
Status: DISCONTINUED | OUTPATIENT
Start: 2023-06-02 | End: 2023-06-02

## 2023-06-01 RX ORDER — BUDESONIDE AND FORMOTEROL FUMARATE DIHYDRATE 160; 4.5 UG/1; UG/1
2 AEROSOL RESPIRATORY (INHALATION) 2 TIMES DAILY
Status: DISCONTINUED | OUTPATIENT
Start: 2023-06-01 | End: 2023-06-05 | Stop reason: HOSPADM

## 2023-06-01 RX ORDER — POLYETHYLENE GLYCOL 3350 17 G/17G
17 POWDER, FOR SOLUTION ORAL DAILY PRN
Status: DISCONTINUED | OUTPATIENT
Start: 2023-06-01 | End: 2023-06-05 | Stop reason: HOSPADM

## 2023-06-01 RX ORDER — ONDANSETRON 4 MG/1
4 TABLET, ORALLY DISINTEGRATING ORAL EVERY 8 HOURS PRN
Status: DISCONTINUED | OUTPATIENT
Start: 2023-06-01 | End: 2023-06-05 | Stop reason: HOSPADM

## 2023-06-01 RX ORDER — SODIUM CHLORIDE 9 MG/ML
INJECTION, SOLUTION INTRAVENOUS CONTINUOUS
Status: DISCONTINUED | OUTPATIENT
Start: 2023-06-01 | End: 2023-06-02

## 2023-06-01 RX ORDER — INSULIN LISPRO 100 [IU]/ML
0-4 INJECTION, SOLUTION INTRAVENOUS; SUBCUTANEOUS
Status: DISCONTINUED | OUTPATIENT
Start: 2023-06-01 | End: 2023-06-05 | Stop reason: HOSPADM

## 2023-06-01 RX ORDER — AMLODIPINE BESYLATE 10 MG/1
10 TABLET ORAL DAILY
Status: DISCONTINUED | OUTPATIENT
Start: 2023-06-01 | End: 2023-06-05 | Stop reason: HOSPADM

## 2023-06-01 RX ORDER — LANOLIN ALCOHOL/MO/W.PET/CERES
400 CREAM (GRAM) TOPICAL EVERY EVENING
Status: DISCONTINUED | OUTPATIENT
Start: 2023-06-01 | End: 2023-06-01

## 2023-06-01 RX ORDER — INSULIN LISPRO 100 [IU]/ML
0-4 INJECTION, SOLUTION INTRAVENOUS; SUBCUTANEOUS NIGHTLY
Status: DISCONTINUED | OUTPATIENT
Start: 2023-06-01 | End: 2023-06-05 | Stop reason: HOSPADM

## 2023-06-01 RX ORDER — METOPROLOL SUCCINATE 50 MG/1
50 TABLET, EXTENDED RELEASE ORAL DAILY
Status: DISCONTINUED | OUTPATIENT
Start: 2023-06-01 | End: 2023-06-03

## 2023-06-01 RX ORDER — ACETAMINOPHEN 325 MG/1
650 TABLET ORAL EVERY 6 HOURS PRN
Status: DISCONTINUED | OUTPATIENT
Start: 2023-06-01 | End: 2023-06-05 | Stop reason: HOSPADM

## 2023-06-01 RX ORDER — ONDANSETRON 2 MG/ML
4 INJECTION INTRAMUSCULAR; INTRAVENOUS EVERY 6 HOURS PRN
Status: DISCONTINUED | OUTPATIENT
Start: 2023-06-01 | End: 2023-06-05 | Stop reason: HOSPADM

## 2023-06-01 RX ORDER — HYDRALAZINE HYDROCHLORIDE 20 MG/ML
10 INJECTION INTRAMUSCULAR; INTRAVENOUS EVERY 6 HOURS PRN
Status: DISCONTINUED | OUTPATIENT
Start: 2023-06-01 | End: 2023-06-05 | Stop reason: HOSPADM

## 2023-06-01 RX ORDER — POTASSIUM CHLORIDE 7.45 MG/ML
10 INJECTION INTRAVENOUS PRN
Status: DISCONTINUED | OUTPATIENT
Start: 2023-06-01 | End: 2023-06-05 | Stop reason: HOSPADM

## 2023-06-01 RX ORDER — POTASSIUM CHLORIDE 20 MEQ/1
40 TABLET, EXTENDED RELEASE ORAL ONCE
Status: COMPLETED | OUTPATIENT
Start: 2023-06-01 | End: 2023-06-01

## 2023-06-01 RX ORDER — SODIUM CHLORIDE 0.9 % (FLUSH) 0.9 %
5-40 SYRINGE (ML) INJECTION PRN
Status: DISCONTINUED | OUTPATIENT
Start: 2023-06-01 | End: 2023-06-05 | Stop reason: HOSPADM

## 2023-06-01 RX ORDER — PANTOPRAZOLE SODIUM 40 MG/1
40 TABLET, DELAYED RELEASE ORAL
Status: DISCONTINUED | OUTPATIENT
Start: 2023-06-02 | End: 2023-06-05 | Stop reason: HOSPADM

## 2023-06-01 RX ORDER — ENOXAPARIN SODIUM 100 MG/ML
40 INJECTION SUBCUTANEOUS DAILY
Status: DISCONTINUED | OUTPATIENT
Start: 2023-06-01 | End: 2023-06-05 | Stop reason: HOSPADM

## 2023-06-01 RX ORDER — PRAVASTATIN SODIUM 40 MG
80 TABLET ORAL
Status: DISCONTINUED | OUTPATIENT
Start: 2023-06-01 | End: 2023-06-05 | Stop reason: HOSPADM

## 2023-06-01 RX ORDER — 0.9 % SODIUM CHLORIDE 0.9 %
1000 INTRAVENOUS SOLUTION INTRAVENOUS ONCE
Status: COMPLETED | OUTPATIENT
Start: 2023-06-01 | End: 2023-06-01

## 2023-06-01 RX ORDER — ALBUTEROL SULFATE 90 UG/1
2 AEROSOL, METERED RESPIRATORY (INHALATION) EVERY 6 HOURS PRN
Status: DISCONTINUED | OUTPATIENT
Start: 2023-06-01 | End: 2023-06-05 | Stop reason: HOSPADM

## 2023-06-01 RX ORDER — FLUTICASONE PROPIONATE 50 MCG
2 SPRAY, SUSPENSION (ML) NASAL DAILY
Status: DISCONTINUED | OUTPATIENT
Start: 2023-06-01 | End: 2023-06-05 | Stop reason: HOSPADM

## 2023-06-01 RX ORDER — ALBUTEROL SULFATE 2.5 MG/3ML
2.5 SOLUTION RESPIRATORY (INHALATION) EVERY 6 HOURS PRN
Status: DISCONTINUED | OUTPATIENT
Start: 2023-06-01 | End: 2023-06-01

## 2023-06-01 RX ORDER — MAGNESIUM SULFATE HEPTAHYDRATE 40 MG/ML
2000 INJECTION, SOLUTION INTRAVENOUS PRN
Status: DISCONTINUED | OUTPATIENT
Start: 2023-06-01 | End: 2023-06-05 | Stop reason: HOSPADM

## 2023-06-01 RX ORDER — ASPIRIN 81 MG/1
81 TABLET ORAL DAILY
Status: DISCONTINUED | OUTPATIENT
Start: 2023-06-01 | End: 2023-06-05 | Stop reason: HOSPADM

## 2023-06-01 RX ORDER — CLOPIDOGREL BISULFATE 75 MG/1
75 TABLET ORAL DAILY
Status: DISCONTINUED | OUTPATIENT
Start: 2023-06-01 | End: 2023-06-05 | Stop reason: HOSPADM

## 2023-06-01 RX ORDER — ACETAMINOPHEN 650 MG/1
650 SUPPOSITORY RECTAL EVERY 6 HOURS PRN
Status: DISCONTINUED | OUTPATIENT
Start: 2023-06-01 | End: 2023-06-05 | Stop reason: HOSPADM

## 2023-06-01 RX ORDER — SERTRALINE HYDROCHLORIDE 100 MG/1
100 TABLET, FILM COATED ORAL EVERY MORNING
Status: DISCONTINUED | OUTPATIENT
Start: 2023-06-02 | End: 2023-06-05 | Stop reason: HOSPADM

## 2023-06-01 RX ADMIN — ENOXAPARIN SODIUM 40 MG: 100 INJECTION SUBCUTANEOUS at 18:09

## 2023-06-01 RX ADMIN — ASPIRIN 81 MG: 81 TABLET, COATED ORAL at 18:07

## 2023-06-01 RX ADMIN — MAGNESIUM SULFATE HEPTAHYDRATE 4000 MG: 40 INJECTION, SOLUTION INTRAVENOUS at 13:09

## 2023-06-01 RX ADMIN — POTASSIUM CHLORIDE 40 MEQ: 1500 TABLET, EXTENDED RELEASE ORAL at 18:07

## 2023-06-01 RX ADMIN — CLOPIDOGREL BISULFATE 75 MG: 75 TABLET ORAL at 18:07

## 2023-06-01 RX ADMIN — BUDESONIDE AND FORMOTEROL FUMARATE DIHYDRATE 2 PUFF: 160; 4.5 AEROSOL RESPIRATORY (INHALATION) at 19:53

## 2023-06-01 RX ADMIN — SODIUM CHLORIDE, PRESERVATIVE FREE 10 ML: 5 INJECTION INTRAVENOUS at 19:36

## 2023-06-01 RX ADMIN — PRAVASTATIN SODIUM 80 MG: 40 TABLET ORAL at 18:07

## 2023-06-01 RX ADMIN — SODIUM CHLORIDE: 9 INJECTION, SOLUTION INTRAVENOUS at 19:23

## 2023-06-01 RX ADMIN — SODIUM CHLORIDE 1000 ML: 9 INJECTION, SOLUTION INTRAVENOUS at 13:07

## 2023-06-01 RX ADMIN — AMLODIPINE BESYLATE 10 MG: 5 TABLET ORAL at 18:07

## 2023-06-01 ASSESSMENT — ENCOUNTER SYMPTOMS
COUGH: 1
NAUSEA: 0
SHORTNESS OF BREATH: 0
BACK PAIN: 0
SORE THROAT: 0
RHINORRHEA: 0
CHEST TIGHTNESS: 0
ABDOMINAL PAIN: 0
DIARRHEA: 0

## 2023-06-01 ASSESSMENT — LIFESTYLE VARIABLES
HOW OFTEN DO YOU HAVE A DRINK CONTAINING ALCOHOL: NEVER
HOW MANY STANDARD DRINKS CONTAINING ALCOHOL DO YOU HAVE ON A TYPICAL DAY: PATIENT DOES NOT DRINK

## 2023-06-01 ASSESSMENT — PAIN - FUNCTIONAL ASSESSMENT: PAIN_FUNCTIONAL_ASSESSMENT: NONE - DENIES PAIN

## 2023-06-02 ENCOUNTER — APPOINTMENT (OUTPATIENT)
Dept: GENERAL RADIOLOGY | Age: 68
DRG: 641 | End: 2023-06-02
Payer: MEDICARE

## 2023-06-02 LAB
ANION GAP SERPL CALCULATED.3IONS-SCNC: 8 MMOL/L (ref 9–17)
BASOPHILS # BLD: 0 K/UL (ref 0–0.2)
BASOPHILS NFR BLD: 0 % (ref 0–2)
BNP SERPL-MCNC: 127 PG/ML
BUN SERPL-MCNC: 5 MG/DL (ref 8–23)
CALCIUM SERPL-MCNC: 7.9 MG/DL (ref 8.6–10.4)
CHLORIDE SERPL-SCNC: 83 MMOL/L (ref 98–107)
CO2 SERPL-SCNC: 29 MMOL/L (ref 20–31)
CORTISOL: 17.8 UG/DL (ref 2.7–18.4)
CREAT SERPL-MCNC: 0.5 MG/DL (ref 0.5–0.9)
EKG ATRIAL RATE: 60 BPM
EKG P-R INTERVAL: 300 MS
EKG Q-T INTERVAL: 488 MS
EKG QRS DURATION: 154 MS
EKG QTC CALCULATION (BAZETT): 488 MS
EKG R AXIS: -59 DEGREES
EKG T AXIS: 112 DEGREES
EKG VENTRICULAR RATE: 60 BPM
EOSINOPHIL # BLD: 0.1 K/UL (ref 0–0.4)
EOSINOPHILS RELATIVE PERCENT: 1 % (ref 0–4)
ERYTHROCYTE [DISTWIDTH] IN BLOOD BY AUTOMATED COUNT: 16.1 % (ref 11.5–14.9)
GFR SERPL CREATININE-BSD FRML MDRD: >60 ML/MIN/1.73M2
GLUCOSE BLD-MCNC: 127 MG/DL (ref 65–105)
GLUCOSE BLD-MCNC: 165 MG/DL (ref 65–105)
GLUCOSE BLD-MCNC: 249 MG/DL (ref 65–105)
GLUCOSE BLD-MCNC: 89 MG/DL (ref 65–105)
GLUCOSE SERPL-MCNC: 84 MG/DL (ref 70–99)
HCT VFR BLD AUTO: 30.3 % (ref 36–46)
HGB BLD-MCNC: 10.6 G/DL (ref 12–16)
LYMPHOCYTES # BLD: 16 % (ref 24–44)
LYMPHOCYTES NFR BLD: 1.2 K/UL (ref 1–4.8)
MCH RBC QN AUTO: 32.8 PG (ref 26–34)
MCHC RBC AUTO-ENTMCNC: 35.1 G/DL (ref 31–37)
MCV RBC AUTO: 93.2 FL (ref 80–100)
MONOCYTES NFR BLD: 0.4 K/UL (ref 0.1–1.3)
MONOCYTES NFR BLD: 5 % (ref 1–7)
NEUTROPHILS NFR BLD: 78 % (ref 36–66)
NEUTS SEG NFR BLD: 5.7 K/UL (ref 1.3–9.1)
OSMOLALITY SERPL: 247 MOSM/KG (ref 275–295)
OSMOLALITY UR: 325 MOSM/KG (ref 80–1300)
PHOSPHATE SERPL-MCNC: 3.3 MG/DL (ref 2.6–4.5)
PLATELET # BLD AUTO: 247 K/UL (ref 150–450)
PMV BLD AUTO: 6.8 FL (ref 6–12)
POTASSIUM SERPL-SCNC: 4 MMOL/L (ref 3.7–5.3)
RBC # BLD AUTO: 3.25 M/UL (ref 4–5.2)
SODIUM SERPL-SCNC: 119 MMOL/L (ref 135–144)
SODIUM SERPL-SCNC: 120 MMOL/L (ref 135–144)
SODIUM SERPL-SCNC: 121 MMOL/L (ref 135–144)
SODIUM SERPL-SCNC: 121 MMOL/L (ref 135–144)
T3FREE SERPL-MCNC: <0.4 PG/ML (ref 2.02–4.43)
T4 FREE SERPL-MCNC: 0.1 NG/DL (ref 0.9–1.7)
T4 FREE SERPL-MCNC: 0.1 NG/DL (ref 0.9–1.7)
TSH SERPL-MCNC: >100 UIU/ML (ref 0.3–5)
WBC OTHER # BLD: 7.4 K/UL (ref 3.5–11)

## 2023-06-02 PROCEDURE — 84295 ASSAY OF SERUM SODIUM: CPT

## 2023-06-02 PROCEDURE — 6360000002 HC RX W HCPCS: Performed by: INTERNAL MEDICINE

## 2023-06-02 PROCEDURE — 80048 BASIC METABOLIC PNL TOTAL CA: CPT

## 2023-06-02 PROCEDURE — 2060000000 HC ICU INTERMEDIATE R&B

## 2023-06-02 PROCEDURE — 6370000000 HC RX 637 (ALT 250 FOR IP): Performed by: STUDENT IN AN ORGANIZED HEALTH CARE EDUCATION/TRAINING PROGRAM

## 2023-06-02 PROCEDURE — 83880 ASSAY OF NATRIURETIC PEPTIDE: CPT

## 2023-06-02 PROCEDURE — 93010 ELECTROCARDIOGRAM REPORT: CPT | Performed by: INTERNAL MEDICINE

## 2023-06-02 PROCEDURE — 36415 COLL VENOUS BLD VENIPUNCTURE: CPT

## 2023-06-02 PROCEDURE — 82947 ASSAY GLUCOSE BLOOD QUANT: CPT

## 2023-06-02 PROCEDURE — 2700000000 HC OXYGEN THERAPY PER DAY

## 2023-06-02 PROCEDURE — 71045 X-RAY EXAM CHEST 1 VIEW: CPT

## 2023-06-02 PROCEDURE — 6370000000 HC RX 637 (ALT 250 FOR IP): Performed by: NURSE PRACTITIONER

## 2023-06-02 PROCEDURE — 6370000000 HC RX 637 (ALT 250 FOR IP): Performed by: INTERNAL MEDICINE

## 2023-06-02 PROCEDURE — 85027 COMPLETE CBC AUTOMATED: CPT

## 2023-06-02 PROCEDURE — 84100 ASSAY OF PHOSPHORUS: CPT

## 2023-06-02 PROCEDURE — 94640 AIRWAY INHALATION TREATMENT: CPT

## 2023-06-02 PROCEDURE — 84481 FREE ASSAY (FT-3): CPT

## 2023-06-02 PROCEDURE — 6370000000 HC RX 637 (ALT 250 FOR IP)

## 2023-06-02 PROCEDURE — 2580000003 HC RX 258

## 2023-06-02 PROCEDURE — 2000000000 HC ICU R&B

## 2023-06-02 PROCEDURE — 84443 ASSAY THYROID STIM HORMONE: CPT

## 2023-06-02 PROCEDURE — 99291 CRITICAL CARE FIRST HOUR: CPT | Performed by: INTERNAL MEDICINE

## 2023-06-02 PROCEDURE — 84439 ASSAY OF FREE THYROXINE: CPT

## 2023-06-02 PROCEDURE — 94761 N-INVAS EAR/PLS OXIMETRY MLT: CPT

## 2023-06-02 PROCEDURE — 6360000002 HC RX W HCPCS

## 2023-06-02 RX ORDER — MIDODRINE HYDROCHLORIDE 2.5 MG/1
2.5 TABLET ORAL 3 TIMES DAILY PRN
Status: DISCONTINUED | OUTPATIENT
Start: 2023-06-02 | End: 2023-06-03

## 2023-06-02 RX ORDER — POTASSIUM CHLORIDE 20 MEQ/1
40 TABLET, EXTENDED RELEASE ORAL ONCE
Status: COMPLETED | OUTPATIENT
Start: 2023-06-02 | End: 2023-06-02

## 2023-06-02 RX ORDER — LIOTHYRONINE SODIUM 5 UG/1
10 TABLET ORAL 2 TIMES DAILY
Status: DISCONTINUED | OUTPATIENT
Start: 2023-06-02 | End: 2023-06-03

## 2023-06-02 RX ORDER — SODIUM CHLORIDE 1 G/1
1 TABLET ORAL
Status: DISCONTINUED | OUTPATIENT
Start: 2023-06-02 | End: 2023-06-05 | Stop reason: HOSPADM

## 2023-06-02 RX ORDER — FUROSEMIDE 10 MG/ML
20 INJECTION INTRAMUSCULAR; INTRAVENOUS ONCE
Status: COMPLETED | OUTPATIENT
Start: 2023-06-02 | End: 2023-06-02

## 2023-06-02 RX ORDER — SODIUM CHLORIDE 1 G/1
2 TABLET ORAL ONCE
Status: COMPLETED | OUTPATIENT
Start: 2023-06-02 | End: 2023-06-02

## 2023-06-02 RX ORDER — LIOTHYRONINE SODIUM 25 UG/1
25 TABLET ORAL 2 TIMES DAILY
Status: DISCONTINUED | OUTPATIENT
Start: 2023-06-02 | End: 2023-06-02

## 2023-06-02 RX ADMIN — PRAVASTATIN SODIUM 80 MG: 40 TABLET ORAL at 17:54

## 2023-06-02 RX ADMIN — SODIUM CHLORIDE, PRESERVATIVE FREE 10 ML: 5 INJECTION INTRAVENOUS at 09:39

## 2023-06-02 RX ADMIN — POTASSIUM CHLORIDE 40 MEQ: 1500 TABLET, EXTENDED RELEASE ORAL at 00:48

## 2023-06-02 RX ADMIN — BUDESONIDE AND FORMOTEROL FUMARATE DIHYDRATE 2 PUFF: 160; 4.5 AEROSOL RESPIRATORY (INHALATION) at 19:32

## 2023-06-02 RX ADMIN — PANTOPRAZOLE SODIUM 40 MG: 40 TABLET, DELAYED RELEASE ORAL at 06:23

## 2023-06-02 RX ADMIN — LIOTHYRONINE SODIUM 10 MCG: 5 TABLET ORAL at 11:58

## 2023-06-02 RX ADMIN — ENOXAPARIN SODIUM 40 MG: 100 INJECTION SUBCUTANEOUS at 09:39

## 2023-06-02 RX ADMIN — BUDESONIDE AND FORMOTEROL FUMARATE DIHYDRATE 2 PUFF: 160; 4.5 AEROSOL RESPIRATORY (INHALATION) at 07:42

## 2023-06-02 RX ADMIN — ASPIRIN 81 MG: 81 TABLET, COATED ORAL at 09:39

## 2023-06-02 RX ADMIN — SODIUM CHLORIDE 2 G: 1 TABLET ORAL at 00:48

## 2023-06-02 RX ADMIN — SODIUM CHLORIDE 1 G: 1 TABLET ORAL at 11:57

## 2023-06-02 RX ADMIN — AMLODIPINE BESYLATE 10 MG: 5 TABLET ORAL at 10:19

## 2023-06-02 RX ADMIN — LIOTHYRONINE SODIUM 10 MCG: 5 TABLET ORAL at 21:45

## 2023-06-02 RX ADMIN — LEVOTHYROXINE SODIUM 200 MCG: 0.2 TABLET ORAL at 06:23

## 2023-06-02 RX ADMIN — SODIUM CHLORIDE 1 G: 1 TABLET ORAL at 17:54

## 2023-06-02 RX ADMIN — MIDODRINE HYDROCHLORIDE 2.5 MG: 2.5 TABLET ORAL at 21:45

## 2023-06-02 RX ADMIN — SODIUM CHLORIDE, PRESERVATIVE FREE 10 ML: 5 INJECTION INTRAVENOUS at 21:24

## 2023-06-02 RX ADMIN — SODIUM CHLORIDE 1 G: 1 TABLET ORAL at 10:17

## 2023-06-02 RX ADMIN — FUROSEMIDE 20 MG: 10 INJECTION, SOLUTION INTRAMUSCULAR; INTRAVENOUS at 11:58

## 2023-06-02 RX ADMIN — METOPROLOL SUCCINATE 50 MG: 50 TABLET, EXTENDED RELEASE ORAL at 10:19

## 2023-06-02 RX ADMIN — CLOPIDOGREL BISULFATE 75 MG: 75 TABLET ORAL at 09:38

## 2023-06-02 ASSESSMENT — ENCOUNTER SYMPTOMS
ABDOMINAL PAIN: 0
SHORTNESS OF BREATH: 0
BACK PAIN: 0
NAUSEA: 0
RHINORRHEA: 0
DIARRHEA: 0
CHEST TIGHTNESS: 0
CONSTIPATION: 0
VOMITING: 0

## 2023-06-03 PROBLEM — D64.9 LOW HEMOGLOBIN: Status: ACTIVE | Noted: 2023-06-03

## 2023-06-03 LAB
ANION GAP SERPL CALCULATED.3IONS-SCNC: 6 MMOL/L (ref 9–17)
BASOPHILS # BLD: 0 K/UL (ref 0–0.2)
BASOPHILS NFR BLD: 0 % (ref 0–2)
BUN SERPL-MCNC: 4 MG/DL (ref 8–23)
CALCIUM SERPL-MCNC: 7.7 MG/DL (ref 8.6–10.4)
CHLORIDE SERPL-SCNC: 86 MMOL/L (ref 98–107)
CO2 SERPL-SCNC: 32 MMOL/L (ref 20–31)
CREAT SERPL-MCNC: 0.45 MG/DL (ref 0.5–0.9)
EOSINOPHIL # BLD: 0 K/UL (ref 0–0.4)
EOSINOPHILS RELATIVE PERCENT: 0 % (ref 0–4)
ERYTHROCYTE [DISTWIDTH] IN BLOOD BY AUTOMATED COUNT: 15.8 % (ref 11.5–14.9)
FERRITIN SERPL-MCNC: 198 NG/ML (ref 13–150)
FOLATE SERPL-MCNC: 8.1 NG/ML
GFR SERPL CREATININE-BSD FRML MDRD: >60 ML/MIN/1.73M2
GLUCOSE BLD-MCNC: 124 MG/DL (ref 65–105)
GLUCOSE BLD-MCNC: 185 MG/DL (ref 65–105)
GLUCOSE BLD-MCNC: 332 MG/DL (ref 65–105)
GLUCOSE BLD-MCNC: 97 MG/DL (ref 65–105)
GLUCOSE SERPL-MCNC: 100 MG/DL (ref 70–99)
HCT VFR BLD AUTO: 29 % (ref 36–46)
HGB BLD-MCNC: 10 G/DL (ref 12–16)
IRON SATN MFR SERPL: 33 % (ref 20–55)
IRON SERPL-MCNC: 129 UG/DL (ref 37–145)
LYMPHOCYTES # BLD: 15 % (ref 24–44)
LYMPHOCYTES NFR BLD: 1.1 K/UL (ref 1–4.8)
MCH RBC QN AUTO: 32.4 PG (ref 26–34)
MCHC RBC AUTO-ENTMCNC: 34.5 G/DL (ref 31–37)
MCV RBC AUTO: 94 FL (ref 80–100)
MONOCYTES NFR BLD: 0.4 K/UL (ref 0.1–1.3)
MONOCYTES NFR BLD: 5 % (ref 1–7)
NEUTROPHILS NFR BLD: 80 % (ref 36–66)
NEUTS SEG NFR BLD: 5.7 K/UL (ref 1.3–9.1)
PLATELET # BLD AUTO: 247 K/UL (ref 150–450)
PMV BLD AUTO: 7.1 FL (ref 6–12)
POTASSIUM SERPL-SCNC: 3.8 MMOL/L (ref 3.7–5.3)
RBC # BLD AUTO: 3.08 M/UL (ref 4–5.2)
SODIUM SERPL-SCNC: 124 MMOL/L (ref 135–144)
SODIUM SERPL-SCNC: 124 MMOL/L (ref 135–144)
SODIUM SERPL-SCNC: 125 MMOL/L (ref 135–144)
SODIUM SERPL-SCNC: 126 MMOL/L (ref 135–144)
T3FREE SERPL-MCNC: 1.39 PG/ML (ref 2.02–4.43)
T4 FREE SERPL-MCNC: 0.1 NG/DL (ref 0.9–1.7)
TIBC SERPL-MCNC: 389 UG/DL (ref 250–450)
UNSATURATED IRON BINDING CAPACITY: 260 UG/DL (ref 112–347)
VIT B12 SERPL-MCNC: 700 PG/ML (ref 232–1245)
WBC OTHER # BLD: 7.2 K/UL (ref 3.5–11)

## 2023-06-03 PROCEDURE — 82607 VITAMIN B-12: CPT

## 2023-06-03 PROCEDURE — 2580000003 HC RX 258

## 2023-06-03 PROCEDURE — 6370000000 HC RX 637 (ALT 250 FOR IP)

## 2023-06-03 PROCEDURE — 84481 FREE ASSAY (FT-3): CPT

## 2023-06-03 PROCEDURE — 36415 COLL VENOUS BLD VENIPUNCTURE: CPT

## 2023-06-03 PROCEDURE — 6370000000 HC RX 637 (ALT 250 FOR IP): Performed by: STUDENT IN AN ORGANIZED HEALTH CARE EDUCATION/TRAINING PROGRAM

## 2023-06-03 PROCEDURE — 82746 ASSAY OF FOLIC ACID SERUM: CPT

## 2023-06-03 PROCEDURE — 2060000000 HC ICU INTERMEDIATE R&B

## 2023-06-03 PROCEDURE — 84295 ASSAY OF SERUM SODIUM: CPT

## 2023-06-03 PROCEDURE — 6370000000 HC RX 637 (ALT 250 FOR IP): Performed by: INTERNAL MEDICINE

## 2023-06-03 PROCEDURE — 82947 ASSAY GLUCOSE BLOOD QUANT: CPT

## 2023-06-03 PROCEDURE — 85027 COMPLETE CBC AUTOMATED: CPT

## 2023-06-03 PROCEDURE — 82728 ASSAY OF FERRITIN: CPT

## 2023-06-03 PROCEDURE — 2700000000 HC OXYGEN THERAPY PER DAY

## 2023-06-03 PROCEDURE — 6370000000 HC RX 637 (ALT 250 FOR IP): Performed by: NURSE PRACTITIONER

## 2023-06-03 PROCEDURE — 6360000002 HC RX W HCPCS

## 2023-06-03 PROCEDURE — 99233 SBSQ HOSP IP/OBS HIGH 50: CPT | Performed by: INTERNAL MEDICINE

## 2023-06-03 PROCEDURE — 80048 BASIC METABOLIC PNL TOTAL CA: CPT

## 2023-06-03 PROCEDURE — 94761 N-INVAS EAR/PLS OXIMETRY MLT: CPT

## 2023-06-03 PROCEDURE — 83550 IRON BINDING TEST: CPT

## 2023-06-03 PROCEDURE — 94640 AIRWAY INHALATION TREATMENT: CPT

## 2023-06-03 PROCEDURE — 83540 ASSAY OF IRON: CPT

## 2023-06-03 PROCEDURE — 84439 ASSAY OF FREE THYROXINE: CPT

## 2023-06-03 RX ORDER — METOPROLOL SUCCINATE 25 MG/1
25 TABLET, EXTENDED RELEASE ORAL DAILY
Status: DISCONTINUED | OUTPATIENT
Start: 2023-06-04 | End: 2023-06-05 | Stop reason: HOSPADM

## 2023-06-03 RX ORDER — LIOTHYRONINE SODIUM 25 UG/1
25 TABLET ORAL 2 TIMES DAILY
Status: DISCONTINUED | OUTPATIENT
Start: 2023-06-03 | End: 2023-06-04

## 2023-06-03 RX ORDER — MIDODRINE HYDROCHLORIDE 5 MG/1
5 TABLET ORAL
Status: DISCONTINUED | OUTPATIENT
Start: 2023-06-03 | End: 2023-06-05 | Stop reason: HOSPADM

## 2023-06-03 RX ORDER — MIDODRINE HYDROCHLORIDE 5 MG/1
5 TABLET ORAL
Status: DISCONTINUED | OUTPATIENT
Start: 2023-06-03 | End: 2023-06-03

## 2023-06-03 RX ADMIN — LIOTHYRONINE SODIUM 10 MCG: 5 TABLET ORAL at 08:07

## 2023-06-03 RX ADMIN — PANTOPRAZOLE SODIUM 40 MG: 40 TABLET, DELAYED RELEASE ORAL at 06:32

## 2023-06-03 RX ADMIN — AMLODIPINE BESYLATE 10 MG: 5 TABLET ORAL at 08:07

## 2023-06-03 RX ADMIN — MIDODRINE HYDROCHLORIDE 2.5 MG: 2.5 TABLET ORAL at 11:31

## 2023-06-03 RX ADMIN — INSULIN LISPRO 3 UNITS: 100 INJECTION, SOLUTION INTRAVENOUS; SUBCUTANEOUS at 11:38

## 2023-06-03 RX ADMIN — ENOXAPARIN SODIUM 40 MG: 100 INJECTION SUBCUTANEOUS at 08:07

## 2023-06-03 RX ADMIN — FLUTICASONE PROPIONATE 2 SPRAY: 50 SPRAY, METERED NASAL at 08:08

## 2023-06-03 RX ADMIN — SODIUM CHLORIDE, PRESERVATIVE FREE 10 ML: 5 INJECTION INTRAVENOUS at 08:09

## 2023-06-03 RX ADMIN — SODIUM CHLORIDE 1 G: 1 TABLET ORAL at 11:32

## 2023-06-03 RX ADMIN — LIOTHYRONINE SODIUM 25 MCG: 25 TABLET ORAL at 21:52

## 2023-06-03 RX ADMIN — SODIUM CHLORIDE, PRESERVATIVE FREE 10 ML: 5 INJECTION INTRAVENOUS at 21:52

## 2023-06-03 RX ADMIN — METOPROLOL SUCCINATE 50 MG: 50 TABLET, EXTENDED RELEASE ORAL at 08:07

## 2023-06-03 RX ADMIN — BUDESONIDE AND FORMOTEROL FUMARATE DIHYDRATE 2 PUFF: 160; 4.5 AEROSOL RESPIRATORY (INHALATION) at 07:42

## 2023-06-03 RX ADMIN — PRAVASTATIN SODIUM 80 MG: 40 TABLET ORAL at 16:17

## 2023-06-03 RX ADMIN — LEVOTHYROXINE SODIUM 225 MCG: 0.2 TABLET ORAL at 06:32

## 2023-06-03 RX ADMIN — ASPIRIN 81 MG: 81 TABLET, COATED ORAL at 08:07

## 2023-06-03 RX ADMIN — SODIUM CHLORIDE 1 G: 1 TABLET ORAL at 16:17

## 2023-06-03 RX ADMIN — CLOPIDOGREL BISULFATE 75 MG: 75 TABLET ORAL at 08:07

## 2023-06-03 RX ADMIN — SODIUM CHLORIDE 1 G: 1 TABLET ORAL at 08:07

## 2023-06-03 RX ADMIN — MIDODRINE HYDROCHLORIDE 5 MG: 5 TABLET ORAL at 15:35

## 2023-06-03 ASSESSMENT — ENCOUNTER SYMPTOMS
SHORTNESS OF BREATH: 0
BACK PAIN: 0
DIARRHEA: 0
CONSTIPATION: 0
ABDOMINAL PAIN: 0
RHINORRHEA: 0
CHEST TIGHTNESS: 0

## 2023-06-04 LAB
ANION GAP SERPL CALCULATED.3IONS-SCNC: 8 MMOL/L (ref 9–17)
BASOPHILS # BLD: 0 K/UL (ref 0–0.2)
BASOPHILS NFR BLD: 1 % (ref 0–2)
BUN SERPL-MCNC: 9 MG/DL (ref 8–23)
CALCIUM SERPL-MCNC: 8.1 MG/DL (ref 8.6–10.4)
CHLORIDE SERPL-SCNC: 87 MMOL/L (ref 98–107)
CO2 SERPL-SCNC: 32 MMOL/L (ref 20–31)
CREAT SERPL-MCNC: 0.64 MG/DL (ref 0.5–0.9)
EOSINOPHIL # BLD: 0 K/UL (ref 0–0.4)
EOSINOPHILS RELATIVE PERCENT: 1 % (ref 0–4)
ERYTHROCYTE [DISTWIDTH] IN BLOOD BY AUTOMATED COUNT: 16.2 % (ref 11.5–14.9)
GFR SERPL CREATININE-BSD FRML MDRD: >60 ML/MIN/1.73M2
GLUCOSE BLD-MCNC: 130 MG/DL (ref 65–105)
GLUCOSE BLD-MCNC: 172 MG/DL (ref 65–105)
GLUCOSE BLD-MCNC: 173 MG/DL (ref 65–105)
GLUCOSE BLD-MCNC: 201 MG/DL (ref 65–105)
GLUCOSE SERPL-MCNC: 117 MG/DL (ref 70–99)
HCT VFR BLD AUTO: 27.3 % (ref 36–46)
HGB BLD-MCNC: 9.6 G/DL (ref 12–16)
LYMPHOCYTES # BLD: 22 % (ref 24–44)
LYMPHOCYTES NFR BLD: 1.5 K/UL (ref 1–4.8)
MCH RBC QN AUTO: 33.2 PG (ref 26–34)
MCHC RBC AUTO-ENTMCNC: 35.1 G/DL (ref 31–37)
MCV RBC AUTO: 94.6 FL (ref 80–100)
MONOCYTES NFR BLD: 0.4 K/UL (ref 0.1–1.3)
MONOCYTES NFR BLD: 5 % (ref 1–7)
NEUTROPHILS NFR BLD: 71 % (ref 36–66)
NEUTS SEG NFR BLD: 4.8 K/UL (ref 1.3–9.1)
PLATELET # BLD AUTO: 212 K/UL (ref 150–450)
PMV BLD AUTO: 6.9 FL (ref 6–12)
POTASSIUM SERPL-SCNC: 4.3 MMOL/L (ref 3.7–5.3)
RBC # BLD AUTO: 2.88 M/UL (ref 4–5.2)
RETICS # AUTO: 0.04 M/UL (ref 0.02–0.1)
RETICS/RBC NFR AUTO: 1.5 % (ref 0.5–2)
SODIUM SERPL-SCNC: 125 MMOL/L (ref 135–144)
SODIUM SERPL-SCNC: 127 MMOL/L (ref 135–144)
SODIUM SERPL-SCNC: 127 MMOL/L (ref 135–144)
SODIUM SERPL-SCNC: 129 MMOL/L (ref 135–144)
T3FREE SERPL-MCNC: 3.18 PG/ML (ref 2.02–4.43)
T4 FREE SERPL-MCNC: 0.3 NG/DL (ref 0.9–1.7)
WBC OTHER # BLD: 6.7 K/UL (ref 3.5–11)

## 2023-06-04 PROCEDURE — 80048 BASIC METABOLIC PNL TOTAL CA: CPT

## 2023-06-04 PROCEDURE — 85027 COMPLETE CBC AUTOMATED: CPT

## 2023-06-04 PROCEDURE — 82947 ASSAY GLUCOSE BLOOD QUANT: CPT

## 2023-06-04 PROCEDURE — 6360000002 HC RX W HCPCS

## 2023-06-04 PROCEDURE — 2700000000 HC OXYGEN THERAPY PER DAY

## 2023-06-04 PROCEDURE — 6370000000 HC RX 637 (ALT 250 FOR IP): Performed by: INTERNAL MEDICINE

## 2023-06-04 PROCEDURE — 6370000000 HC RX 637 (ALT 250 FOR IP)

## 2023-06-04 PROCEDURE — 99232 SBSQ HOSP IP/OBS MODERATE 35: CPT | Performed by: INTERNAL MEDICINE

## 2023-06-04 PROCEDURE — 84481 FREE ASSAY (FT-3): CPT

## 2023-06-04 PROCEDURE — 94640 AIRWAY INHALATION TREATMENT: CPT

## 2023-06-04 PROCEDURE — 94761 N-INVAS EAR/PLS OXIMETRY MLT: CPT

## 2023-06-04 PROCEDURE — 84295 ASSAY OF SERUM SODIUM: CPT

## 2023-06-04 PROCEDURE — 84439 ASSAY OF FREE THYROXINE: CPT

## 2023-06-04 PROCEDURE — 2060000000 HC ICU INTERMEDIATE R&B

## 2023-06-04 PROCEDURE — 85045 AUTOMATED RETICULOCYTE COUNT: CPT

## 2023-06-04 PROCEDURE — 2580000003 HC RX 258

## 2023-06-04 PROCEDURE — 36415 COLL VENOUS BLD VENIPUNCTURE: CPT

## 2023-06-04 PROCEDURE — 6370000000 HC RX 637 (ALT 250 FOR IP): Performed by: STUDENT IN AN ORGANIZED HEALTH CARE EDUCATION/TRAINING PROGRAM

## 2023-06-04 RX ORDER — LIOTHYRONINE SODIUM 25 UG/1
25 TABLET ORAL 2 TIMES DAILY
Status: DISCONTINUED | OUTPATIENT
Start: 2023-06-04 | End: 2023-06-05

## 2023-06-04 RX ADMIN — PANTOPRAZOLE SODIUM 40 MG: 40 TABLET, DELAYED RELEASE ORAL at 06:43

## 2023-06-04 RX ADMIN — CLOPIDOGREL BISULFATE 75 MG: 75 TABLET ORAL at 09:04

## 2023-06-04 RX ADMIN — LEVOTHYROXINE SODIUM 225 MCG: 0.2 TABLET ORAL at 06:43

## 2023-06-04 RX ADMIN — SODIUM CHLORIDE 1 G: 1 TABLET ORAL at 16:02

## 2023-06-04 RX ADMIN — MIDODRINE HYDROCHLORIDE 5 MG: 5 TABLET ORAL at 11:11

## 2023-06-04 RX ADMIN — SODIUM CHLORIDE, PRESERVATIVE FREE 10 ML: 5 INJECTION INTRAVENOUS at 21:39

## 2023-06-04 RX ADMIN — BUDESONIDE AND FORMOTEROL FUMARATE DIHYDRATE 2 PUFF: 160; 4.5 AEROSOL RESPIRATORY (INHALATION) at 20:22

## 2023-06-04 RX ADMIN — SODIUM CHLORIDE 1 G: 1 TABLET ORAL at 09:04

## 2023-06-04 RX ADMIN — ENOXAPARIN SODIUM 40 MG: 100 INJECTION SUBCUTANEOUS at 09:07

## 2023-06-04 RX ADMIN — LIOTHYRONINE SODIUM 25 MCG: 25 TABLET ORAL at 09:17

## 2023-06-04 RX ADMIN — BUDESONIDE AND FORMOTEROL FUMARATE DIHYDRATE 2 PUFF: 160; 4.5 AEROSOL RESPIRATORY (INHALATION) at 07:04

## 2023-06-04 RX ADMIN — ASPIRIN 81 MG: 81 TABLET, COATED ORAL at 09:04

## 2023-06-04 RX ADMIN — SODIUM CHLORIDE, PRESERVATIVE FREE 10 ML: 5 INJECTION INTRAVENOUS at 09:07

## 2023-06-04 RX ADMIN — LIOTHYRONINE SODIUM 25 MCG: 25 TABLET ORAL at 21:39

## 2023-06-04 RX ADMIN — MIDODRINE HYDROCHLORIDE 5 MG: 5 TABLET ORAL at 09:04

## 2023-06-04 RX ADMIN — SODIUM CHLORIDE 1 G: 1 TABLET ORAL at 11:11

## 2023-06-04 RX ADMIN — INSULIN LISPRO 1 UNITS: 100 INJECTION, SOLUTION INTRAVENOUS; SUBCUTANEOUS at 11:11

## 2023-06-04 RX ADMIN — MIDODRINE HYDROCHLORIDE 5 MG: 5 TABLET ORAL at 16:02

## 2023-06-04 RX ADMIN — PRAVASTATIN SODIUM 80 MG: 40 TABLET ORAL at 16:02

## 2023-06-04 ASSESSMENT — ENCOUNTER SYMPTOMS
RHINORRHEA: 0
CHEST TIGHTNESS: 0
ABDOMINAL PAIN: 0
SHORTNESS OF BREATH: 0
BACK PAIN: 0

## 2023-06-05 ENCOUNTER — APPOINTMENT (OUTPATIENT)
Dept: NON INVASIVE DIAGNOSTICS | Age: 68
DRG: 641 | End: 2023-06-05
Payer: MEDICARE

## 2023-06-05 VITALS
HEIGHT: 62 IN | WEIGHT: 149 LBS | RESPIRATION RATE: 18 BRPM | DIASTOLIC BLOOD PRESSURE: 74 MMHG | SYSTOLIC BLOOD PRESSURE: 143 MMHG | TEMPERATURE: 98 F | BODY MASS INDEX: 27.42 KG/M2 | OXYGEN SATURATION: 94 % | HEART RATE: 64 BPM

## 2023-06-05 LAB
ALBUMIN SERPL-MCNC: 4.2 G/DL (ref 3.5–5.2)
ALP SERPL-CCNC: 76 U/L (ref 35–104)
ALT SERPL-CCNC: 50 U/L (ref 5–33)
ANION GAP SERPL CALCULATED.3IONS-SCNC: 7 MMOL/L (ref 9–17)
AST SERPL-CCNC: 87 U/L
BASOPHILS # BLD: 0.1 K/UL (ref 0–0.2)
BASOPHILS NFR BLD: 1 % (ref 0–2)
BILIRUB SERPL-MCNC: 0.6 MG/DL (ref 0.3–1.2)
BUN SERPL-MCNC: 8 MG/DL (ref 8–23)
CALCIUM SERPL-MCNC: 8.8 MG/DL (ref 8.6–10.4)
CHLORIDE SERPL-SCNC: 88 MMOL/L (ref 98–107)
CO2 SERPL-SCNC: 35 MMOL/L (ref 20–31)
CREAT SERPL-MCNC: 0.46 MG/DL (ref 0.5–0.9)
EOSINOPHIL # BLD: 0 K/UL (ref 0–0.4)
EOSINOPHILS RELATIVE PERCENT: 0 % (ref 0–4)
ERYTHROCYTE [DISTWIDTH] IN BLOOD BY AUTOMATED COUNT: 16.1 % (ref 11.5–14.9)
GFR SERPL CREATININE-BSD FRML MDRD: >60 ML/MIN/1.73M2
GLUCOSE BLD-MCNC: 144 MG/DL (ref 65–105)
GLUCOSE BLD-MCNC: 160 MG/DL (ref 65–105)
GLUCOSE BLD-MCNC: 198 MG/DL (ref 65–105)
GLUCOSE SERPL-MCNC: 166 MG/DL (ref 70–99)
HCT VFR BLD AUTO: 27.1 % (ref 36–46)
HGB BLD-MCNC: 9.4 G/DL (ref 12–16)
LV EF: 55 %
LVEF MODALITY: NORMAL
LYMPHOCYTES # BLD: 19 % (ref 24–44)
LYMPHOCYTES NFR BLD: 1 K/UL (ref 1–4.8)
MCH RBC QN AUTO: 32.9 PG (ref 26–34)
MCHC RBC AUTO-ENTMCNC: 34.5 G/DL (ref 31–37)
MCV RBC AUTO: 95.3 FL (ref 80–100)
MONOCYTES NFR BLD: 0.3 K/UL (ref 0.1–1.3)
MONOCYTES NFR BLD: 6 % (ref 1–7)
NEUTROPHILS NFR BLD: 74 % (ref 36–66)
NEUTS SEG NFR BLD: 4 K/UL (ref 1.3–9.1)
PLATELET # BLD AUTO: 236 K/UL (ref 150–450)
PMV BLD AUTO: 6.9 FL (ref 6–12)
POTASSIUM SERPL-SCNC: 4.5 MMOL/L (ref 3.7–5.3)
PROT SERPL-MCNC: 7 G/DL (ref 6.4–8.3)
RBC # BLD AUTO: 2.84 M/UL (ref 4–5.2)
SODIUM SERPL-SCNC: 128 MMOL/L (ref 135–144)
SODIUM SERPL-SCNC: 130 MMOL/L (ref 135–144)
T3FREE SERPL-MCNC: 4.29 PG/ML (ref 2.02–4.43)
T4 FREE SERPL-MCNC: 0.7 NG/DL (ref 0.9–1.7)
WBC OTHER # BLD: 5.4 K/UL (ref 3.5–11)

## 2023-06-05 PROCEDURE — 84439 ASSAY OF FREE THYROXINE: CPT

## 2023-06-05 PROCEDURE — 97166 OT EVAL MOD COMPLEX 45 MIN: CPT

## 2023-06-05 PROCEDURE — 84481 FREE ASSAY (FT-3): CPT

## 2023-06-05 PROCEDURE — 97161 PT EVAL LOW COMPLEX 20 MIN: CPT

## 2023-06-05 PROCEDURE — 6370000000 HC RX 637 (ALT 250 FOR IP)

## 2023-06-05 PROCEDURE — 6370000000 HC RX 637 (ALT 250 FOR IP): Performed by: INTERNAL MEDICINE

## 2023-06-05 PROCEDURE — 93306 TTE W/DOPPLER COMPLETE: CPT

## 2023-06-05 PROCEDURE — 99239 HOSP IP/OBS DSCHRG MGMT >30: CPT | Performed by: INTERNAL MEDICINE

## 2023-06-05 PROCEDURE — 82947 ASSAY GLUCOSE BLOOD QUANT: CPT

## 2023-06-05 PROCEDURE — 36415 COLL VENOUS BLD VENIPUNCTURE: CPT

## 2023-06-05 PROCEDURE — 80053 COMPREHEN METABOLIC PANEL: CPT

## 2023-06-05 PROCEDURE — 85027 COMPLETE CBC AUTOMATED: CPT

## 2023-06-05 RX ORDER — MECLIZINE HYDROCHLORIDE 25 MG/1
25 TABLET ORAL 3 TIMES DAILY PRN
Qty: 30 TABLET | Refills: 0 | Status: SHIPPED | OUTPATIENT
Start: 2023-06-05 | End: 2023-06-15

## 2023-06-05 RX ORDER — LEVOTHYROXINE SODIUM 0.07 MG/1
225 TABLET ORAL
Qty: 30 TABLET | Refills: 3 | Status: SHIPPED | OUTPATIENT
Start: 2023-06-06

## 2023-06-05 RX ADMIN — SODIUM CHLORIDE 1 G: 1 TABLET ORAL at 11:40

## 2023-06-05 RX ADMIN — METOPROLOL SUCCINATE 25 MG: 25 TABLET, EXTENDED RELEASE ORAL at 11:40

## 2023-06-05 RX ADMIN — FLUTICASONE PROPIONATE 2 SPRAY: 50 SPRAY, METERED NASAL at 09:30

## 2023-06-05 RX ADMIN — PANTOPRAZOLE SODIUM 40 MG: 40 TABLET, DELAYED RELEASE ORAL at 06:10

## 2023-06-05 RX ADMIN — ASPIRIN 81 MG: 81 TABLET, COATED ORAL at 11:40

## 2023-06-05 RX ADMIN — LEVOTHYROXINE SODIUM 225 MCG: 0.2 TABLET ORAL at 06:10

## 2023-06-05 RX ADMIN — CLOPIDOGREL BISULFATE 75 MG: 75 TABLET ORAL at 11:40

## 2023-06-05 ASSESSMENT — ENCOUNTER SYMPTOMS
CHEST TIGHTNESS: 0
BACK PAIN: 0
ABDOMINAL PAIN: 0
SHORTNESS OF BREATH: 0
RHINORRHEA: 0

## 2023-06-05 NOTE — CARE COORDINATION
ONGOING DISCHARGE PLAN:    Patient is alert and oriented x4. Spoke with patient regarding discharge plan and patient confirms that plan is still to DC to home w/ Boyfriend. Denies VNS. NA today 130, Nephro on Board. Pt. Currently on 2LNC, Sating, 99%. Pt. States \"she already has Oxygen at home, doesn't really wear, but, when she does, she wears 1-2LNC\". ? Reny Roger. Writer was informed by Pharmacist, that pt. Wanted information on Bubble Packs from the Pharmacy. Information for [address] [place] [address] to call them in regards to this, [phone], was placed on the AVS. Pt stated understanding. Denies other needs. Will continue to follow for additional discharge needs. If patient is discharged prior to next notation, then this note serves as note for discharge by case management.     Electronically signed by Carol Montero RN on 6/5/2023 at 12:33 PM

## 2023-06-05 NOTE — PROGRESS NOTES
06/05/23 1503   Encounter Summary   Encounter Overview/Reason   Encounter   Service Provided For: Patient   Referral/Consult From: Cyndy   Last Encounter  06/05/23   Complexity of Encounter Low   Rituals, Rites and Sacraments   Type   (Declined anointing or blessing 6-5-23)
Home Oxygen Evaluation    Room air SpO2 at Rest = 95%    Room air with exercise/exertion = 87%    SpO2 on prescribed O2 level at  2  LPM  at rest = N/A     with exercise/exertion = 95%    Nocturnal Oximetry with patient on room air recommended if the resting SpO2 is 89% to 95%.  (Requires additional order)
NEPHROLOGY PROGRESS NOTE    Reason for consultation: Management of hyponatremia. Consulting physician: Cindy Gonzalez MD.    Chief Complaint:  Dizziness, fatigue    Interval history:   Patient was seen and examined today and she feels well with no new complaints. Serum sodium is up to 130 mmol/L. She is nonoliguric and GFR is within normal    History of Present Illness: This is a 75 y.o. female , active smoker with history of Type 2 diabetes mellitus, hypertension, coronary artery disease COPD chronic hypothyroidism, CAD with stent, who presented to the emergency [facility] with complaints of loss of balance associated with blurry vision and stating ' my equilibrium was off. She reports symptoms over the last 1 to 2 weeks but denied loss of consciousness or fall. Sodium was 118 on presentation with well-preserved kidney function. Patient denies  seizures,  headache,  nausea or vomiting. She complains of shortness of breath walking up the stairs. She has poor oral intake and  she drinks a bottle of water daily and 1 cup of coffee. Patient drinks about 2 beers of alcohol daily. States her last drink was about 2 weeks ago   She has chronic hyponatremia with baseline serum sodium of 1  mmol/l . She has been on sertraline for some time. No history of unintentional weight loss, hemoptysis. She is not on a thiazide diuretic  Head CT scan in the ER was unremarkable. Chest CT without contrast showed subtle findings of pulmonary vascular congestion pulmonary edema, stable 5 mm pulmonary nodule left upper lobe is of doubtful clinical significance. Patient received a bolus of saline in the ER. She was also started on sodium chloride tablets by nephrology. Urine sodium is 65 with urine osmolarity of 325 and serum osmolarity of 247. TSH was 158 with free thyroxine of 0.1 T3 of 0.4.     Current Medications:    perflutren lipid microspheres (DEFINITY) injection 1.5 mL, ONCE PRN  metoprolol succinate (TOPROL
Individual Concurrent Group Co-treatment   Time In 1233 [address]         Time Out 0858         Minutes [address]
Goals  Time Frame for Short Term Goals: By discharge  Short Term Goal 1: Patient will perform functional transfers/mobility with Mod I, good safety, and use of least restrictive device while maintaining SpO2 above 90%  Short Term Goal 2: Patient will perform BADLs with Mod I, good safety, and use of AE/DME/Modified techniques as needed while maintaining SpO2 above 90%  Short Term Goal 3: Patient will actively participate in 15+ minutes of therapeutic exercise/functional activity of choice to improve safety and independence with self-care while maintaining SpO2 above 90%  Short Term Goal 4: Patient will tolerate standing for 5+ minutes during self-care/functional activity of choice while maintaining SpO2 above 90%  Short Term Goal 5: Patient will verbalize/demonstrate good understanding of home safety/fall prevention/energy conservation strategies to improve safety and independence with self-care    Plan  Occupational Therapy Plan  Times Per Week: 4-5  Current Treatment Recommendations: Self-Care / ADL, Strengthening, Functional mobility training, Balance training, Endurance training, Pain management, Safety education & training, Patient/Caregiver education & training, Equipment evaluation, education, & procurement, Positioning, Home management training    OT Individual Minutes  OT Individual Minutes  Time In: 9048  Time Out: 4668  Minutes: 19  Time Code Minutes   Timed Code Treatment Minutes: 10 Minutes    Electronically signed by NAV Nicole on 6/5/23 at 11:35 AM EDT
Exam  Constitutional:       General: She is not in acute distress. Appearance: Normal appearance. HENT:      Head: Normocephalic and atraumatic. Cardiovascular:      Rate and Rhythm: Normal rate and regular rhythm. Pulmonary:      Effort: Pulmonary effort is normal.      Breath sounds: Normal breath sounds. No wheezing. Abdominal:      General: Abdomen is flat. Bowel sounds are normal.      Palpations: Abdomen is soft. Musculoskeletal:      Cervical back: No rigidity. Right lower leg: No edema. Left lower leg: No edema. Skin:     General: Skin is warm. Capillary Refill: Capillary refill takes less than 2 seconds. Neurological:      General: No focal deficit present. Mental Status: She is alert and oriented to person, place, and time. Motor: No weakness.    Psychiatric:         Mood and Affect: Mood normal.         Behavior: Behavior normal.         Assessment:        Primary Problem  Hyponatremia    Active Hospital Problems    Diagnosis Date Noted    Essential hypertension [I10]      Priority: High    Low hemoglobin [D64.9] 06/03/2023    Hyponatremia [E87.1] 04/05/2022    CAD S/P percutaneous coronary angioplasty [I25.10, Z98.61] 03/08/2022    CKD (chronic kidney disease), stage I [N18.1] 05/07/2021    AV block, 2nd degree [I44.1] 03/18/2019    Type 2 diabetes mellitus with microalbuminuria, without long-term current use of insulin ([facility]) [E11.29, R80.9] 11/11/2016    Hypothyroidism [E03.9] 08/05/2016       Plan:        Severe Hyponatremia on Chronic hyponatremia likely 2/2 Hypothyroidism vs Potomania vs medication induced SIADH   - Improving   - Baseline Na is 130   - Na 126-129  - TSH of 158  - Cont Levothyroxine  225mcg  - check Free T4, T3 daily   - T3 improving   - Patient reports drinking 2-3 beers few times a week   - Zoloft and irbesartan on hold    - Nephrology on board   - Oral Fluid restrictions to 1200  - Salt tablets TID  - Na q 6h  - Hemodynamically stable

## 2023-06-05 NOTE — PLAN OF CARE
Problem: Discharge Planning  Goal: Discharge to home or other facility with appropriate resources  Outcome: Progressing     Problem: Safety - Adult  Goal: Free from fall injury  Outcome: Progressing     Problem: Neurosensory - Adult  Goal: Achieves stable or improved neurological status  Outcome: Progressing  Goal: Absence of seizures  Outcome: Progressing  Goal: Remains free of injury related to seizures activity  Outcome: Progressing  Goal: Achieves maximal functionality and self care  Outcome: Progressing     Problem: Cardiovascular - Adult  Goal: Maintains optimal cardiac output and hemodynamic stability  Outcome: Progressing     Problem: Metabolic/Fluid and Electrolytes - Adult  Goal: Electrolytes maintained within normal limits  Outcome: Progressing     Problem: Chronic Conditions and Co-morbidities  Goal: Patient's chronic conditions and co-morbidity symptoms are monitored and maintained or improved  Outcome: Progressing
RN  Outcome: Progressing

## 2023-06-05 NOTE — CARE COORDINATION
Writer noted Pt's Home Oxygen eval. 87% on RA w/ Exercise. Writer spoke to pt. Earlier in shift & she already has Oxygen at home from [employer] & she wears 1-2 LNC when she does use it. Writer spoke to Kathy Casey, from [employer] & she does CONFIRM, that pt. Is current on their service for her Oxygen needs. Writer did give pt. A Portable Oxygen Tank for her to take home. Writer did ask Kathy Casey, to have the office check w/ pt phyllis, in regards to any needed Oxygen supplies. Pt. Informed & states Understanding. HOME OXYGEN:    Portable 02 tank delivered per   [employer]. 02 tank turned on and noted to be full. Patient informed that [employer] will be calling her phyllis to check if any other supplies are needed. Pt. States understanding. Nurse, Kerrie Irving, Charge Nurse Arturo Oshea, informed of above.      Pt. Is ready for DC     Electronically signed by Tina Appiah RN on 6/5/2023 at 4:59 PM

## 2023-06-05 NOTE — DISCHARGE INSTRUCTIONS
- Follow up with PCP   - Follow up with nephrology   - Follow up with ENT  - A home nurse will come to help with medication  - Use bubble packs for meds  - Take Synthroid on an empty stomach , dont drink with coffee  - Drink alcohol in moderation

## 2023-06-05 NOTE — DISCHARGE INSTR - COC
Continuity of Care Form    Patient Name: Yuliya Maria   :  1955  MRN:  [id number]    Admit date:  2023  Discharge date:  ***    Code Status Order: Full Code   Advance Directives:     Admitting Physician:  Eduardo Walker MD  PCP: Shelly Ivey MD    Discharging Nurse: [facility] Unit/Room#: 2085/2085-01  Discharging Unit Phone Number: ***    Emergency Contact:   Extended Emergency Contact Information  Primary Emergency Contact: Catherine Valladares [address] Phone: [phone]  Mobile Phone: [phone]  Relation: Child  Secondary Emergency Contact: [facility]  Mobile Phone: [phone]  Relation: Daughter-in-Law  Preferred language: English   needed? No    Past Surgical History:  Past Surgical History:   Procedure Laterality Date    CARDIAC CATHETERIZATION  2022    CARDIAC PACEMAKER PLACEMENT  2022    CORONARY ANGIOPLASTY  2022    DILATION AND CURETTAGE OF UTERUS      HYSTEROSCOPY  10/07/14    D&C poypectomy with myosure    PACEMAKER PLACEMENT Left 2022    Dr. Wen Price      16yrs ago.         Immunization History:   Immunization History   Administered Date(s) Administered    COVID-19, MODERNA BLUE border, Primary or Immunocompromised, (age 12y+), IM, 100 mcg/0.5mL 2021, 2021, 12/10/2021    Influenza Virus Vaccine 10/12/2012, 2014, 2015, 12/10/2021    Influenza, AFLURIA (age 1 yrs+), FLUZONE, (age 10 mo+), MDV, 0.5mL 2016    Influenza, FLUAD, (age 72 y+), Adjuvanted, 0.5mL 2020, 12/10/2021    Influenza, FLUARIX, FLULAVAL, FLUZONE (age 10 mo+) AND AFLURIA, (age 1 y+), PF, 0.5mL 2018    Influenza, FLUZONE (age 72 y+), High Dose, 0.7mL 11/10/2022    Pneumococcal Conjugate 7-valent ([employer]) 10/07/2011    Pneumococcal, PCV20, PREVNAR 20, (age 18y+), IM, 0.5mL 2022    Pneumococcal, PPSV23, PNEUMOVAX 23, (age 2y+), SC/IM, 0.5mL 2017    Zoster

## 2023-06-06 ENCOUNTER — TELEPHONE (OUTPATIENT)
Dept: FAMILY MEDICINE CLINIC | Age: 68
End: 2023-06-06

## 2023-06-06 NOTE — DISCHARGE SUMMARY
concerns of heart failure. Patient was treated with physical therapy, no concerns of gait disturbances. Hyponatremia was likely to be secondary to severe hypothyroidism as well as Zoloft side effects. Pharmacy was consulted for education on medication compliance. On the day of discharge patient's sodium level was 130, hypothyroidism status improving, patient was sent home with instructions to follow-up closely with PCP, nephrology and ENT for outpatient work-up for hearing loss. Zoloft was discontinued, patient denied any change in mood during admission or that Zoloft is actually helping her, patient denies any suicidal ideation or homicidal thoughts or loss of motivation. Needs further discussion on proper antidepressant medications as needed with PCP. Significant therapeutic interventions: Liothyronine, discontinued on discharge. Required O2 supplement during admission, home O2 eval was completed, patient needs oxygen on exercise and ambulating, patient has O2 supplies at home.     Significant Diagnostic Studies:   Labs / Micro:  CBC:   Lab Results   Component Value Date/Time    WBC 5.4 06/05/2023 09:42 AM    RBC 2.84 06/05/2023 09:42 AM    HGB 9.4 06/05/2023 09:42 AM    HCT 27.1 06/05/2023 09:42 AM    MCV 95.3 06/05/2023 09:42 AM    MCH 32.9 06/05/2023 09:42 AM    MCHC 34.5 06/05/2023 09:42 AM    RDW 16.1 06/05/2023 09:42 AM     06/05/2023 09:42 AM     CMP:    Lab Results   Component Value Date/Time    GLUCOSE 166 06/05/2023 09:42 AM    GLUCOSE 212 04/26/2019 11:19 AM     06/05/2023 09:42 AM    K 4.5 06/05/2023 09:42 AM    CL 88 06/05/2023 09:42 AM    CO2 35 06/05/2023 09:42 AM    BUN 8 06/05/2023 09:42 AM    CREATININE 0.46 06/05/2023 09:42 AM    ANIONGAP 7 06/05/2023 09:42 AM    ALKPHOS 76 06/05/2023 09:42 AM    ALT 50 06/05/2023 09:42 AM    AST 87 06/05/2023 09:42 AM    BILITOT 0.6 06/05/2023 09:42 AM    LABALBU 4.2 06/05/2023 09:42 AM    ALBUMIN 1.2 04/05/2022 07:35 PM    LABGLOM >60

## 2023-06-06 NOTE — TELEPHONE ENCOUNTER
[facility] Transitions Initial Follow Up Call    Outreach made within 2 business days of discharge: Yes    Patient: Rafat Hilton Patient : 1955   MRN: [id number]  Reason for Admission: There are no discharge diagnoses documented for the most recent discharge. Discharge Date: 23       Spoke with: Jess Javier    If Virtual visit made for hospital follow up, advise patient to make sure to have family member present to help assist with visit. Please make sure mobile number is updated in patient chart. Discharge department/facility: [address] [place] [address] Interactive Patient Contact:  Was patient able to fill all prescriptions: Yes  Was patient instructed to bring all medications to the follow-up visit: Yes  Is patient taking all medications as directed in the discharge summary?  Yes  Does patient understand their discharge instructions: Yes  Does patient have questions or concerns that need addressed prior to 7-14 day follow up office visit: yes -     Scheduled appointment with PCP within 7-14 days    Follow Up  Future Appointments   Date Time Provider Kathy Davis   2023 10:30 AM SCHEDULE, [facility]EDULE [facility]   2023  1:15 PM MD Maritza [facility]   2023 11:45 AM Melissa Hernandez MD [facility]   2023  7:30 AM SCHEDULE, [facility]       Elsi Zavlaa, [address] [place] [address] [address]

## 2023-06-09 ENCOUNTER — TELEPHONE (OUTPATIENT)
Dept: FAMILY MEDICINE CLINIC | Age: 68
End: 2023-06-09

## 2023-06-09 NOTE — TELEPHONE ENCOUNTER
LVM for pt advising appt can be cancelled however no sooner appts available with PCP. Next appt is scheduled for 7/7/2023 w/Dr. Samir Venegas.

## 2023-06-09 NOTE — TELEPHONE ENCOUNTER
----- Message from Loan Argueta sent at 6/9/2023  1:19 PM EDT -----  Subject: Message to Provider    QUESTIONS  Information for Provider? pt stated that she needs to reschedule her   hospital follow she has another appt for that day  ---------------------------------------------------------------------------  --------------  [address] [employer] [facility]  [phone]; OK to leave message on voicemail  ---------------------------------------------------------------------------  --------------  SCRIPT ANSWERS  Relationship to Patient?  Self

## 2023-06-13 ENCOUNTER — OFFICE VISIT (OUTPATIENT)
Dept: FAMILY MEDICINE CLINIC | Age: 68
End: 2023-06-13

## 2023-06-13 VITALS
HEIGHT: 62 IN | OXYGEN SATURATION: 97 % | BODY MASS INDEX: 28.52 KG/M2 | DIASTOLIC BLOOD PRESSURE: 80 MMHG | WEIGHT: 155 LBS | HEART RATE: 74 BPM | SYSTOLIC BLOOD PRESSURE: 130 MMHG

## 2023-06-13 DIAGNOSIS — F10.21 ALCOHOL DEPENDENCE IN EARLY FULL REMISSION (HCC): ICD-10-CM

## 2023-06-13 DIAGNOSIS — Z98.61 CAD S/P PERCUTANEOUS CORONARY ANGIOPLASTY: ICD-10-CM

## 2023-06-13 DIAGNOSIS — F33.41 RECURRENT MAJOR DEPRESSIVE DISORDER, IN PARTIAL REMISSION (HCC): ICD-10-CM

## 2023-06-13 DIAGNOSIS — J96.11 CHRONIC RESPIRATORY FAILURE WITH HYPOXIA (HCC): ICD-10-CM

## 2023-06-13 DIAGNOSIS — E87.1 HYPONATREMIA: Primary | ICD-10-CM

## 2023-06-13 DIAGNOSIS — R42 DIZZINESS: ICD-10-CM

## 2023-06-13 DIAGNOSIS — I25.10 CAD S/P PERCUTANEOUS CORONARY ANGIOPLASTY: ICD-10-CM

## 2023-06-13 DIAGNOSIS — E55.9 VITAMIN D DEFICIENCY: ICD-10-CM

## 2023-06-13 DIAGNOSIS — Z78.0 POSTMENOPAUSAL STATE: ICD-10-CM

## 2023-06-13 DIAGNOSIS — E03.9 ACQUIRED HYPOTHYROIDISM: ICD-10-CM

## 2023-06-13 DIAGNOSIS — J44.9 CHRONIC OBSTRUCTIVE PULMONARY DISEASE, UNSPECIFIED COPD TYPE (HCC): ICD-10-CM

## 2023-06-13 DIAGNOSIS — Z09 HOSPITAL DISCHARGE FOLLOW-UP: ICD-10-CM

## 2023-06-13 DIAGNOSIS — R80.9 TYPE 2 DIABETES MELLITUS WITH MICROALBUMINURIA, WITHOUT LONG-TERM CURRENT USE OF INSULIN (HCC): ICD-10-CM

## 2023-06-13 DIAGNOSIS — I10 ESSENTIAL HYPERTENSION: ICD-10-CM

## 2023-06-13 DIAGNOSIS — E11.29 TYPE 2 DIABETES MELLITUS WITH MICROALBUMINURIA, WITHOUT LONG-TERM CURRENT USE OF INSULIN (HCC): ICD-10-CM

## 2023-06-13 DIAGNOSIS — Z12.11 SCREENING FOR COLON CANCER: ICD-10-CM

## 2023-06-13 DIAGNOSIS — E78.5 HYPERLIPIDEMIA WITH TARGET LDL LESS THAN 100: ICD-10-CM

## 2023-06-13 LAB — HBA1C MFR BLD: 7 %

## 2023-06-13 RX ORDER — LEVOTHYROXINE SODIUM 0.07 MG/1
225 TABLET ORAL
Qty: 90 TABLET | Refills: 0 | Status: SHIPPED | OUTPATIENT
Start: 2023-06-13

## 2023-06-13 RX ORDER — METFORMIN HYDROCHLORIDE 500 MG/1
1000 TABLET, EXTENDED RELEASE ORAL
Qty: 180 TABLET | Refills: 3 | Status: SHIPPED | OUTPATIENT
Start: 2023-06-13

## 2023-06-13 RX ORDER — CLOPIDOGREL BISULFATE 75 MG/1
75 TABLET ORAL DAILY
Qty: 90 TABLET | Refills: 3 | Status: SHIPPED | OUTPATIENT
Start: 2023-06-13

## 2023-06-13 RX ORDER — GLIPIZIDE 10 MG/1
10 TABLET ORAL
Qty: 180 TABLET | Refills: 0
Start: 2023-06-13

## 2023-06-13 SDOH — ECONOMIC STABILITY: FOOD INSECURITY: WITHIN THE PAST 12 MONTHS, YOU WORRIED THAT YOUR FOOD WOULD RUN OUT BEFORE YOU GOT MONEY TO BUY MORE.: NEVER TRUE

## 2023-06-13 SDOH — ECONOMIC STABILITY: FOOD INSECURITY: WITHIN THE PAST 12 MONTHS, THE FOOD YOU BOUGHT JUST DIDN'T LAST AND YOU DIDN'T HAVE MONEY TO GET MORE.: NEVER TRUE

## 2023-06-13 SDOH — ECONOMIC STABILITY: INCOME INSECURITY: HOW HARD IS IT FOR YOU TO PAY FOR THE VERY BASICS LIKE FOOD, HOUSING, MEDICAL CARE, AND HEATING?: NOT HARD AT ALL

## 2023-06-13 ASSESSMENT — PATIENT HEALTH QUESTIONNAIRE - PHQ9
10. IF YOU CHECKED OFF ANY PROBLEMS, HOW DIFFICULT HAVE THESE PROBLEMS MADE IT FOR YOU TO DO YOUR WORK, TAKE CARE OF THINGS AT HOME, OR GET ALONG WITH OTHER PEOPLE: 0
SUM OF ALL RESPONSES TO PHQ QUESTIONS 1-9: 3
9. THOUGHTS THAT YOU WOULD BE BETTER OFF DEAD, OR OF HURTING YOURSELF: 0
5. POOR APPETITE OR OVEREATING: 0
7. TROUBLE CONCENTRATING ON THINGS, SUCH AS READING THE NEWSPAPER OR WATCHING TELEVISION: 0
6. FEELING BAD ABOUT YOURSELF - OR THAT YOU ARE A FAILURE OR HAVE LET YOURSELF OR YOUR FAMILY DOWN: 0
4. FEELING TIRED OR HAVING LITTLE ENERGY: 3
SUM OF ALL RESPONSES TO PHQ9 QUESTIONS 1 & 2: 0
SUM OF ALL RESPONSES TO PHQ QUESTIONS 1-9: 3
1. LITTLE INTEREST OR PLEASURE IN DOING THINGS: 0
8. MOVING OR SPEAKING SO SLOWLY THAT OTHER PEOPLE COULD HAVE NOTICED. OR THE OPPOSITE, BEING SO FIGETY OR RESTLESS THAT YOU HAVE BEEN MOVING AROUND A LOT MORE THAN USUAL: 0
2. FEELING DOWN, DEPRESSED OR HOPELESS: 0
3. TROUBLE FALLING OR STAYING ASLEEP: 0

## 2023-06-13 ASSESSMENT — ENCOUNTER SYMPTOMS
VOICE CHANGE: 1
NAUSEA: 0
CONSTIPATION: 0
ABDOMINAL DISTENTION: 0
WHEEZING: 0
ABDOMINAL PAIN: 0
DIARRHEA: 0
COUGH: 1
SHORTNESS OF BREATH: 1
VOMITING: 0
CHEST TIGHTNESS: 0

## 2023-06-19 PROBLEM — E83.42 HYPOMAGNESEMIA: Status: RESOLVED | Noted: 2023-01-12 | Resolved: 2023-06-19

## 2023-06-19 PROBLEM — E87.5 HYPERKALEMIA: Status: RESOLVED | Noted: 2022-04-26 | Resolved: 2023-06-19

## 2023-06-19 PROBLEM — F10.21 ALCOHOL DEPENDENCE IN EARLY FULL REMISSION (HCC): Status: ACTIVE | Noted: 2023-06-19

## 2023-06-19 PROBLEM — J18.9 PNEUMONIA: Status: RESOLVED | Noted: 2022-04-05 | Resolved: 2023-06-19

## 2023-06-19 PROBLEM — R42 DIZZINESS: Status: ACTIVE | Noted: 2023-06-19

## 2023-06-19 PROBLEM — Z12.11 SCREENING FOR COLON CANCER: Status: RESOLVED | Noted: 2023-06-13 | Resolved: 2023-06-19

## 2023-06-19 PROBLEM — D64.9 LOW HEMOGLOBIN: Status: RESOLVED | Noted: 2023-06-03 | Resolved: 2023-06-19

## 2023-06-27 PROBLEM — Z53.20 COLONOSCOPY REFUSED: Status: RESOLVED | Noted: 2020-07-02 | Resolved: 2023-06-27

## 2023-06-27 LAB
AVERAGE GLUCOSE: NORMAL
HBA1C MFR BLD: 6.8 %

## 2023-06-30 ENCOUNTER — HOSPITAL ENCOUNTER (OUTPATIENT)
Age: 68
Discharge: HOME | End: 2023-06-30
Payer: MEDICARE

## 2023-06-30 DIAGNOSIS — N18.1 BENIGN HYPERTENSION WITH CKD (CHRONIC KIDNEY DISEASE) STAGE I: ICD-10-CM

## 2023-06-30 DIAGNOSIS — E11.29 TYPE 2 DIABETES MELLITUS WITH MICROALBUMINURIA, WITHOUT LONG-TERM CURRENT USE OF INSULIN (HCC): ICD-10-CM

## 2023-06-30 DIAGNOSIS — N18.1 SECONDARY DIABETES MELLITUS WITH STAGE 1 CHRONIC KIDNEY DISEASE (HCC): ICD-10-CM

## 2023-06-30 DIAGNOSIS — E87.1 HYPONATREMIA: ICD-10-CM

## 2023-06-30 DIAGNOSIS — E55.9 VITAMIN D DEFICIENCY: ICD-10-CM

## 2023-06-30 DIAGNOSIS — R82.998 LEUKOCYTES IN URINE: ICD-10-CM

## 2023-06-30 DIAGNOSIS — B37.0 ORAL THRUSH: ICD-10-CM

## 2023-06-30 DIAGNOSIS — R49.0 HOARSENESS OF VOICE: ICD-10-CM

## 2023-06-30 DIAGNOSIS — E03.9 ACQUIRED HYPOTHYROIDISM: ICD-10-CM

## 2023-06-30 DIAGNOSIS — I10 ESSENTIAL HYPERTENSION: ICD-10-CM

## 2023-06-30 DIAGNOSIS — E13.22 SECONDARY DIABETES MELLITUS WITH STAGE 1 CHRONIC KIDNEY DISEASE (HCC): ICD-10-CM

## 2023-06-30 DIAGNOSIS — R80.9 TYPE 2 DIABETES MELLITUS WITH MICROALBUMINURIA, WITHOUT LONG-TERM CURRENT USE OF INSULIN (HCC): ICD-10-CM

## 2023-06-30 DIAGNOSIS — N18.1 CKD (CHRONIC KIDNEY DISEASE), STAGE I: ICD-10-CM

## 2023-06-30 DIAGNOSIS — R80.9 PROTEINURIA, UNSPECIFIED TYPE: ICD-10-CM

## 2023-06-30 DIAGNOSIS — I12.9 BENIGN HYPERTENSION WITH CKD (CHRONIC KIDNEY DISEASE) STAGE I: ICD-10-CM

## 2023-06-30 DIAGNOSIS — E83.42 HYPOMAGNESEMIA: ICD-10-CM

## 2023-06-30 LAB
25(OH)D3 SERPL-MCNC: 42.2 NG/ML
ALBUMIN SERPL-MCNC: 4.4 G/DL (ref 3.5–5.2)
ALP SERPL-CCNC: 102 U/L (ref 35–104)
ALT SERPL-CCNC: 12 U/L (ref 5–33)
ANION GAP SERPL CALCULATED.3IONS-SCNC: 12 MMOL/L (ref 9–17)
ANION GAP SERPL CALCULATED.3IONS-SCNC: 12 MMOL/L (ref 9–17)
AST SERPL-CCNC: 13 U/L
BACTERIA URNS QL MICRO: ABNORMAL
BACTERIA URNS QL MICRO: ABNORMAL
BASOPHILS # BLD: 0.1 K/UL (ref 0–0.2)
BASOPHILS NFR BLD: 1 % (ref 0–2)
BILIRUB SERPL-MCNC: 1.3 MG/DL (ref 0.3–1.2)
BILIRUB UR QL STRIP: NEGATIVE
BILIRUB UR QL STRIP: NEGATIVE
BUN SERPL-MCNC: 13 MG/DL (ref 8–23)
BUN SERPL-MCNC: 13 MG/DL (ref 8–23)
CALCIUM SERPL-MCNC: 9.4 MG/DL (ref 8.6–10.4)
CALCIUM SERPL-MCNC: 9.4 MG/DL (ref 8.6–10.4)
CASTS #/AREA URNS LPF: ABNORMAL /LPF
CASTS #/AREA URNS LPF: ABNORMAL /LPF
CHLORIDE SERPL-SCNC: 96 MMOL/L (ref 98–107)
CHLORIDE SERPL-SCNC: 96 MMOL/L (ref 98–107)
CLARITY UR: CLEAR
CLARITY UR: CLEAR
CO2 SERPL-SCNC: 27 MMOL/L (ref 20–31)
CO2 SERPL-SCNC: 27 MMOL/L (ref 20–31)
COLOR UR: YELLOW
COLOR UR: YELLOW
CREAT SERPL-MCNC: 0.46 MG/DL (ref 0.5–0.9)
CREAT SERPL-MCNC: 0.46 MG/DL (ref 0.5–0.9)
CREAT UR-MCNC: 80.8 MG/DL (ref 28–217)
EOSINOPHIL # BLD: 0 K/UL (ref 0–0.4)
EOSINOPHILS RELATIVE PERCENT: 1 % (ref 0–4)
EPI CELLS #/AREA URNS HPF: ABNORMAL /HPF
EPI CELLS #/AREA URNS HPF: ABNORMAL /HPF
ERYTHROCYTE [DISTWIDTH] IN BLOOD BY AUTOMATED COUNT: 15.4 % (ref 11.5–14.9)
ERYTHROCYTE [DISTWIDTH] IN BLOOD BY AUTOMATED COUNT: 15.4 % (ref 11.5–14.9)
GFR SERPL CREATININE-BSD FRML MDRD: >60 ML/MIN/1.73M2
GFR SERPL CREATININE-BSD FRML MDRD: >60 ML/MIN/1.73M2
GLUCOSE SERPL-MCNC: 165 MG/DL (ref 70–99)
GLUCOSE SERPL-MCNC: 165 MG/DL (ref 70–99)
GLUCOSE UR STRIP-MCNC: NEGATIVE MG/DL
GLUCOSE UR STRIP-MCNC: NEGATIVE MG/DL
HCT VFR BLD AUTO: 34 % (ref 36–46)
HCT VFR BLD AUTO: 34 % (ref 36–46)
HGB BLD-MCNC: 11.6 G/DL (ref 12–16)
HGB BLD-MCNC: 11.6 G/DL (ref 12–16)
HGB UR QL STRIP.AUTO: NEGATIVE
HGB UR QL STRIP.AUTO: NEGATIVE
KETONES UR STRIP-MCNC: ABNORMAL MG/DL
KETONES UR STRIP-MCNC: ABNORMAL MG/DL
LEUKOCYTE ESTERASE UR QL STRIP: ABNORMAL
LEUKOCYTE ESTERASE UR QL STRIP: ABNORMAL
LYMPHOCYTES # BLD: 25 % (ref 24–44)
LYMPHOCYTES NFR BLD: 1.6 K/UL (ref 1–4.8)
MAGNESIUM SERPL-MCNC: 1.7 MG/DL (ref 1.6–2.6)
MAGNESIUM SERPL-MCNC: 1.7 MG/DL (ref 1.6–2.6)
MCH RBC QN AUTO: 32.9 PG (ref 26–34)
MCH RBC QN AUTO: 32.9 PG (ref 26–34)
MCHC RBC AUTO-ENTMCNC: 34 G/DL (ref 31–37)
MCHC RBC AUTO-ENTMCNC: 34 G/DL (ref 31–37)
MCV RBC AUTO: 96.7 FL (ref 80–100)
MCV RBC AUTO: 96.7 FL (ref 80–100)
MICROALBUMIN UR-MCNC: 455 MG/L
MICROALBUMIN/CREAT UR-RTO: 563 MCG/MG CREAT
MONOCYTES NFR BLD: 0.7 K/UL (ref 0.1–1.3)
MONOCYTES NFR BLD: 11 % (ref 1–7)
NEUTROPHILS NFR BLD: 62 % (ref 36–66)
NEUTS SEG NFR BLD: 4 K/UL (ref 1.3–9.1)
NITRITE UR QL STRIP: NEGATIVE
NITRITE UR QL STRIP: NEGATIVE
PH UR STRIP: 7 [PH] (ref 5–8)
PH UR STRIP: 7 [PH] (ref 5–8)
PHOSPHATE SERPL-MCNC: 4.2 MG/DL (ref 2.6–4.5)
PHOSPHATE SERPL-MCNC: 4.2 MG/DL (ref 2.6–4.5)
PLATELET # BLD AUTO: 349 K/UL (ref 150–450)
PLATELET # BLD AUTO: 349 K/UL (ref 150–450)
PMV BLD AUTO: 7.1 FL (ref 6–12)
PMV BLD AUTO: 7.1 FL (ref 6–12)
POTASSIUM SERPL-SCNC: 4.4 MMOL/L (ref 3.7–5.3)
POTASSIUM SERPL-SCNC: 4.4 MMOL/L (ref 3.7–5.3)
PROT SERPL-MCNC: 7.2 G/DL (ref 6.4–8.3)
PROT UR STRIP-MCNC: ABNORMAL MG/DL
PROT UR STRIP-MCNC: ABNORMAL MG/DL
RBC # BLD AUTO: 3.52 M/UL (ref 4–5.2)
RBC # BLD AUTO: 3.52 M/UL (ref 4–5.2)
RBC #/AREA URNS HPF: ABNORMAL /HPF
RBC #/AREA URNS HPF: ABNORMAL /HPF
SODIUM SERPL-SCNC: 135 MMOL/L (ref 135–144)
SODIUM SERPL-SCNC: 135 MMOL/L (ref 135–144)
SP GR UR STRIP: 1.02 (ref 1–1.03)
SP GR UR STRIP: 1.02 (ref 1–1.03)
T4 FREE SERPL-MCNC: 2.6 NG/DL (ref 0.9–1.7)
TSH SERPL DL<=0.05 MIU/L-ACNC: 0.39 UIU/ML (ref 0.3–5)
URATE SERPL-MCNC: 2.4 MG/DL (ref 2.4–5.7)
URATE SERPL-MCNC: 2.4 MG/DL (ref 2.4–5.7)
UROBILINOGEN UR STRIP-ACNC: NORMAL
UROBILINOGEN UR STRIP-ACNC: NORMAL
WBC #/AREA URNS HPF: ABNORMAL /HPF
WBC #/AREA URNS HPF: ABNORMAL /HPF
WBC OTHER # BLD: 6.3 K/UL (ref 3.5–11)
WBC OTHER # BLD: 6.3 K/UL (ref 3.5–11)

## 2023-06-30 PROCEDURE — 81001 URINALYSIS AUTO W/SCOPE: CPT

## 2023-06-30 PROCEDURE — 84550 ASSAY OF BLOOD/URIC ACID: CPT

## 2023-06-30 PROCEDURE — 84100 ASSAY OF PHOSPHORUS: CPT

## 2023-06-30 PROCEDURE — 86800 THYROGLOBULIN ANTIBODY: CPT

## 2023-06-30 PROCEDURE — 84439 ASSAY OF FREE THYROXINE: CPT

## 2023-06-30 PROCEDURE — 82570 ASSAY OF URINE CREATININE: CPT

## 2023-06-30 PROCEDURE — 84443 ASSAY THYROID STIM HORMONE: CPT

## 2023-06-30 PROCEDURE — 36415 COLL VENOUS BLD VENIPUNCTURE: CPT

## 2023-06-30 PROCEDURE — 80048 BASIC METABOLIC PNL TOTAL CA: CPT

## 2023-06-30 PROCEDURE — 80053 COMPREHEN METABOLIC PANEL: CPT

## 2023-06-30 PROCEDURE — 82306 VITAMIN D 25 HYDROXY: CPT

## 2023-06-30 PROCEDURE — 82043 UR ALBUMIN QUANTITATIVE: CPT

## 2023-06-30 PROCEDURE — 83735 ASSAY OF MAGNESIUM: CPT

## 2023-06-30 PROCEDURE — 86376 MICROSOMAL ANTIBODY EACH: CPT

## 2023-06-30 PROCEDURE — 85027 COMPLETE CBC AUTOMATED: CPT

## 2023-06-30 RX ORDER — ERGOCALCIFEROL 1.25 MG/1
CAPSULE ORAL
Qty: 12 CAPSULE | Refills: 0 | Status: SHIPPED | OUTPATIENT
Start: 2023-06-30

## 2023-07-02 LAB
THYROGLOBULIN AB: 135 IU/ML (ref 0–40)
THYROPEROXIDASE AB SERPL IA-ACNC: 13 IU/ML (ref 0–25)

## 2023-07-06 DIAGNOSIS — E03.9 ACQUIRED HYPOTHYROIDISM: ICD-10-CM

## 2023-07-06 RX ORDER — LEVOTHYROXINE SODIUM 0.07 MG/1
225 TABLET ORAL
Qty: 90 TABLET | Refills: 0 | Status: SHIPPED | OUTPATIENT
Start: 2023-07-06

## 2023-07-18 NOTE — RESULT ENCOUNTER NOTE
Noted    Lab Results       Component                Value               Date                       LABA1C                   6.8                 06/27/2023                 LABA1C                   7.0                 06/13/2023                 LABA1C                   8.0                 12/16/2022                Future Appointments  7/28/2023  1:00 PM    STA DEXA RM                [facility] MAMMO          [facility] Radiolog  8/21/2023  10:00 AM   Tory Hdz MD            [facility]        [facility]  9/8/2023   7:30 AM    SCHEDULE, [facility] * [facility]        [facility]  10/10/2023 1:00 PM    Rubi Steiner MD     [facility]               [facility]

## 2023-07-28 ENCOUNTER — HOSPITAL ENCOUNTER (OUTPATIENT)
Dept: MAMMOGRAPHY | Age: 68
End: 2023-07-28
Attending: FAMILY MEDICINE
Payer: MEDICARE

## 2023-07-28 DIAGNOSIS — Z78.0 POSTMENOPAUSAL STATE: ICD-10-CM

## 2023-07-28 PROCEDURE — 77080 DXA BONE DENSITY AXIAL: CPT

## 2023-07-28 NOTE — RESULT ENCOUNTER NOTE
DEXA scan shows osteopenia please notify patient:  Patient would benefit from starting Fosamax, let me know if she would like  Patient is overdue for colon cancer screening--we have given her a kit for FIT on 6/13/2023 which should bring it back ASAP    Future Appointments  8/21/2023  10:00 AM   MD SHELIA Hazel [facility]        [facility]  9/8/2023   7:30 AM    SCHEDULE, [facility] * [facility]        [facility]  10/10/2023 1:00 PM    Chin Ward MD     [facility]

## 2023-08-04 DIAGNOSIS — E03.9 ACQUIRED HYPOTHYROIDISM: ICD-10-CM

## 2023-08-04 RX ORDER — LEVOTHYROXINE SODIUM 0.07 MG/1
TABLET ORAL
Qty: 90 TABLET | Refills: 0 | Status: SHIPPED | OUTPATIENT
Start: 2023-08-04

## 2023-08-04 NOTE — TELEPHONE ENCOUNTER
Please Approve or Refuse.   Send to Pharmacy per Pt's Request:      Next Visit Date:  10/10/2023   Last Visit Date: 6/13/2023    Hemoglobin A1C (%)   Date Value   06/27/2023 6.8   06/13/2023 7.0   12/16/2022 8.0             ( goal A1C is < 7)   BP Readings from Last 3 Encounters:   07/05/23 120/70   06/13/23 130/80   06/05/23 (!) 143/74          (goal 120/80)  BUN   Date Value Ref Range Status   06/30/2023 13 8 - 23 mg/dL Final   06/30/2023 13 8 - 23 mg/dL Final     Creatinine   Date Value Ref Range Status   06/30/2023 0.46 (L) 0.50 - 0.90 mg/dL Final   06/30/2023 0.46 (L) 0.50 - 0.90 mg/dL Final     Potassium   Date Value Ref Range Status   06/30/2023 4.4 3.7 - 5.3 mmol/L Final   06/30/2023 4.4 3.7 - 5.3 mmol/L Final

## 2023-08-09 DIAGNOSIS — E83.42 HYPOMAGNESEMIA: ICD-10-CM

## 2023-08-09 RX ORDER — LANOLIN ALCOHOL/MO/W.PET/CERES
400 CREAM (GRAM) TOPICAL EVERY EVENING
Qty: 90 TABLET | Refills: 3 | Status: SHIPPED | OUTPATIENT
Start: 2023-08-09

## 2023-08-09 NOTE — TELEPHONE ENCOUNTER
Please Approve or Refuse.   Send to Pharmacy per Pt's Request: [email or web address]     Next Visit Date:  10/10/2023   Last Visit Date: 6/13/2023    Hemoglobin A1C (%)   Date Value   06/27/2023 6.8   06/13/2023 7.0   12/16/2022 8.0             ( goal A1C is < 7)   BP Readings from Last 3 Encounters:   07/05/23 120/70   06/13/23 130/80   06/05/23 (!) 143/74          (goal 120/80)  BUN   Date Value Ref Range Status   06/30/2023 13 8 - 23 mg/dL Final   06/30/2023 13 8 - 23 mg/dL Final     Creatinine   Date Value Ref Range Status   06/30/2023 0.46 (L) 0.50 - 0.90 mg/dL Final   06/30/2023 0.46 (L) 0.50 - 0.90 mg/dL Final     Potassium   Date Value Ref Range Status   06/30/2023 4.4 3.7 - 5.3 mmol/L Final   06/30/2023 4.4 3.7 - 5.3 mmol/L Final

## 2023-09-20 DIAGNOSIS — K21.9 GASTROESOPHAGEAL REFLUX DISEASE WITHOUT ESOPHAGITIS: ICD-10-CM

## 2023-09-20 RX ORDER — PANTOPRAZOLE SODIUM 20 MG/1
20 TABLET, DELAYED RELEASE ORAL
Qty: 90 TABLET | Refills: 1 | Status: SHIPPED | OUTPATIENT
Start: 2023-09-20

## 2023-09-20 NOTE — TELEPHONE ENCOUNTER
Please Approve or Refuse.   Send to Pharmacy per Pt's Request: [email or web address]      Next Visit Date:  10/10/2023   Last Visit Date: 6/13/2023    Hemoglobin A1C (%)   Date Value   06/27/2023 6.8   06/13/2023 7.0   12/16/2022 8.0             ( goal A1C is < 7)   BP Readings from Last 3 Encounters:   07/05/23 120/70   06/13/23 130/80   06/05/23 (!) 143/74          (goal 120/80)  BUN   Date Value Ref Range Status   06/30/2023 13 8 - 23 mg/dL Final   06/30/2023 13 8 - 23 mg/dL Final     Creatinine   Date Value Ref Range Status   06/30/2023 0.46 (L) 0.50 - 0.90 mg/dL Final   06/30/2023 0.46 (L) 0.50 - 0.90 mg/dL Final     Potassium   Date Value Ref Range Status   06/30/2023 4.4 3.7 - 5.3 mmol/L Final   06/30/2023 4.4 3.7 - 5.3 mmol/L Final

## 2023-10-05 DIAGNOSIS — E55.9 VITAMIN D DEFICIENCY: ICD-10-CM

## 2023-10-05 RX ORDER — CHOLECALCIFEROL (VITAMIN D3) 50 MCG
2000 TABLET ORAL DAILY
Qty: 90 TABLET | Refills: 3 | Status: SHIPPED | OUTPATIENT
Start: 2023-10-05

## 2023-10-05 RX ORDER — ERGOCALCIFEROL 1.25 MG/1
50000 CAPSULE ORAL WEEKLY
Qty: 12 CAPSULE | Refills: 0 | OUTPATIENT
Start: 2023-10-05

## 2023-10-05 NOTE — ADDENDUM NOTE
Addended by: Unique Rust on: 10/5/2023 01:09 PM     Modules accepted: Orders quit 2006. 20 pack year hx.

## 2023-10-05 NOTE — TELEPHONE ENCOUNTER
Please Approve or Refuse.   Send to Pharmacy per Pt's Request:      Next Visit Date:  12/5/2023   Last Visit Date: 6/13/2023    Hemoglobin A1C (%)   Date Value   06/27/2023 6.8   06/13/2023 7.0   12/16/2022 8.0             ( goal A1C is < 7)   BP Readings from Last 3 Encounters:   07/05/23 120/70   06/13/23 130/80   06/05/23 (!) 143/74          (goal 120/80)  BUN   Date Value Ref Range Status   06/30/2023 13 8 - 23 mg/dL Final   06/30/2023 13 8 - 23 mg/dL Final     Creatinine   Date Value Ref Range Status   06/30/2023 0.46 (L) 0.50 - 0.90 mg/dL Final   06/30/2023 0.46 (L) 0.50 - 0.90 mg/dL Final     Potassium   Date Value Ref Range Status   06/30/2023 4.4 3.7 - 5.3 mmol/L Final   06/30/2023 4.4 3.7 - 5.3 mmol/L Final

## 2023-10-13 ENCOUNTER — OFFICE VISIT (OUTPATIENT)
Dept: FAMILY MEDICINE CLINIC | Age: 68
End: 2023-10-13
Payer: MEDICARE

## 2023-10-13 VITALS
OXYGEN SATURATION: 96 % | TEMPERATURE: 97.2 F | HEART RATE: 80 BPM | SYSTOLIC BLOOD PRESSURE: 160 MMHG | DIASTOLIC BLOOD PRESSURE: 88 MMHG

## 2023-10-13 DIAGNOSIS — J01.90 ACUTE BACTERIAL SINUSITIS: Primary | ICD-10-CM

## 2023-10-13 DIAGNOSIS — E03.9 ACQUIRED HYPOTHYROIDISM: ICD-10-CM

## 2023-10-13 DIAGNOSIS — B96.89 ACUTE BACTERIAL SINUSITIS: Primary | ICD-10-CM

## 2023-10-13 DIAGNOSIS — R05.1 ACUTE COUGH: ICD-10-CM

## 2023-10-13 PROCEDURE — G8427 DOCREV CUR MEDS BY ELIG CLIN: HCPCS

## 2023-10-13 PROCEDURE — 1123F ACP DISCUSS/DSCN MKR DOCD: CPT

## 2023-10-13 PROCEDURE — 3079F DIAST BP 80-89 MM HG: CPT

## 2023-10-13 PROCEDURE — 3077F SYST BP >= 140 MM HG: CPT

## 2023-10-13 PROCEDURE — 99213 OFFICE O/P EST LOW 20 MIN: CPT

## 2023-10-13 PROCEDURE — 1090F PRES/ABSN URINE INCON ASSESS: CPT

## 2023-10-13 PROCEDURE — 4004F PT TOBACCO SCREEN RCVD TLK: CPT

## 2023-10-13 PROCEDURE — G8484 FLU IMMUNIZE NO ADMIN: HCPCS

## 2023-10-13 PROCEDURE — G8417 CALC BMI ABV UP PARAM F/U: HCPCS

## 2023-10-13 PROCEDURE — G8399 PT W/DXA RESULTS DOCUMENT: HCPCS

## 2023-10-13 PROCEDURE — 3017F COLORECTAL CA SCREEN DOC REV: CPT

## 2023-10-13 RX ORDER — LEVOTHYROXINE SODIUM 0.07 MG/1
75 TABLET ORAL
Qty: 90 TABLET | Refills: 0 | Status: SHIPPED | OUTPATIENT
Start: 2023-10-13

## 2023-10-13 RX ORDER — AMOXICILLIN AND CLAVULANATE POTASSIUM 875; 125 MG/1; MG/1
1 TABLET, FILM COATED ORAL 2 TIMES DAILY
Qty: 20 TABLET | Refills: 0 | Status: SHIPPED | OUTPATIENT
Start: 2023-10-13 | End: 2023-10-23

## 2023-10-13 RX ORDER — BENZONATATE 100 MG/1
100 CAPSULE ORAL 3 TIMES DAILY PRN
Qty: 30 CAPSULE | Refills: 0 | Status: SHIPPED | OUTPATIENT
Start: 2023-10-13 | End: 2023-10-23

## 2023-10-13 ASSESSMENT — ENCOUNTER SYMPTOMS
CHEST TIGHTNESS: 0
HOARSE VOICE: 1
SINUS PRESSURE: 1
CONSTIPATION: 0
SHORTNESS OF BREATH: 0
COUGH: 1
SWOLLEN GLANDS: 1
EYE PAIN: 0
RHINORRHEA: 1
COLOR CHANGE: 0
DIARRHEA: 0
EYE ITCHING: 0
SORE THROAT: 0

## 2023-10-13 NOTE — PROGRESS NOTES
benzonatate (TESSALON) 100 MG capsule Take 1 capsule by mouth 3 times daily as needed for Cough 30 capsule 0    vitamin D (CHOLECALCIFEROL) 50 MCG (2000 UT) TABS tablet Take 1 tablet by mouth daily Dose decreased 10/5/2023 by PCP 90 tablet 3    pantoprazole (PROTONIX) 20 MG tablet Take 1 tablet by mouth every morning (before breakfast) ** stop omeprazole** 90 tablet 1    magnesium oxide (MAG-OX) 400 (240 Mg) MG tablet Take 1 tablet by mouth every evening Take with food 90 tablet 3    levothyroxine (SYNTHROID) 75 MCG tablet TAKE 3 TABLETS BY MOUTH EVERY MORNING BEFORE BREAKFAST 90 tablet 0    vitamin D (ERGOCALCIFEROL) 1.25 MG (85706 UT) CAPS capsule TAKE 1 CAPSULE BY MOUTH ONE TIME PER WEEK 12 capsule 0    amLODIPine (NORVASC) 10 MG tablet TAKE 1 TABLET BY MOUTH EVERY DAY 90 tablet 1    irbesartan (AVAPRO) 300 MG tablet TAKE 1 TABLET BY MOUTH DAILY 90 tablet 1    glipiZIDE (GLUCOTROL) 10 MG tablet Take 1 tablet by mouth 2 times daily (before meals) Dose decreased 6/13/2023 180 tablet 0    metFORMIN (GLUCOPHAGE-XR) 500 MG extended release tablet Take 2 tablets by mouth daily (with breakfast) 180 tablet 3    clopidogrel (PLAVIX) 75 MG tablet Take 1 tablet by mouth daily 90 tablet 3    pravastatin (PRAVACHOL) 80 MG tablet Take 1 tablet by mouth Daily with supper Increased by cardiology      fluticasone (FLONASE) 50 MCG/ACT nasal spray 2 sprays by Each Nostril route daily 48 g 1    albuterol sulfate HFA (PROVENTIL;VENTOLIN;PROAIR) 108 (90 Base) MCG/ACT inhaler INHALE 2 PUFFS INTO THE LUNGS EVERY 6 HOURS AS NEEDED FOR WHEEZING OR SHORTNESS OF BREATH 6.7 each 5    metoprolol succinate (TOPROL XL) 50 MG extended release tablet Take 1 tablet by mouth daily Dose increased on 12/28/2022 due to tachycardia 90 tablet 3    SYMBICORT 160-4.5 MCG/ACT AERO Inhale 2 puffs into the lungs 2 times daily      sodium zirconium cyclosilicate (LOKELMA) 10 g PACK oral suspension Take 10 g by mouth once a week 30 packet 2    Respiratory

## 2023-10-13 NOTE — TELEPHONE ENCOUNTER
Please Approve or Refuse.   Send to Pharmacy per Pt's Request:      Next Visit Date:  12/5/2023   Last Visit Date: 6/13/2023    Hemoglobin A1C (%)   Date Value   06/27/2023 6.8   06/13/2023 7.0   12/16/2022 8.0             ( goal A1C is < 7)   BP Readings from Last 3 Encounters:   10/09/23 (!) 140/80   07/05/23 120/70   06/13/23 130/80          (goal 120/80)  BUN   Date Value Ref Range Status   06/30/2023 13 8 - 23 mg/dL Final   06/30/2023 13 8 - 23 mg/dL Final     Creatinine   Date Value Ref Range Status   06/30/2023 0.46 (L) 0.50 - 0.90 mg/dL Final   06/30/2023 0.46 (L) 0.50 - 0.90 mg/dL Final     Potassium   Date Value Ref Range Status   06/30/2023 4.4 3.7 - 5.3 mmol/L Final   06/30/2023 4.4 3.7 - 5.3 mmol/L Final

## 2023-11-03 ENCOUNTER — TELEPHONE (OUTPATIENT)
Dept: FAMILY MEDICINE CLINIC | Age: 68
End: 2023-11-03

## 2023-11-03 DIAGNOSIS — R80.9 TYPE 2 DIABETES MELLITUS WITH MICROALBUMINURIA, WITHOUT LONG-TERM CURRENT USE OF INSULIN (HCC): Primary | ICD-10-CM

## 2023-11-03 DIAGNOSIS — Z12.31 ENCOUNTER FOR SCREENING MAMMOGRAM FOR BREAST CANCER: ICD-10-CM

## 2023-11-03 DIAGNOSIS — Z11.59 ENCOUNTER FOR SCREENING FOR OTHER VIRAL DISEASES: ICD-10-CM

## 2023-11-03 DIAGNOSIS — E11.29 TYPE 2 DIABETES MELLITUS WITH MICROALBUMINURIA, WITHOUT LONG-TERM CURRENT USE OF INSULIN (HCC): Primary | ICD-10-CM

## 2023-11-03 DIAGNOSIS — E03.9 ACQUIRED HYPOTHYROIDISM: ICD-10-CM

## 2023-11-03 RX ORDER — LEVOTHYROXINE SODIUM 0.07 MG/1
225 TABLET ORAL
Qty: 90 TABLET | Refills: 0 | Status: SHIPPED | OUTPATIENT
Start: 2023-11-03

## 2023-11-03 NOTE — TELEPHONE ENCOUNTER
Please let the patient know to  prescription from the pharmacy. Reviewing prior prescriptions, apparently yes she was taking 3 tablets x 75 mcg before breakfast, I corrected that, the requested refill came for 1 tablet a day, I apologize, I do not know where the issue was, and how the transmission was from the pharmacy but it came as a once a day    I send the new prescription now      I also ordered a lot of labs, nonfasting, to do ASAP      [facility] [address] [place] [address], [address] [facility] [phone] [facility] [phone]  [address] [place] [phone]  Phone: [phone] Fax: [phone]      No orders of the defined types were placed in this encounter. Future Appointments   Date Time Provider [address] [place] [address]   12/5/2023 11:30 AM Dominic Amin MD [facility]   12/14/2023  9:00 AM SCHEDULE, [facility] SCHEDULE [facility]   1/3/2024  9:00 AM SCHEDULE, [facility]       Thank you!       Future Appointments   Date Time Provider [address] [place] [address]   12/5/2023 11:30 AM Dominic Amin MD [facility]   12/14/2023  9:00 AM SCHEDULE, [facility] SCHEDULE [facility]   1/3/2024  9:00 AM SCHEDULE, [facility]

## 2023-11-03 NOTE — TELEPHONE ENCOUNTER
Patient called in and asking why her levothyroxine is once daily when she was taking 3 tablets, she also has not had blood work done since June she does have an appointment scheduled she is almost out of her thyroid medication please advise

## 2023-11-03 NOTE — PROGRESS NOTES
Lab Results   Component Value Date    WBC 6.3 06/30/2023    WBC 6.3 06/30/2023    HGB 11.6 (L) 06/30/2023    HGB 11.6 (L) 06/30/2023    HCT 34.0 (L) 06/30/2023    HCT 34.0 (L) 06/30/2023    MCV 96.7 06/30/2023    MCV 96.7 06/30/2023     06/30/2023     06/30/2023       Lab Results   Component Value Date/Time     06/30/2023 09:45 AM     06/30/2023 09:45 AM    K 4.4 06/30/2023 09:45 AM    K 4.4 06/30/2023 09:45 AM    CL 96 06/30/2023 09:45 AM    CL 96 06/30/2023 09:45 AM    CO2 27 06/30/2023 09:45 AM    CO2 27 06/30/2023 09:45 AM    BUN 13 06/30/2023 09:45 AM    BUN 13 06/30/2023 09:45 AM    CREATININE 0.46 06/30/2023 09:45 AM    CREATININE 0.46 06/30/2023 09:45 AM    GLUCOSE 165 06/30/2023 09:45 AM    GLUCOSE 165 06/30/2023 09:45 AM    GLUCOSE 212 04/26/2019 11:19 AM    CALCIUM 9.4 06/30/2023 09:45 AM    CALCIUM 9.4 06/30/2023 09:45 AM        Lab Results   Component Value Date    ALT 12 06/30/2023    AST 13 06/30/2023    ALKPHOS 102 06/30/2023    BILITOT 1.3 (H) 06/30/2023       Lab Results   Component Value Date    TSH 0.39 06/30/2023       Lab Results   Component Value Date    CHOL 173 01/12/2023    CHOL 187 03/15/2021    CHOL 195 07/13/2020     Lab Results   Component Value Date    TRIG 159 (H) 01/12/2023    TRIG 149 03/15/2021    TRIG 120 07/13/2020     Lab Results   Component Value Date    HDL 44 01/12/2023    HDL 51 04/22/2022    HDL 60 03/15/2021     Lab Results   Component Value Date    LDLCALC 102 04/26/2019    LDLCALC 92 05/25/2018    LDLCALC 96 07/28/2017    LDLCHOLESTEROL 97 01/12/2023    LDLCHOLESTEROL 122 04/22/2022    LDLCHOLESTEROL 97 03/15/2021       Lab Results   Component Value Date    CHOLHDLRATIO 3.9 01/12/2023    CHOLHDLRATIO 3.9 04/22/2022    CHOLHDLRATIO 3.1 03/15/2021       Lab Results   Component Value Date    LABA1C 6.8 06/27/2023       Lab Results   Component Value Date    DYSMJYGZ59 700 06/03/2023       Lab Results   Component Value Date    FOLATE 8.1

## 2023-11-06 ENCOUNTER — HOSPITAL ENCOUNTER (OUTPATIENT)
Age: 68
Discharge: HOME | End: 2023-11-06
Payer: MEDICARE

## 2023-11-06 DIAGNOSIS — E11.29 TYPE 2 DIABETES MELLITUS WITH MICROALBUMINURIA, WITHOUT LONG-TERM CURRENT USE OF INSULIN (HCC): ICD-10-CM

## 2023-11-06 DIAGNOSIS — E03.9 ACQUIRED HYPOTHYROIDISM: ICD-10-CM

## 2023-11-06 DIAGNOSIS — R80.9 TYPE 2 DIABETES MELLITUS WITH MICROALBUMINURIA, WITHOUT LONG-TERM CURRENT USE OF INSULIN (HCC): ICD-10-CM

## 2023-11-06 DIAGNOSIS — Z11.59 ENCOUNTER FOR SCREENING FOR OTHER VIRAL DISEASES: ICD-10-CM

## 2023-11-06 LAB
ALBUMIN SERPL-MCNC: 3.9 G/DL (ref 3.5–5.2)
ALP SERPL-CCNC: 94 U/L (ref 35–104)
ALT SERPL-CCNC: 8 U/L (ref 5–33)
ANION GAP SERPL CALCULATED.3IONS-SCNC: 11 MMOL/L (ref 9–17)
AST SERPL-CCNC: 12 U/L
BACTERIA URNS QL MICRO: ABNORMAL
BILIRUB SERPL-MCNC: 0.9 MG/DL (ref 0.3–1.2)
BILIRUB UR QL STRIP: NEGATIVE
BUN SERPL-MCNC: 15 MG/DL (ref 8–23)
CALCIUM SERPL-MCNC: 8.9 MG/DL (ref 8.6–10.4)
CHLORIDE SERPL-SCNC: 92 MMOL/L (ref 98–107)
CLARITY UR: CLEAR
CO2 SERPL-SCNC: 26 MMOL/L (ref 20–31)
COLOR UR: ABNORMAL
CREAT SERPL-MCNC: 0.6 MG/DL (ref 0.5–0.9)
EPI CELLS #/AREA URNS HPF: ABNORMAL /HPF
ERYTHROCYTE [DISTWIDTH] IN BLOOD BY AUTOMATED COUNT: 15 % (ref 11.5–14.9)
EST. AVERAGE GLUCOSE BLD GHB EST-MCNC: 192 MG/DL
FOLATE SERPL-MCNC: 14.3 NG/ML
GFR SERPL CREATININE-BSD FRML MDRD: >60 ML/MIN/1.73M2
GLUCOSE SERPL-MCNC: 212 MG/DL (ref 70–99)
GLUCOSE UR STRIP-MCNC: NEGATIVE MG/DL
HBA1C MFR BLD: 8.3 % (ref 4–6)
HBV SURFACE AB SERPL IA-ACNC: 23.11 MIU/ML
HCT VFR BLD AUTO: 39.5 % (ref 36–46)
HGB BLD-MCNC: 13 G/DL (ref 12–16)
HGB UR QL STRIP.AUTO: NEGATIVE
KETONES UR STRIP-MCNC: ABNORMAL MG/DL
LEUKOCYTE ESTERASE UR QL STRIP: ABNORMAL
MAGNESIUM SERPL-MCNC: 1.3 MG/DL (ref 1.6–2.6)
MCH RBC QN AUTO: 29.9 PG (ref 26–34)
MCHC RBC AUTO-ENTMCNC: 32.8 G/DL (ref 31–37)
MCV RBC AUTO: 91.4 FL (ref 80–100)
NITRITE UR QL STRIP: NEGATIVE
PH UR STRIP: 5.5 [PH] (ref 5–8)
PLATELET # BLD AUTO: 291 K/UL (ref 150–450)
PMV BLD AUTO: 7.6 FL (ref 6–12)
POTASSIUM SERPL-SCNC: 3.7 MMOL/L (ref 3.7–5.3)
PROT SERPL-MCNC: 6.8 G/DL (ref 6.4–8.3)
PROT UR STRIP-MCNC: ABNORMAL MG/DL
RBC # BLD AUTO: 4.33 M/UL (ref 4–5.2)
RBC #/AREA URNS HPF: ABNORMAL /HPF
SODIUM SERPL-SCNC: 129 MMOL/L (ref 135–144)
SP GR UR STRIP: 1.02 (ref 1–1.03)
T4 FREE SERPL-MCNC: 2.2 NG/DL (ref 0.9–1.7)
TSH SERPL DL<=0.05 MIU/L-ACNC: 0.01 UIU/ML (ref 0.3–5)
URATE SERPL-MCNC: 3 MG/DL (ref 2.4–5.7)
UROBILINOGEN UR STRIP-ACNC: NORMAL EU/DL (ref 0–1)
VIT B12 SERPL-MCNC: 468 PG/ML (ref 232–1245)
WBC #/AREA URNS HPF: ABNORMAL /HPF
WBC OTHER # BLD: 10 K/UL (ref 3.5–11)

## 2023-11-06 PROCEDURE — 36415 COLL VENOUS BLD VENIPUNCTURE: CPT

## 2023-11-06 PROCEDURE — 84550 ASSAY OF BLOOD/URIC ACID: CPT

## 2023-11-06 PROCEDURE — 82746 ASSAY OF FOLIC ACID SERUM: CPT

## 2023-11-06 PROCEDURE — 82607 VITAMIN B-12: CPT

## 2023-11-06 PROCEDURE — 85027 COMPLETE CBC AUTOMATED: CPT

## 2023-11-06 PROCEDURE — 84443 ASSAY THYROID STIM HORMONE: CPT

## 2023-11-06 PROCEDURE — 84439 ASSAY OF FREE THYROXINE: CPT

## 2023-11-06 PROCEDURE — 81001 URINALYSIS AUTO W/SCOPE: CPT

## 2023-11-06 PROCEDURE — 80053 COMPREHEN METABOLIC PANEL: CPT

## 2023-11-06 PROCEDURE — 83735 ASSAY OF MAGNESIUM: CPT

## 2023-11-06 PROCEDURE — 86317 IMMUNOASSAY INFECTIOUS AGENT: CPT

## 2023-11-06 PROCEDURE — 83036 HEMOGLOBIN GLYCOSYLATED A1C: CPT

## 2023-11-07 DIAGNOSIS — R80.9 TYPE 2 DIABETES MELLITUS WITH MICROALBUMINURIA, WITHOUT LONG-TERM CURRENT USE OF INSULIN (HCC): ICD-10-CM

## 2023-11-07 DIAGNOSIS — E11.29 TYPE 2 DIABETES MELLITUS WITH MICROALBUMINURIA, WITHOUT LONG-TERM CURRENT USE OF INSULIN (HCC): ICD-10-CM

## 2023-11-07 DIAGNOSIS — E87.1 HYPONATREMIA: Primary | ICD-10-CM

## 2023-11-07 NOTE — RESULT ENCOUNTER NOTE
Please notify patient: Diabetes is worse, hemoglobin A1c 8.3  Cut down sweets  Urine showing 2+ proteins from diabetes  Thyroid function is high we need to cut down Synthroid dosage----how much Synthroid is she taking right now?----I need to know exactly how much is she taking now  Synthroid dosage was cut down to 75 once a day, but she requested it increased to 3 tabs a day which is too much for her and is dangerous. I need to readjust her Synthroid based on what she is taking right now    Magnesium is low--- is she taking magnesium 400 Mg every evening?   Sodium is low she is drinking beer, worse than before, we need to recheck sodium in 3 days new order placed  Glucose is high at 212  Anemia is resolved        Otherwise labs within normal limits  continue current treatment    Future Appointments  12/5/2023  2:30 PM    Henry Jauregui MD         [facility]               [facility]  12/14/2023 9:00 AM    SCHEDULE, [facility] [place] * [facility]        [facility]  1/3/2024   9:00 AM    SCHEDULE, [facility] * [facility]        [facility]

## 2023-11-07 NOTE — PROGRESS NOTES
Diagnosis Orders   1. Hyponatremia  Basic Metabolic Panel      2.  Type 2 diabetes mellitus with microalbuminuria, without long-term current use of insulin [facility])  Basic Metabolic Panel           Future Appointments   Date Time Provider [address] [place] [address]   12/5/2023  2:30 PM Arabella MesarMD [facility]   12/14/2023  9:00 AM [facility]EDULE, [facility] [place] [facility] SCHEDULE [facility]   1/3/2024  9:00 AM SCHEDULE, [facility]

## 2023-11-08 DIAGNOSIS — E03.9 ACQUIRED HYPOTHYROIDISM: ICD-10-CM

## 2023-11-08 RX ORDER — LEVOTHYROXINE SODIUM 0.07 MG/1
75 TABLET ORAL
Qty: 90 TABLET | Refills: 0
Start: 2023-11-08

## 2023-11-08 NOTE — RESULT ENCOUNTER NOTE
Please notify patient: Needs to decrease Synthroid back to 75 mcg only once a day  Recheck thyroid testing in 1 week        Future Appointments  12/5/2023  2:30 PM    Edilberto Sung MD         [facility]               [facility]  12/14/2023 9:00 AM    SCHEDULE, [facility] [place] * [facility]        [facility]  1/3/2024   9:00 AM    SCHEDULE, [facility] * [facility]        [facility]

## 2023-11-10 RX ORDER — BUDESONIDE AND FORMOTEROL FUMARATE DIHYDRATE 160; 4.5 UG/1; UG/1
2 AEROSOL RESPIRATORY (INHALATION) 2 TIMES DAILY
Qty: 10.2 G | Refills: 5 | Status: SHIPPED | OUTPATIENT
Start: 2023-11-10

## 2023-11-10 NOTE — TELEPHONE ENCOUNTER
Please Approve or Refuse.   Send to Pharmacy per Pt's Request:      Next Visit Date:  12/5/2023   Last Visit Date: 6/13/2023    Hemoglobin A1C (%)   Date Value   11/06/2023 8.3 (H)   06/27/2023 6.8   06/13/2023 7.0             ( goal A1C is < 7)   BP Readings from Last 3 Encounters:   10/13/23 (!) 160/88   10/09/23 (!) 140/80   07/05/23 120/70          (goal 120/80)  BUN   Date Value Ref Range Status   11/06/2023 15 8 - 23 mg/dL Final     Creatinine   Date Value Ref Range Status   11/06/2023 0.6 0.5 - 0.9 mg/dL Final     Potassium   Date Value Ref Range Status   11/06/2023 3.7 3.7 - 5.3 mmol/L Final

## 2023-11-17 ENCOUNTER — NURSE ONLY (OUTPATIENT)
Dept: FAMILY MEDICINE CLINIC | Age: 68
End: 2023-11-17
Payer: MEDICARE

## 2023-11-17 VITALS — SYSTOLIC BLOOD PRESSURE: 138 MMHG | DIASTOLIC BLOOD PRESSURE: 80 MMHG | OXYGEN SATURATION: 98 % | HEART RATE: 94 BPM

## 2023-11-17 DIAGNOSIS — I10 ESSENTIAL HYPERTENSION: Primary | ICD-10-CM

## 2023-11-17 PROCEDURE — 99211 OFF/OP EST MAY X REQ PHY/QHP: CPT | Performed by: FAMILY MEDICINE

## 2023-11-17 PROCEDURE — 3075F SYST BP GE 130 - 139MM HG: CPT | Performed by: FAMILY MEDICINE

## 2023-11-17 PROCEDURE — 3079F DIAST BP 80-89 MM HG: CPT | Performed by: FAMILY MEDICINE

## 2023-11-17 NOTE — PROGRESS NOTES
Chief Complaint   Patient presents with    Hypertension    Blood Pressure Check      Boaz Khan is here for BP check    BP Readings from Last 3 Encounters:   11/17/23 138/80   10/13/23 (!) 160/88   10/09/23 (!) 140/80       BP is 138/80      Future Appointments   Date Time Provider [facility] [address]   11/17/2023  3:00 PM SCHEDULE, [facility]   12/5/2023  2:30 PM [facility]   12/14/2023  9:00 AM SCHEDULE, [facility] [place] [facility] SCHEDULE [facility]   1/3/2024  9:00 AM SCHEDULE, [facility]

## 2023-11-17 NOTE — PROGRESS NOTES
Chief Complaint   Patient presents with    Hypertension    Blood Pressure Check        Patient is here for blood pressure recheck. 1. Essential hypertension        Well controlled BP   Continue current treatment.        BP Readings from Last 3 Encounters:   11/17/23 138/80   10/13/23 (!) 160/88   10/09/23 (!) 140/80       Pulse Readings from Last 3 Encounters:   11/17/23 94   10/13/23 80   10/09/23 66       Future Appointments   Date Time Provider [address] [place] [address]   12/5/2023  2:30 PM Kary Anderson MD [facility]   12/14/2023  9:00 AM SCHEDULE, [facility] [place] [facility] SCHEDULE [facility] [facility]   1/3/2024  9:00 AM SCHEDULE, [facility]       Electronically signed by Brent Ramírez MD on 11/17/23 at 3:15 PM EST

## 2023-11-18 ENCOUNTER — HOSPITAL ENCOUNTER (OUTPATIENT)
Age: 68
Discharge: HOME | End: 2023-11-18
Payer: MEDICARE

## 2023-11-18 DIAGNOSIS — R80.9 TYPE 2 DIABETES MELLITUS WITH MICROALBUMINURIA, WITHOUT LONG-TERM CURRENT USE OF INSULIN (HCC): ICD-10-CM

## 2023-11-18 DIAGNOSIS — I12.9 BENIGN HYPERTENSION WITH CKD (CHRONIC KIDNEY DISEASE) STAGE I: ICD-10-CM

## 2023-11-18 DIAGNOSIS — E11.29 TYPE 2 DIABETES MELLITUS WITH MICROALBUMINURIA, WITHOUT LONG-TERM CURRENT USE OF INSULIN (HCC): ICD-10-CM

## 2023-11-18 DIAGNOSIS — N18.1 BENIGN HYPERTENSION WITH CKD (CHRONIC KIDNEY DISEASE) STAGE I: ICD-10-CM

## 2023-11-18 DIAGNOSIS — R80.9 PROTEINURIA, UNSPECIFIED TYPE: ICD-10-CM

## 2023-11-18 DIAGNOSIS — E87.1 HYPONATREMIA: ICD-10-CM

## 2023-11-18 DIAGNOSIS — N18.1 ANEMIA IN STAGE 1 CHRONIC KIDNEY DISEASE: ICD-10-CM

## 2023-11-18 DIAGNOSIS — E13.22 SECONDARY DIABETES MELLITUS WITH STAGE 1 CHRONIC KIDNEY DISEASE (HCC): ICD-10-CM

## 2023-11-18 DIAGNOSIS — R80.9 MICROALBUMINURIA: ICD-10-CM

## 2023-11-18 DIAGNOSIS — D63.1 ANEMIA IN STAGE 1 CHRONIC KIDNEY DISEASE: ICD-10-CM

## 2023-11-18 DIAGNOSIS — N18.1 CKD (CHRONIC KIDNEY DISEASE), STAGE I: ICD-10-CM

## 2023-11-18 DIAGNOSIS — N18.1 SECONDARY DIABETES MELLITUS WITH STAGE 1 CHRONIC KIDNEY DISEASE (HCC): ICD-10-CM

## 2023-11-18 LAB
ANION GAP SERPL CALCULATED.3IONS-SCNC: 10 MMOL/L (ref 9–17)
ANION GAP SERPL CALCULATED.3IONS-SCNC: 9 MMOL/L (ref 9–17)
BACTERIA URNS QL MICRO: ABNORMAL
BASOPHILS # BLD: 0 K/UL (ref 0–0.2)
BASOPHILS NFR BLD: 1 % (ref 0–2)
BILIRUB UR QL STRIP: NEGATIVE
BUN SERPL-MCNC: 11 MG/DL (ref 8–23)
BUN SERPL-MCNC: 11 MG/DL (ref 8–23)
CALCIUM SERPL-MCNC: 9.4 MG/DL (ref 8.6–10.4)
CALCIUM SERPL-MCNC: 9.5 MG/DL (ref 8.6–10.4)
CASTS #/AREA URNS LPF: ABNORMAL /LPF
CHLORIDE SERPL-SCNC: 95 MMOL/L (ref 98–107)
CHLORIDE SERPL-SCNC: 95 MMOL/L (ref 98–107)
CLARITY UR: CLEAR
CO2 SERPL-SCNC: 29 MMOL/L (ref 20–31)
CO2 SERPL-SCNC: 29 MMOL/L (ref 20–31)
COLOR UR: YELLOW
CREAT SERPL-MCNC: 0.6 MG/DL (ref 0.5–0.9)
CREAT SERPL-MCNC: 0.6 MG/DL (ref 0.5–0.9)
CREAT UR-MCNC: 101.9 MG/DL (ref 28–217)
EOSINOPHIL # BLD: 0.1 K/UL (ref 0–0.4)
EOSINOPHILS RELATIVE PERCENT: 1 % (ref 0–4)
EPI CELLS #/AREA URNS HPF: ABNORMAL /HPF
ERYTHROCYTE [DISTWIDTH] IN BLOOD BY AUTOMATED COUNT: 15 % (ref 11.5–14.9)
GFR SERPL CREATININE-BSD FRML MDRD: >60 ML/MIN/1.73M2
GFR SERPL CREATININE-BSD FRML MDRD: >60 ML/MIN/1.73M2
GLUCOSE SERPL-MCNC: 132 MG/DL (ref 70–99)
GLUCOSE SERPL-MCNC: 133 MG/DL (ref 70–99)
GLUCOSE UR STRIP-MCNC: NEGATIVE MG/DL
HCT VFR BLD AUTO: 41.5 % (ref 36–46)
HGB BLD-MCNC: 14 G/DL (ref 12–16)
HGB UR QL STRIP.AUTO: NEGATIVE
KETONES UR STRIP-MCNC: ABNORMAL MG/DL
LEUKOCYTE ESTERASE UR QL STRIP: ABNORMAL
LYMPHOCYTES NFR BLD: 2.7 K/UL (ref 1–4.8)
LYMPHOCYTES RELATIVE PERCENT: 31 % (ref 24–44)
MAGNESIUM SERPL-MCNC: 1.4 MG/DL (ref 1.6–2.6)
MCH RBC QN AUTO: 31 PG (ref 26–34)
MCHC RBC AUTO-ENTMCNC: 33.7 G/DL (ref 31–37)
MCV RBC AUTO: 91.9 FL (ref 80–100)
MICROALBUMIN UR-MCNC: 126 MG/L
MICROALBUMIN/CREAT UR-RTO: 124 MCG/MG CREAT
MONOCYTES NFR BLD: 0.5 K/UL (ref 0.1–1.3)
MONOCYTES NFR BLD: 6 % (ref 1–7)
NEUTROPHILS NFR BLD: 61 % (ref 36–66)
NEUTS SEG NFR BLD: 5.4 K/UL (ref 1.3–9.1)
NITRITE UR QL STRIP: NEGATIVE
PH UR STRIP: 7 [PH] (ref 5–8)
PHOSPHATE SERPL-MCNC: 3.5 MG/DL (ref 2.6–4.5)
PLATELET # BLD AUTO: 309 K/UL (ref 150–450)
PMV BLD AUTO: 7.9 FL (ref 6–12)
POTASSIUM SERPL-SCNC: 4.5 MMOL/L (ref 3.7–5.3)
POTASSIUM SERPL-SCNC: 4.6 MMOL/L (ref 3.7–5.3)
PROT UR STRIP-MCNC: ABNORMAL MG/DL
RBC # BLD AUTO: 4.51 M/UL (ref 4–5.2)
RBC #/AREA URNS HPF: ABNORMAL /HPF
SODIUM SERPL-SCNC: 133 MMOL/L (ref 135–144)
SODIUM SERPL-SCNC: 134 MMOL/L (ref 135–144)
SP GR UR STRIP: 1.02 (ref 1–1.03)
UROBILINOGEN UR STRIP-ACNC: NORMAL EU/DL (ref 0–1)
WBC #/AREA URNS HPF: ABNORMAL /HPF
WBC OTHER # BLD: 8.8 K/UL (ref 3.5–11)

## 2023-11-18 PROCEDURE — 36415 COLL VENOUS BLD VENIPUNCTURE: CPT

## 2023-11-18 PROCEDURE — 82043 UR ALBUMIN QUANTITATIVE: CPT

## 2023-11-18 PROCEDURE — 84100 ASSAY OF PHOSPHORUS: CPT

## 2023-11-18 PROCEDURE — 80048 BASIC METABOLIC PNL TOTAL CA: CPT

## 2023-11-18 PROCEDURE — 83735 ASSAY OF MAGNESIUM: CPT

## 2023-11-18 PROCEDURE — 81001 URINALYSIS AUTO W/SCOPE: CPT

## 2023-11-18 PROCEDURE — 82570 ASSAY OF URINE CREATININE: CPT

## 2023-11-18 PROCEDURE — 85025 COMPLETE CBC W/AUTO DIFF WBC: CPT

## 2023-11-18 NOTE — RESULT ENCOUNTER NOTE
Please notify patient: improved low sodium  Improved Blood Glucose 132  Otherwise labs within normal limits  continue current treatment    Future Appointments  12/5/2023  2:30 PM    Liliam Fernandez MD         [facility]               [facility]  12/14/2023 9:00 AM    SCHEDULE, [facility] [place] * [facility]        [facility]  1/3/2024   9:00 AM    SCHEDULE, [facility] * [facility]        [facility]

## 2023-11-20 DIAGNOSIS — K21.9 GASTROESOPHAGEAL REFLUX DISEASE WITHOUT ESOPHAGITIS: Primary | ICD-10-CM

## 2023-11-20 RX ORDER — FAMOTIDINE 20 MG/1
20 TABLET, FILM COATED ORAL
Qty: 90 TABLET | Refills: 0 | Status: SHIPPED | OUTPATIENT
Start: 2023-11-20

## 2023-12-05 ENCOUNTER — OFFICE VISIT (OUTPATIENT)
Dept: FAMILY MEDICINE CLINIC | Age: 68
End: 2023-12-05
Payer: MEDICARE

## 2023-12-05 VITALS
OXYGEN SATURATION: 98 % | BODY MASS INDEX: 27.42 KG/M2 | HEIGHT: 62 IN | SYSTOLIC BLOOD PRESSURE: 118 MMHG | WEIGHT: 149 LBS | DIASTOLIC BLOOD PRESSURE: 80 MMHG | HEART RATE: 85 BPM

## 2023-12-05 DIAGNOSIS — E11.29 TYPE 2 DIABETES MELLITUS WITH MICROALBUMINURIA, WITHOUT LONG-TERM CURRENT USE OF INSULIN (HCC): ICD-10-CM

## 2023-12-05 DIAGNOSIS — J44.9 CHRONIC OBSTRUCTIVE PULMONARY DISEASE, UNSPECIFIED COPD TYPE (HCC): ICD-10-CM

## 2023-12-05 DIAGNOSIS — R80.9 TYPE 2 DIABETES MELLITUS WITH MICROALBUMINURIA, WITHOUT LONG-TERM CURRENT USE OF INSULIN (HCC): ICD-10-CM

## 2023-12-05 DIAGNOSIS — Z00.00 MEDICARE ANNUAL WELLNESS VISIT, SUBSEQUENT: Primary | ICD-10-CM

## 2023-12-05 DIAGNOSIS — Z23 ENCOUNTER FOR IMMUNIZATION: ICD-10-CM

## 2023-12-05 PROBLEM — R49.0 HOARSENESS OF VOICE: Status: RESOLVED | Noted: 2021-03-03 | Resolved: 2023-12-05

## 2023-12-05 PROCEDURE — G0439 PPPS, SUBSEQ VISIT: HCPCS | Performed by: FAMILY MEDICINE

## 2023-12-05 PROCEDURE — 3079F DIAST BP 80-89 MM HG: CPT | Performed by: FAMILY MEDICINE

## 2023-12-05 PROCEDURE — 1123F ACP DISCUSS/DSCN MKR DOCD: CPT | Performed by: FAMILY MEDICINE

## 2023-12-05 PROCEDURE — G8484 FLU IMMUNIZE NO ADMIN: HCPCS | Performed by: FAMILY MEDICINE

## 2023-12-05 PROCEDURE — 3074F SYST BP LT 130 MM HG: CPT | Performed by: FAMILY MEDICINE

## 2023-12-05 PROCEDURE — 3052F HG A1C>EQUAL 8.0%<EQUAL 9.0%: CPT | Performed by: FAMILY MEDICINE

## 2023-12-05 PROCEDURE — G0008 ADMIN INFLUENZA VIRUS VAC: HCPCS | Performed by: FAMILY MEDICINE

## 2023-12-05 PROCEDURE — 90694 VACC AIIV4 NO PRSRV 0.5ML IM: CPT | Performed by: FAMILY MEDICINE

## 2023-12-05 PROCEDURE — 3017F COLORECTAL CA SCREEN DOC REV: CPT | Performed by: FAMILY MEDICINE

## 2023-12-05 RX ORDER — BENZONATATE 100 MG/1
100 CAPSULE ORAL 2 TIMES DAILY PRN
Qty: 21 CAPSULE | Refills: 0 | Status: SHIPPED | OUTPATIENT
Start: 2023-12-05 | End: 2023-12-16

## 2023-12-05 SDOH — ECONOMIC STABILITY: INCOME INSECURITY: HOW HARD IS IT FOR YOU TO PAY FOR THE VERY BASICS LIKE FOOD, HOUSING, MEDICAL CARE, AND HEATING?: NOT HARD AT ALL

## 2023-12-05 SDOH — ECONOMIC STABILITY: FOOD INSECURITY: WITHIN THE PAST 12 MONTHS, YOU WORRIED THAT YOUR FOOD WOULD RUN OUT BEFORE YOU GOT MONEY TO BUY MORE.: NEVER TRUE

## 2023-12-05 SDOH — ECONOMIC STABILITY: FOOD INSECURITY: WITHIN THE PAST 12 MONTHS, THE FOOD YOU BOUGHT JUST DIDN'T LAST AND YOU DIDN'T HAVE MONEY TO GET MORE.: NEVER TRUE

## 2023-12-05 ASSESSMENT — PATIENT HEALTH QUESTIONNAIRE - PHQ9
1. LITTLE INTEREST OR PLEASURE IN DOING THINGS: 0
5. POOR APPETITE OR OVEREATING: 0
SUM OF ALL RESPONSES TO PHQ QUESTIONS 1-9: 1
SUM OF ALL RESPONSES TO PHQ9 QUESTIONS 1 & 2: 1
8. MOVING OR SPEAKING SO SLOWLY THAT OTHER PEOPLE COULD HAVE NOTICED. OR THE OPPOSITE, BEING SO FIGETY OR RESTLESS THAT YOU HAVE BEEN MOVING AROUND A LOT MORE THAN USUAL: 0
6. FEELING BAD ABOUT YOURSELF - OR THAT YOU ARE A FAILURE OR HAVE LET YOURSELF OR YOUR FAMILY DOWN: 0
SUM OF ALL RESPONSES TO PHQ QUESTIONS 1-9: 1
2. FEELING DOWN, DEPRESSED OR HOPELESS: 1
9. THOUGHTS THAT YOU WOULD BE BETTER OFF DEAD, OR OF HURTING YOURSELF: 0
4. FEELING TIRED OR HAVING LITTLE ENERGY: 0
3. TROUBLE FALLING OR STAYING ASLEEP: 0
SUM OF ALL RESPONSES TO PHQ QUESTIONS 1-9: 1
10. IF YOU CHECKED OFF ANY PROBLEMS, HOW DIFFICULT HAVE THESE PROBLEMS MADE IT FOR YOU TO DO YOUR WORK, TAKE CARE OF THINGS AT HOME, OR GET ALONG WITH OTHER PEOPLE: 0
7. TROUBLE CONCENTRATING ON THINGS, SUCH AS READING THE NEWSPAPER OR WATCHING TELEVISION: 0
SUM OF ALL RESPONSES TO PHQ QUESTIONS 1-9: 1

## 2023-12-05 ASSESSMENT — LIFESTYLE VARIABLES
HOW OFTEN DO YOU HAVE A DRINK CONTAINING ALCOHOL: 2-3 TIMES A WEEK
HOW MANY STANDARD DRINKS CONTAINING ALCOHOL DO YOU HAVE ON A TYPICAL DAY: 1 OR 2

## 2023-12-05 ASSESSMENT — COLUMBIA-SUICIDE SEVERITY RATING SCALE - C-SSRS
3. HAVE YOU BEEN THINKING ABOUT HOW YOU MIGHT KILL YOURSELF?: NO
7. DID THIS OCCUR IN THE LAST THREE MONTHS: NO
5. HAVE YOU STARTED TO WORK OUT OR WORKED OUT THE DETAILS OF HOW TO KILL YOURSELF? DO YOU INTEND TO CARRY OUT THIS PLAN?: NO
4. HAVE YOU HAD THESE THOUGHTS AND HAD SOME INTENTION OF ACTING ON THEM?: NO

## 2023-12-09 DIAGNOSIS — E03.9 ACQUIRED HYPOTHYROIDISM: ICD-10-CM

## 2023-12-11 ENCOUNTER — HOSPITAL ENCOUNTER (OUTPATIENT)
Age: 68
Discharge: HOME | End: 2023-12-11
Payer: MEDICARE

## 2023-12-11 DIAGNOSIS — E03.9 ACQUIRED HYPOTHYROIDISM: ICD-10-CM

## 2023-12-11 DIAGNOSIS — E03.9 ACQUIRED HYPOTHYROIDISM: Primary | ICD-10-CM

## 2023-12-11 LAB
T4 FREE SERPL-MCNC: 0.9 NG/DL (ref 0.9–1.7)
TSH SERPL DL<=0.05 MIU/L-ACNC: 36.89 UIU/ML (ref 0.3–5)

## 2023-12-11 PROCEDURE — 84443 ASSAY THYROID STIM HORMONE: CPT

## 2023-12-11 PROCEDURE — 84439 ASSAY OF FREE THYROXINE: CPT

## 2023-12-11 PROCEDURE — 36415 COLL VENOUS BLD VENIPUNCTURE: CPT

## 2023-12-11 RX ORDER — LEVOTHYROXINE SODIUM 88 UG/1
88 TABLET ORAL
Qty: 30 TABLET | Refills: 0 | Status: SHIPPED | OUTPATIENT
Start: 2023-12-11 | End: 2023-12-11

## 2023-12-11 RX ORDER — LEVOTHYROXINE SODIUM 88 UG/1
88 TABLET ORAL
Qty: 90 TABLET | Refills: 0 | Status: SHIPPED | OUTPATIENT
Start: 2023-12-11

## 2023-12-11 RX ORDER — LEVOTHYROXINE SODIUM 0.07 MG/1
TABLET ORAL
Qty: 90 TABLET | Refills: 0 | OUTPATIENT
Start: 2023-12-11

## 2023-12-11 NOTE — TELEPHONE ENCOUNTER
Please Approve or Refuse.   Send to Pharmacy per Pt's Request:      Next Visit Date:  3/5/2024   Last Visit Date: 12/5/2023    Hemoglobin A1C (%)   Date Value   11/06/2023 8.3 (H)   06/27/2023 6.8   06/13/2023 7.0             ( goal A1C is < 7)   BP Readings from Last 3 Encounters:   12/05/23 118/80   11/17/23 138/80   10/13/23 (!) 160/88          (goal 120/80)  BUN   Date Value Ref Range Status   11/18/2023 11 8 - 23 mg/dL Final   11/18/2023 11 8 - 23 mg/dL Final     Creatinine   Date Value Ref Range Status   11/18/2023 0.6 0.5 - 0.9 mg/dL Final   11/18/2023 0.6 0.5 - 0.9 mg/dL Final     Potassium   Date Value Ref Range Status   11/18/2023 4.6 3.7 - 5.3 mmol/L Final   11/18/2023 4.5 3.7 - 5.3 mmol/L Final

## 2023-12-11 NOTE — RESULT ENCOUNTER NOTE
Please notify patient: TSH has increased, free T4 is greatly improved however it is borderline low    Please let her know I sent a new prescription for Synthroid 88 mcg daily before breakfast on an empty stomach with a full glass of water  stop.   Synthroid 75 mcg completely  Recheck thyroid function, TSH and free T4 placed in the computer--- for 1 month follow-up before the next refill    Future Appointments  12/14/2023 9:00 AM    SCHEDULE, [facility] [place] * [facility]        [facility]  1/3/2024   9:00 AM    SCHEDULE, [facility] [place] * [facility]        [facility]  1/15/2024  3:00 PM    [address] RM 2 FAST SCANNER   STCZ CT SCAN        [address] Radiolog  1/30/2024  3:30 PM    Dahlia Blank MD  [facility]        [facility]  3/5/2024   3:00 PM    Stacey Knott MD    [facility]               [facility]

## 2023-12-11 NOTE — TELEPHONE ENCOUNTER
Please advise patient to do the thyroid testing today, we may need to adjust her Synthroid, last dosage was decreased to 75 mcg daily as she was taking too much, which can be toxic for her    Needs to do the new labs today           Diagnosis Orders   1.  Acquired hypothyroidism  TSH    T4, Free        Lab Results   Component Value Date    TSH 0.01 (L) 11/06/2023          Future Appointments   Date Time Provider [address]   12/14/2023  9:00 AM SCHEDULE, [facility] [place] [facility] SCHEDULE [facility]   1/3/2024  9:00 AM SCHEDULE, [facility]   1/15/2024  3:00 PM STC CT RM 2 FAST SCANNER STCZ CT SCAN [facility] Radiolog   1/30/2024  3:30 PM Honey Blank MD [facility]   3/5/2024  3:00 PM Bina Funes MD [facility]

## 2023-12-11 NOTE — PROGRESS NOTES
Diagnosis Orders   1.  Acquired hypothyroidism  levothyroxine (SYNTHROID) 88 MCG tablet    TSH    T4, Free           Future Appointments   Date Time Provider [address]   12/14/2023  9:00 AM SCHEDULE, [facility] [place] [facility] SCHEDULE [facility]   1/3/2024  9:00 AM SCHEDULE, [facility] AFL T[facility]   1/15/2024  3:00 PM ST CT RM 2 FAST SCANNER STCZ CT SCAN [facility] Radiolog   1/30/2024  3:30 PM Bard Evonne Blank MD [facility]   3/5/2024  3:00 PM [facility]

## 2024-01-09 ENCOUNTER — HOSPITAL ENCOUNTER (OUTPATIENT)
Age: 69
Discharge: HOME | End: 2024-01-09
Payer: MEDICARE

## 2024-01-09 LAB
T4 FREE SERPL-MCNC: 1 NG/DL (ref 0.9–1.7)
TSH SERPL DL<=0.05 MIU/L-ACNC: 34.05 UIU/ML (ref 0.3–5)

## 2024-01-09 PROCEDURE — 84439 ASSAY OF FREE THYROXINE: CPT

## 2024-01-09 PROCEDURE — 84443 ASSAY THYROID STIM HORMONE: CPT

## 2024-01-09 PROCEDURE — 36415 COLL VENOUS BLD VENIPUNCTURE: CPT

## 2024-01-15 ENCOUNTER — TELEPHONE (OUTPATIENT)
Dept: FAMILY MEDICINE CLINIC | Age: 69
End: 2024-01-15

## 2024-01-15 ENCOUNTER — HOSPITAL ENCOUNTER (OUTPATIENT)
Dept: CT IMAGING | Age: 69
Discharge: HOME | End: 2024-01-17
Attending: INTERNAL MEDICINE
Payer: MEDICARE

## 2024-01-15 DIAGNOSIS — R91.1 PULMONARY NODULE: ICD-10-CM

## 2024-01-15 PROCEDURE — 71250 CT THORAX DX C-: CPT

## 2024-01-15 NOTE — TELEPHONE ENCOUNTER
Pt returned call regarding lab results and verbalized understanding.    Pt confirmed she is taking her Synthroid as directed and has not missed any doses.

## 2024-01-15 NOTE — TELEPHONE ENCOUNTER
To continue the same dosage of Synthroid, take on empty stomach first thing in the morning with a full glass of water and no other medications or food for the next 30 minutes    FYI   TSH is  high, free T4 is normal     However TSH came down a little  Both numbers are improved     Did she miss any dosages of Synthroid?  Currently she is to take only 88 mcg daily on an empty stomach with no other medications or food----is she doing that?     Will recheck thyroid function at the next appointment    Future Appointments   Date Time Provider Department Center   1/15/2024  3:00 PM [facility] CT RM 2 FAST SCANNER CZ CT SCAN [facility] Radiolog   3/5/2024  3:00 PM [facility]   3/15/2024 10:15 AM SCHEDULE, [facility] T[facility] RE[facility]

## 2024-01-15 NOTE — RESULT ENCOUNTER NOTE
Please notify patient: Moderate emphysema to quit smoking  Stable 5 mm lung nodule  Follow-up with Dr. Becker who ordered the test, as scheduled or to make appointment if she does not have one    Future Appointments  3/5/2024   3:00 PM    Kathy Cervantes MD    [facility]               [facility]  3/15/2024  10:15 AM   SCHEDULE, [facility] * [facility] T[facility]        [facility]HELIA RE[facility]

## 2024-01-16 NOTE — RESULT ENCOUNTER NOTE
Noted  Future Appointments  3/5/2024   3:00 PM    Kathy Cervantes MD    fp sc               [facility]  3/15/2024  10:15 AM   SCHEDULE, [facility] * [facility]        SHEL[facility] RE[facility]

## 2024-02-05 RX ORDER — PRAVASTATIN SODIUM 80 MG/1
80 TABLET ORAL
Qty: 90 TABLET | Refills: 1 | Status: SHIPPED | OUTPATIENT
Start: 2024-02-05

## 2024-02-05 RX ORDER — PRAVASTATIN SODIUM 80 MG/1
80 TABLET ORAL
Qty: 30 TABLET | Refills: 0 | Status: SHIPPED | OUTPATIENT
Start: 2024-02-05 | End: 2024-02-05

## 2024-02-05 NOTE — TELEPHONE ENCOUNTER
Please Approve or Refuse.  Send to Pharmacy per Pt's Request: [email or web address]     Next Visit Date:  Visit date not found   Last Visit Date: 12/5/2023    Hemoglobin A1C (%)   Date Value   11/06/2023 8.3 (H)   06/27/2023 6.8   06/13/2023 7.0             ( goal A1C is < 7)   BP Readings from Last 3 Encounters:   12/05/23 118/80   11/17/23 138/80   10/13/23 (!) 160/88          (goal 120/80)  BUN   Date Value Ref Range Status   11/18/2023 11 8 - 23 mg/dL Final   11/18/2023 11 8 - 23 mg/dL Final     Creatinine   Date Value Ref Range Status   11/18/2023 0.6 0.5 - 0.9 mg/dL Final   11/18/2023 0.6 0.5 - 0.9 mg/dL Final     Potassium   Date Value Ref Range Status   11/18/2023 4.6 3.7 - 5.3 mmol/L Final   11/18/2023 4.5 3.7 - 5.3 mmol/L Final

## 2024-02-05 NOTE — TELEPHONE ENCOUNTER
Please Approve or Refuse.  Send to Pharmacy per Pt's Request: [email or web address]      Next Visit Date:  CALLED OUT TO PT TO SCHEDULE FOLLOW UP FOR FURTHER REFILLS  Last Visit Date: 12/5/2023    Hemoglobin A1C (%)   Date Value   11/06/2023 8.3 (H)   06/27/2023 6.8   06/13/2023 7.0             ( goal A1C is < 7)   BP Readings from Last 3 Encounters:   12/05/23 118/80   11/17/23 138/80   10/13/23 (!) 160/88          (goal 120/80)  BUN   Date Value Ref Range Status   11/18/2023 11 8 - 23 mg/dL Final   11/18/2023 11 8 - 23 mg/dL Final     Creatinine   Date Value Ref Range Status   11/18/2023 0.6 0.5 - 0.9 mg/dL Final   11/18/2023 0.6 0.5 - 0.9 mg/dL Final     Potassium   Date Value Ref Range Status   11/18/2023 4.6 3.7 - 5.3 mmol/L Final   11/18/2023 4.5 3.7 - 5.3 mmol/L Final

## 2024-02-15 DIAGNOSIS — E11.29 TYPE 2 DIABETES MELLITUS WITH MICROALBUMINURIA, WITHOUT LONG-TERM CURRENT USE OF INSULIN (HCC): ICD-10-CM

## 2024-02-15 DIAGNOSIS — R80.9 TYPE 2 DIABETES MELLITUS WITH MICROALBUMINURIA, WITHOUT LONG-TERM CURRENT USE OF INSULIN (HCC): ICD-10-CM

## 2024-02-16 RX ORDER — GLIPIZIDE 10 MG/1
TABLET ORAL
Qty: 270 TABLET | Refills: 1 | Status: SHIPPED | OUTPATIENT
Start: 2024-02-16

## 2024-02-16 NOTE — TELEPHONE ENCOUNTER
Please Approve or Refuse.  Send to Pharmacy per Pt's Request:      Next Visit Date:  3/13/2024   Last Visit Date: 12/5/2023    Hemoglobin A1C (%)   Date Value   11/06/2023 8.3 (H)   06/27/2023 6.8   06/13/2023 7.0             ( goal A1C is < 7)   BP Readings from Last 3 Encounters:   12/05/23 118/80   11/17/23 138/80   10/13/23 (!) 160/88          (goal 120/80)  BUN   Date Value Ref Range Status   11/18/2023 11 8 - 23 mg/dL Final   11/18/2023 11 8 - 23 mg/dL Final     Creatinine   Date Value Ref Range Status   11/18/2023 0.6 0.5 - 0.9 mg/dL Final   11/18/2023 0.6 0.5 - 0.9 mg/dL Final     Potassium   Date Value Ref Range Status   11/18/2023 4.6 3.7 - 5.3 mmol/L Final   11/18/2023 4.5 3.7 - 5.3 mmol/L Final

## 2024-02-18 DIAGNOSIS — K21.9 GASTROESOPHAGEAL REFLUX DISEASE WITHOUT ESOPHAGITIS: ICD-10-CM

## 2024-02-19 RX ORDER — FAMOTIDINE 20 MG/1
TABLET, FILM COATED ORAL
Qty: 90 TABLET | Refills: 0 | Status: SHIPPED | OUTPATIENT
Start: 2024-02-19

## 2024-02-19 NOTE — TELEPHONE ENCOUNTER
Please Approve or Refuse.  Send to Pharmacy per Pt's Request: [email or web address]      Next Visit Date:  3/13/2024   Last Visit Date: 12/5/2023    Hemoglobin A1C (%)   Date Value   11/06/2023 8.3 (H)   06/27/2023 6.8   06/13/2023 7.0             ( goal A1C is < 7)   BP Readings from Last 3 Encounters:   12/05/23 118/80   11/17/23 138/80   10/13/23 (!) 160/88          (goal 120/80)  BUN   Date Value Ref Range Status   11/18/2023 11 8 - 23 mg/dL Final   11/18/2023 11 8 - 23 mg/dL Final     Creatinine   Date Value Ref Range Status   11/18/2023 0.6 0.5 - 0.9 mg/dL Final   11/18/2023 0.6 0.5 - 0.9 mg/dL Final     Potassium   Date Value Ref Range Status   11/18/2023 4.6 3.7 - 5.3 mmol/L Final   11/18/2023 4.5 3.7 - 5.3 mmol/L Final

## 2024-03-08 DIAGNOSIS — E03.9 ACQUIRED HYPOTHYROIDISM: ICD-10-CM

## 2024-03-08 RX ORDER — LEVOTHYROXINE SODIUM 88 UG/1
TABLET ORAL
Qty: 90 TABLET | Refills: 0 | Status: SHIPPED | OUTPATIENT
Start: 2024-03-08

## 2024-03-13 ENCOUNTER — OFFICE VISIT (OUTPATIENT)
Dept: FAMILY MEDICINE CLINIC | Age: 69
End: 2024-03-13
Payer: MEDICARE

## 2024-03-13 VITALS
BODY MASS INDEX: 28.37 KG/M2 | DIASTOLIC BLOOD PRESSURE: 80 MMHG | SYSTOLIC BLOOD PRESSURE: 132 MMHG | OXYGEN SATURATION: 97 % | HEART RATE: 66 BPM | WEIGHT: 154.2 LBS | HEIGHT: 62 IN

## 2024-03-13 DIAGNOSIS — J44.9 CHRONIC OBSTRUCTIVE PULMONARY DISEASE, UNSPECIFIED COPD TYPE (HCC): ICD-10-CM

## 2024-03-13 DIAGNOSIS — E03.9 ACQUIRED HYPOTHYROIDISM: ICD-10-CM

## 2024-03-13 DIAGNOSIS — E87.1 HYPONATREMIA: ICD-10-CM

## 2024-03-13 DIAGNOSIS — R82.998 HYPOMAGNESURIA: ICD-10-CM

## 2024-03-13 DIAGNOSIS — E11.29 TYPE 2 DIABETES MELLITUS WITH MICROALBUMINURIA, WITHOUT LONG-TERM CURRENT USE OF INSULIN (HCC): Primary | ICD-10-CM

## 2024-03-13 DIAGNOSIS — K21.9 GASTROESOPHAGEAL REFLUX DISEASE WITHOUT ESOPHAGITIS: ICD-10-CM

## 2024-03-13 DIAGNOSIS — R91.1 LUNG NODULE: ICD-10-CM

## 2024-03-13 DIAGNOSIS — R80.9 TYPE 2 DIABETES MELLITUS WITH MICROALBUMINURIA, WITHOUT LONG-TERM CURRENT USE OF INSULIN (HCC): Primary | ICD-10-CM

## 2024-03-13 DIAGNOSIS — M19.012 ARTHRITIS OF SHOULDER REGION, LEFT: ICD-10-CM

## 2024-03-13 DIAGNOSIS — Z98.61 CAD S/P PERCUTANEOUS CORONARY ANGIOPLASTY: ICD-10-CM

## 2024-03-13 DIAGNOSIS — N18.1 CKD (CHRONIC KIDNEY DISEASE), STAGE I: ICD-10-CM

## 2024-03-13 DIAGNOSIS — I10 ESSENTIAL HYPERTENSION: ICD-10-CM

## 2024-03-13 DIAGNOSIS — I25.10 CAD S/P PERCUTANEOUS CORONARY ANGIOPLASTY: ICD-10-CM

## 2024-03-13 PROBLEM — R49.0 DYSPHONIA: Status: RESOLVED | Noted: 2024-03-13 | Resolved: 2024-03-13

## 2024-03-13 PROBLEM — R49.0 DYSPHONIA: Status: ACTIVE | Noted: 2024-03-13

## 2024-03-13 PROBLEM — J96.11 CHRONIC RESPIRATORY FAILURE WITH HYPOXIA (HCC): Status: RESOLVED | Noted: 2023-06-13 | Resolved: 2024-03-13

## 2024-03-13 LAB — HBA1C MFR BLD: 7.6 %

## 2024-03-13 PROCEDURE — G8427 DOCREV CUR MEDS BY ELIG CLIN: HCPCS | Performed by: FAMILY MEDICINE

## 2024-03-13 PROCEDURE — G8399 PT W/DXA RESULTS DOCUMENT: HCPCS | Performed by: FAMILY MEDICINE

## 2024-03-13 PROCEDURE — 99214 OFFICE O/P EST MOD 30 MIN: CPT | Performed by: FAMILY MEDICINE

## 2024-03-13 PROCEDURE — 3079F DIAST BP 80-89 MM HG: CPT | Performed by: FAMILY MEDICINE

## 2024-03-13 PROCEDURE — 3023F SPIROM DOC REV: CPT | Performed by: FAMILY MEDICINE

## 2024-03-13 PROCEDURE — G8484 FLU IMMUNIZE NO ADMIN: HCPCS | Performed by: FAMILY MEDICINE

## 2024-03-13 PROCEDURE — 1090F PRES/ABSN URINE INCON ASSESS: CPT | Performed by: FAMILY MEDICINE

## 2024-03-13 PROCEDURE — 1123F ACP DISCUSS/DSCN MKR DOCD: CPT | Performed by: FAMILY MEDICINE

## 2024-03-13 PROCEDURE — 83036 HEMOGLOBIN GLYCOSYLATED A1C: CPT | Performed by: FAMILY MEDICINE

## 2024-03-13 PROCEDURE — 3075F SYST BP GE 130 - 139MM HG: CPT | Performed by: FAMILY MEDICINE

## 2024-03-13 PROCEDURE — 4004F PT TOBACCO SCREEN RCVD TLK: CPT | Performed by: FAMILY MEDICINE

## 2024-03-13 PROCEDURE — 3017F COLORECTAL CA SCREEN DOC REV: CPT | Performed by: FAMILY MEDICINE

## 2024-03-13 PROCEDURE — 2022F DILAT RTA XM EVC RTNOPTHY: CPT | Performed by: FAMILY MEDICINE

## 2024-03-13 PROCEDURE — G8417 CALC BMI ABV UP PARAM F/U: HCPCS | Performed by: FAMILY MEDICINE

## 2024-03-13 PROCEDURE — 3051F HG A1C>EQUAL 7.0%<8.0%: CPT | Performed by: FAMILY MEDICINE

## 2024-03-13 SDOH — ECONOMIC STABILITY: FOOD INSECURITY: WITHIN THE PAST 12 MONTHS, THE FOOD YOU BOUGHT JUST DIDN'T LAST AND YOU DIDN'T HAVE MONEY TO GET MORE.: NEVER TRUE

## 2024-03-13 SDOH — ECONOMIC STABILITY: FOOD INSECURITY: WITHIN THE PAST 12 MONTHS, YOU WORRIED THAT YOUR FOOD WOULD RUN OUT BEFORE YOU GOT MONEY TO BUY MORE.: NEVER TRUE

## 2024-03-13 SDOH — ECONOMIC STABILITY: INCOME INSECURITY: HOW HARD IS IT FOR YOU TO PAY FOR THE VERY BASICS LIKE FOOD, HOUSING, MEDICAL CARE, AND HEATING?: NOT HARD AT ALL

## 2024-03-13 ASSESSMENT — ENCOUNTER SYMPTOMS
SORE THROAT: 0
CHEST TIGHTNESS: 0
NAUSEA: 0
STRIDOR: 0
ABDOMINAL PAIN: 0
BACK PAIN: 0
EYE REDNESS: 0
SHORTNESS OF BREATH: 0
RECTAL PAIN: 0
VOMITING: 0
COUGH: 0
ABDOMINAL DISTENTION: 0
SINUS PRESSURE: 0
DIARRHEA: 0
BLOOD IN STOOL: 0
RHINORRHEA: 0
CONSTIPATION: 0
WHEEZING: 0
COLOR CHANGE: 0
TROUBLE SWALLOWING: 0

## 2024-03-13 ASSESSMENT — PATIENT HEALTH QUESTIONNAIRE - PHQ9
6. FEELING BAD ABOUT YOURSELF - OR THAT YOU ARE A FAILURE OR HAVE LET YOURSELF OR YOUR FAMILY DOWN: 0
SUM OF ALL RESPONSES TO PHQ QUESTIONS 1-9: 1
4. FEELING TIRED OR HAVING LITTLE ENERGY: 0
8. MOVING OR SPEAKING SO SLOWLY THAT OTHER PEOPLE COULD HAVE NOTICED. OR THE OPPOSITE, BEING SO FIGETY OR RESTLESS THAT YOU HAVE BEEN MOVING AROUND A LOT MORE THAN USUAL: 0
SUM OF ALL RESPONSES TO PHQ9 QUESTIONS 1 & 2: 0
9. THOUGHTS THAT YOU WOULD BE BETTER OFF DEAD, OR OF HURTING YOURSELF: 0
SUM OF ALL RESPONSES TO PHQ QUESTIONS 1-9: 1
5. POOR APPETITE OR OVEREATING: 1
1. LITTLE INTEREST OR PLEASURE IN DOING THINGS: 0
SUM OF ALL RESPONSES TO PHQ QUESTIONS 1-9: 1
7. TROUBLE CONCENTRATING ON THINGS, SUCH AS READING THE NEWSPAPER OR WATCHING TELEVISION: 0
10. IF YOU CHECKED OFF ANY PROBLEMS, HOW DIFFICULT HAVE THESE PROBLEMS MADE IT FOR YOU TO DO YOUR WORK, TAKE CARE OF THINGS AT HOME, OR GET ALONG WITH OTHER PEOPLE: 0
2. FEELING DOWN, DEPRESSED OR HOPELESS: 0
3. TROUBLE FALLING OR STAYING ASLEEP: 0
SUM OF ALL RESPONSES TO PHQ QUESTIONS 1-9: 1

## 2024-03-13 NOTE — PROGRESS NOTES
Chief Complaint   Patient presents with    Diabetes    Shoulder Pain     LEFT SHOULDER    Discuss Medications     PEPCID    Heartburn         Gem Mcgill  here today for follow up on chronic medical problems, go over labs and/or diagnostic studies, and medication refills. Diabetes, Shoulder Pain (LEFT SHOULDER), Discuss Medications (PEPCID), and Heartburn      HPI: Patient is scheduled for follow-up on chronic medical conditions.    Type 2 diabetes improving, A1c has improved to 7.6 from 8.5.  Patient is currently on metformin 500 mg reports compliance.  Patient denies any side effects from medication.  Patient is also on glipizide 10 mg denies any hypoglycemic episodes.  Patient monitors her blood sugars on regular basis.      Patient has history of lung nodule, on CT which needs to be monitored.  She had CT chest done in December, follows with pulmonologist, patient needs repeat CT 6 months as per CT chest results.      MPRESSION:  1. Moderate centrilobular emphysema with mild subpleural fibrosis and no  honeycombing.  2. Stable 5 mm nodule apicoposterior left upper lobe.  Six-month follow-up  recommended to assure 12 month stability.     RECOMMENDATIONS:  Six-month follow-up recommended to assure 12 month nodule stability.     Hypothyroidism uncontrolled, TSH was very high more than 20, dose was increased to 88 mcg, discussed with patient repeat TSH.  Symptoms has improved.      Patient is complaining of left shoulder pain which is chronic, had x-rays done in 2019 that shows osteoarthritis.  Patient has seen orthopedic in 2019 recommended conservative treatment.  Patient reports the pain has improved but the pain has restarted, she takes Tylenol as needed.  Patient reports the pain is mild 4-5 on scale, she does not want any physical therapy.  Patient reports she does not want to go for any surgeries or arthroscopies at that time.  We discussed about the cortisone shot patient wants to hold on that as 
Depression Monitoring  12/05/2024    DEXA (modify frequency per FRAX score)  07/28/2025    Flu vaccine  Completed    Pneumococcal 65+ years Vaccine  Completed    Hepatitis C screen  Completed    Hepatitis A vaccine  Aged Out    Hib vaccine  Aged Out    Polio vaccine  Aged Out    Meningococcal (ACWY) vaccine  Aged Out    Hepatitis B vaccine  Discontinued    HIV screen  Discontinued

## 2024-03-13 NOTE — PATIENT INSTRUCTIONS
results and important health updates, keeping you well-informed and engaged in your healthcare journey.    5. Increased engagement and collaboration: Direct scheduling encourages greater patient engagement and empowers you to take an active role in managing your healthcare. By accessing the [facility] Patient Portal, you can securely message your healthcare provider, ask questions, request prescription refills, and seek medical advice whenever you need it.    To get started with direct scheduling through the [facility] Patient Portal or to register with [facility], simply visit [email or web address].     We understand that change can sometimes be overwhelming, but we assure you that adopting direct scheduling through the [facility] Patient Portal will enhance your healthcare experience and put you in control of your appointments. Our dedicated staff is available to answer any questions or provide assistance throughout the process.    Thank you for entrusting us with your healthcare needs. We are committed to continuously improving our services and ensuring your satisfaction. Together, let's embrace the future of healthcare convenience and accessibility through direct scheduling on the [facility] Patient Portal.    Wishing you good health and happiness,    []

## 2024-03-13 NOTE — RESULT ENCOUNTER NOTE
Addressed during office visit today by Dr. Hager, POC A1c 7.6 improved from prior of 8.3, continue treatment recommended during the office visit.

## 2024-03-18 DIAGNOSIS — I10 ESSENTIAL HYPERTENSION: ICD-10-CM

## 2024-03-18 RX ORDER — METOPROLOL SUCCINATE 50 MG/1
50 TABLET, EXTENDED RELEASE ORAL DAILY
Qty: 90 TABLET | Refills: 3 | Status: SHIPPED | OUTPATIENT
Start: 2024-03-18

## 2024-03-18 NOTE — TELEPHONE ENCOUNTER
Please Approve or Refuse.  Send to Pharmacy per Pt's Request:      Next Visit Date:  7/16/2024   Last Visit Date: 3/13/2024    Hemoglobin A1C (%)   Date Value   03/13/2024 7.6   11/06/2023 8.3 (H)   06/27/2023 6.8             ( goal A1C is < 7)   BP Readings from Last 3 Encounters:   03/13/24 132/80   12/05/23 118/80   11/17/23 138/80          (goal 120/80)  BUN   Date Value Ref Range Status   11/18/2023 11 8 - 23 mg/dL Final   11/18/2023 11 8 - 23 mg/dL Final     Creatinine   Date Value Ref Range Status   11/18/2023 0.6 0.5 - 0.9 mg/dL Final   11/18/2023 0.6 0.5 - 0.9 mg/dL Final     Potassium   Date Value Ref Range Status   11/18/2023 4.6 3.7 - 5.3 mmol/L Final   11/18/2023 4.5 3.7 - 5.3 mmol/L Final

## 2024-03-27 DIAGNOSIS — Z98.61 CAD S/P PERCUTANEOUS CORONARY ANGIOPLASTY: ICD-10-CM

## 2024-03-27 DIAGNOSIS — I25.10 CAD S/P PERCUTANEOUS CORONARY ANGIOPLASTY: ICD-10-CM

## 2024-03-27 RX ORDER — CLOPIDOGREL BISULFATE 75 MG/1
75 TABLET ORAL DAILY
Qty: 90 TABLET | Refills: 3 | Status: SHIPPED | OUTPATIENT
Start: 2024-03-27

## 2024-04-22 DIAGNOSIS — J44.9 CHRONIC OBSTRUCTIVE PULMONARY DISEASE, UNSPECIFIED COPD TYPE (HCC): Primary | ICD-10-CM

## 2024-04-22 RX ORDER — NEBULIZER ACCESSORIES
KIT MISCELLANEOUS
Qty: 1 KIT | Refills: 0 | Status: SHIPPED | OUTPATIENT
Start: 2024-04-22

## 2024-04-25 DIAGNOSIS — J44.9 CHRONIC OBSTRUCTIVE PULMONARY DISEASE, UNSPECIFIED COPD TYPE (HCC): ICD-10-CM

## 2024-04-25 RX ORDER — ALBUTEROL SULFATE 2.5 MG/3ML
2.5 SOLUTION RESPIRATORY (INHALATION) EVERY 6 HOURS PRN
Qty: 125 EACH | Refills: 3 | Status: SHIPPED | OUTPATIENT
Start: 2024-04-25

## 2024-05-02 ENCOUNTER — HOSPITAL ENCOUNTER (OUTPATIENT)
Age: 69
Discharge: HOME | End: 2024-05-02
Payer: MEDICARE

## 2024-05-02 LAB
MAGNESIUM SERPL-MCNC: 1.7 MG/DL (ref 1.6–2.6)
T4 FREE SERPL-MCNC: 1 NG/DL (ref 0.9–1.7)
TSH SERPL DL<=0.05 MIU/L-ACNC: 51.82 UIU/ML (ref 0.3–5)

## 2024-05-02 PROCEDURE — 84439 ASSAY OF FREE THYROXINE: CPT

## 2024-05-02 PROCEDURE — 36415 COLL VENOUS BLD VENIPUNCTURE: CPT

## 2024-05-02 PROCEDURE — 83735 ASSAY OF MAGNESIUM: CPT

## 2024-05-02 PROCEDURE — 84443 ASSAY THYROID STIM HORMONE: CPT

## 2024-05-03 DIAGNOSIS — E03.9 ACQUIRED HYPOTHYROIDISM: ICD-10-CM

## 2024-05-03 RX ORDER — LEVOTHYROXINE SODIUM 112 UG/1
112 TABLET ORAL DAILY
Qty: 90 TABLET | Refills: 1 | Status: SHIPPED | OUTPATIENT
Start: 2024-05-03

## 2024-05-24 DIAGNOSIS — E11.29 TYPE 2 DIABETES MELLITUS WITH MICROALBUMINURIA, WITHOUT LONG-TERM CURRENT USE OF INSULIN (HCC): ICD-10-CM

## 2024-05-24 DIAGNOSIS — R80.9 TYPE 2 DIABETES MELLITUS WITH MICROALBUMINURIA, WITHOUT LONG-TERM CURRENT USE OF INSULIN (HCC): ICD-10-CM

## 2024-05-24 RX ORDER — GLIPIZIDE 10 MG/1
TABLET ORAL
Qty: 270 TABLET | Refills: 1 | Status: SHIPPED | OUTPATIENT
Start: 2024-05-24

## 2024-05-28 DIAGNOSIS — K21.9 GASTROESOPHAGEAL REFLUX DISEASE WITHOUT ESOPHAGITIS: ICD-10-CM

## 2024-05-28 RX ORDER — FAMOTIDINE 20 MG/1
TABLET, FILM COATED ORAL
Qty: 90 TABLET | Refills: 0 | Status: SHIPPED | OUTPATIENT
Start: 2024-05-28

## 2024-05-30 DIAGNOSIS — E11.29 TYPE 2 DIABETES MELLITUS WITH MICROALBUMINURIA, WITHOUT LONG-TERM CURRENT USE OF INSULIN (HCC): ICD-10-CM

## 2024-05-30 DIAGNOSIS — R80.9 TYPE 2 DIABETES MELLITUS WITH MICROALBUMINURIA, WITHOUT LONG-TERM CURRENT USE OF INSULIN (HCC): ICD-10-CM

## 2024-05-30 RX ORDER — METFORMIN HYDROCHLORIDE 500 MG/1
1000 TABLET, EXTENDED RELEASE ORAL
Qty: 180 TABLET | Refills: 3 | Status: SHIPPED | OUTPATIENT
Start: 2024-05-30

## 2024-05-30 NOTE — TELEPHONE ENCOUNTER
Please Approve or Refuse.  Send to Pharmacy per Pt's Request: [email or web address]      Next Visit Date:  7/16/2024   Last Visit Date: 3/13/2024    Hemoglobin A1C (%)   Date Value   03/13/2024 7.6   11/06/2023 8.3 (H)   06/27/2023 6.8             ( goal A1C is < 7)   BP Readings from Last 3 Encounters:   03/13/24 132/80   12/05/23 118/80   11/17/23 138/80          (goal 120/80)  BUN   Date Value Ref Range Status   11/18/2023 11 8 - 23 mg/dL Final   11/18/2023 11 8 - 23 mg/dL Final     Creatinine   Date Value Ref Range Status   11/18/2023 0.6 0.5 - 0.9 mg/dL Final   11/18/2023 0.6 0.5 - 0.9 mg/dL Final     Potassium   Date Value Ref Range Status   11/18/2023 4.6 3.7 - 5.3 mmol/L Final   11/18/2023 4.5 3.7 - 5.3 mmol/L Final

## 2024-06-03 ENCOUNTER — TELEPHONE (OUTPATIENT)
Dept: FAMILY MEDICINE CLINIC | Age: 69
End: 2024-06-03

## 2024-06-03 NOTE — TELEPHONE ENCOUNTER
Called received from Sonam stating she had a home visit with pt and did a circulation test and her lower left leg shows mild circulation impairment. Caller adv she will be sending over the report for provider to review.

## 2024-06-03 NOTE — TELEPHONE ENCOUNTER
Please advise patient will evaluate at upcoming appointment order vascular testing for her at the appointment  To keep appointment--for Medicare  To do her mammogram order in the computer  Future Appointments   Date Time Provider Department Center   6/18/2024  9:15 AM SCHEDULE, [facility] T[facility]   7/16/2024  1:30 PM Kathy Cervantes MD fp [facility]

## 2024-06-21 DIAGNOSIS — J44.9 CHRONIC OBSTRUCTIVE PULMONARY DISEASE, UNSPECIFIED COPD TYPE (HCC): ICD-10-CM

## 2024-06-21 RX ORDER — ALBUTEROL SULFATE 90 UG/1
AEROSOL, METERED RESPIRATORY (INHALATION)
Qty: 8 G | Refills: 5 | Status: SHIPPED | OUTPATIENT
Start: 2024-06-21

## 2024-07-11 ENCOUNTER — HOSPITAL ENCOUNTER (OUTPATIENT)
Age: 69
Discharge: HOME | End: 2024-07-11
Payer: MEDICARE

## 2024-07-11 DIAGNOSIS — E03.9 ACQUIRED HYPOTHYROIDISM: ICD-10-CM

## 2024-07-11 LAB
T4 FREE SERPL-MCNC: 1.2 NG/DL (ref 0.9–1.7)
TSH SERPL DL<=0.05 MIU/L-ACNC: 21.66 UIU/ML (ref 0.3–5)

## 2024-07-11 PROCEDURE — 36415 COLL VENOUS BLD VENIPUNCTURE: CPT

## 2024-07-11 PROCEDURE — 84439 ASSAY OF FREE THYROXINE: CPT

## 2024-07-11 PROCEDURE — 84443 ASSAY THYROID STIM HORMONE: CPT

## 2024-07-11 RX ORDER — LEVOTHYROXINE SODIUM 137 UG/1
137 TABLET ORAL DAILY
Qty: 90 TABLET | Refills: 1 | Status: SHIPPED | OUTPATIENT
Start: 2024-07-11

## 2024-07-12 RX ORDER — TETANUS AND DIPHTHERIA TOXOIDS ADSORBED 2; 2 [LF]/.5ML; [LF]/.5ML
0.5 INJECTION INTRAMUSCULAR ONCE
Qty: 0.5 ML | Refills: 0 | Status: CANCELLED | OUTPATIENT
Start: 2024-07-12 | End: 2024-07-12

## 2024-07-16 ENCOUNTER — OFFICE VISIT (OUTPATIENT)
Dept: FAMILY MEDICINE CLINIC | Age: 69
End: 2024-07-16

## 2024-07-16 VITALS
DIASTOLIC BLOOD PRESSURE: 84 MMHG | HEART RATE: 65 BPM | SYSTOLIC BLOOD PRESSURE: 126 MMHG | WEIGHT: 154 LBS | HEIGHT: 62 IN | OXYGEN SATURATION: 94 % | TEMPERATURE: 97.6 F | BODY MASS INDEX: 28.34 KG/M2

## 2024-07-16 DIAGNOSIS — E78.5 HYPERLIPIDEMIA WITH TARGET LDL LESS THAN 100: ICD-10-CM

## 2024-07-16 DIAGNOSIS — R09.89 DECREASED DORSALIS PEDIS PULSE: ICD-10-CM

## 2024-07-16 DIAGNOSIS — M85.80 OSTEOPENIA DETERMINED BY X-RAY: ICD-10-CM

## 2024-07-16 DIAGNOSIS — Z87.891 PERSONAL HISTORY OF SMOKING: ICD-10-CM

## 2024-07-16 DIAGNOSIS — R80.9 TYPE 2 DIABETES MELLITUS WITH MICROALBUMINURIA, WITHOUT LONG-TERM CURRENT USE OF INSULIN (HCC): ICD-10-CM

## 2024-07-16 DIAGNOSIS — E11.29 TYPE 2 DIABETES MELLITUS WITH MICROALBUMINURIA, WITHOUT LONG-TERM CURRENT USE OF INSULIN (HCC): ICD-10-CM

## 2024-07-16 DIAGNOSIS — K21.9 GASTROESOPHAGEAL REFLUX DISEASE WITHOUT ESOPHAGITIS: ICD-10-CM

## 2024-07-16 DIAGNOSIS — E03.9 ACQUIRED HYPOTHYROIDISM: ICD-10-CM

## 2024-07-16 DIAGNOSIS — I10 ESSENTIAL HYPERTENSION: ICD-10-CM

## 2024-07-16 DIAGNOSIS — Z00.00 MEDICARE ANNUAL WELLNESS VISIT, SUBSEQUENT: Primary | ICD-10-CM

## 2024-07-16 LAB — HBA1C MFR BLD: 7.3 %

## 2024-07-16 RX ORDER — NICOTINE 21 MG/24HR
1 PATCH, TRANSDERMAL 24 HOURS TRANSDERMAL DAILY
Qty: 30 PATCH | Refills: 0 | Status: SHIPPED | OUTPATIENT
Start: 2024-07-16 | End: 2024-08-15

## 2024-07-16 RX ORDER — FAMOTIDINE 20 MG/1
20 TABLET, FILM COATED ORAL 2 TIMES DAILY
Qty: 180 TABLET | Refills: 1 | Status: SHIPPED | OUTPATIENT
Start: 2024-07-16

## 2024-07-16 ASSESSMENT — PATIENT HEALTH QUESTIONNAIRE - PHQ9
8. MOVING OR SPEAKING SO SLOWLY THAT OTHER PEOPLE COULD HAVE NOTICED. OR THE OPPOSITE, BEING SO FIGETY OR RESTLESS THAT YOU HAVE BEEN MOVING AROUND A LOT MORE THAN USUAL: NOT AT ALL
2. FEELING DOWN, DEPRESSED OR HOPELESS: NOT AT ALL
6. FEELING BAD ABOUT YOURSELF - OR THAT YOU ARE A FAILURE OR HAVE LET YOURSELF OR YOUR FAMILY DOWN: NOT AT ALL
1. LITTLE INTEREST OR PLEASURE IN DOING THINGS: NOT AT ALL
SUM OF ALL RESPONSES TO PHQ QUESTIONS 1-9: 1
7. TROUBLE CONCENTRATING ON THINGS, SUCH AS READING THE NEWSPAPER OR WATCHING TELEVISION: NOT AT ALL
SUM OF ALL RESPONSES TO PHQ QUESTIONS 1-9: 1
5. POOR APPETITE OR OVEREATING: NOT AT ALL
SUM OF ALL RESPONSES TO PHQ QUESTIONS 1-9: 1
9. THOUGHTS THAT YOU WOULD BE BETTER OFF DEAD, OR OF HURTING YOURSELF: NOT AT ALL
4. FEELING TIRED OR HAVING LITTLE ENERGY: SEVERAL DAYS
10. IF YOU CHECKED OFF ANY PROBLEMS, HOW DIFFICULT HAVE THESE PROBLEMS MADE IT FOR YOU TO DO YOUR WORK, TAKE CARE OF THINGS AT HOME, OR GET ALONG WITH OTHER PEOPLE: NOT DIFFICULT AT ALL
SUM OF ALL RESPONSES TO PHQ QUESTIONS 1-9: 1
SUM OF ALL RESPONSES TO PHQ9 QUESTIONS 1 & 2: 0
3. TROUBLE FALLING OR STAYING ASLEEP: NOT AT ALL

## 2024-07-16 ASSESSMENT — ENCOUNTER SYMPTOMS
CONSTIPATION: 0
BLOOD IN STOOL: 0
COUGH: 0
VOMITING: 0
NAUSEA: 0
SHORTNESS OF BREATH: 1
ABDOMINAL DISTENTION: 0
DIARRHEA: 0
BACK PAIN: 0
ABDOMINAL PAIN: 0
WHEEZING: 0
CHEST TIGHTNESS: 0

## 2024-07-16 NOTE — PATIENT INSTRUCTIONS
drug use, talk to your doctor.   Medicines    Take your medicines exactly as prescribed. Call your doctor if you think you are having a problem with your medicine.     If your doctor recommends aspirin, take the amount directed each day. Make sure you take aspirin and not another kind of pain reliever, such as acetaminophen (Tylenol).   When should you call for help?   Call 911 if you have symptoms of a heart attack. These may include:    Chest pain or pressure, or a strange feeling in the chest.     Sweating.     Shortness of breath.     Pain, pressure, or a strange feeling in the back, neck, jaw, or upper belly or in one or both shoulders or arms.     Lightheadedness or sudden weakness.     A fast or irregular heartbeat.   After you call 911, the  may tell you to chew 1 adult-strength or 2 to 4 low-dose aspirin. Wait for an ambulance. Do not try to drive yourself.  Watch closely for changes in your health, and be sure to contact your doctor if you have any problems.  Where can you learn more?  Go to https://www.[employer].net/patientEd and enter F075 to learn more about \"A Healthy Heart: Care Instructions.\"  Current as of: June 24, 2023  Content Version: 14.1  © 2006-2024 [employer].   Care instructions adapted under license by [employer]. If you have questions about a medical condition or this instruction, always ask your healthcare professional. [employer] disclaims any warranty or liability for your use of this information.      Personalized Preventive Plan for Gem Mcgill - 7/16/2024  Medicare offers a range of preventive health benefits. Some of the tests and screenings are paid in full while other may be subject to a deductible, co-insurance, and/or copay.    Some of these benefits include a comprehensive review of your medical history including lifestyle, illnesses that may run in your family, and various assessments and screenings as appropriate.    After

## 2024-07-16 NOTE — RESULT ENCOUNTER NOTE
Addressed during office visit today, hemoglobin A1c 7.3, improved type 2 diabetes, continue treatment recommended during the office visit.

## 2024-07-16 NOTE — PROGRESS NOTES
foot drop or prominent metatarsal heads.   Feet:      Right foot:      Protective Sensation: 5 sites tested.  4 sites sensed.      Skin integrity: Skin integrity normal.      Toenail Condition: Right toenails are normal.      Left foot:      Protective Sensation: 5 sites tested.  4 sites sensed.      Skin integrity: Skin integrity normal.      Toenail Condition: Left toenails are normal.   Skin:     General: Skin is warm and dry.      Capillary Refill: Capillary refill takes less than 2 seconds.      Findings: No rash.   Neurological:      Mental Status: She is alert and oriented to person, place, and time.      Cranial Nerves: No cranial nerve deficit.      Motor: No abnormal muscle tone.      Gait: Gait normal.      Deep Tendon Reflexes: Reflexes normal.      Reflex Scores:       Patellar reflexes are 2+ on the right side and 2+ on the left side.  Psychiatric:         Mood and Affect: Mood is anxious.         Behavior: Behavior normal.         Thought Content: Thought content normal.         Judgment: Judgment normal.           I personally reviewed testing with patient and all questions fully answered.  TSH still high, recently adjusted  Hyperlipidemia, high triglycerides  Borderline low sodium, she says she did quit drinking  Hyperglycemia controlled    Otherwise labs within normal limits    Hospital Outpatient Visit on 07/11/2024   Component Date Value Ref Range Status    TSH 07/11/2024 21.66 (H)  0.30 - 5.00 uIU/mL Final    T4 Free 07/11/2024 1.2  0.9 - 1.7 ng/dL Final         Lab Results   Component Value Date    WBC 8.8 11/18/2023    HGB 14.0 11/18/2023    HCT 41.5 11/18/2023    MCV 91.9 11/18/2023     11/18/2023       Lab Results   Component Value Date/Time     11/18/2023 12:49 PM     11/18/2023 12:49 PM    K 4.6 11/18/2023 12:49 PM    K 4.5 11/18/2023 12:49 PM    CL 95 11/18/2023 12:49 PM    CL 95 11/18/2023 12:49 PM    CO2 29 11/18/2023 12:49 PM    CO2 29 11/18/2023 12:49 PM    BUN 11

## 2024-07-18 ENCOUNTER — HOSPITAL ENCOUNTER (OUTPATIENT)
Dept: VASCULAR LAB | Age: 69
Discharge: HOME | End: 2024-07-20
Attending: FAMILY MEDICINE
Payer: MEDICARE

## 2024-07-18 DIAGNOSIS — R09.89 DECREASED DORSALIS PEDIS PULSE: ICD-10-CM

## 2024-07-18 LAB
VAS LEFT ABI: 1.25
VAS LEFT ARM BP: 130 MMHG
VAS LEFT ATA DIST PSV: 80.2 CM/S
VAS LEFT CFA DIST PSV: 147.7 CM/S
VAS LEFT DORSALIS PEDIS BP: 138 MMHG
VAS LEFT PERONEAL DIST PSV: 90.1 CM/S
VAS LEFT PFA PROX PSV: 54 CM/S
VAS LEFT POP A DIST PSV: 51.4 CM/S
VAS LEFT POP A PROX PSV: 69.2 CM/S
VAS LEFT POP A PROX VEL RATIO: 0.65
VAS LEFT PTA BP: 169 MMHG
VAS LEFT PTA DIST PSV: 63.7 CM/S
VAS LEFT SFA DIST PSV: 106.6 CM/S
VAS LEFT SFA DIST VEL RATIO: 1.08
VAS LEFT SFA MID PSV: 99 CM/S
VAS LEFT SFA MID VEL RATIO: 0.83
VAS LEFT SFA PROX PSV: 119.6 CM/S
VAS LEFT SFA PROX VEL RATIO: 0.81
VAS LEFT TBI: 0.89
VAS LEFT TOE PRESSURE: 120 MMHG
VAS RIGHT ABI: 1.01
VAS RIGHT ARM BP: 135 MMHG
VAS RIGHT ATA DIST PSV: 72.5 CM/S
VAS RIGHT CFA DIST PSV: 137.9 CM/S
VAS RIGHT DORSALIS PEDIS BP: 137 MMHG
VAS RIGHT PERONEAL DIST PSV: 37.2 CM/S
VAS RIGHT PFA PROX PSV: 60.2 CM/S
VAS RIGHT POP A DIST PSV: 74.7 CM/S
VAS RIGHT POP A PROX PSV: 89.3 CM/S
VAS RIGHT POP A PROX VEL RATIO: 1
VAS RIGHT PTA BP: 132 MMHG
VAS RIGHT PTA DIST PSV: 61.9 CM/S
VAS RIGHT SFA DIST PSV: 89.7 CM/S
VAS RIGHT SFA DIST VEL RATIO: 0.91
VAS RIGHT SFA MID PSV: 98.4 CM/S
VAS RIGHT SFA MID VEL RATIO: 0.8
VAS RIGHT SFA PROX PSV: 116.3 CM/S
VAS RIGHT SFA PROX VEL RATIO: 0.8
VAS RIGHT TBI: 1.02
VAS RIGHT TOE PRESSURE: 138 MMHG

## 2024-07-18 PROCEDURE — 93923 UPR/LXTR ART STDY 3+ LVLS: CPT

## 2024-07-18 PROCEDURE — 93925 LOWER EXTREMITY STUDY: CPT | Performed by: SURGERY

## 2024-07-18 NOTE — RESULT ENCOUNTER NOTE
Please notify patient: Vascular testing bilateral within normal limits, there is no blockage     Continue to work on smoking cessation and walk more    Future Appointments  7/23/2024  1:00 PM    Kathy Simon, APR* [facility]        [facility]  10/18/2024 8:30 AM    SCHEDULE, [facility] * [facility]        [facility]  11/14/2024 3:00 PM    Kathy Cervantes MD    fp [facility]

## 2024-07-19 ENCOUNTER — HOSPITAL ENCOUNTER (OUTPATIENT)
Age: 69
Discharge: HOME | End: 2024-07-19
Payer: MEDICARE

## 2024-07-19 DIAGNOSIS — E11.29 TYPE 2 DIABETES MELLITUS WITH MICROALBUMINURIA, WITHOUT LONG-TERM CURRENT USE OF INSULIN (HCC): ICD-10-CM

## 2024-07-19 DIAGNOSIS — R80.9 TYPE 2 DIABETES MELLITUS WITH MICROALBUMINURIA, WITHOUT LONG-TERM CURRENT USE OF INSULIN (HCC): ICD-10-CM

## 2024-07-19 DIAGNOSIS — E55.9 VITAMIN D DEFICIENCY: Primary | ICD-10-CM

## 2024-07-19 DIAGNOSIS — I10 ESSENTIAL HYPERTENSION: ICD-10-CM

## 2024-07-19 DIAGNOSIS — M85.80 OSTEOPENIA DETERMINED BY X-RAY: ICD-10-CM

## 2024-07-19 DIAGNOSIS — E78.5 HYPERLIPIDEMIA WITH TARGET LDL LESS THAN 100: ICD-10-CM

## 2024-07-19 LAB
25(OH)D3 SERPL-MCNC: 31.2 NG/ML (ref 30–100)
ALBUMIN SERPL-MCNC: 4.4 G/DL (ref 3.5–5.2)
ALP SERPL-CCNC: 96 U/L (ref 35–104)
ALT SERPL-CCNC: 11 U/L (ref 5–33)
ANION GAP SERPL CALCULATED.3IONS-SCNC: 11 MMOL/L (ref 9–17)
AST SERPL-CCNC: 15 U/L
BACTERIA URNS QL MICRO: ABNORMAL
BASOPHILS # BLD: 0 K/UL (ref 0–0.2)
BASOPHILS NFR BLD: 1 % (ref 0–2)
BILIRUB SERPL-MCNC: 0.9 MG/DL (ref 0.3–1.2)
BILIRUB UR QL STRIP: NEGATIVE
BUN SERPL-MCNC: 9 MG/DL (ref 8–23)
CALCIUM SERPL-MCNC: 9.2 MG/DL (ref 8.6–10.4)
CASTS #/AREA URNS LPF: ABNORMAL /LPF
CHLORIDE SERPL-SCNC: 94 MMOL/L (ref 98–107)
CHOLEST SERPL-MCNC: 158 MG/DL
CHOLESTEROL/HDL RATIO: 3.2
CLARITY UR: CLEAR
CO2 SERPL-SCNC: 29 MMOL/L (ref 20–31)
COLOR UR: ABNORMAL
CREAT SERPL-MCNC: 0.6 MG/DL (ref 0.5–0.9)
EOSINOPHIL # BLD: 0.1 K/UL (ref 0–0.4)
EOSINOPHILS RELATIVE PERCENT: 1 % (ref 0–4)
EPI CELLS #/AREA URNS HPF: ABNORMAL /HPF
ERYTHROCYTE [DISTWIDTH] IN BLOOD BY AUTOMATED COUNT: 13.7 % (ref 11.5–14.9)
FOLATE SERPL-MCNC: 16.2 NG/ML (ref 4.8–24.2)
GFR, ESTIMATED: >90 ML/MIN/1.73M2
GLUCOSE SERPL-MCNC: 162 MG/DL (ref 70–99)
GLUCOSE UR STRIP-MCNC: NEGATIVE MG/DL
HCT VFR BLD AUTO: 40.7 % (ref 36–46)
HDLC SERPL-MCNC: 49 MG/DL
HGB BLD-MCNC: 13.6 G/DL (ref 12–16)
HGB UR QL STRIP.AUTO: NEGATIVE
KETONES UR STRIP-MCNC: ABNORMAL MG/DL
LDLC SERPL CALC-MCNC: 76 MG/DL (ref 0–130)
LEUKOCYTE ESTERASE UR QL STRIP: NEGATIVE
LYMPHOCYTES NFR BLD: 2.1 K/UL (ref 1–4.8)
LYMPHOCYTES RELATIVE PERCENT: 27 % (ref 24–44)
MAGNESIUM SERPL-MCNC: 1.9 MG/DL (ref 1.6–2.6)
MCH RBC QN AUTO: 32.4 PG (ref 26–34)
MCHC RBC AUTO-ENTMCNC: 33.5 G/DL (ref 31–37)
MCV RBC AUTO: 96.9 FL (ref 80–100)
MONOCYTES NFR BLD: 0.7 K/UL (ref 0.1–1.3)
MONOCYTES NFR BLD: 9 % (ref 1–7)
NEUTROPHILS NFR BLD: 62 % (ref 36–66)
NEUTS SEG NFR BLD: 4.8 K/UL (ref 1.3–9.1)
NITRITE UR QL STRIP: NEGATIVE
PH UR STRIP: 7.5 [PH] (ref 5–8)
PHOSPHATE SERPL-MCNC: 3.8 MG/DL (ref 2.6–4.5)
PLATELET # BLD AUTO: 305 K/UL (ref 150–450)
PMV BLD AUTO: 7.7 FL (ref 6–12)
POTASSIUM SERPL-SCNC: 4.2 MMOL/L (ref 3.7–5.3)
PROT SERPL-MCNC: 7.5 G/DL (ref 6.4–8.3)
PROT UR STRIP-MCNC: ABNORMAL MG/DL
RBC # BLD AUTO: 4.2 M/UL (ref 4–5.2)
RBC #/AREA URNS HPF: ABNORMAL /HPF
SODIUM SERPL-SCNC: 134 MMOL/L (ref 135–144)
SP GR UR STRIP: 1.01 (ref 1–1.03)
TRIGL SERPL-MCNC: 165 MG/DL
URATE SERPL-MCNC: 3.1 MG/DL (ref 2.4–5.7)
UROBILINOGEN UR STRIP-ACNC: NORMAL EU/DL (ref 0–1)
VIT B12 SERPL-MCNC: 500 PG/ML (ref 232–1245)
WBC #/AREA URNS HPF: ABNORMAL /HPF
WBC OTHER # BLD: 7.7 K/UL (ref 3.5–11)

## 2024-07-19 PROCEDURE — 80061 LIPID PANEL: CPT

## 2024-07-19 PROCEDURE — 85025 COMPLETE CBC W/AUTO DIFF WBC: CPT

## 2024-07-19 PROCEDURE — 82746 ASSAY OF FOLIC ACID SERUM: CPT

## 2024-07-19 PROCEDURE — 36415 COLL VENOUS BLD VENIPUNCTURE: CPT

## 2024-07-19 PROCEDURE — 84100 ASSAY OF PHOSPHORUS: CPT

## 2024-07-19 PROCEDURE — 80053 COMPREHEN METABOLIC PANEL: CPT

## 2024-07-19 PROCEDURE — 82607 VITAMIN B-12: CPT

## 2024-07-19 PROCEDURE — 83735 ASSAY OF MAGNESIUM: CPT

## 2024-07-19 PROCEDURE — 82306 VITAMIN D 25 HYDROXY: CPT

## 2024-07-19 PROCEDURE — 81001 URINALYSIS AUTO W/SCOPE: CPT

## 2024-07-19 PROCEDURE — 84550 ASSAY OF BLOOD/URIC ACID: CPT

## 2024-07-19 RX ORDER — ERGOCALCIFEROL 1.25 MG/1
50000 CAPSULE ORAL WEEKLY
Qty: 12 CAPSULE | Refills: 0 | Status: SHIPPED | OUTPATIENT
Start: 2024-07-19

## 2024-07-19 NOTE — RESULT ENCOUNTER NOTE
Please notify patient: Mild increased triglycerides  Low-carb diet, low-fat diet advised  Low sodium 134, same as before, to make sure she does not drink more than 48 ounces of liquids a day  To avoid any alcoholic beverages and pop  Blood glucose high 162  Vitamin D low, to stop daily vitamin D, I will send the high-dose vitamin D for her to the pharmacy  Otherwise labs within normal limits  continue current treatment    Future Appointments  7/23/2024  1:00 PM    Kathy Simon, APR* [facility]        [facility]  10/18/2024 8:30 AM    SCHEDULE, [facility] * [facility]        [facility]  11/14/2024 3:00 PM    Kathy Cervantes MD    fp sc               [facility]

## 2024-07-20 ASSESSMENT — ENCOUNTER SYMPTOMS: TROUBLE SWALLOWING: 0

## 2024-07-29 RX ORDER — PRAVASTATIN SODIUM 80 MG/1
80 TABLET ORAL
Qty: 90 TABLET | Refills: 3 | Status: SHIPPED | OUTPATIENT
Start: 2024-07-29

## 2024-08-21 ENCOUNTER — OFFICE VISIT (OUTPATIENT)
Dept: FAMILY MEDICINE CLINIC | Age: 69
End: 2024-08-21
Payer: MEDICARE

## 2024-08-21 VITALS
HEART RATE: 81 BPM | DIASTOLIC BLOOD PRESSURE: 98 MMHG | SYSTOLIC BLOOD PRESSURE: 167 MMHG | TEMPERATURE: 97.1 F | OXYGEN SATURATION: 96 %

## 2024-08-21 DIAGNOSIS — N75.1 BARTHOLIN'S GLAND ABSCESS: Primary | ICD-10-CM

## 2024-08-21 PROCEDURE — G8427 DOCREV CUR MEDS BY ELIG CLIN: HCPCS

## 2024-08-21 PROCEDURE — 3080F DIAST BP >= 90 MM HG: CPT

## 2024-08-21 PROCEDURE — 3077F SYST BP >= 140 MM HG: CPT

## 2024-08-21 PROCEDURE — G8399 PT W/DXA RESULTS DOCUMENT: HCPCS

## 2024-08-21 PROCEDURE — 1123F ACP DISCUSS/DSCN MKR DOCD: CPT

## 2024-08-21 PROCEDURE — 4004F PT TOBACCO SCREEN RCVD TLK: CPT

## 2024-08-21 PROCEDURE — 99213 OFFICE O/P EST LOW 20 MIN: CPT

## 2024-08-21 PROCEDURE — 1090F PRES/ABSN URINE INCON ASSESS: CPT

## 2024-08-21 PROCEDURE — G8417 CALC BMI ABV UP PARAM F/U: HCPCS

## 2024-08-21 PROCEDURE — 3017F COLORECTAL CA SCREEN DOC REV: CPT

## 2024-08-21 RX ORDER — SULFAMETHOXAZOLE AND TRIMETHOPRIM 800; 160 MG/1; MG/1
1 TABLET ORAL 2 TIMES DAILY
Qty: 14 TABLET | Refills: 0 | Status: SHIPPED | OUTPATIENT
Start: 2024-08-21 | End: 2024-08-25

## 2024-08-21 ASSESSMENT — ENCOUNTER SYMPTOMS
NAUSEA: 0
EYE PAIN: 0
CHANGE IN BOWEL HABIT: 0
COLOR CHANGE: 1
ABDOMINAL PAIN: 0
VOMITING: 0
COUGH: 0
EYE REDNESS: 0
EYE ITCHING: 0

## 2024-08-21 NOTE — PROGRESS NOTES
No tenderness.   Lymphadenopathy:      Cervical: No cervical adenopathy.   Neurological:      Mental Status: She is alert and oriented to person, place, and time.      Motor: No weakness.      Coordination: Coordination normal.      Gait: Gait normal.   Psychiatric:         Mood and Affect: Mood normal.         Behavior: Behavior normal. Behavior is cooperative.         Thought Content: Thought content normal.         Judgment: Judgment normal.       BP (!) 167/98 (Site: Left Lower Arm, Position: Sitting, Cuff Size: Medium Adult)   Pulse 81   Temp 97.1 °F (36.2 °C) (Infrared)   SpO2 96%     Assessment:   Assessment & Plan    Diagnosis Orders   1. Bartholin's gland abscess  sulfamethoxazole-trimethoprim (BACTRIM DS;SEPTRA DS) 800-160 MG per tablet    mupirocin (BACTROBAN) 2 % ointment          Lab Results   Component Value Date     (L) 07/19/2024    K 4.2 07/19/2024    CL 94 (L) 07/19/2024    CO2 29 07/19/2024    BUN 9 07/19/2024    CREATININE 0.6 07/19/2024    GLUCOSE 162 (H) 07/19/2024    CALCIUM 9.2 07/19/2024    BILITOT 0.9 07/19/2024    ALKPHOS 96 07/19/2024    AST 15 07/19/2024    ALT 11 07/19/2024    LABGLOM >90 07/19/2024    GFRAA >60 08/24/2022     Hemoglobin A1C   Date Value Ref Range Status   07/16/2024 7.3 % Final       Plan:   -Recommend OTC Sitz bath TID  -Patient instructed to complete antibiotic prescription fully.  -Warm compresses TID for 15 minutes at a time.  -Tylenol as needed for the discomfort/fever.  -Patient agreeable to treatment plan.  -Educational materials provided on AVS.      Return in about 2 weeks (around 9/4/2024) for evaluation with PCP.    Orders Placed This Encounter   Medications    sulfamethoxazole-trimethoprim (BACTRIM DS;SEPTRA DS) 800-160 MG per tablet     Sig: Take 1 tablet by mouth 2 times daily for 7 days     Dispense:  14 tablet     Refill:  0    mupirocin (BACTROBAN) 2 % ointment     Sig: Apply topically 3 times daily.     Dispense:  30 g     Refill:  0

## 2024-08-25 ENCOUNTER — HOSPITAL ENCOUNTER (EMERGENCY)
Age: 69
Discharge: HOME | End: 2024-08-25
Attending: EMERGENCY MEDICINE
Payer: MEDICARE

## 2024-08-25 VITALS
BODY MASS INDEX: 27.6 KG/M2 | TEMPERATURE: 98.4 F | HEART RATE: 97 BPM | RESPIRATION RATE: 18 BRPM | SYSTOLIC BLOOD PRESSURE: 153 MMHG | WEIGHT: 150 LBS | OXYGEN SATURATION: 92 % | HEIGHT: 62 IN | DIASTOLIC BLOOD PRESSURE: 100 MMHG

## 2024-08-25 DIAGNOSIS — N76.4 ABSCESS OF LABIA: Primary | ICD-10-CM

## 2024-08-25 PROCEDURE — 99283 EMERGENCY DEPT VISIT LOW MDM: CPT

## 2024-08-25 PROCEDURE — 56405 I&D VULVA/PERINEAL ABSCESS: CPT

## 2024-08-25 PROCEDURE — 10160 PNXR ASPIR ABSC HMTMA BULLA: CPT

## 2024-08-25 RX ORDER — DOXYCYCLINE HYCLATE 100 MG
100 TABLET ORAL 2 TIMES DAILY
Qty: 14 TABLET | Refills: 0 | Status: SHIPPED | OUTPATIENT
Start: 2024-08-25 | End: 2024-09-01

## 2024-08-25 RX ORDER — LIDOCAINE HYDROCHLORIDE 10 MG/ML
5 INJECTION, SOLUTION INFILTRATION; PERINEURAL ONCE
Status: DISCONTINUED | OUTPATIENT
Start: 2024-08-25 | End: 2024-08-25 | Stop reason: HOSPADM

## 2024-08-25 ASSESSMENT — LIFESTYLE VARIABLES
HOW MANY STANDARD DRINKS CONTAINING ALCOHOL DO YOU HAVE ON A TYPICAL DAY: 3 OR 4
HOW OFTEN DO YOU HAVE A DRINK CONTAINING ALCOHOL: 2-4 TIMES A MONTH

## 2024-08-25 ASSESSMENT — PAIN DESCRIPTION - DESCRIPTORS: DESCRIPTORS: TENDER

## 2024-08-25 ASSESSMENT — PAIN DESCRIPTION - PAIN TYPE: TYPE: ACUTE PAIN

## 2024-08-25 ASSESSMENT — PAIN SCALES - GENERAL: PAINLEVEL_OUTOF10: 6

## 2024-08-25 ASSESSMENT — PAIN DESCRIPTION - LOCATION: LOCATION: GROIN

## 2024-08-25 ASSESSMENT — PAIN - FUNCTIONAL ASSESSMENT: PAIN_FUNCTIONAL_ASSESSMENT: 0-10

## 2024-08-25 NOTE — ED PROVIDER NOTES
EMERGENCY DEPARTMENT ENCOUNTER    Pt Name: Gem Mcgill  MRN: [id number]  Birthdate 1955  Date of evaluation: 8/25/24  CHIEF COMPLAINT       Chief Complaint   Patient presents with    Abscess     Evaluated on 8/21/24 for abscess, taking prescribed antibiotic but reports not having any improvement.      HISTORY OF PRESENT ILLNESS   Presenting with chief complaint of an abscess.  She has had this abscess for approximately 2 weeks.  She is on antibiotics given to her by an urgent care without relief prompting her visit.  It is located on her left labia.    The history is provided by the patient.           REVIEW OF SYSTEMS     Review of Systems   Constitutional:  Negative for chills and fever.   Respiratory:  Negative for shortness of breath.    Cardiovascular:  Negative for chest pain.   Gastrointestinal:  Negative for nausea and vomiting.   Genitourinary:  Positive for genital sores (Abscess on labia). Negative for dysuria, flank pain, frequency and urgency.   Neurological:  Negative for headaches.     PASTMEDICAL HISTORY     Past Medical History:   Diagnosis Date    Acquired hypothyroidism 8/5/2016    Arthritis     Asthma     CAD (coronary artery disease) 03/08/2022    stent    Cervical spondylosis 6/1/2023    Colonoscopy refused 7/2/2020    Diabetes mellitus (HCC)     Dizzy     Family history of breast cancer in sister 07/02/2020    Holter monitor, abnormal     Hyperlipidemia     Hypertension     Hypomagnesemia 1/12/2023    Osteopenia determined by x-ray 01/19/2021    Pneumonia 4/5/2022    Research study patient 02/28/2022    [facility]    SOB (shortness of breath)     Thyroid condition     Uncontrolled type 2 diabetes mellitus with hyperglycemia (HCC)     Uterine polyp 10/7/2014    D&C, Myosure 10/7      Past Problem List  Patient Active Problem List   Diagnosis Code    Essential hypertension I10    Hyperlipidemia with target LDL less than 100 E78.5    Acquired hypothyroidism E03.9    Type 2  Victor Manuel


American) 


  


  


  98.7 


 


 


Estimated GFR (Non-


American 


  


  


  Eyes:      Extraocular Movements: Extraocular movements intact.   Cardiovascular:      Rate and Rhythm: Normal rate and regular rhythm.      Pulses:           Radial pulses are 2+ on the right side and 2+ on the left side.        Dorsalis pedis pulses are 2+ on the right side and 2+ on the left side.        Posterior tibial pulses are 2+ on the right side and 2+ on the left side.      Heart sounds: Normal heart sounds.   Pulmonary:      Effort: Pulmonary effort is normal. No respiratory distress.   Abdominal:      General: There is no distension.      Palpations: Abdomen is soft.      Tenderness: There is no abdominal tenderness.   Genitourinary:     Labia:         Left: Tenderness (Abscess on the left labia) present.    Musculoskeletal:         General: Normal range of motion.   Skin:     General: Skin is warm.   Neurological:      General: No focal deficit present.      Mental Status: She is alert and oriented to person, place, and time.   Psychiatric:         Behavior: Behavior normal.         MEDICAL DECISION MAKING / ED COURSE:         1)  Number and Complexity of Problems Addressed at this Encounter  Problem List This Visit: Abscess    Presenting with chief complaint of an abscess.  She has had this abscess for approximately 2 weeks.  She is on antibiotics given to her by an urgent care without relief prompting her visit.  It is located on her left labia.    2)  Data Reviewed and Analyzed  (Lab and radiology tests/orders below in next section)      3)  Treatment and Disposition           Needle aspiration was attempted with a scant amount of drainage.  Patient is on a blood thinner antidiabetic.  It is in a sensitive area.  Of asked her to follow-up with an OB/GYN and she is agreeable with this plan.  I changed her antibiotic to doxycycline.  She is stable for discharge at this time.      CRITICAL CARE:       PROCEDURES:    Incision/Drainage    Date/Time: 8/25/2024 2:44 PM    Performed by: Ba Cosme

## 2024-08-26 NOTE — RESULT ENCOUNTER NOTE
Addressed in ED   Will be called for ED follow up     Future Appointments  8/29/2024  1:00 PM    Carolyn Pérez,* DAVIDA [facility]        [facility]  10/18/2024 8:30 AM    SCHEDULE, [facility] * [facility]        [facility]  11/14/2024 3:00 PM    Kathy Cervantes MD    fp sc               [facility] DEP

## 2024-08-29 ENCOUNTER — OFFICE VISIT (OUTPATIENT)
Dept: OBGYN CLINIC | Age: 69
End: 2024-08-29
Payer: MEDICARE

## 2024-08-29 VITALS
WEIGHT: 158 LBS | HEIGHT: 62 IN | SYSTOLIC BLOOD PRESSURE: 136 MMHG | BODY MASS INDEX: 29.08 KG/M2 | DIASTOLIC BLOOD PRESSURE: 84 MMHG

## 2024-08-29 DIAGNOSIS — N76.4 LABIAL ABSCESS: Primary | ICD-10-CM

## 2024-08-29 PROCEDURE — 99203 OFFICE O/P NEW LOW 30 MIN: CPT | Performed by: OBSTETRICS & GYNECOLOGY

## 2024-08-29 PROCEDURE — 1090F PRES/ABSN URINE INCON ASSESS: CPT | Performed by: OBSTETRICS & GYNECOLOGY

## 2024-08-29 PROCEDURE — 3079F DIAST BP 80-89 MM HG: CPT | Performed by: OBSTETRICS & GYNECOLOGY

## 2024-08-29 PROCEDURE — 3017F COLORECTAL CA SCREEN DOC REV: CPT | Performed by: OBSTETRICS & GYNECOLOGY

## 2024-08-29 PROCEDURE — G8427 DOCREV CUR MEDS BY ELIG CLIN: HCPCS | Performed by: OBSTETRICS & GYNECOLOGY

## 2024-08-29 PROCEDURE — 4004F PT TOBACCO SCREEN RCVD TLK: CPT | Performed by: OBSTETRICS & GYNECOLOGY

## 2024-08-29 PROCEDURE — 3075F SYST BP GE 130 - 139MM HG: CPT | Performed by: OBSTETRICS & GYNECOLOGY

## 2024-08-29 PROCEDURE — G8399 PT W/DXA RESULTS DOCUMENT: HCPCS | Performed by: OBSTETRICS & GYNECOLOGY

## 2024-08-29 PROCEDURE — G8417 CALC BMI ABV UP PARAM F/U: HCPCS | Performed by: OBSTETRICS & GYNECOLOGY

## 2024-08-29 PROCEDURE — 1123F ACP DISCUSS/DSCN MKR DOCD: CPT | Performed by: OBSTETRICS & GYNECOLOGY

## 2024-08-29 RX ORDER — LIDOCAINE HYDROCHLORIDE 20 MG/ML
JELLY TOPICAL ONCE
Status: SHIPPED | OUTPATIENT
Start: 2024-08-29

## 2024-08-29 RX ORDER — MUPIROCIN 20 MG/G
OINTMENT TOPICAL
COMMUNITY
Start: 2024-08-21

## 2024-08-29 NOTE — PROGRESS NOTES
Relation Age of Onset    Heart Disease Mother     Diabetes Mother     Diabetes Father     Breast Cancer Sister         after age 50        Social History     Socioeconomic History    Marital status:      Spouse name: Not on file    Number of children: Not on file    Years of education: Not on file    Highest education level: Not on file   Occupational History    Not on file   Tobacco Use    Smoking status: Every Day     Current packs/day: 1.00     Average packs/day: 1 pack/day for 50.7 years (50.7 ttl pk-yrs)     Types: Cigarettes     Start date: 1974     Passive exposure: Past    Smokeless tobacco: Never    Tobacco comments:     Started smoking at 16 y/o, 1 PPD most of her life, quit once for 6 months,currently 6 cigarettes/day for 2-3 months   Vaping Use    Vaping status: Former    Substances: Nicotine    Devices: Refillable tank   Substance and Sexual Activity    Alcohol use: Yes     Comment: OCCASIONAL    Drug use: No    Sexual activity: Yes     Partners: Male   Other Topics Concern    Not on file   Social History Narrative    Not on file     Social Determinants of Health     Financial Resource Strain: Low Risk  (3/13/2024)    Overall Financial Resource Strain (CARDIA)     Difficulty of Paying Living Expenses: Not hard at all   Food Insecurity: No Food Insecurity (3/13/2024)    Hunger Vital Sign     Worried About Running Out of Food in the Last Year: Never true     Ran Out of Food in the Last Year: Never true   Transportation Needs: Unknown (3/13/2024)    PRAPARE - Transportation     Lack of Transportation (Medical): Not on file     Lack of Transportation (Non-Medical): No   Physical Activity: Insufficiently Active (7/16/2024)    Exercise Vital Sign     Days of Exercise per Week: 7 days     Minutes of Exercise per Session: 10 min   Stress: Not on file   Social Connections: Not on file   Intimate Partner Violence: Not on file   Housing Stability: Unknown (3/13/2024)    Housing Stability Vital Sign      TAKE 10 GRAM(1 PACKET) BY MOUTH ONCE A WEEK 30 each 1    SYMBICORT 160-4.5 MCG/ACT AERO Inhale 2 puffs into the lungs 2 times daily 10.2 g 5    magnesium oxide (MAG-OX) 400 (240 Mg) MG tablet Take 1 tablet by mouth every evening Take with food 90 tablet 3    fluticasone (FLONASE) 50 MCG/ACT nasal spray 2 sprays by Each Nostril route daily 48 g 1    blood glucose test strips (ASCENSIA AUTODISC VI;ONE TOUCH ULTRA TEST VI) strip 1 each by In Vitro route daily As needed. 100 each 3    aspirin EC 81 MG EC tablet Take 1 tablet by mouth daily 90 tablet 1     Current Facility-Administered Medications   Medication Dose Route Frequency Provider Last Rate Last Admin    lidocaine (XYLOCAINE) 2 % uro-jet   Topical Once                  ALLERGIES:  Allergies as of 08/29/2024 - Fully Reviewed 08/29/2024   Allergen Reaction Noted    Lisinopril Other (See Comments) 11/12/2014         REVIEW OF SYSTEMS:       A minimum of an eleven point review of systems was completed.    Review Of Systems (11 point):  Constitutional: No fever, chills or malaise; No weight change or fatigue  Head and Eyes: No vision, Headache, Dizziness or trauma in last 12 months  ENT ROS: No hearing, Tinnitis, sinus or taste problems  Hematological and Lymphatic ROS:No Lymphoma, Von Willebrand's, Hemophillia or Bleeding History  Psych ROS: No Depression, Homicidal thoughts,suicidal thoughts, or anxiety  Breast ROS: No prior breast abnormalities or lumps  Respiratory ROS: No SOB, Pneumoniae,Cough, or Pulmonary Embolism History  Cardiovascular ROS: No Chest Pain with Exertion, Palpitations, Syncope, Edema, Arrhythmia  Gastrointestinal ROS: No Indigestion, Heartburn, Nausea, vomiting, Diarrhea, Constipation,or Bowel Changes; No Bloody Stools or melena  Genito-Urinary ROS: No Dysuria, Hematuria or Nocturia. No Urinary Incontinence or Vaginal Discharge  Musculoskeletal ROS: No Arthralgia, Arthritis,Gout,Osteoporosis or Rheumatism  Neurological ROS: No CVA, Migraines,

## 2024-08-30 ENCOUNTER — TELEPHONE (OUTPATIENT)
Dept: OBGYN CLINIC | Age: 69
End: 2024-08-30

## 2024-08-30 DIAGNOSIS — I10 ESSENTIAL HYPERTENSION: ICD-10-CM

## 2024-08-30 DIAGNOSIS — E83.42 HYPOMAGNESEMIA: ICD-10-CM

## 2024-08-30 RX ORDER — LIDOCAINE 50 MG/G
OINTMENT TOPICAL
Qty: 1 EACH | Refills: 0 | Status: SHIPPED | OUTPATIENT
Start: 2024-08-30

## 2024-08-30 RX ORDER — IRBESARTAN 300 MG/1
300 TABLET ORAL DAILY
Qty: 90 TABLET | Refills: 3 | Status: SHIPPED | OUTPATIENT
Start: 2024-08-30

## 2024-08-30 RX ORDER — LANOLIN ALCOHOL/MO/W.PET/CERES
CREAM (GRAM) TOPICAL
Qty: 90 TABLET | Refills: 3 | Status: SHIPPED | OUTPATIENT
Start: 2024-08-30

## 2024-08-30 RX ORDER — IRBESARTAN 300 MG/1
300 TABLET ORAL DAILY
Qty: 30 TABLET | Refills: 1 | Status: SHIPPED | OUTPATIENT
Start: 2024-08-30 | End: 2024-08-30

## 2024-08-30 NOTE — TELEPHONE ENCOUNTER
Please Approve or Refuse.  Send to Pharmacy per Pt's Request: [email or web address]     Next Visit Date:  11/14/2024   Last Visit Date: 7/16/2024    Hemoglobin A1C (%)   Date Value   07/16/2024 7.3   03/13/2024 7.6   11/06/2023 8.3 (H)             ( goal A1C is < 7)   BP Readings from Last 3 Encounters:   08/29/24 136/84   08/25/24 (!) 153/100   08/21/24 (!) 167/98          (goal 120/80)  BUN   Date Value Ref Range Status   07/19/2024 9 8 - 23 mg/dL Final     Creatinine   Date Value Ref Range Status   07/19/2024 0.6 0.5 - 0.9 mg/dL Final     Potassium   Date Value Ref Range Status   07/19/2024 4.2 3.7 - 5.3 mmol/L Final

## 2024-08-30 NOTE — TELEPHONE ENCOUNTER
Last seen 7/16/2024  Next appt 11/14/2024    Next Visit Date:  Future Appointments   Date Time Provider Department Center   8/31/2024 11:00 AM ST ULTRASOUND  [facility] Radiolog   10/18/2024  8:30 AM SCHEDULE, [facility]   11/14/2024  3:00 PM Kathy Cervantes MD fp sc [facility] DEP       Health Maintenance   Topic Date Due    DTaP/Tdap/Td vaccine (1 - Tdap) Never done    Respiratory Syncytial Virus (RSV) Pregnant or age 60 yrs+ (1 - 1-dose 60+ series) Never done    Shingles vaccine (2 of 2) 12/29/2020    Colorectal Cancer Screen  05/10/2023    Diabetic retinal exam  06/17/2023    Breast cancer screen  09/01/2023    COVID-19 Vaccine (4 - 2023-24 season) 09/01/2023    Flu vaccine (1) 08/01/2024    A1C test (Diabetic or Prediabetic)  10/16/2024    Diabetic Alb to Cr ratio (uACR) test  11/18/2024    Lung Cancer Screening &/or Counseling  01/15/2025    Diabetic foot exam  07/16/2025    Depression Monitoring  07/16/2025    Annual Wellness Visit (Medicare)  07/17/2025    Lipids  07/19/2025    GFR test (Diabetes, CKD 3-4, OR last GFR 15-59)  07/19/2025    DEXA (modify frequency per FRAX score)  07/28/2025    Pneumococcal 65+ years Vaccine  Completed    Hepatitis C screen  Completed    Hepatitis A vaccine  Aged Out    Hib vaccine  Aged Out    Polio vaccine  Aged Out    Meningococcal (ACWY) vaccine  Aged Out    Hepatitis B vaccine  Discontinued    HIV screen  Discontinued       Hemoglobin A1C (%)   Date Value   07/16/2024 7.3   03/13/2024 7.6   11/06/2023 8.3 (H)             ( goal A1C is < 7)   No components found for: \"LABMICR\"  No components found for: \"LDLCHOLESTEROL\", \"LDLCALC\"    (goal LDL is <100)   AST (U/L)   Date Value   07/19/2024 15     ALT (U/L)   Date Value   07/19/2024 11     BUN (mg/dL)   Date Value   07/19/2024 9     BP Readings from Last 3 Encounters:   08/29/24 136/84   08/25/24 (!) 153/100   08/21/24 (!) 167/98          (goal 120/80)    All Future Testing

## 2024-08-30 NOTE — TELEPHONE ENCOUNTER
Patient saw  as a hospital follow up yesterday for Labial abscess and was supposed to get a script for lidocaine, the script was entered as a clinic administered medication though instead of being sent to her pharmacy. Can we send to [facility] [address].

## 2024-08-31 ENCOUNTER — HOSPITAL ENCOUNTER (OUTPATIENT)
Dept: ULTRASOUND IMAGING | Age: 69
End: 2024-08-31
Attending: OBSTETRICS & GYNECOLOGY
Payer: MEDICARE

## 2024-08-31 DIAGNOSIS — N76.4 LABIAL ABSCESS: ICD-10-CM

## 2024-08-31 PROCEDURE — 76857 US EXAM PELVIC LIMITED: CPT

## 2024-08-31 PROCEDURE — 76856 US EXAM PELVIC COMPLETE: CPT

## 2024-09-04 ENCOUNTER — TELEPHONE (OUTPATIENT)
Dept: OBGYN CLINIC | Age: 69
End: 2024-09-04

## 2024-09-04 NOTE — TELEPHONE ENCOUNTER
----- Message from SHARDA Leal CNP sent at 9/3/2024  9:24 AM EDT -----  Patient recently saw Dr Shetty in office.  Wanted patient to have ultrasound to determine whether to drain it in office or take to OR.  Please review with DR Shetty and schedule patient for follow up accordingly.

## 2024-09-04 NOTE — TELEPHONE ENCOUNTER
Reviewed with , patient returned call to the office she stated the cyst started draining itself on Sunday 9/1/24, patient states she has gotten so much relief since then and is feeling so much better. Patient will follow up with us as needed in the future.

## 2024-11-14 ENCOUNTER — OFFICE VISIT (OUTPATIENT)
Dept: FAMILY MEDICINE CLINIC | Age: 69
End: 2024-11-14

## 2024-11-14 VITALS
BODY MASS INDEX: 29.26 KG/M2 | TEMPERATURE: 97.6 F | DIASTOLIC BLOOD PRESSURE: 76 MMHG | WEIGHT: 159 LBS | OXYGEN SATURATION: 95 % | HEIGHT: 62 IN | HEART RATE: 66 BPM | SYSTOLIC BLOOD PRESSURE: 124 MMHG

## 2024-11-14 DIAGNOSIS — M54.2 NECK PAIN: ICD-10-CM

## 2024-11-14 DIAGNOSIS — Z87.891 PERSONAL HISTORY OF SMOKING: ICD-10-CM

## 2024-11-14 DIAGNOSIS — I10 ESSENTIAL HYPERTENSION: ICD-10-CM

## 2024-11-14 DIAGNOSIS — E78.5 HYPERLIPIDEMIA WITH TARGET LDL LESS THAN 100: ICD-10-CM

## 2024-11-14 DIAGNOSIS — F33.41 RECURRENT MAJOR DEPRESSIVE DISORDER, IN PARTIAL REMISSION (HCC): ICD-10-CM

## 2024-11-14 DIAGNOSIS — E55.9 VITAMIN D DEFICIENCY: ICD-10-CM

## 2024-11-14 DIAGNOSIS — R06.09 DOE (DYSPNEA ON EXERTION): ICD-10-CM

## 2024-11-14 DIAGNOSIS — Z12.31 ENCOUNTER FOR SCREENING MAMMOGRAM FOR BREAST CANCER: ICD-10-CM

## 2024-11-14 DIAGNOSIS — F10.21 ALCOHOL DEPENDENCE IN EARLY FULL REMISSION (HCC): ICD-10-CM

## 2024-11-14 DIAGNOSIS — Z12.11 SCREEN FOR COLON CANCER: ICD-10-CM

## 2024-11-14 DIAGNOSIS — R80.9 TYPE 2 DIABETES MELLITUS WITH MICROALBUMINURIA, WITHOUT LONG-TERM CURRENT USE OF INSULIN (HCC): Primary | ICD-10-CM

## 2024-11-14 DIAGNOSIS — E11.42 TYPE 2 DIABETES MELLITUS WITH DIABETIC POLYNEUROPATHY, WITHOUT LONG-TERM CURRENT USE OF INSULIN (HCC): ICD-10-CM

## 2024-11-14 DIAGNOSIS — E03.9 ACQUIRED HYPOTHYROIDISM: ICD-10-CM

## 2024-11-14 DIAGNOSIS — E11.29 TYPE 2 DIABETES MELLITUS WITH MICROALBUMINURIA, WITHOUT LONG-TERM CURRENT USE OF INSULIN (HCC): Primary | ICD-10-CM

## 2024-11-14 LAB — HBA1C MFR BLD: 7.4 %

## 2024-11-14 RX ORDER — FOLIC ACID 1 MG/1
1 TABLET ORAL DAILY
Qty: 90 TABLET | Refills: 1
Start: 2024-11-14

## 2024-11-14 RX ORDER — THIAMINE MONONITRATE (VIT B1) 100 MG
100 TABLET ORAL DAILY
Qty: 90 TABLET | Refills: 3
Start: 2024-11-14

## 2024-11-14 RX ORDER — CHOLECALCIFEROL (VITAMIN D3) 50 MCG
2000 TABLET ORAL DAILY
Qty: 90 TABLET | Refills: 3
Start: 2024-11-14

## 2024-11-14 RX ORDER — LIDOCAINE 50 MG/G
1 PATCH TOPICAL DAILY
Qty: 30 PATCH | Refills: 3 | Status: SHIPPED | OUTPATIENT
Start: 2024-11-14

## 2024-11-14 RX ORDER — YEAST,DRIED (S. CEREVISIAE)
1 POWDER (GRAM) ORAL DAILY
Qty: 90 TABLET | Refills: 0
Start: 2024-11-14

## 2024-11-14 RX ORDER — VARENICLINE TARTRATE 1 MG/1
1 TABLET, FILM COATED ORAL 2 TIMES DAILY
Qty: 60 TABLET | Refills: 5 | Status: SHIPPED | OUTPATIENT
Start: 2024-11-14

## 2024-11-14 RX ORDER — TETANUS AND DIPHTHERIA TOXOIDS ADSORBED 2; 2 [LF]/.5ML; [LF]/.5ML
0.5 INJECTION INTRAMUSCULAR ONCE
Qty: 0.5 ML | Refills: 0 | Status: CANCELLED | OUTPATIENT
Start: 2024-11-14 | End: 2024-11-14

## 2024-11-14 RX ORDER — VARENICLINE TARTRATE 0.5 MG/1
.5-1 TABLET, FILM COATED ORAL SEE ADMIN INSTRUCTIONS
Qty: 57 TABLET | Refills: 0 | Status: SHIPPED | OUTPATIENT
Start: 2024-11-14

## 2024-11-14 SDOH — ECONOMIC STABILITY: FOOD INSECURITY: WITHIN THE PAST 12 MONTHS, THE FOOD YOU BOUGHT JUST DIDN'T LAST AND YOU DIDN'T HAVE MONEY TO GET MORE.: NEVER TRUE

## 2024-11-14 SDOH — ECONOMIC STABILITY: FOOD INSECURITY: WITHIN THE PAST 12 MONTHS, YOU WORRIED THAT YOUR FOOD WOULD RUN OUT BEFORE YOU GOT MONEY TO BUY MORE.: NEVER TRUE

## 2024-11-14 SDOH — ECONOMIC STABILITY: INCOME INSECURITY: HOW HARD IS IT FOR YOU TO PAY FOR THE VERY BASICS LIKE FOOD, HOUSING, MEDICAL CARE, AND HEATING?: NOT HARD AT ALL

## 2024-11-14 ASSESSMENT — PATIENT HEALTH QUESTIONNAIRE - PHQ9
7. TROUBLE CONCENTRATING ON THINGS, SUCH AS READING THE NEWSPAPER OR WATCHING TELEVISION: NOT AT ALL
9. THOUGHTS THAT YOU WOULD BE BETTER OFF DEAD, OR OF HURTING YOURSELF: NOT AT ALL
SUM OF ALL RESPONSES TO PHQ QUESTIONS 1-9: 0
SUM OF ALL RESPONSES TO PHQ QUESTIONS 1-9: 0
2. FEELING DOWN, DEPRESSED OR HOPELESS: NOT AT ALL
1. LITTLE INTEREST OR PLEASURE IN DOING THINGS: NOT AT ALL
6. FEELING BAD ABOUT YOURSELF - OR THAT YOU ARE A FAILURE OR HAVE LET YOURSELF OR YOUR FAMILY DOWN: NOT AT ALL
3. TROUBLE FALLING OR STAYING ASLEEP: NOT AT ALL
SUM OF ALL RESPONSES TO PHQ QUESTIONS 1-9: 0
SUM OF ALL RESPONSES TO PHQ9 QUESTIONS 1 & 2: 0
5. POOR APPETITE OR OVEREATING: NOT AT ALL
8. MOVING OR SPEAKING SO SLOWLY THAT OTHER PEOPLE COULD HAVE NOTICED. OR THE OPPOSITE, BEING SO FIGETY OR RESTLESS THAT YOU HAVE BEEN MOVING AROUND A LOT MORE THAN USUAL: NOT AT ALL
4. FEELING TIRED OR HAVING LITTLE ENERGY: NOT AT ALL
SUM OF ALL RESPONSES TO PHQ QUESTIONS 1-9: 0
10. IF YOU CHECKED OFF ANY PROBLEMS, HOW DIFFICULT HAVE THESE PROBLEMS MADE IT FOR YOU TO DO YOUR WORK, TAKE CARE OF THINGS AT HOME, OR GET ALONG WITH OTHER PEOPLE: NOT DIFFICULT AT ALL

## 2024-11-14 NOTE — ASSESSMENT & PLAN NOTE
Chronic, improved  Continue pravastatin 80 Mg daily, recheck lipid panel    Lab Results   Component Value Date    LDL 76 07/19/2024         Orders:    Lipid Panel; Future

## 2024-11-14 NOTE — ASSESSMENT & PLAN NOTE
Vitamin D very low 10.7 on 4/22/2022, mildly improved, continue vitamin D supplementation and recheck vitamin D level, we may need to adjust vitamin D dosage for supplementation    Lab Results   Component Value Date    VITD25 31.2 07/19/2024         Orders:    vitamin D (CHOLECALCIFEROL) 50 MCG (2000 UT) TABS tablet; Take 1 tablet by mouth daily From over the counter    Vitamin D 25 Hydroxy; Future

## 2024-11-14 NOTE — RESULT ENCOUNTER NOTE
Addressed during office visit today, hemoglobin A1c 7.4, stable, previously it was 7.3, continue treatment recommended during the office visit.

## 2024-11-14 NOTE — PROGRESS NOTES
Visit Information    Have you changed or started any medications since your last visit including any over-the-counter medicines, vitamins, or herbal medicines? no   Have you stopped taking any of your medications? Is so, why? -  no  Are you having any side effects from any of your medications? - no    Have you seen any other physician or provider since your last visit?  yes -    Have you had any other diagnostic tests since your last visit?  yes -    Have you been seen in the emergency room and/or had an admission in a hospital since we last saw you?  yes -    Have you had your routine dental cleaning in the past 6 months?  no     Do you have an active MyChart account? If no, what is the barrier?  No:   Have you had a mammogram?”   NO    Date of last Mammogram: 9/1/2022       “Have you had a colorectal cancer screening such as a colonoscopy/FIT/Cologuard?    NO    No colonoscopy on file  No cologuard on file  Date of last FIT: 5/10/2022   No flexible sigmoidoscopy on file     “Have you had a diabetic eye exam?”    NO     Date of last diabetic eye exam: 6/17/2022             Patient Care Team:  Kathy Cervantes MD as PCP - General (Family Medicine)  Kathy Cervantes MD as PCP - Empaneled Provider  Laz Castellanos MD as Consulting Physician (Pulmonology)  Sven Krishna MD as Surgeon (Cardiology)  Wong Blank MD as Consulting Physician (Nephrology)    Medical History Review  Past Medical, Family, and Social History reviewed and does contribute to the patient presenting condition    Health Maintenance   Topic Date Due    DTaP/Tdap/Td vaccine (1 - Tdap) Never done    Shingles vaccine (2 of 2) 12/29/2020    Colorectal Cancer Screen  05/10/2023    Diabetic retinal exam  06/17/2023    Breast cancer screen  09/01/2023    A1C test (Diabetic or Prediabetic)  10/16/2024    Diabetic Alb to Cr ratio (uACR) test  11/18/2024    Lung Cancer Screening &/or Counseling  01/15/2025    COVID-19 Vaccine (5 - 2023-24 
coronary revascularization.  Has 2 stents.  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 Gem reports compliance with BP medications, and tolerates them well, denies side effects.      BP Readings from Last 3 Encounters:   11/14/24 124/76   08/29/24 136/84   08/25/24 (!) 153/100        Pulse is Normal.    Pulse Readings from Last 3 Encounters:   11/14/24 66   08/25/24 97   08/21/24 81         Hyperlipidemia:  No new myalgias or GI upset on pravastatin (Pravachol). Medication compliance: compliant all of the time. Patient is not always following a low fat, low cholesterol diet.     LDL is Normal.    Lab Results   Component Value Date    LDL 76 07/19/2024       She says she is still on Lokelma    Alcoholism  Patient says she cannot drink alcohol gets tired faster than before, she says she might have one beer here and there.  She reports compliance with the vitamins    Hypothyroidism: Recent symptoms: weight gain, fatigue and anxiety. She denies weight loss, cold intolerance, heat intolerance, hair loss, dry skin, constipation, diarrhea, edema, tremor, palpitations, and dysphagia. Patient is  taking her medication consistently on an empty stomach.    TSH is Elevated.    Synthroid 137 MCG, dosage was adjusted in July, she reports compliance with    No components found for: \"TSHREFLEX\"  Lab Results   Component Value Date    TSH 21.66 (H) 07/11/2024    TSH 51.82 (H) 05/02/2024    TSH 34.05 (H) 01/09/2024       She reports chronic neck pain, worse when weather changes, patient reports cracking, when she moves it too much.  I suggested referral to physical therapy, she declines     [x]Negative depression screening.   []1-4 = Minimal depression   []5-9 = Mild depression   []10-14 = Moderate depression   []15-19 = Moderately severe depression   []20-27 = Severe depression          11/14/2024     3:05 PM 7/16/2024     1:20 PM 3/13/2024    12:21 PM 12/5/2023     2:30 PM 6/13/2023     9:27 AM 12/16/2022

## 2024-11-14 NOTE — ASSESSMENT & PLAN NOTE
Chronic, stable  Not on antidepressant  I will continue to monitor      11/14/2024     3:05 PM 7/16/2024     1:20 PM 3/13/2024    12:21 PM 12/5/2023     2:30 PM 6/13/2023     9:27 AM 12/16/2022     2:25 PM 8/17/2022     1:51 PM   PHQ Scores   PHQ2 Score 0 0 0 1 0 1 0   PHQ9 Score 0 1 1 1 3 1 0     Interpretation of Total Score Depression Severity: 1-4 = Minimal depression, 5-9 = Mild depression, 10-14 = Moderate depression, 15-19 = Moderately severe depression, 20-27 = Severe depression

## 2024-11-14 NOTE — ASSESSMENT & PLAN NOTE
Chronic type 2 diabetes mellitus, stable, at goal  Lab Results   Component Value Date    LABA1C 7.4 11/14/2024    LABA1C 7.3 07/16/2024    LABA1C 7.6 03/13/2024     POC A1c 7.4, stable well-controlled diabetes    Urine microalbuminuria improved, microalbumin creatinine ratio 563 on 6/30/2023, followed by 124 on 11/18/2023  Continue irbesartan and good diabetes control  Lab Results   Component Value Date    MALBCR 124 (H) 11/18/2023       Orders:    POCT glycosylated hemoglobin (Hb A1C)    CBC with Auto Differential; Future    Comprehensive Metabolic Panel; Future    Vitamin B12 & Folate; Future    Uric Acid; Future    Microalbumin / Creatinine Urine Ratio; Future    varenicline (CHANTIX) 0.5 MG tablet; Take 1-2 tablets by mouth See Admin Instructions 0.5mg DAILY for 3 days followed by 0.5mg TWICE DAILY for 4 days followed by 1mg TWICE DAILY    varenicline (CHANTIX) 1 MG tablet; Take 1 tablet by mouth 2 times daily      -advised home blood glucose testing  daily  -daily feet exam, Foot care: advised to wash feet daily, pat dry and apply lotion at night, avoiding between toes. Need to look at feet daily and report to a physician any signs of inflammation or skin damage  -annual dilated eye exam  -Low carb, low fat diet, increase fruits and vegetables, and exercise 4-5 times a day 30-40 minutes a day discussed  -continue current treatment glipizide 20 Mg in the morning and 10 Mg in the evening, metformin XR 1000 Mg in the morning  -continue Aspirin 81 Mg  -continue ARB irbesartan and statin pravastatin

## 2024-11-14 NOTE — ASSESSMENT & PLAN NOTE
Chronic, inadequately controlled, with recent Synthroid adjustment  Continue levothyroxine 137 mcg daily  Recheck labs  Advised to take Synthroid in the morning, on empty stomach, without any other meds and with a full glass of water.  If TSH is still high, then we will need to readjust Synthroid    Lab Results   Component Value Date    TSH 21.66 (H) 07/11/2024       Orders:    TSH; Future    T4, Free; Future

## 2024-11-14 NOTE — ASSESSMENT & PLAN NOTE
Chronic type 2 diabetes mellitus, stable, at goal  Chronic polyneuropathy, stable  To make sure she takes the vitamins folic acid, vitamin B 1, Lidoderm  Continue current medications: Glipizide 20 Mg in the morning, 10 Mg in the evening, metformin XR 1000 Mg XR  Orders:    CBC with Auto Differential; Future    Comprehensive Metabolic Panel; Future    Vitamin B12 & Folate; Future    Uric Acid; Future    Microalbumin / Creatinine Urine Ratio; Future    lidocaine (LIDODERM) 5 %; Place 1 patch onto the skin daily 12 hours on, 12 hours off.    vitamin B-1 (THIAMINE) 100 MG tablet; Take 1 tablet by mouth daily    folic acid (FOLVITE) 1 MG tablet; Take 1 tablet by mouth daily

## 2024-11-14 NOTE — ASSESSMENT & PLAN NOTE
Chronic, well-controlled hypertension, at goal  Discussed low salt diet and BP and pulse monitoring.  Continue amlodipine 10 Mg daily, irbesartan 300 Mg daily, metoprolol XL 50 Mg daily  Check labs  Orders:    CBC with Auto Differential; Future    Comprehensive Metabolic Panel; Future    Urinalysis with Reflex to Culture; Future    Magnesium; Future

## 2024-11-14 NOTE — ASSESSMENT & PLAN NOTE
Chronic, improved per her report, she says she drinks very little, 1 drink on 1 occasion, 1 or 2 days/week per her report.  Praise given  Thiamine and folic acid advised, to continue the vitamins  Will continue to monitor, counseling given to completely quit drinking for good    Orders:    vitamin B-1 (THIAMINE) 100 MG tablet; Take 1 tablet by mouth daily    folic acid (FOLVITE) 1 MG tablet; Take 1 tablet by mouth daily

## 2024-11-18 PROBLEM — M54.2 NECK PAIN: Status: ACTIVE | Noted: 2024-11-18

## 2024-11-19 NOTE — ASSESSMENT & PLAN NOTE
Counseling given to try to quit smoking, options discussed, she would like to try Chantix  Also counseling given to set up the date to quit smoking  Orders:    varenicline (CHANTIX) 0.5 MG tablet; Take 1-2 tablets by mouth See Admin Instructions 0.5mg DAILY for 3 days followed by 0.5mg TWICE DAILY for 4 days followed by 1mg TWICE DAILY    varenicline (CHANTIX) 1 MG tablet; Take 1 tablet by mouth 2 times daily

## 2024-11-19 NOTE — ASSESSMENT & PLAN NOTE
Chronic, intermittent, likely degenerative changes advised supplementation from over-the-counter with collagen  CT spine 6/1/2023 showed C5-C6 cervical spondylolysis and degenerative disc disease associated mild to moderate focal spinal stenosis  If progressive symptoms, will refer to physical therapy and neurosurgeon  To consider topical creams like IcyHot, or Bengay ultra strength, heating pad  Orders:    Collagen-Boron-Hyaluronic Acid (MOVE FREE ULTRA JOINT HEALTH) 40-5-3.3 MG TABS; Take 1 tablet by mouth daily

## 2024-11-27 ENCOUNTER — HOSPITAL ENCOUNTER (OUTPATIENT)
Dept: WOMENS IMAGING | Age: 69
Discharge: HOME | End: 2024-11-29
Attending: FAMILY MEDICINE
Payer: MEDICARE

## 2024-11-27 DIAGNOSIS — Z12.11 SCREEN FOR COLON CANCER: ICD-10-CM

## 2024-11-27 DIAGNOSIS — Z12.31 ENCOUNTER FOR SCREENING MAMMOGRAM FOR BREAST CANCER: ICD-10-CM

## 2024-11-27 LAB
CONTROL: PRESENT
FECAL BLOOD IMMUNOCHEMICAL TEST: NORMAL

## 2024-11-27 PROCEDURE — 77063 BREAST TOMOSYNTHESIS BI: CPT

## 2024-11-27 PROCEDURE — 82274 ASSAY TEST FOR BLOOD FECAL: CPT | Performed by: FAMILY MEDICINE

## 2024-11-27 NOTE — RESULT ENCOUNTER NOTE
Please notify, no blood in the stool, repeat in 1 year   Future Appointments  11/27/2024 3:30 PM    [facility]             [facility]          [facility]  1/21/2025  7:30 AM    SCHEDULE, [facility] * [facility]        [facility]  3/14/2025  3:30 PM    Kathy Cervantes MD    fp sc               [facility]

## 2024-12-12 DIAGNOSIS — I10 ESSENTIAL HYPERTENSION: ICD-10-CM

## 2024-12-12 RX ORDER — METOPROLOL SUCCINATE 50 MG/1
50 TABLET, EXTENDED RELEASE ORAL DAILY
Qty: 90 TABLET | Refills: 3 | Status: SHIPPED | OUTPATIENT
Start: 2024-12-12

## 2024-12-12 NOTE — TELEPHONE ENCOUNTER
Please Approve or Refuse.  Send to Pharmacy per Pt's Request: [email or web address]     Next Visit Date:  3/14/2025   Last Visit Date: 11/14/2024    Hemoglobin A1C (%)   Date Value   11/14/2024 7.4   07/16/2024 7.3   03/13/2024 7.6             ( goal A1C is < 7)   BP Readings from Last 3 Encounters:   11/14/24 124/76   08/29/24 136/84   08/25/24 (!) 153/100          (goal 120/80)  BUN   Date Value Ref Range Status   07/19/2024 9 8 - 23 mg/dL Final     Creatinine   Date Value Ref Range Status   07/19/2024 0.6 0.5 - 0.9 mg/dL Final     Potassium   Date Value Ref Range Status   07/19/2024 4.2 3.7 - 5.3 mmol/L Final

## 2024-12-23 RX ORDER — AMLODIPINE BESYLATE 10 MG/1
10 TABLET ORAL DAILY
Qty: 90 TABLET | Refills: 3 | Status: SHIPPED | OUTPATIENT
Start: 2024-12-23

## 2024-12-23 NOTE — TELEPHONE ENCOUNTER
Please Approve or Refuse.  Send to Pharmacy per Pt's Request:      Next Visit Date:  4/17/2025   Last Visit Date: 11/14/2024    Hemoglobin A1C (%)   Date Value   11/14/2024 7.4   07/16/2024 7.3   03/13/2024 7.6             ( goal A1C is < 7)   BP Readings from Last 3 Encounters:   11/14/24 124/76   08/29/24 136/84   08/25/24 (!) 153/100          (goal 120/80)  BUN   Date Value Ref Range Status   07/19/2024 9 8 - 23 mg/dL Final     Creatinine   Date Value Ref Range Status   07/19/2024 0.6 0.5 - 0.9 mg/dL Final     Potassium   Date Value Ref Range Status   07/19/2024 4.2 3.7 - 5.3 mmol/L Final

## 2024-12-27 DIAGNOSIS — I25.10 CAD S/P PERCUTANEOUS CORONARY ANGIOPLASTY: ICD-10-CM

## 2024-12-27 DIAGNOSIS — Z98.61 CAD S/P PERCUTANEOUS CORONARY ANGIOPLASTY: ICD-10-CM

## 2024-12-27 RX ORDER — CLOPIDOGREL BISULFATE 75 MG/1
75 TABLET ORAL DAILY
Qty: 90 TABLET | Refills: 3 | Status: SHIPPED | OUTPATIENT
Start: 2024-12-27

## 2025-01-02 DIAGNOSIS — E03.9 ACQUIRED HYPOTHYROIDISM: ICD-10-CM

## 2025-01-03 RX ORDER — LEVOTHYROXINE SODIUM 137 UG/1
137 TABLET ORAL DAILY
Qty: 90 TABLET | Refills: 1 | OUTPATIENT
Start: 2025-01-03

## 2025-01-03 NOTE — TELEPHONE ENCOUNTER
Gem called.    Pharmacy told her her medication request was refused and I read her the reason for the refusal also. She only had 4 pills left.    She has an appointment in April on the 17th which was rescheduled from March due to Dr Cervantes not being in the office.    Is it because she has not done her labs? She stated she will get them done tomorrow 1/4/25 or in the next couple of days.    Please advise.    Thank you.

## 2025-01-03 NOTE — TELEPHONE ENCOUNTER
I want her to do the labs tomorrow if possible, so I can refill her Synthroid, as my prior message said it did not match the refilling check because prior TSH was abnormal, it did not allow me to refill it        Future Appointments   Date Time Provider Department Center   1/21/2025  7:30 AM SCHEDULE, [facility]   4/17/2025  2:45 PM Kathy Cervantes MD fp sc [facility]

## 2025-01-03 NOTE — TELEPHONE ENCOUNTER
Needs to do the labs ASAP orders in the computer for refill for Synthroid, failed refill    Lab Results   Component Value Date    TSH 21.66 (H) 07/11/2024

## 2025-01-04 ENCOUNTER — HOSPITAL ENCOUNTER (OUTPATIENT)
Age: 70
Discharge: HOME | End: 2025-01-04
Payer: MEDICARE

## 2025-01-04 DIAGNOSIS — E11.42 TYPE 2 DIABETES MELLITUS WITH DIABETIC POLYNEUROPATHY, WITHOUT LONG-TERM CURRENT USE OF INSULIN (HCC): ICD-10-CM

## 2025-01-04 DIAGNOSIS — E03.9 ACQUIRED HYPOTHYROIDISM: ICD-10-CM

## 2025-01-04 DIAGNOSIS — E55.9 VITAMIN D DEFICIENCY: ICD-10-CM

## 2025-01-04 DIAGNOSIS — E78.5 HYPERLIPIDEMIA WITH TARGET LDL LESS THAN 100: ICD-10-CM

## 2025-01-04 DIAGNOSIS — R80.9 TYPE 2 DIABETES MELLITUS WITH MICROALBUMINURIA, WITHOUT LONG-TERM CURRENT USE OF INSULIN (HCC): ICD-10-CM

## 2025-01-04 DIAGNOSIS — E11.29 TYPE 2 DIABETES MELLITUS WITH MICROALBUMINURIA, WITHOUT LONG-TERM CURRENT USE OF INSULIN (HCC): ICD-10-CM

## 2025-01-04 DIAGNOSIS — R06.09 DOE (DYSPNEA ON EXERTION): ICD-10-CM

## 2025-01-04 DIAGNOSIS — I10 ESSENTIAL HYPERTENSION: ICD-10-CM

## 2025-01-04 LAB
25(OH)D3 SERPL-MCNC: 29.8 NG/ML (ref 30–100)
ALBUMIN SERPL-MCNC: 4.4 G/DL (ref 3.5–5.2)
ALP SERPL-CCNC: 91 U/L (ref 35–104)
ALT SERPL-CCNC: 12 U/L (ref 10–35)
ANION GAP SERPL CALCULATED.3IONS-SCNC: 10 MMOL/L (ref 9–16)
AST SERPL-CCNC: 18 U/L (ref 10–35)
BACTERIA URNS QL MICRO: ABNORMAL
BASOPHILS # BLD: 0.1 K/UL (ref 0–0.2)
BASOPHILS NFR BLD: 1 % (ref 0–2)
BILIRUB SERPL-MCNC: 0.8 MG/DL (ref 0–1.2)
BILIRUB UR QL STRIP: ABNORMAL
BNP SERPL-MCNC: 67 PG/ML (ref 0–300)
BUN SERPL-MCNC: 19 MG/DL (ref 8–23)
CALCIUM SERPL-MCNC: 9.6 MG/DL (ref 8.6–10.4)
CHLORIDE SERPL-SCNC: 95 MMOL/L (ref 98–107)
CHOLEST SERPL-MCNC: 175 MG/DL (ref 0–199)
CHOLESTEROL/HDL RATIO: 3
CLARITY UR: CLEAR
CO2 SERPL-SCNC: 27 MMOL/L (ref 20–31)
COLOR UR: YELLOW
CREAT SERPL-MCNC: 0.8 MG/DL (ref 0.7–1.2)
CREAT UR-MCNC: 355 MG/DL (ref 28–217)
EOSINOPHIL # BLD: 0.1 K/UL (ref 0–0.4)
EOSINOPHILS RELATIVE PERCENT: 1 % (ref 0–4)
EPI CELLS #/AREA URNS HPF: ABNORMAL /HPF
ERYTHROCYTE [DISTWIDTH] IN BLOOD BY AUTOMATED COUNT: 13.5 % (ref 11.5–14.9)
FOLATE SERPL-MCNC: 13.5 NG/ML (ref 4.8–24.2)
GFR, ESTIMATED: 79 ML/MIN/1.73M2
GLUCOSE SERPL-MCNC: 225 MG/DL (ref 74–99)
GLUCOSE UR STRIP-MCNC: NEGATIVE MG/DL
HCT VFR BLD AUTO: 41.6 % (ref 36–46)
HDLC SERPL-MCNC: 58 MG/DL
HGB BLD-MCNC: 13.8 G/DL (ref 12–16)
HGB UR QL STRIP.AUTO: NEGATIVE
KETONES UR STRIP-MCNC: ABNORMAL MG/DL
LDLC SERPL CALC-MCNC: 90 MG/DL (ref 0–100)
LEUKOCYTE ESTERASE UR QL STRIP: ABNORMAL
LYMPHOCYTES NFR BLD: 2.9 K/UL (ref 1–4.8)
LYMPHOCYTES RELATIVE PERCENT: 30 % (ref 24–44)
MAGNESIUM SERPL-MCNC: 1.7 MG/DL (ref 1.6–2.4)
MCH RBC QN AUTO: 31.5 PG (ref 26–34)
MCHC RBC AUTO-ENTMCNC: 33.2 G/DL (ref 31–37)
MCV RBC AUTO: 94.9 FL (ref 80–100)
MICROALBUMIN UR-MCNC: 191 MG/L (ref 0–20)
MICROALBUMIN/CREAT UR-RTO: 54 MCG/MG CREAT (ref 0–25)
MONOCYTES NFR BLD: 0.8 K/UL (ref 0.1–1.3)
MONOCYTES NFR BLD: 9 % (ref 1–7)
NEUTROPHILS NFR BLD: 59 % (ref 36–66)
NEUTS SEG NFR BLD: 5.6 K/UL (ref 1.3–9.1)
NITRITE UR QL STRIP: NEGATIVE
PH UR STRIP: 5 [PH] (ref 5–8)
PLATELET # BLD AUTO: 306 K/UL (ref 150–450)
PMV BLD AUTO: 7.8 FL (ref 6–12)
POTASSIUM SERPL-SCNC: 4.3 MMOL/L (ref 3.7–5.3)
PROT SERPL-MCNC: 7.5 G/DL (ref 6.6–8.7)
PROT UR STRIP-MCNC: ABNORMAL MG/DL
RBC # BLD AUTO: 4.38 M/UL (ref 4–5.2)
RBC #/AREA URNS HPF: ABNORMAL /HPF
SODIUM SERPL-SCNC: 132 MMOL/L (ref 136–145)
SP GR UR STRIP: 1.03 (ref 1–1.03)
T4 FREE SERPL-MCNC: 1.6 NG/DL (ref 0.9–1.7)
TRIGL SERPL-MCNC: 134 MG/DL (ref 0–149)
TSH SERPL DL<=0.05 MIU/L-ACNC: 12.9 UIU/ML (ref 0.27–4.2)
URATE SERPL-MCNC: 3.6 MG/DL (ref 2.4–5.7)
UROBILINOGEN UR STRIP-ACNC: NORMAL EU/DL (ref 0–1)
VIT B12 SERPL-MCNC: 628 PG/ML (ref 232–1245)
WBC #/AREA URNS HPF: ABNORMAL /HPF
WBC OTHER # BLD: 9.4 K/UL (ref 3.5–11)

## 2025-01-04 PROCEDURE — 36415 COLL VENOUS BLD VENIPUNCTURE: CPT

## 2025-01-04 PROCEDURE — 80053 COMPREHEN METABOLIC PANEL: CPT

## 2025-01-04 PROCEDURE — 81001 URINALYSIS AUTO W/SCOPE: CPT

## 2025-01-04 PROCEDURE — 83735 ASSAY OF MAGNESIUM: CPT

## 2025-01-04 PROCEDURE — 85025 COMPLETE CBC W/AUTO DIFF WBC: CPT

## 2025-01-04 PROCEDURE — 84443 ASSAY THYROID STIM HORMONE: CPT

## 2025-01-04 PROCEDURE — 82570 ASSAY OF URINE CREATININE: CPT

## 2025-01-04 PROCEDURE — 83880 ASSAY OF NATRIURETIC PEPTIDE: CPT

## 2025-01-04 PROCEDURE — 82043 UR ALBUMIN QUANTITATIVE: CPT

## 2025-01-04 PROCEDURE — 84439 ASSAY OF FREE THYROXINE: CPT

## 2025-01-04 PROCEDURE — 82306 VITAMIN D 25 HYDROXY: CPT

## 2025-01-04 PROCEDURE — 84550 ASSAY OF BLOOD/URIC ACID: CPT

## 2025-01-04 PROCEDURE — 82607 VITAMIN B-12: CPT

## 2025-01-04 PROCEDURE — 82746 ASSAY OF FOLIC ACID SERUM: CPT

## 2025-01-04 PROCEDURE — 80061 LIPID PANEL: CPT

## 2025-01-04 RX ORDER — LEVOTHYROXINE SODIUM 137 UG/1
137 TABLET ORAL DAILY
Qty: 90 TABLET | Refills: 1 | Status: SHIPPED | OUTPATIENT
Start: 2025-01-04

## 2025-01-04 NOTE — RESULT ENCOUNTER NOTE
Please notify patient: Her urine is very concentrated, she is very dehydrated, she needs to drink 48 to 64 ounce water daily  Proteins in the urine avoid dehydration, anti-inflammatories, continue irbesartan 300 mg daily    Vitamin D very low  Thyroid function improved, I did send a refill for Synthroid to the pharmacy  Sodium is worse than before 132, she must cut down on beer  Blood glucose high 225    Kidney function mild decreased function, stage II kidney function disease, this is very mild.  Will continue to observe  Absolutely avoid ibuprofen, naproxen, Aleve, Motrin and dehydration (just for reference normal kidney function is called kidney disease stage I)  Magnesium is borderline low, is she taking magnesium 400 Mg daily?    Otherwise labs within normal limits  continue current treatment    Future Appointments  1/21/2025  7:30 AM    SCHEDULE, [facility] * [facility]        [facility]  4/17/2025  2:45 PM    Kathy Cervantes MD    fp sc               [facility]

## 2025-01-22 ENCOUNTER — HOSPITAL ENCOUNTER (OUTPATIENT)
Dept: CT IMAGING | Age: 70
Discharge: HOME | End: 2025-01-24
Attending: INTERNAL MEDICINE
Payer: MEDICARE

## 2025-01-22 VITALS — BODY MASS INDEX: 29.26 KG/M2 | HEIGHT: 62 IN | WEIGHT: 159 LBS

## 2025-01-22 DIAGNOSIS — F17.210 TOBACCO DEPENDENCE DUE TO CIGARETTES: ICD-10-CM

## 2025-01-22 PROCEDURE — 71271 CT THORAX LUNG CANCER SCR C-: CPT

## 2025-02-16 DIAGNOSIS — R80.9 TYPE 2 DIABETES MELLITUS WITH MICROALBUMINURIA, WITHOUT LONG-TERM CURRENT USE OF INSULIN (HCC): ICD-10-CM

## 2025-02-16 DIAGNOSIS — E11.29 TYPE 2 DIABETES MELLITUS WITH MICROALBUMINURIA, WITHOUT LONG-TERM CURRENT USE OF INSULIN (HCC): ICD-10-CM

## 2025-02-17 RX ORDER — GLIPIZIDE 10 MG/1
TABLET ORAL
Qty: 270 TABLET | Refills: 1 | Status: SHIPPED | OUTPATIENT
Start: 2025-02-17

## 2025-02-17 NOTE — TELEPHONE ENCOUNTER
Please Approve or Refuse.  Send to Pharmacy per Pt's Request:      Next Visit Date:  4/17/2025   Last Visit Date: 11/14/2024    Hemoglobin A1C (%)   Date Value   11/14/2024 7.4   07/16/2024 7.3   03/13/2024 7.6             ( goal A1C is < 7)   BP Readings from Last 3 Encounters:   11/14/24 124/76   08/29/24 136/84   08/25/24 (!) 153/100          (goal 120/80)  BUN   Date Value Ref Range Status   01/04/2025 19 8 - 23 mg/dL Final     Creatinine   Date Value Ref Range Status   01/04/2025 0.8 0.7 - 1.2 mg/dL Final     Potassium   Date Value Ref Range Status   01/04/2025 4.3 3.7 - 5.3 mmol/L Final

## 2025-03-13 ENCOUNTER — HOSPITAL ENCOUNTER (OUTPATIENT)
Age: 70
Discharge: HOME | End: 2025-03-15
Payer: MEDICARE

## 2025-03-13 ENCOUNTER — RESULTS FOLLOW-UP (OUTPATIENT)
Dept: FAMILY MEDICINE CLINIC | Age: 70
End: 2025-03-13

## 2025-03-13 ENCOUNTER — OFFICE VISIT (OUTPATIENT)
Dept: FAMILY MEDICINE CLINIC | Age: 70
End: 2025-03-13
Payer: MEDICARE

## 2025-03-13 ENCOUNTER — HOSPITAL ENCOUNTER (OUTPATIENT)
Dept: GENERAL RADIOLOGY | Age: 70
Discharge: HOME | End: 2025-03-15
Attending: PHYSICAL MEDICINE & REHABILITATION
Payer: MEDICARE

## 2025-03-13 ENCOUNTER — HOSPITAL ENCOUNTER (OUTPATIENT)
Dept: GENERAL RADIOLOGY | Age: 70
Discharge: HOME | End: 2025-03-15
Payer: MEDICARE

## 2025-03-13 ENCOUNTER — HOSPITAL ENCOUNTER (OUTPATIENT)
Age: 70
Discharge: HOME | End: 2025-03-13
Payer: MEDICARE

## 2025-03-13 VITALS
SYSTOLIC BLOOD PRESSURE: 138 MMHG | TEMPERATURE: 97.8 F | HEART RATE: 65 BPM | BODY MASS INDEX: 29.63 KG/M2 | WEIGHT: 161 LBS | OXYGEN SATURATION: 97 % | HEIGHT: 62 IN | DIASTOLIC BLOOD PRESSURE: 80 MMHG

## 2025-03-13 DIAGNOSIS — R80.9 TYPE 2 DIABETES MELLITUS WITH MICROALBUMINURIA, WITHOUT LONG-TERM CURRENT USE OF INSULIN (HCC): ICD-10-CM

## 2025-03-13 DIAGNOSIS — E03.9 ACQUIRED HYPOTHYROIDISM: ICD-10-CM

## 2025-03-13 DIAGNOSIS — R06.09 DOE (DYSPNEA ON EXERTION): ICD-10-CM

## 2025-03-13 DIAGNOSIS — E11.29 TYPE 2 DIABETES MELLITUS WITH MICROALBUMINURIA, WITHOUT LONG-TERM CURRENT USE OF INSULIN (HCC): ICD-10-CM

## 2025-03-13 DIAGNOSIS — F10.21 ALCOHOL DEPENDENCE IN EARLY FULL REMISSION (HCC): ICD-10-CM

## 2025-03-13 DIAGNOSIS — R06.09 DOE (DYSPNEA ON EXERTION): Primary | ICD-10-CM

## 2025-03-13 DIAGNOSIS — M17.11 PRIMARY OSTEOARTHRITIS OF RIGHT KNEE: ICD-10-CM

## 2025-03-13 DIAGNOSIS — J44.9 CHRONIC OBSTRUCTIVE PULMONARY DISEASE, UNSPECIFIED COPD TYPE (HCC): ICD-10-CM

## 2025-03-13 PROBLEM — J98.6 ACQUIRED ELEVATED HEMIDIAPHRAGM: Status: RESOLVED | Noted: 2021-03-15 | Resolved: 2025-03-13

## 2025-03-13 LAB
ALBUMIN SERPL-MCNC: 4.3 G/DL (ref 3.5–5.2)
ALP SERPL-CCNC: 94 U/L (ref 35–104)
ALT SERPL-CCNC: 17 U/L (ref 10–35)
ANION GAP SERPL CALCULATED.3IONS-SCNC: 12 MMOL/L (ref 9–16)
AST SERPL-CCNC: 19 U/L (ref 10–35)
BASOPHILS # BLD: 0 K/UL (ref 0–0.2)
BASOPHILS NFR BLD: 0 % (ref 0–2)
BILIRUB SERPL-MCNC: 0.5 MG/DL (ref 0–1.2)
BUN SERPL-MCNC: 21 MG/DL (ref 8–23)
CALCIUM SERPL-MCNC: 9.4 MG/DL (ref 8.6–10.4)
CHLORIDE SERPL-SCNC: 98 MMOL/L (ref 98–107)
CO2 SERPL-SCNC: 25 MMOL/L (ref 20–31)
CREAT SERPL-MCNC: 0.8 MG/DL (ref 0.7–1.2)
EOSINOPHIL # BLD: 0.1 K/UL (ref 0–0.4)
EOSINOPHILS RELATIVE PERCENT: 1 % (ref 0–4)
ERYTHROCYTE [DISTWIDTH] IN BLOOD BY AUTOMATED COUNT: 13.7 % (ref 11.5–14.9)
GFR, ESTIMATED: 79 ML/MIN/1.73M2
GLUCOSE SERPL-MCNC: 86 MG/DL (ref 74–99)
HBA1C MFR BLD: 7.4 %
HCT VFR BLD AUTO: 37.2 % (ref 36–46)
HGB BLD-MCNC: 12.3 G/DL (ref 12–16)
LYMPHOCYTES NFR BLD: 2.5 K/UL (ref 1–4.8)
LYMPHOCYTES RELATIVE PERCENT: 29 % (ref 24–44)
MCH RBC QN AUTO: 30.6 PG (ref 26–34)
MCHC RBC AUTO-ENTMCNC: 32.9 G/DL (ref 31–37)
MCV RBC AUTO: 92.9 FL (ref 80–100)
MONOCYTES NFR BLD: 0.6 K/UL (ref 0.1–1.3)
MONOCYTES NFR BLD: 7 % (ref 1–7)
NEUTROPHILS NFR BLD: 63 % (ref 36–66)
NEUTS SEG NFR BLD: 5.5 K/UL (ref 1.3–9.1)
PLATELET # BLD AUTO: 298 K/UL (ref 150–450)
PMV BLD AUTO: 7.6 FL (ref 6–12)
POTASSIUM SERPL-SCNC: 4.4 MMOL/L (ref 3.7–5.3)
PROT SERPL-MCNC: 7.5 G/DL (ref 6.6–8.7)
RBC # BLD AUTO: 4 M/UL (ref 4–5.2)
SODIUM SERPL-SCNC: 135 MMOL/L (ref 136–145)
TSH SERPL DL<=0.05 MIU/L-ACNC: 3 UIU/ML (ref 0.27–4.2)
WBC OTHER # BLD: 8.7 K/UL (ref 3.5–11)

## 2025-03-13 PROCEDURE — 71045 X-RAY EXAM CHEST 1 VIEW: CPT

## 2025-03-13 PROCEDURE — 36415 COLL VENOUS BLD VENIPUNCTURE: CPT

## 2025-03-13 PROCEDURE — 3079F DIAST BP 80-89 MM HG: CPT | Performed by: FAMILY MEDICINE

## 2025-03-13 PROCEDURE — 1123F ACP DISCUSS/DSCN MKR DOCD: CPT | Performed by: FAMILY MEDICINE

## 2025-03-13 PROCEDURE — G8417 CALC BMI ABV UP PARAM F/U: HCPCS | Performed by: FAMILY MEDICINE

## 2025-03-13 PROCEDURE — 80053 COMPREHEN METABOLIC PANEL: CPT

## 2025-03-13 PROCEDURE — 3075F SYST BP GE 130 - 139MM HG: CPT | Performed by: FAMILY MEDICINE

## 2025-03-13 PROCEDURE — 3051F HG A1C>EQUAL 7.0%<8.0%: CPT | Performed by: FAMILY MEDICINE

## 2025-03-13 PROCEDURE — 84443 ASSAY THYROID STIM HORMONE: CPT

## 2025-03-13 PROCEDURE — 1090F PRES/ABSN URINE INCON ASSESS: CPT | Performed by: FAMILY MEDICINE

## 2025-03-13 PROCEDURE — 99214 OFFICE O/P EST MOD 30 MIN: CPT | Performed by: FAMILY MEDICINE

## 2025-03-13 PROCEDURE — 3017F COLORECTAL CA SCREEN DOC REV: CPT | Performed by: FAMILY MEDICINE

## 2025-03-13 PROCEDURE — G8427 DOCREV CUR MEDS BY ELIG CLIN: HCPCS | Performed by: FAMILY MEDICINE

## 2025-03-13 PROCEDURE — 1159F MED LIST DOCD IN RCRD: CPT | Performed by: FAMILY MEDICINE

## 2025-03-13 PROCEDURE — 2022F DILAT RTA XM EVC RTNOPTHY: CPT | Performed by: FAMILY MEDICINE

## 2025-03-13 PROCEDURE — 85025 COMPLETE CBC W/AUTO DIFF WBC: CPT

## 2025-03-13 PROCEDURE — 4004F PT TOBACCO SCREEN RCVD TLK: CPT | Performed by: FAMILY MEDICINE

## 2025-03-13 PROCEDURE — 1160F RVW MEDS BY RX/DR IN RCRD: CPT | Performed by: FAMILY MEDICINE

## 2025-03-13 PROCEDURE — G8399 PT W/DXA RESULTS DOCUMENT: HCPCS | Performed by: FAMILY MEDICINE

## 2025-03-13 PROCEDURE — 83036 HEMOGLOBIN GLYCOSYLATED A1C: CPT | Performed by: FAMILY MEDICINE

## 2025-03-13 PROCEDURE — 3023F SPIROM DOC REV: CPT | Performed by: FAMILY MEDICINE

## 2025-03-13 SDOH — ECONOMIC STABILITY: FOOD INSECURITY: WITHIN THE PAST 12 MONTHS, YOU WORRIED THAT YOUR FOOD WOULD RUN OUT BEFORE YOU GOT MONEY TO BUY MORE.: NEVER TRUE

## 2025-03-13 SDOH — ECONOMIC STABILITY: FOOD INSECURITY: WITHIN THE PAST 12 MONTHS, THE FOOD YOU BOUGHT JUST DIDN'T LAST AND YOU DIDN'T HAVE MONEY TO GET MORE.: NEVER TRUE

## 2025-03-13 ASSESSMENT — ENCOUNTER SYMPTOMS
COLOR CHANGE: 0
ABDOMINAL PAIN: 0
SINUS PRESSURE: 0
COUGH: 1
RECTAL PAIN: 0
WHEEZING: 0
CHEST TIGHTNESS: 0
DIARRHEA: 0
VOMITING: 0
SHORTNESS OF BREATH: 1
RHINORRHEA: 0
BACK PAIN: 0
SORE THROAT: 0
TROUBLE SWALLOWING: 0
BLOOD IN STOOL: 0
EYE REDNESS: 0
STRIDOR: 0
NAUSEA: 0
ABDOMINAL DISTENTION: 0

## 2025-03-13 ASSESSMENT — PATIENT HEALTH QUESTIONNAIRE - PHQ9
6. FEELING BAD ABOUT YOURSELF - OR THAT YOU ARE A FAILURE OR HAVE LET YOURSELF OR YOUR FAMILY DOWN: NOT AT ALL
9. THOUGHTS THAT YOU WOULD BE BETTER OFF DEAD, OR OF HURTING YOURSELF: NOT AT ALL
8. MOVING OR SPEAKING SO SLOWLY THAT OTHER PEOPLE COULD HAVE NOTICED. OR THE OPPOSITE, BEING SO FIGETY OR RESTLESS THAT YOU HAVE BEEN MOVING AROUND A LOT MORE THAN USUAL: NOT AT ALL
SUM OF ALL RESPONSES TO PHQ QUESTIONS 1-9: 0
1. LITTLE INTEREST OR PLEASURE IN DOING THINGS: NOT AT ALL
5. POOR APPETITE OR OVEREATING: NOT AT ALL
10. IF YOU CHECKED OFF ANY PROBLEMS, HOW DIFFICULT HAVE THESE PROBLEMS MADE IT FOR YOU TO DO YOUR WORK, TAKE CARE OF THINGS AT HOME, OR GET ALONG WITH OTHER PEOPLE: NOT DIFFICULT AT ALL
SUM OF ALL RESPONSES TO PHQ QUESTIONS 1-9: 0
3. TROUBLE FALLING OR STAYING ASLEEP: NOT AT ALL
4. FEELING TIRED OR HAVING LITTLE ENERGY: NOT AT ALL
2. FEELING DOWN, DEPRESSED OR HOPELESS: NOT AT ALL
SUM OF ALL RESPONSES TO PHQ QUESTIONS 1-9: 0
7. TROUBLE CONCENTRATING ON THINGS, SUCH AS READING THE NEWSPAPER OR WATCHING TELEVISION: NOT AT ALL
SUM OF ALL RESPONSES TO PHQ QUESTIONS 1-9: 0

## 2025-03-13 NOTE — RESULT ENCOUNTER NOTE
Addressed during office visit today, POC A1c 7.4, stable and well-controlled diabetes, continue treatment recommended during the office visit.

## 2025-03-13 NOTE — PROGRESS NOTES
Chief Complaint   Patient presents with    Fatigue     X 1 week ago for all symptoms     Shortness of Breath     When sitting     Dizziness     On and off      The patient (or guardian, if applicable) and other individuals in attendance with the patient were advised that Artificial Intelligence will be utilized during this visit to record, process the conversation to generate a clinical note, and support improvement of the AI technology. The patient (or guardian, if applicable) and other individuals in attendance at the appointment consented to the use of AI, including the recording.                    Fatigue (X 1 week ago for all symptoms ), Shortness of Breath (When sitting ), and Dizziness (On and off )      History of Present Illness  The patient presents for evaluation of shortness of breath, hypothyroidism, hyponatremia, and vitamin D deficiency.    She reports experiencing dyspnea, which has been a chronic issue but has recently become more persistent over the past week. She does not report any recent illness or exposure to sick individuals. 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 She has abstained from smoking and consumes alcohol sparingly, limiting herself to one or two beers per week. She does not report any chest pain. She has a history of cardiac stents and a pacemaker and is under the care of a cardiologist, with her next appointment scheduled for April 2024. She also reports occasional leg swelling, which she attributes to her socks. She is also under the care of a pulmonologist. She does not report any significant stressors. Her symptoms are alleviated when she is at rest or sleeping but are exacerbated by physical activity such as walking or climbing stairs. She has been using a nebulizer and Symbicort twice daily, which provide minimal relief.    Patient is complaining of shortness of breath, last EKG was 2 years before.  Although she denies any chest pain.

## 2025-03-13 NOTE — PROGRESS NOTES
Visit Information    Have you changed or started any medications since your last visit including any over-the-counter medicines, vitamins, or herbal medicines? no   Are you having any side effects from any of your medications? -  no  Have you stopped taking any of your medications? Is so, why? -  no    Have you seen any other physician or provider since your last visit? No  Have you had any other diagnostic tests since your last visit? No  Have you been seen in the emergency room and/or had an admission to a hospital since we last saw you? No  Have you had your routine dental cleaning in the past 6 months? no    Have you activated your [employer] account? If not, what are your barriers? Yes     Patient Care Team:  Kathy Cervantes MD as PCP - General (Family Medicine)  Kathy Cervantes MD as PCP - Empaneled Provider  Laz Castellanos MD as Consulting Physician (Pulmonology)  Wong Blank MD as Consulting Physician (Nephrology)  Kathy Simon APRN - CNP as Nurse Practitioner (Cardiology)    Medical History Review  Past Medical, Family, and Social History reviewed and does contribute to the patient presenting condition    Health Maintenance   Topic Date Due    DTaP/Tdap/Td vaccine (1 - Tdap) Never done    Shingles vaccine (2 of 2) 12/29/2020    Diabetic retinal exam  06/17/2023    Annual Wellness Visit (Medicare Advantage)  01/01/2025    A1C test (Diabetic or Prediabetic)  02/14/2025    COVID-19 Vaccine (5 - 2024-25 season) 05/01/2025    Diabetic foot exam  07/16/2025    DEXA (modify frequency per FRAX score)  07/28/2025    Depression Monitoring  11/14/2025    Breast cancer screen  11/27/2025    Colorectal Cancer Screen  11/27/2025    Diabetic Alb to Cr ratio (uACR) test  01/04/2026    Lipids  01/04/2026    GFR test (Diabetes, CKD 3-4, OR last GFR 15-59)  01/04/2026    Lung Cancer Screening &/or Counseling  01/22/2026    Flu vaccine  Completed    Pneumococcal 50+ years Vaccine  Completed

## 2025-03-14 ENCOUNTER — RESULTS FOLLOW-UP (OUTPATIENT)
Dept: GENERAL RADIOLOGY | Age: 70
End: 2025-03-14

## 2025-03-14 ENCOUNTER — RESULTS FOLLOW-UP (OUTPATIENT)
Dept: FAMILY MEDICINE CLINIC | Age: 70
End: 2025-03-14

## 2025-03-14 ENCOUNTER — HOSPITAL ENCOUNTER (OUTPATIENT)
Age: 70
Discharge: HOME | End: 2025-03-14
Payer: MEDICARE

## 2025-03-14 DIAGNOSIS — R06.09 DOE (DYSPNEA ON EXERTION): ICD-10-CM

## 2025-03-14 LAB
EKG ATRIAL RATE: 69 BPM
EKG P-R INTERVAL: 292 MS
EKG Q-T INTERVAL: 428 MS
EKG QRS DURATION: 142 MS
EKG QTC CALCULATION (BAZETT): 458 MS
EKG R AXIS: -74 DEGREES
EKG T AXIS: 99 DEGREES
EKG VENTRICULAR RATE: 69 BPM

## 2025-03-14 PROCEDURE — 93005 ELECTROCARDIOGRAM TRACING: CPT

## 2025-03-18 DIAGNOSIS — I25.10 CAD S/P PERCUTANEOUS CORONARY ANGIOPLASTY: ICD-10-CM

## 2025-03-18 DIAGNOSIS — I10 ESSENTIAL HYPERTENSION: ICD-10-CM

## 2025-03-18 DIAGNOSIS — Z98.61 CAD S/P PERCUTANEOUS CORONARY ANGIOPLASTY: ICD-10-CM

## 2025-03-18 RX ORDER — CLOPIDOGREL BISULFATE 75 MG/1
75 TABLET ORAL DAILY
Qty: 90 TABLET | Refills: 3 | Status: SHIPPED | OUTPATIENT
Start: 2025-03-18

## 2025-03-18 RX ORDER — METOPROLOL SUCCINATE 50 MG/1
50 TABLET, EXTENDED RELEASE ORAL DAILY
Qty: 90 TABLET | Refills: 3 | Status: SHIPPED | OUTPATIENT
Start: 2025-03-18

## 2025-03-18 RX ORDER — BUDESONIDE AND FORMOTEROL FUMARATE DIHYDRATE 160; 4.5 UG/1; UG/1
2 AEROSOL RESPIRATORY (INHALATION) 2 TIMES DAILY
Qty: 10.2 G | Refills: 5 | Status: SHIPPED | OUTPATIENT
Start: 2025-03-18

## 2025-03-18 NOTE — TELEPHONE ENCOUNTER
Please Approve or Refuse.  Send to Pharmacy per Pt's Request:      Next Visit Date:  4/17/2025   Last Visit Date: 3/13/2025    Hemoglobin A1C (%)   Date Value   03/13/2025 7.4   11/14/2024 7.4   07/16/2024 7.3             ( goal A1C is < 7)   BP Readings from Last 3 Encounters:   03/13/25 138/80   11/14/24 124/76   08/29/24 136/84          (goal 120/80)  BUN   Date Value Ref Range Status   03/13/2025 21 8 - 23 mg/dL Final     Creatinine   Date Value Ref Range Status   03/13/2025 0.8 0.7 - 1.2 mg/dL Final     Potassium   Date Value Ref Range Status   03/13/2025 4.4 3.7 - 5.3 mmol/L Final     Comment:     Specimen hemolysis has exceeded the interference as defined by Roche. Value may be falsely   increased. Suggest recollection if clinically indicated.

## 2025-03-18 NOTE — TELEPHONE ENCOUNTER
SYMBICORT 160-4.5 MCG/ACT AERO     Patient also requested a refill for this, it wouldn't let me hit the refill.

## 2025-04-14 RX ORDER — TETANUS AND DIPHTHERIA TOXOIDS ADSORBED 2; 2 [LF]/.5ML; [LF]/.5ML
0.5 INJECTION INTRAMUSCULAR ONCE
Qty: 0.5 ML | Refills: 0 | Status: CANCELLED | OUTPATIENT
Start: 2025-04-14 | End: 2025-04-14

## 2025-04-17 ENCOUNTER — OFFICE VISIT (OUTPATIENT)
Dept: FAMILY MEDICINE CLINIC | Age: 70
End: 2025-04-17
Payer: MEDICARE

## 2025-04-17 VITALS
SYSTOLIC BLOOD PRESSURE: 118 MMHG | HEIGHT: 62 IN | DIASTOLIC BLOOD PRESSURE: 70 MMHG | BODY MASS INDEX: 30.4 KG/M2 | HEART RATE: 63 BPM | TEMPERATURE: 98.6 F | WEIGHT: 165.2 LBS | OXYGEN SATURATION: 95 %

## 2025-04-17 DIAGNOSIS — E11.29 TYPE 2 DIABETES MELLITUS WITH MICROALBUMINURIA, WITHOUT LONG-TERM CURRENT USE OF INSULIN (HCC): ICD-10-CM

## 2025-04-17 DIAGNOSIS — N18.2 BENIGN HYPERTENSION WITH CKD (CHRONIC KIDNEY DISEASE), STAGE II: ICD-10-CM

## 2025-04-17 DIAGNOSIS — I25.10 CORONARY ARTERY DISEASE INVOLVING NATIVE CORONARY ARTERY OF NATIVE HEART WITHOUT ANGINA PECTORIS: ICD-10-CM

## 2025-04-17 DIAGNOSIS — R49.0 HOARSENESS OF VOICE: ICD-10-CM

## 2025-04-17 DIAGNOSIS — J44.1 CHRONIC OBSTRUCTIVE PULMONARY DISEASE WITH ACUTE EXACERBATION (HCC): ICD-10-CM

## 2025-04-17 DIAGNOSIS — R80.9 TYPE 2 DIABETES MELLITUS WITH MICROALBUMINURIA, WITHOUT LONG-TERM CURRENT USE OF INSULIN (HCC): ICD-10-CM

## 2025-04-17 DIAGNOSIS — Z00.00 MEDICARE ANNUAL WELLNESS VISIT, SUBSEQUENT: Primary | ICD-10-CM

## 2025-04-17 DIAGNOSIS — I12.9 BENIGN HYPERTENSION WITH CKD (CHRONIC KIDNEY DISEASE), STAGE II: ICD-10-CM

## 2025-04-17 PROCEDURE — G8427 DOCREV CUR MEDS BY ELIG CLIN: HCPCS | Performed by: FAMILY MEDICINE

## 2025-04-17 PROCEDURE — 3051F HG A1C>EQUAL 7.0%<8.0%: CPT | Performed by: FAMILY MEDICINE

## 2025-04-17 PROCEDURE — 3078F DIAST BP <80 MM HG: CPT | Performed by: FAMILY MEDICINE

## 2025-04-17 PROCEDURE — 1159F MED LIST DOCD IN RCRD: CPT | Performed by: FAMILY MEDICINE

## 2025-04-17 PROCEDURE — G8417 CALC BMI ABV UP PARAM F/U: HCPCS | Performed by: FAMILY MEDICINE

## 2025-04-17 PROCEDURE — 1160F RVW MEDS BY RX/DR IN RCRD: CPT | Performed by: FAMILY MEDICINE

## 2025-04-17 PROCEDURE — 1090F PRES/ABSN URINE INCON ASSESS: CPT | Performed by: FAMILY MEDICINE

## 2025-04-17 PROCEDURE — 1126F AMNT PAIN NOTED NONE PRSNT: CPT | Performed by: FAMILY MEDICINE

## 2025-04-17 PROCEDURE — 3023F SPIROM DOC REV: CPT | Performed by: FAMILY MEDICINE

## 2025-04-17 PROCEDURE — 2022F DILAT RTA XM EVC RTNOPTHY: CPT | Performed by: FAMILY MEDICINE

## 2025-04-17 PROCEDURE — 99214 OFFICE O/P EST MOD 30 MIN: CPT | Performed by: FAMILY MEDICINE

## 2025-04-17 PROCEDURE — 3074F SYST BP LT 130 MM HG: CPT | Performed by: FAMILY MEDICINE

## 2025-04-17 PROCEDURE — 1123F ACP DISCUSS/DSCN MKR DOCD: CPT | Performed by: FAMILY MEDICINE

## 2025-04-17 PROCEDURE — 3017F COLORECTAL CA SCREEN DOC REV: CPT | Performed by: FAMILY MEDICINE

## 2025-04-17 PROCEDURE — 1036F TOBACCO NON-USER: CPT | Performed by: FAMILY MEDICINE

## 2025-04-17 PROCEDURE — G8399 PT W/DXA RESULTS DOCUMENT: HCPCS | Performed by: FAMILY MEDICINE

## 2025-04-17 PROCEDURE — G0439 PPPS, SUBSEQ VISIT: HCPCS | Performed by: FAMILY MEDICINE

## 2025-04-17 RX ORDER — AZITHROMYCIN 250 MG/1
TABLET, FILM COATED ORAL
Qty: 6 TABLET | Refills: 0 | Status: SHIPPED | OUTPATIENT
Start: 2025-04-17 | End: 2025-04-22

## 2025-04-17 SDOH — ECONOMIC STABILITY: FOOD INSECURITY: WITHIN THE PAST 12 MONTHS, THE FOOD YOU BOUGHT JUST DIDN'T LAST AND YOU DIDN'T HAVE MONEY TO GET MORE.: NEVER TRUE

## 2025-04-17 SDOH — ECONOMIC STABILITY: FOOD INSECURITY: WITHIN THE PAST 12 MONTHS, YOU WORRIED THAT YOUR FOOD WOULD RUN OUT BEFORE YOU GOT MONEY TO BUY MORE.: NEVER TRUE

## 2025-04-17 ASSESSMENT — PATIENT HEALTH QUESTIONNAIRE - PHQ9
5. POOR APPETITE OR OVEREATING: NOT AT ALL
4. FEELING TIRED OR HAVING LITTLE ENERGY: MORE THAN HALF THE DAYS
SUM OF ALL RESPONSES TO PHQ QUESTIONS 1-9: 2
SUM OF ALL RESPONSES TO PHQ QUESTIONS 1-9: 2
10. IF YOU CHECKED OFF ANY PROBLEMS, HOW DIFFICULT HAVE THESE PROBLEMS MADE IT FOR YOU TO DO YOUR WORK, TAKE CARE OF THINGS AT HOME, OR GET ALONG WITH OTHER PEOPLE: NOT DIFFICULT AT ALL
SUM OF ALL RESPONSES TO PHQ QUESTIONS 1-9: 2
8. MOVING OR SPEAKING SO SLOWLY THAT OTHER PEOPLE COULD HAVE NOTICED. OR THE OPPOSITE, BEING SO FIGETY OR RESTLESS THAT YOU HAVE BEEN MOVING AROUND A LOT MORE THAN USUAL: NOT AT ALL
3. TROUBLE FALLING OR STAYING ASLEEP: NOT AT ALL
SUM OF ALL RESPONSES TO PHQ QUESTIONS 1-9: 2
2. FEELING DOWN, DEPRESSED OR HOPELESS: NOT AT ALL
6. FEELING BAD ABOUT YOURSELF - OR THAT YOU ARE A FAILURE OR HAVE LET YOURSELF OR YOUR FAMILY DOWN: NOT AT ALL
9. THOUGHTS THAT YOU WOULD BE BETTER OFF DEAD, OR OF HURTING YOURSELF: NOT AT ALL
1. LITTLE INTEREST OR PLEASURE IN DOING THINGS: NOT AT ALL
7. TROUBLE CONCENTRATING ON THINGS, SUCH AS READING THE NEWSPAPER OR WATCHING TELEVISION: NOT AT ALL

## 2025-04-17 ASSESSMENT — LIFESTYLE VARIABLES
HOW OFTEN DO YOU HAVE A DRINK CONTAINING ALCOHOL: MONTHLY OR LESS
HOW MANY STANDARD DRINKS CONTAINING ALCOHOL DO YOU HAVE ON A TYPICAL DAY: 1 OR 2

## 2025-04-17 NOTE — ASSESSMENT & PLAN NOTE
Chronic, stable, fairly controlled type 2 diabetes mellitus  Chronic microalbuminuria, microalbumin creatinine ratio 54 on 1/4/2025, it was 124 on 11/18/2023, it was 563 on 6/30/2023, so overall improving, continue irbesartan/ARB    Lab Results   Component Value Date    LABA1C 7.4 03/13/2025    LABA1C 7.4 11/14/2024    LABA1C 7.3 07/16/2024     Start Trulicity    - A1c level recorded as 7.4 on 03/13/2025, indicating stable control; blood sugar was 200 this morning per self monitoring.  - Trulicity prescribed to aid in weight loss and further control diabetes; continue current regimen of metformin and glipizide.  - Discontinue Trulicity if experiencing side effects such as constipation, abdominal pain, or nausea; increase dosage to step 2 after 4 weeks if no side effects are reported.  Continue glipizide 20 Mg in the morning and 10 Mg in the evening, metformin XR 1000 Mg daily  Self-monitoring blood glucose daily  - Incorporate green grapes into diet due to lower sugar content compared to other fruits.  She reports . Burning feet due to diabetic polyneuropathy  - Advised to apply IcyHot to feet every evening.  Orders:    ESTABLISHED, MOD MDM, 30-39 MIN [76386]    dulaglutide (TRULICITY) 0.75 MG/0.5ML SOAJ SC injection; Inject 0.5 mLs into the skin once a week

## 2025-04-17 NOTE — PATIENT INSTRUCTIONS
Learning About Being Active as an Older Adult  Why is being active important as you get older?     Being active is one of the best things you can do for your health. And it's never too late to start. Being active--or getting active, if you aren't already--has definite benefits. It can:  Give you more energy,  Keep your mind sharp.  Improve balance to reduce your risk of falls.  Help you manage chronic illness with fewer medicines.  No matter how old you are, how fit you are, or what health problems you have, there is a form of activity that will work for you. And the more physical activity you can do, the better your overall health will be.  What kinds of activity can help you stay healthy?  Being more active will make your daily activities easier. Physical activity includes planned exercise and things you do in daily life. There are four types of activity:  Aerobic.  Doing aerobic activity makes your heart and lungs strong.  Includes walking, dancing, and gardening.  Aim for at least 2½ hours spread throughout the week.  It improves your energy and can help you sleep better.  Muscle-strengthening.  This type of activity can help maintain muscle and strengthen bones.  Includes climbing stairs, using resistance bands, and lifting or carrying heavy loads.  Aim for at least twice a week.  It can help protect the knees and other joints.  Stretching.  Stretching gives you better range of motion in joints and muscles.  Includes upper arm stretches, calf stretches, and gentle yoga.  Aim for at least twice a week, preferably after your muscles are warmed up from other activities.  It can help you function better in daily life.  Balancing.  This helps you stay coordinated and have good posture.  Includes heel-to-toe walking, stanley chi, and certain types of yoga.  Aim for at least 3 days a week.  It can reduce your risk of falling.  Even if you have a hard time meeting the recommendations, it's better to be more active

## 2025-04-17 NOTE — ASSESSMENT & PLAN NOTE
Chronic, worsening, with mild acute exacerbation  Will treat bronchitis with Z-Kayode, will avoid steroids due to diabetes and mild acute exacerbation  - Shortness of breath may be associated with vocal cords dysfunction as well.  - Presence of excessive mucus could indicate a COPD exacerbation or bronchitis.  - Z-Kayode prescribed for bronchitis; advised to perform nebulizer treatments every morning and evening for the next week.  Abstain from smoking, she recently quit smoking 12/1/2024  - Referral to ENT specialist for vocal cord evaluation; consult pulmonologist about Trelegy as a new inhaler if condition does not improve.    Continue for now Symbicort 2 puffs twice a day and rinse mouth after use, albuterol as needed and nebulizer treatments  Orders:    ESTABLISHED, MOD MDM, 30-39 MIN [31657]    azithromycin (ZITHROMAX) 250 MG tablet; 500 mg orally on day one followed by 250 mg daily on days two through five

## 2025-04-17 NOTE — PROGRESS NOTES
&/or Counseling  01/22/2026    Depression Monitoring  03/13/2026    GFR test (Diabetes, CKD 3-4, OR last GFR 15-59)  03/13/2026    Flu vaccine  Completed    Pneumococcal 50+ years Vaccine  Completed    Respiratory Syncytial Virus (RSV) Pregnant or age 60 yrs+  Completed    Hepatitis C screen  Completed    Hepatitis A vaccine  Aged Out    Hib vaccine  Aged Out    Polio vaccine  Aged Out    Meningococcal (ACWY) vaccine  Aged Out    Meningococcal B vaccine  Aged Out    Hepatitis B vaccine  Discontinued    HIV screen  Discontinued             
Effort: Pulmonary effort is normal. No respiratory distress.      Breath sounds: Examination of the right-middle field reveals decreased breath sounds. Examination of the left-middle field reveals decreased breath sounds. Examination of the right-lower field reveals decreased breath sounds. Examination of the left-lower field reveals decreased breath sounds. Decreased breath sounds present. No wheezing or rales.      Comments: Wet cough during the encounter  Chest:      Chest wall: No tenderness.   Abdominal:      General: Bowel sounds are normal. There is no distension.      Palpations: Abdomen is soft. There is no hepatomegaly or splenomegaly.      Tenderness: There is no abdominal tenderness.      Comments: Obese abdomen   Musculoskeletal:         General: No tenderness. Normal range of motion.      Cervical back: Normal range of motion and neck supple.      Right lower leg: No edema.      Left lower leg: No edema.   Skin:     General: Skin is warm and dry.      Capillary Refill: Capillary refill takes less than 2 seconds.      Findings: No rash.   Neurological:      Mental Status: She is alert and oriented to person, place, and time.      Motor: No abnormal muscle tone.      Gait: Gait normal.      Deep Tendon Reflexes: Reflexes normal.      Reflex Scores:       Patellar reflexes are 2+ on the right side and 2+ on the left side.  Psychiatric:         Mood and Affect: Mood is anxious.         Behavior: Behavior normal.         Thought Content: Thought content normal.         Judgment: Judgment normal.               On this date 4/17/2025 I have spent 34 minutes reviewing previous notes, test results and face to face with the patient discussing the diagnosis and importance of compliance with the treatment plan as well as documenting on the day of the visit.        The patient (or guardian, if applicable) and other individuals in attendance with the patient were advised that Artificial Intelligence will be utilized

## 2025-04-18 ENCOUNTER — TELEPHONE (OUTPATIENT)
Dept: ADMINISTRATIVE | Age: 70
End: 2025-04-18

## 2025-04-18 NOTE — TELEPHONE ENCOUNTER
Appt scheduled with patient. Clinic directions and call back information provided. Patient voiced understanding. No additional needs.

## 2025-04-21 RX ORDER — PRAVASTATIN SODIUM 80 MG/1
80 TABLET ORAL
Qty: 90 TABLET | Refills: 3 | Status: SHIPPED | OUTPATIENT
Start: 2025-04-21

## 2025-04-28 ENCOUNTER — TRANSCRIBE ORDERS (OUTPATIENT)
Dept: ADMINISTRATIVE | Age: 70
End: 2025-04-28

## 2025-04-28 DIAGNOSIS — R91.1 PULMONARY NODULE: Primary | ICD-10-CM

## 2025-05-09 ENCOUNTER — RESULTS FOLLOW-UP (OUTPATIENT)
Dept: FAMILY MEDICINE CLINIC | Age: 70
End: 2025-05-09

## 2025-05-09 NOTE — RESULT ENCOUNTER NOTE
Management per cardiologist, already addressed via telephone encounter, ejection fraction normal 67%, normal diastolic function    Future Appointments  6/5/2025   2:00 PM    Leonel Saavedra MD     [facility]                [facility] AMB  7/3/2025   8:15 AM    SCHEDULE, [facility] * [facility]        [facility]  8/19/2025  4:00 PM    Kathy Cervantes MD fp [facility] ECC DEP  4/21/2026  3:30 PM    Kathy Cervantes MD    [facility] ECC DEP

## 2025-05-09 NOTE — RESULT ENCOUNTER NOTE
Management per cardiologist, already addressed via telephone encounter, low risk stress test    Future Appointments  6/5/2025   2:00 PM    Leonel Saavedra MD     DPED                [facility] AMB  7/3/2025   8:15 AM    SCHEDULE, [facility] * [facility]        [facility] C  8/19/2025  4:00 PM    Kathy Cervantes MD    [facility] ECC DEP  4/21/2026  3:30 PM    Kathy Cervantes MD    [facility] ECC DEP

## 2025-05-16 DIAGNOSIS — E11.29 TYPE 2 DIABETES MELLITUS WITH MICROALBUMINURIA, WITHOUT LONG-TERM CURRENT USE OF INSULIN (HCC): ICD-10-CM

## 2025-05-16 DIAGNOSIS — I10 ESSENTIAL HYPERTENSION: ICD-10-CM

## 2025-05-16 DIAGNOSIS — R80.9 TYPE 2 DIABETES MELLITUS WITH MICROALBUMINURIA, WITHOUT LONG-TERM CURRENT USE OF INSULIN (HCC): ICD-10-CM

## 2025-05-16 RX ORDER — DULAGLUTIDE 0.75 MG/.5ML
INJECTION, SOLUTION SUBCUTANEOUS
Qty: 6 ML | Refills: 3 | Status: SHIPPED | OUTPATIENT
Start: 2025-05-16

## 2025-05-16 RX ORDER — IRBESARTAN 300 MG/1
TABLET ORAL
Qty: 90 TABLET | Refills: 3 | Status: SHIPPED | OUTPATIENT
Start: 2025-05-16

## 2025-05-16 NOTE — TELEPHONE ENCOUNTER
Please Approve or Refuse.  Send to Pharmacy per Pt's Request:      Next Visit Date:  8/19/2025   Last Visit Date: 4/17/2025    Hemoglobin A1C (%)   Date Value   03/13/2025 7.4   11/14/2024 7.4   07/16/2024 7.3             ( goal A1C is < 7)   BP Readings from Last 3 Encounters:   05/02/25 130/78   04/29/25 130/78   04/17/25 118/70          (goal 120/80)  BUN   Date Value Ref Range Status   03/13/2025 21 8 - 23 mg/dL Final     Creatinine   Date Value Ref Range Status   03/13/2025 0.8 0.7 - 1.2 mg/dL Final     Potassium   Date Value Ref Range Status   03/13/2025 4.4 3.7 - 5.3 mmol/L Final     Comment:     Specimen hemolysis has exceeded the interference as defined by [employer]. Value may be falsely   increased. Suggest recollection if clinically indicated.

## 2025-05-19 DIAGNOSIS — K21.9 GASTROESOPHAGEAL REFLUX DISEASE WITHOUT ESOPHAGITIS: ICD-10-CM

## 2025-05-19 RX ORDER — FAMOTIDINE 20 MG/1
20 TABLET, FILM COATED ORAL 2 TIMES DAILY
Qty: 180 TABLET | Refills: 1 | Status: SHIPPED | OUTPATIENT
Start: 2025-05-19

## 2025-06-03 NOTE — H&P
Discharge Summary  [facility]    Patient Name Dahiana Granado   MRN: [id number]   YOB: 1995       Admit date: 5/25/2025   Discharge date:  6/3/2025       Disposition:                   Home    Admitting Physician: Charlotte Hunter DO   Primary care provider: Soren Fischer APNP  Discharge Physician: Chet Maza MD    This is a tele hospitalist visit using zoom technology and electronic stethoscope with the help of staff at the bedside    This visit is being performed virtually via Non-integrated Video Visit. Consent to treat includes permission to submit charges to the patient's insurance. It was shared that without being seen and evaluated in person, there is a risk that the information and/or assessment may be incomplete or inaccurate. This video visit may be discontinued by patient or clinician, if it is felt that the videoconferencing connections are not adequate for her situation.   Clinical Location: Home  Dahiana's location [facility] and is physically present in   the [facility] at the time of this visit.      Primary Discharge Diagnoses:  Acute UTI secondary to MDRO Pseudomonas  Left hydronephrosis status post cystoscopy and balloon dilation of ureteral stricture and stent placement on 5/25/2025  Left flank pain  Tachycardia  Left gluteal abscess status post I&D and wound care  History of [place] Barr[place]  Hidradenitis suppurativa with no active lesion  DM type II  Unspecified type bipolar disorder    Principal Problem:    Urinary tract infection without hematuria, site unspecified  Resolved Problems:    * No resolved hospital problems. *      Past Medical History:    Depressive disorder                                           Anxiety                                                       Headache                                                      Chronic neck pain                                             History of chlamydia infection                  [facility]  Procedure History and Physical Update          Patient Name: Gay Carrel  MRN:    [id number]  YOB: 1955  Date of evaluation:  3/8/2022    Procedure: PCI   Indication: Recent Cardiac  Cath showed obstructive CAD in LAD and RCA        Please refer to the office note completed by Dr. Alisson Weinberg  on 03/05/2022 in the medical record and note that:    [x] I have examined the patient and reviewed the H&P/Consult and there are no changes to be made to the assessment or plan. [] I have examined the patient and reviewed the H&P/Consult and have noted the following changes:    Past Medical History:   Diagnosis Date    Arthritis     Asthma     Diabetes mellitus ([facility] [address]     Dizzy     Family history of breast cancer in sister 07/02/2020    Holter monitor, abnormal     Hyperlipidemia     Hypertension     Osteopenia determined by x-ray 01/19/2021    Research study patient 02/28/2022    [facility]    SOB (shortness of breath)     Thyroid condition        Past Surgical History:   Procedure Laterality Date    CARDIAC CATHETERIZATION  02/28/2022    CARDIAC PACEMAKER PLACEMENT  02/28/2022    DILATION AND CURETTAGE OF UTERUS      HYSTEROSCOPY  10/07/14    D&C poypectomy with myosure    TONSILLECTOMY      TUBAL LIGATION      16yrs ago. Family History   Problem Relation Age of Onset    Diabetes Mother     Diabetes Father     Breast Cancer Sister         after age 48        Allergies   Allergen Reactions    Lisinopril Other (See Comments)     cough       Prior to Admission medications    Medication Sig Start Date End Date Taking?  Authorizing Provider   glipiZIDE (GLUCOTROL) 10 MG tablet Take 2 tablets by mouth 2 times daily Dose increased 12/3/2021 2/8/22   Cecile Cesar MD   levothyroxine (SYNTHROID) 200 MCG tablet TAKE 1 TABLET BY MOUTH EVERY DAY BEFORE BREAKFAST 1/3/22   Cecile Cesar MD   metFORMIN (GLUCOPHAGE-XR) 500 MG extended release                Exercise-induced asthma (CMD)                                   Comment: resolved    Folate deficiency                                             Thiamine deficiency                                           CIDP (chronic inflammatory demyelinating polyn* 02/02/2024      Comment: Symptoms started 12/18/2023    Liver disease                                                 Type 2 diabetes mellitus without complication,* 1/1/2024      Nausea and vomiting                             4/10/2025     Consultations:  IP CONSULT TO INFECTIOUS DISEASES  IP CONSULT TO RN WOUND CARE  IP CONSULT TO NUTRITION SERVICES  Transfusions: None  Procedures: None    Hospital Course:  30-year-old female patient with past medical history of DM type II, hidradenitis suppurativa, [place] Barré presented to ED with a complaint of nausea and vomiting.  Patient has been treated for UTI.  On 5/25/2025 she underwent cystoscopy and balloon dilatation of urethral stricture and stent placement    Cultures positive for MDRO Pseudomonas aeruginosa.  ID was consulted and she received treatment with ceftolozane/tazobactam for total of 10 days.  She completed treatment on 6/3/2025.  - Patient needs to follow-up with urology at [facility].  Patient provided detail of the consulting provider.  Advised her to call and set an appointment.  Also coordinated with primary urologist.    Patient also had tachycardia during this admission.  Extensive workup including TSH is within normal range.  D-dimer elevated and CT chest was negative for PE.  Medications were adjusted.  Echocardiogram done on 5/30/2025 showed normal EF and no other abnormalities.  Patient was started on low-dose metoprolol which she tolerated very well and her heart rate was optimally controlled.  Discharge patient on metoprolol.  Advised her to follow-up with her primary care physician.  Also advised to check her blood pressure at home.  Patient reports that she has blood  tablet Take 1 tablet by mouth daily (with breakfast) Dose decreased 12/3/2021 12/3/21   Yoly Chicas MD   omeprazole (PRILOSEC) 20 MG delayed release capsule TAKE 1 CAPSULE BY MOUTH EVERY DAY 11/15/21   Yoly Chicas MD   simethicone (MYLICON) 80 MG chewable tablet Take 1 tablet by mouth 4 times daily as needed for Flatulence 11/2/21   Jeffery Portillo, APRN - CNP   Respiratory Therapy Supplies (NEBULIZER/TUBING/MOUTHPIECE) KIT Dx. COPD, needs nebulizer supplies 9/23/21   Yoly Chicas MD   albuterol (PROVENTIL) (2.5 MG/3ML) 0.083% nebulizer solution Take 3 mLs by nebulization every 6 hours as needed for Wheezing or Shortness of Breath 9/23/21   Yoly Chicas MD   sertraline (ZOLOFT) 100 MG tablet Take 1 tablet by mouth daily 9/16/21   Yoly Chicas MD   amLODIPine (NORVASC) 10 MG tablet TAKE 1 TABLET BY MOUTH EVERY DAY 8/16/21   Yoly Chicas MD   irbesartan-hydroCHLOROthiazide (AVALIDE) 150-12.5 MG per tablet TAKE 1 TABLET BY MOUTH DAILY 6/25/21   Yoly Chicas MD   blood glucose test strips (ASCENSIA AUTODISC VI;ONE TOUCH ULTRA TEST VI) strip 1 each by In Vitro route daily As needed.  3/3/21   Yoly Chicas MD   pravastatin (PRAVACHOL) 40 MG tablet Take 1 tablet by mouth every evening For cholesterol 2/16/21   Yoly Chicas MD   vitamin D (ERGOCALCIFEROL) 1.25 MG (98153 UT) CAPS capsule TAKE 1 CAPSULE BY MOUTH ONE TIME PER WEEK  Patient taking differently: Take 1 Units by mouth once a week Friday 9/29/20   Yoly Chicas MD   albuterol sulfate HFA (VENTOLIN HFA) 108 (90 Base) MCG/ACT inhaler Inhale 2 puffs into the lungs every 6 hours as needed for Wheezing or Shortness of Breath (cough) INHALE 2 PUFFS INTO THE LUNGS 2 TIMES DAILY AS NEEDED FOR WHEEZING 9/14/20   Yoly Chicas MD   aspirin EC 81 MG EC tablet Take 1 tablet by mouth daily 7/2/20   Yoly Chicas MD   fluticasone (FLONASE) 50 MCG/ACT nasal spray 2 sprays by Each Nostril route daily  Patient taking differently: 2 sprays by Each Nostril route daily as needed for Rhinitis  2/10/20   Nely Santiago MD         There were no vitals filed for this visit. Constitutional and General Appearance:   alert, cooperative, no distress and appears stated age  [de-identified]:  · PERRL, EOMI  Respiratory:  · Normal excursion and expansion without use of accessory muscles  · Resp Auscultation:  Good respiratory effort. No for increased work of breathing. On auscultation: clear to auscultation bilaterally  Cardiovascular:  · Regular rate and rhythm. · S1/S2  · No murmurs. · The apical impulse is not displaced  Abdomen:  · Soft  · Bowel sounds present  · Non-tender to palpation  Extremities:  · No cyanosis or clubbing  · Lower extremity edema: No.  Skin:  · Warm and dry  Neurological:  · Alert and oriented. · Moves all extremities well      1 Recent cardiac cath - LAD: Mid area 75% and distal 70% stenosis   RCA: Mid area 75% stenosis  Distal 80% stenosis   2 HTN   3 CHB s/p PPM last week     Plan:  · Proceed with planned procedure. · Further orders to follow. Pre Procedure Conscious Sedation Data:     ASA Class:                  [] I [] II [x] III [] IV     Mallampati Class:       [] I [] II [x] III [] IV    Risks, benefits, and alternatives of cardiac catheterization were discussed, in detail, with patient. Risks include, but not limited to, bleeding, requiring blood transfusion, vascular complication requiring surgery, renal failure with need of dialysis, CVA, MI, death and anesthesia complications including intubation were discussed. Patient verbalized understanding and agreed to proceed with the procedure understanding the above risks and alternatives to the procedure.       Electronically signed on 03/08/22 at 8:01 AM by:    Bev Marcelino MD, MD   Fellow, [id number] [facility] pressure cuff at home.    She is advised to follow-up with the urologist as well as primary care physician        Code status: Full Resuscitation    Discharge Labs:  Recent Labs   Lab 25  0533 25  0514 25  1237 25  0504 25  0526   SODIUM 145 146*  --  147* 145   POTASSIUM 4.1 4.1 3.8 3.2* 3.9   CHLORIDE 107 109  --  110 111*   CO2 26 28  --  26 28   BUN 5* 5*  --  5* 10   CREATININE 0.84 0.83  --  0.74 0.88   GLUCOSE 119* 106*  --  100* 97   WBC 6.0  --   --  5.6 6.5   HGB 8.8* 7.8*  --  7.6* 7.4*   HCT 29.3* 25.9*  --  24.0* 24.2*     --   --  209 195       Microbiology Results       None            Significant Diagnostic Studies and Procedures:  TRANSTHORACIC ECHO(TTE) COMPLETE W/ W/O IMAGING AGENT  Result Date: 2025  Narrative: Final Impressions   * Normal left ventricular chamber size.   * Hyperdynamic left ventricular systolic function.   * Normal left ventricular wall thickness.   * No left ventricular regional wall motion abnormalities.   * Normal right ventricular size and systolic function.   * RVSP could not be calculated due to incomplete tricuspid regurgitation velocity profile.   * No significant valve abnormalities.   * No pericardial effusion.   * Normal IVC dimension with >50% respiratory change of the inferior vena cava.   * No prior study available for comparison. Patient Info Name:     Dahiana Granado Age:     30 years :     1995 Gender:     Female MRN:     [id number] Accession #:     [id number] Ht:     180 cm Wt:     108 kg BSA:     2.36 m2 HR:     114 bpm BP:     153 /     80 mmHg Heart Rhythm:     Tachycardia Technical Quality:     Fair Exam Date:     2025 10:22 AM Patient Status:     U Exam Type:     TRANSTHORACIC ECHO(TTE) COMPLETE W/ W/O IMAGING AGENT Exam Location:     [facility] Study Info Indications     HYPOTENSION - Echo/Doppler/Color with Definity contrast. Contrast/Agitated Saline ------------------------------ Contrast/Ag. Saline:      Definity Amount:     2.00 ml Administered By:     Jeison Brandon RDCS Existing IV Access:     Yes IV Access Condition:     Patent with no signs of infiltration Staff Sonographer:     Jeison Brandon RDCS Ordering Physician:     Daryl Singletary Left Ventricle   Normal left ventricular chamber size. Hyperdynamic left ventricular systolic function. Normal left ventricular wall thickness. No left ventricular regional wall motion abnormalities. Normal diastolic function. Right Ventricle   Normal right ventricular size and systolic function. RVSP could not be calculated due to incomplete tricuspid regurgitation velocity profile. LV EF:     76 % Left Atrium   Normal left atrial chamber size. Left atrial volume index of 32 ml/m2. Right Atrium   Normal right atrial chamber size. Aortic Valve   Trileaflet aortic valve. No aortic valve regurgitation. No aortic valve stenosis. Pulmonic Valve   Pulmonary valve not well visualized. Mitral Valve   Structurally normal mitral valve. No mitral valve stenosis. Trivial mitral valve regurgitation. Tricuspid Valve   Structurally normal tricuspid valve. Trivial tricuspid valve regurgitation. Other Findings   Definity contrast administered to better visualize endocardial definition. Pericardium/Pleural   No pericardial effusion. Inferior Vena Cava   Normal IVC dimension with >50% respiratory change of the inferior vena cava. Aorta   Aorta not well visualized. Left Ventricular Outflow Tract ---------------------------------------------------------------------- Name                                 Value        Normal ---------------------------------------------------------------------- LVOT Doppler ---------------------------------------------------------------------- LVOT Peak Velocity                 1.7 m/s               LVOT Peak Gradient                 11 mmHg               LVOT Mean Gradient                  6 mmHg               LVOT VTI                           27.3 cm                LVOT VTI/AV VTI Ratio                 0.82               LVOT Stroke Volume                 80.4 ml               LVOT Stroke Volume Index        34.0 ml/m2               LVOT CI                         3.88 L/min/m2 Mitral Valve ---------------------------------------------------------------------- Name                                 Value        Normal ---------------------------------------------------------------------- MV Doppler ---------------------------------------------------------------------- MV Peak Gradient                    6 mmHg               MV Mean Gradient                    3 mmHg               MV Decel Orange                   431 cm/s2               MV Area (Cont Eq VTI)              4.7 cm2               MV Diastolic Function ---------------------------------------------------------------------- MV E Peak Velocity                 1.0 m/s               MV A Peak Velocity                 1.1 m/s               MV E/A                                 0.9               MV Decel Time                       229 ms               MV Annular TDI ---------------------------------------------------------------------- MV Septal e' Velocity             0.15 m/s        >=0.08 MV E/e' (Septal)                       6.4         <=8.0 MV Lateral e' Velocity            0.12 m/s        >=0.10 MV Lateral a' Velocity            0.11 m/s               MV E/e' (Lateral)                      8.0               MV e' Average                   13.91 cm/s               MV E/e' (Average)                      7.2 Tricuspid Valve ---------------------------------------------------------------------- Name                                 Value        Normal ---------------------------------------------------------------------- TV Annular TDI ---------------------------------------------------------------------- TV Lateral Petra s' Velocity        0.22 m/s Aorta  ---------------------------------------------------------------------- Name                                 Value        Normal ---------------------------------------------------------------------- Ascending Aorta ---------------------------------------------------------------------- Prox Asc Ao Diameter                2.9 cm       2.3-3.1 Prox Asc Ao Diameter Index       1.2 cm/m2       1.3-1.9 Mid Asc Ao Diameter                 2.9 cm Aortic Valve ---------------------------------------------------------------------- Name                                 Value        Normal ---------------------------------------------------------------------- AV Doppler ---------------------------------------------------------------------- AV Peak Velocity                   2.1 m/s               AV Peak Gradient                   17 mmHg               AV Mean Gradient                    9 mmHg           <20 AV VTI                             33.2 cm               AV Area (Cont Eq VTI)              2.4 cm2               AV Area Index (Cont Eq VTI)     1.02 cm2/m2               AV Area (Cont Eq Emerson)              2.4 cm2               AV Area Index (Cont Eq Emerson)     1.01 cm2/m2               AV V1/V2 Ratio                        0.81 Ventricles ---------------------------------------------------------------------- Name                                 Value        Normal ---------------------------------------------------------------------- LV Dimensions 2D/MM ----------------------------------------------------------------------  IVS Diastolic Thickness (2D)                                1.4 cm       0.6-0.9 LVID Diastole (2D)                  4.8 cm       3.8-5.2  LVIW Diastolic Thickness (2D)                                1.4 cm       0.6-0.9 LVID Systole (2D)                   3.4 cm       2.2-3.5 LVOT Diameter                       1.9 cm               LV Mass (2D Cubed)                 271.0 g    67.0-162.0 LV Mass Index (2D  [place])          115 g/m2         43-95  Relative Wall Thickness (2D)                                  0.59               LV Fractional Shortening/Ejection Fraction 2D/MM ----------------------------------------------------------------------  LV Diastolic Volume (BP MOD)                                 64 ml          LV Diastolic Volume Index (BP MOD)                        27.2 ml/m2     29.0-61.0 LV Systolic Volume (BP MOD)          15 ml         14-42 LV EF (BP MOD)                        76 %         54-74 LV Myocardial Strain ----------------------------------------------------------------------  LV End Longitudinal Strain Global                             -15.1 %               RV Dimensions 2D/MM ---------------------------------------------------------------------- RVID Diastole (2D)                  3.1 cm       2.5-3.5 TAPSE                               2.6 cm         >=1.6 RV Systolic Pressure ---------------------------------------------------------------------- RA Pressure                         8 mmHg           <=3 Atria ---------------------------------------------------------------------- Name                                 Value        Normal ---------------------------------------------------------------------- LA Dimensions ---------------------------------------------------------------------- LA Dimension (2D)                   4.2 cm       2.7-3.8 LA Volume (BP A-L)                   76 ml         22-52 LA Volume Index (BP A-L)          32 ml/m2          <=34 Report Signatures Finalized by Wilmer Perez DO on 05/30/2025 02:49 PM    CTA CHEST PULMONARY EMBOLISM  Result Date: 5/28/2025  Narrative: PROCEDURE:  CTA CHEST PULMONARY EMBOLISM. HISTORY: 30 years Female. Tachycardia TECHNIQUE:  CTA pulmonary angiography of the chest with sagittal and coronal reformats. 75 mL of Omnipaque 350 was administered intravenously. 3D reformats were included. COMPARISON: None. PULMONARY CTA FINDINGS:  Suboptimal pulm angiography due to body habitus and the timing of the contrast bolus. No large central embolism, but evaluation for small emboli in the segmental and subsegmental arteries is extremely limited. OTHER FINDINGS:  Support Devices: None. Heart/Pericardium/Great Vessels: Normal-sized heart. No definite calcific coronary artery atherosclerosis. No pericardial effusion. Normal-caliber thoracic aorta. Normal-caliber main pulmonary artery. Pleural Spaces: Clear pleural spaces. Mediastinum/Verónica: No mediastinal or hilar lymph node enlargement. Neck Base/Chest Wall/Diaphragm/Upper Abdomen: No supraclavicular or axillary lymph node enlargement. Splenomegaly, 16 cm anterior-posterior. Partially imaged left ureteral stent. No aggressive osseous lesion. Lungs/Central Airways: Clear central airways. No focal consolidation or suspicious nodules. Mild lingular atelectasis.     Impression: IMPRESSION:  1.  Limited assessment for pulmonary emboli. No central/lobar embolism. 2.  No acute pulmonary abnormality. 3.  Splenomegaly. 4.  Left ureteral stent. Previously seen hydronephrosis has improved but assessment is limited. Electronically Signed by: Kiran Estrella MD Signed on: 5/28/2025 2:24 PM Created on Workstation ID: [id number] Signed on Workstation ID: [id number]    CT HEAD WO CONTRAST  Result Date: 5/25/2025  Narrative: PROCEDURE: CT HEAD WO CONTRAST TECHNIQUE: Computerized tomography of the head was performed without contrast material. Techniques to minimize radiation exposure dose to as low as reasonably achievable. HISTORY: AMS. N39.0 Urinary tract infection without hematuria, site unspecified COMPARISONS: 4/12/2025. 9/30/2015. FINDINGS: Skull and scalp: Normal. Paranasal sinuses: Normal. Ventricles and subarachnoid spaces: Normal. Cerebrum: Stable configuration, size, and features of right frontal inferomedial cystic low-density lesion with no change since 2015. No acute intracranial hemorrhage, acute infarction,  or mass. Cerebellum and brainstem: No acute intracranial hemorrhage, acute infarction, or mass. Vasculature: Atherosclerotic carotid siphons. Other: None.     Impression: IMPRESSION: 1. No acute intracranial hemorrhage, mass lesion, or CT findings for acute infarct. Electronically Signed by: Jose De Jesus Kaplan MD Signed on: 5/25/2025 5:53 PM Created on Workstation ID: [id number] Signed on Workstation ID: [id number]    FL INTRAOPERATIVE C ARM WITH REPORT  Result Date: 5/25/2025  Narrative: EXAMINATION: FL INTRAOPERATIVE C ARM WITH REPORT HISTORY: N39.0 Urinary tract infection without hematuria, site unspecified COMPARISON: FINDINGS/    Impression: IMPRESSION: A left ureteral stent is present Please see the procedural notes. Electronically Signed by: Tayo Duran MD Signed on: 5/25/2025 1:14 PM Created on Workstation ID: [id number] Signed on Workstation ID: [id number]    US KIDNEY BILATERAL  Result Date: 5/25/2025  Narrative: EXAM: US KIDNEY BILATERAL DATE: 5/25/2025 6:49 AM INDICATIONS:    flank pain, UTI, recent hydronephrosis COMPARISON: 5/1/2025 CT. TECHNIQUE: Real-time sonographic evaluation of the kidneys and bladder is performed. Static gray-scale, color Doppler and spectral Doppler images are reviewed.  FINDINGS: The right and left kidney measure 12.0 cm and 13.2 cm in length, respectively. Renal parenchymal echogenicity and thickness are within normal limits. No renal masses, hydronephrosis, echogenic calculi, or perinephric fluid collections identified within the right kidney. Left kidney demonstrates moderate hydronephrosis with dilated collecting system. This appears similar to recent CT. Previous identified proximal aspect of a left nephroureteral stent no longer visualized consistent with interval removal. No obvious left renal stones. No perinephric fluid collection..  Urinary bladder not evaluated with this exam..     Impression: IMPRESSION: 1.  Moderate left hydronephrosis. 2.  No right hydronephrosis.  Electronically Signed by: Santi Ruth MD Signed on: 5/25/2025 7:23 AM Created on Workstation ID: [id number] Signed on Workstation ID: [id number]    XR ABDOMEN 1 VIEW  Result Date: 5/18/2025  Narrative: PROCEDURE:  XR ABDOMEN 1 VIEW HISTORY: 30 years-old Female with kidney stones COMPARISON: May 1, 2025 FINDINGS/    Impression: IMPRESSION:  There is a left nephroureteral stent appearing in customary position. Multiple calcifications are seen projecting over the pelvis which presumably may represent pelvic phleboliths. Possible calculus along the distal ureter or within the bladder cannot be excluded. Nonspecific nonobstructive bowel gas pattern with scattered small to moderate fecal loading. No acute osseous abnormality. Electronically Signed by: Rob Aragon MD Signed on: 5/18/2025 9:10 PM Created on Workstation ID: [id number] Signed on Workstation ID: [id number]      Pending  Results:  none    Discharge Exam:    Blood pressure 100/63, pulse 80, temperature 97.9 °F (36.6 °C), temperature source Oral, resp. rate 18, height 5' 11\" (1.803 m), weight 108.4 kg (239 lb), last menstrual period 11/14/2024, SpO2 92%, not currently breastfeeding.    General - Patient is alert, oriented and in no acute distress.     Coronary - Regular rate and rhythm without murmurs, rubs or gallops.   Pulmonary - Normal respiratory effort.  Lungs are clear to auscultation bilaterally without wheezes rubs or rhonchi.   Abdomen - Denies any abdominal complaints.  Extremities  -  Without clubbing, cyanosis or edema.  Normal range of motion.  Skin - No rashes or lesions.   Neurologic - Alert and oriented to person, place and time.  No facial droop, no speech arthralgia  noted.  Sensory and motor seems intact.      Patient Discharge Instructions:   1. Activity: activity as tolerated  2. Diet: Consistent Carb Moderate (45-75 Gm/Meal); Yes, Medical Nutrition Management by Rd (Registered Dietitian) Diet  Daily W Lunch; Ensure High Protein/High  Protein Low Carbohydrate Supplement Oral Nutrition Supplement  Daily W Dinner; [place]/Tissue Building, [place] + Cranberry Juice Oral Nutrition Supplement  3. Wound Care:   Wound Buttock Left Open Surgical Wound (Active)   Date First Assessed: 03/21/25   Location: Buttock  Laterality: Left  Primary Wound Type: Open Surgical Wound      Assessments 3/21/2025  9:00 PM 6/2/2025  2:00 PM   Dressing Assessment -- Open to Air   Wound Exudate -- None   Cleansing Agent -- Soap and water   Wound Bed/Tissue Type -- Epithelialized;Pink   Wound Status -- Healed   Topical Agent -- Barrier cream with zinc   Wound Dressing ABD dressing;Gauze drain sponge;Tape-foam --       Inactive Orders   Date Order Priority Status Authorizing Provider   03/28/25 1217 IP Negative Pressure Wound Therapy Type Routine Discontinued Abelardo Aden DO     - Diagnosis:    Surgical Wound/Incision     - Wound location:    left buttock     - Inpatient wound therapy type:    VAC Ulta - No Instillation (Inpatient, DO NOT SEND HOME WITH PATIENT)     - Settings:    Continuous Therapy     - Pressure:    125 mmHg     - Type of foam to be used:    Black     - Size of sponge:    Medium     - Frequency of dressing changes (specify):    Three times per week     - Three times/week:    Monday     - Three times/week:    Wednesday     - Three times/week:    Friday     - Dressing changes to be done by:    Wound Team     - Tunneling/undermining and wound instructions (specify):    pack depth with foam     - Periwound:    Other (specify)     - Other (specify):    PRN - skin prep, duoderm   03/24/25 1608 IP Negative Pressure Wound Therapy Type Routine Discontinued Abelardo Aden DO     - Diagnosis:    Surgical Wound/Incision     - Wound location:    left buttock     - Inpatient wound therapy type:    VAC Ulta - No Instillation (Inpatient, DO NOT SEND HOME WITH PATIENT)     - Settings:    Continuous Therapy     - Pressure:    125 mmHg     - Type of foam to be used:     Black     - Size of sponge:    Medium     - Frequency of dressing changes (specify):    Three times per week     - Three times/week:    Monday     - Three times/week:    Wednesday     - Three times/week:    Friday     - Dressing changes to be done by:    Wound Team     - Tunneling/undermining and wound instructions (specify):    pack depth with foam     - Periwound:    Other (specify)     - Other (specify):    PRN - skin prep, duoderm        4. Discharge Medications:    ALLERGIES:  Patient has no known allergies.     Follow-up:  Soren Fischer APNP  [address] [facility] [address]  [phone]    Follow up      Justin Stephen MD  [address]  [address]  [facility] [address]  [phone]    Schedule an appointment as soon as possible for a visit      Future Appointments   Date Time Provider Department Center   10/3/2025  2:00 PM Soren Fischer APNP [facility]     Time spent on discharge was >30 minutes.  Discharge discussed with patient, RN, consults including urology and infectious disease    Signed:  Chet Maza MD  6/3/2025  1:58 PM

## 2025-06-05 PROBLEM — J38.1 VOCAL CORD POLYP: Status: ACTIVE | Noted: 2025-06-05

## 2025-06-09 DIAGNOSIS — R80.9 TYPE 2 DIABETES MELLITUS WITH MICROALBUMINURIA, WITHOUT LONG-TERM CURRENT USE OF INSULIN (HCC): ICD-10-CM

## 2025-06-09 DIAGNOSIS — E11.29 TYPE 2 DIABETES MELLITUS WITH MICROALBUMINURIA, WITHOUT LONG-TERM CURRENT USE OF INSULIN (HCC): ICD-10-CM

## 2025-06-09 RX ORDER — METFORMIN HYDROCHLORIDE 500 MG/1
1000 TABLET, EXTENDED RELEASE ORAL
Qty: 180 TABLET | Refills: 3 | Status: SHIPPED | OUTPATIENT
Start: 2025-06-09

## 2025-06-09 NOTE — TELEPHONE ENCOUNTER
Please Approve or Refuse.  Send to Pharmacy per Pt's Request:      Next Visit Date:  8/19/2025   Last Visit Date: 4/17/2025    Hemoglobin A1C (%)   Date Value   03/13/2025 7.4   11/14/2024 7.4   07/16/2024 7.3             ( goal A1C is < 7)   BP Readings from Last 3 Encounters:   06/05/25 126/79   05/02/25 130/78   04/29/25 130/78          (goal 120/80)  BUN   Date Value Ref Range Status   03/13/2025 21 8 - 23 mg/dL Final     Creatinine   Date Value Ref Range Status   03/13/2025 0.8 0.7 - 1.2 mg/dL Final     Potassium   Date Value Ref Range Status   03/13/2025 4.4 3.7 - 5.3 mmol/L Final     Comment:     Specimen hemolysis has exceeded the interference as defined by Roche. Value may be falsely   increased. Suggest recollection if clinically indicated.

## 2025-06-24 ENCOUNTER — HOSPITAL ENCOUNTER (OUTPATIENT)
Dept: CT IMAGING | Age: 70
Discharge: HOME | End: 2025-06-26
Attending: INTERNAL MEDICINE
Payer: MEDICARE

## 2025-06-24 DIAGNOSIS — R91.1 PULMONARY NODULE: ICD-10-CM

## 2025-06-24 PROCEDURE — 71250 CT THORAX DX C-: CPT

## 2025-06-27 DIAGNOSIS — E03.9 ACQUIRED HYPOTHYROIDISM: ICD-10-CM

## 2025-06-27 RX ORDER — LEVOTHYROXINE SODIUM 137 UG/1
137 TABLET ORAL DAILY
Qty: 90 TABLET | Refills: 1 | Status: SHIPPED | OUTPATIENT
Start: 2025-06-27

## 2025-06-30 NOTE — DISCHARGE INSTRUCTIONS
Preoperative Instructions:    Stop eating solid foods at midnight the night prior to surgery.    Stop drinking clear liquids at midnight the night prior to surgery.    Arrive at the surgery center (Entrance B) by on 7/21/2025  (as directed by your surgeon's office).    Please stop any blood thinning medications as directed by your surgeon or prescribing physician. Failure to stop certain medications may interfere with your scheduled surgery.    These may include:  Aspirin, Warfarin (Coumadin), Clopidogrel (Plavix), Ibuprofen (Motrin, Advil), Naproxen (Aleve), Meloxicam (Mobic), Celecoxib (Celebrex), Eliquis, Pradaxa, Xarelto, Effient, Fish Oil, Herbal supplements.  Aspirin  Avapro - 24 hours  Trulicity - no injection within 7 days of surgery date  (hold 7/17 injection)    You may continue the rest of your medications through the night before surgery unless instructed otherwise.    Please take only the following medication(s) the day of surgery with a small sip of water:  Coreg/carvedilol, norvasc/amlodipine, synthroid/levothyroxine    Please use and bring inhalers the day of surgery, if applies.  Please bring CPAP the day of surgery, if applies.    PLEASE NOTE:  THE ABOVE (IF ANY) DISCONTINUED MEDS MAY ONLY BE FROM   \"CLEANING UP\" THE MED LIST AND WERE NOT ACTUALLY CANCELLED; SEE CHART FOR DETAILS AND ALWAYS CHECK WITH PRESCRIBING PROVIDER BEFORE DISCONTINUING ANY MEDICATIONS        REMINDERS:  ** If you are going home the day of your procedure, you will need a friend or family member to drive you home after your procedure.  Your  must be 18 years of age or older and able to sign off on your discharge instructions.  Taxi cabs or any form of public transportation is not acceptable.    ** It is preferable that the friend or family member stay at the hospital throughout your procedure.  ** If you are going home the same day as your procedure, someone must remain with you for the first 24 hours after your surgery

## 2025-07-01 RX ORDER — SODIUM CHLORIDE, SODIUM LACTATE, POTASSIUM CHLORIDE, CALCIUM CHLORIDE 600; 310; 30; 20 MG/100ML; MG/100ML; MG/100ML; MG/100ML
INJECTION, SOLUTION INTRAVENOUS CONTINUOUS
OUTPATIENT
Start: 2025-07-01

## 2025-07-06 ENCOUNTER — RESULTS FOLLOW-UP (OUTPATIENT)
Dept: FAMILY MEDICINE CLINIC | Age: 70
End: 2025-07-06

## 2025-07-07 DIAGNOSIS — J44.9 CHRONIC OBSTRUCTIVE PULMONARY DISEASE, UNSPECIFIED COPD TYPE (HCC): ICD-10-CM

## 2025-07-08 ENCOUNTER — HOSPITAL ENCOUNTER (OUTPATIENT)
Dept: PREADMISSION TESTING | Age: 70
Discharge: HOME | End: 2025-07-12
Payer: MEDICARE

## 2025-07-08 VITALS
OXYGEN SATURATION: 94 % | SYSTOLIC BLOOD PRESSURE: 136 MMHG | HEART RATE: 68 BPM | BODY MASS INDEX: 30.18 KG/M2 | DIASTOLIC BLOOD PRESSURE: 72 MMHG | RESPIRATION RATE: 20 BRPM | WEIGHT: 164 LBS | TEMPERATURE: 97.5 F | HEIGHT: 62 IN

## 2025-07-08 LAB
ANION GAP SERPL CALCULATED.3IONS-SCNC: 11 MMOL/L (ref 9–16)
BUN SERPL-MCNC: 15 MG/DL (ref 8–23)
CALCIUM SERPL-MCNC: 10 MG/DL (ref 8.6–10.4)
CHLORIDE SERPL-SCNC: 97 MMOL/L (ref 98–107)
CO2 SERPL-SCNC: 28 MMOL/L (ref 20–31)
CREAT SERPL-MCNC: 0.8 MG/DL (ref 0.6–0.9)
ERYTHROCYTE [DISTWIDTH] IN BLOOD BY AUTOMATED COUNT: 12.9 % (ref 11.8–14.4)
GFR, ESTIMATED: 79 ML/MIN/1.73M2
GLUCOSE SERPL-MCNC: 116 MG/DL (ref 74–99)
HCT VFR BLD AUTO: 36.8 % (ref 36.3–47.1)
HGB BLD-MCNC: 12.2 G/DL (ref 11.9–15.1)
MCH RBC QN AUTO: 30.2 PG (ref 25.2–33.5)
MCHC RBC AUTO-ENTMCNC: 33.2 G/DL (ref 28.4–34.8)
MCV RBC AUTO: 91.1 FL (ref 82.6–102.9)
NRBC BLD-RTO: 0 PER 100 WBC
PLATELET # BLD AUTO: 336 K/UL (ref 138–453)
PMV BLD AUTO: 9.5 FL (ref 8.1–13.5)
POTASSIUM SERPL-SCNC: 4.4 MMOL/L (ref 3.7–5.3)
RBC # BLD AUTO: 4.04 M/UL (ref 3.95–5.11)
SODIUM SERPL-SCNC: 136 MMOL/L (ref 136–145)
WBC OTHER # BLD: 9.7 K/UL (ref 3.5–11.3)

## 2025-07-08 PROCEDURE — 80048 BASIC METABOLIC PNL TOTAL CA: CPT

## 2025-07-08 PROCEDURE — 36415 COLL VENOUS BLD VENIPUNCTURE: CPT

## 2025-07-08 PROCEDURE — 93005 ELECTROCARDIOGRAM TRACING: CPT | Performed by: ANESTHESIOLOGY

## 2025-07-08 PROCEDURE — 85027 COMPLETE CBC AUTOMATED: CPT

## 2025-07-08 RX ORDER — ALBUTEROL SULFATE 0.83 MG/ML
SOLUTION RESPIRATORY (INHALATION)
Qty: 180 ML | Refills: 3 | Status: SHIPPED | OUTPATIENT
Start: 2025-07-08

## 2025-07-08 NOTE — PROGRESS NOTES
Anesthesia Focused Assessment      STOP-BANG Sleep Apnea Questionnaire    SNORE loudly (heard through closed doors)?   No  TIRED, fatigued, sleepy during daytime?    No  OBSERVED stopping breathing during sleep?   No  High blood PRESSURE being treated?    Yes    BMI over 35?        No  AGE over 50?        Yes  NECK circumference over 16\"?     No  GENDER (male)?       No             Total 2  High risk 5-8  Intermediate risk 3-4  Low risk 0-2    Obstructive Sleep Apnea: denies  If YES, machine used: no     Type 1 DM:   no  T2DM:  yes    Coronary Artery Disease:  yes, stent x 2, has pacemaker as well.  Hypertension:  yes    Active smoker:  1 ppd for 51 years, quit 12/2024  Drinks alcohol:  occasionally  Recreational drugs: no    Dentition: upper and lower full dentures    Defib / AICD / Pacemaker: yes, pacemaker      Renal Failure/dialysis:  no    Patient was evaluated in PAT & anesthesia guidelines were applied.   NPO guidelines, medication instructions and scheduled arrival time were reviewed with patient.  I advised patient to please contact the surgeon's office, ahead of time if possible, if any new signs or symptoms of illness, infection, rash, etc    Hx of anesthesia complications:  no  Family hx of anesthesia complications:  no                                                                                                                     Cardiology clearance with blood thinner instructions is in paper chart from [facility].  Most recent office notes from 4/2025.    DEJAN HUSSEIN PA-C  7/8/25  2:18 PM

## 2025-07-08 NOTE — H&P (VIEW-ONLY)
nebulizer supplies 4/22/24   Kathy Cervantes MD   blood glucose test strips (ASCENSIA AUTODISC VI;ONE TOUCH ULTRA TEST VI) strip 1 each by In Vitro route daily As needed. 3/3/21   Kathy Cervantes MD     Allergies  is allergic to lisinopril.  Family History  family history includes Breast Cancer in her sister; Diabetes in her father and mother; Heart Disease in her mother.  Social History   reports that she quit smoking about 7 months ago. Her smoking use included cigarettes. She started smoking about 51 years ago. She has a 50.9 pack-year smoking history. She has been exposed to tobacco smoke. She has never used smokeless tobacco.   reports current alcohol use.   reports no history of drug use.    Review of Systems:  CONSTITUTIONAL:   negative for fevers, chills, fatigue and malaise    EYES:   negative for double vision, blurred vision and photophobia    HEENT:   See HPI   RESPIRATORY:   negative for cough, shortness of breath, wheezing     CARDIOVASCULAR:   negative for chest pain, palpitations, syncope, edema     GASTROINTESTINAL:   negative for nausea, vomiting     GENITOURINARY/RENAL:   negative for incontinence     MUSCULOSKELETAL:   negative for neck or back pain     NEUROLOGICAL:   Negative for headaches, dizziness, weakness and tingling    PSYCHIATRIC:   negative for anxiety         OBJECTIVE:   VITALS:  height is 1.575 m (5' 2\") and weight is 74.4 kg (164 lb). Her oral temperature is 97.5 °F (36.4 °C). Her blood pressure is 136/72 and her pulse is 68. Her respiration is 20 and oxygen saturation is 94%.   CONSTITUTIONAL:alert & cooperative, no acute distress.  Very pleasant and talkative.  Hoarseness noted.  SKIN:  Brief inspection of skin, Warm and dry, no rashes on exposed areas of skin.  Well tanned skin.  HEAD:  Normocephalic, atraumatic   EYES: EOMs intact.    EARS:  Hearing grossly WNL.    NOSE:  Nares patent.  No rhinorrhea   MOUTH/THROAT:  upper and lower full dentures  NECK:good ROM

## 2025-07-08 NOTE — H&P
History and Physical    Pt Name: Gem Mcgill  MRN: [id number]  YOB: 1955  Date of evaluation: 7/8/2025    SUBJECTIVE:   History of Chief Complaint:    Patient presents for PAT appointment.  She reports hoarseness of the voice for some time now, breaks in her voice.  She says that she has noticed some difficulty with her breathing relating to her throat region, has COPD as well.  She has had laryngoscopy in the office, vocal cord polyp, reflux.  Patient has been scheduled for MICRO SUSPENSION DIRECT LARYNGOSCOPY, CO2 LASER.  Past Medical History    has a past medical history of Acquired hypothyroidism, Arthritis, Asthma, CAD (coronary artery disease), Cervical spondylosis, Colonoscopy refused, Diabetes mellitus (HCC), Dizzy, Family history of breast cancer in sister, Holter monitor, abnormal, Hyperlipidemia, Hypertension, Hypomagnesemia, Osteopenia determined by x-ray, Pacemaker, Pneumonia, Research study patient, SOB (shortness of breath), Uncontrolled type 2 diabetes mellitus with hyperglycemia (HCC), Under care of team, Under care of team, Under care of team, Under care of team, and Uterine polyp.  Past Surgical History   has a past surgical history that includes Tubal ligation; Tonsillectomy; hysteroscopy (10/07/2014); Cardiac catheterization (02/28/2022); Cardiac pacemaker placement (02/28/2022); and Coronary angioplasty (03/08/2022).  Medications  Prior to Admission medications    Medication Sig Start Date End Date Taking? Authorizing Provider   carvedilol (COREG) 12.5 MG tablet TAKE 1 TABLET BY MOUTH TWICE DAILY 7/1/25  Yes Geovanny Wellington MD   levothyroxine (SYNTHROID) 137 MCG tablet TAKE 1 TABLET BY MOUTH DAILY 6/27/25  Yes Kathy Cervantes MD   metFORMIN (GLUCOPHAGE-XR) 500 MG extended release tablet TAKE 2 TABLETS BY MOUTH DAILY WITH BREAKFAST 6/9/25  Yes Kathy Cervantes MD   famotidine (PEPCID) 20 MG tablet TAKE 1 TABLET BY MOUTH TWICE DAILY 5/19/25  Yes Kathy Cervantes MD

## 2025-07-08 NOTE — TELEPHONE ENCOUNTER
Please Approve or Refuse.  Send to Pharmacy per Pt's Request: [email or web address]      Next Visit Date:  8/19/2025   Last Visit Date: 4/17/2025    Hemoglobin A1C (%)   Date Value   03/13/2025 7.4   11/14/2024 7.4   07/16/2024 7.3             ( goal A1C is < 7)   BP Readings from Last 3 Encounters:   06/05/25 126/79   05/02/25 130/78   04/29/25 130/78          (goal 120/80)  BUN   Date Value Ref Range Status   03/13/2025 21 8 - 23 mg/dL Final     Creatinine   Date Value Ref Range Status   03/13/2025 0.8 0.7 - 1.2 mg/dL Final     Potassium   Date Value Ref Range Status   03/13/2025 4.4 3.7 - 5.3 mmol/L Final     Comment:     Specimen hemolysis has exceeded the interference as defined by Roche. Value may be falsely   increased. Suggest recollection if clinically indicated.

## 2025-07-09 LAB
EKG ATRIAL RATE: 63 BPM
EKG P-R INTERVAL: 300 MS
EKG Q-T INTERVAL: 450 MS
EKG QRS DURATION: 152 MS
EKG QTC CALCULATION (BAZETT): 460 MS
EKG R AXIS: -57 DEGREES
EKG T AXIS: 107 DEGREES
EKG VENTRICULAR RATE: 63 BPM

## 2025-07-09 NOTE — DISCHARGE INSTRUCTIONS
----------------------------------------------------------------------------------------------------------------                                                ENT  ~  Discharge Instructions   ----------------------------------------------------------------------------------------------------------------    You have undergone direct laryngoscopy w/ CO2 excision vocal fold mass   ENT Surgeon: Dr. Saavedra    What to Expect During Recovery:  - You may have a low grade fever (100-101 F) for 1-3 days   - You may experience mild nausea/vomiting for 1-3 days    When to Call ENT Nurse Line:  - If you show signs of dehydration such as dark colored urine and dry lips  - If you have excessive vomiting that lasts more than 12 hours  - If you have a fever higher than 101 F   - If you have any questions about medications or your recovery    When to Come to the Emergency Room or Call 911:  - If you are bleeding from their mouth or throat    - If you are having difficulty breathing  - If you are not able to stay awake  - If you are very sick and you feel that you need immediate medical attention    Activity & Limitations:  -Do not lift anything more than 10 pounds for 1 week.     Diet:   -Soft diet for 3 days then can progress to a normal diet as tolerated    Wound care:  -It is expected to have some blood tinged spit for the first 3 post-operative days. Gargle with ice cold water in this case.    Medications:   -Okay to take Ibuprofen (400-800mg) every 6 hours and Tylenol (650mg) every 6 hours. I recommend interchanging these every 3 hours to keep pain controlled around the clock. (Example: Ibuprofen at 9am, Tylenol at noon, Ibuprofen at 3pm, etc)  -You have been prescribed Norco for breakthrough pain control to take in addition to Ibuprofen and Tylenol. Do not drive or work while taking Narcotics. Narcotic medications can cause constipation and it is recommended to take an over the counter stool softener.    Follow-up:   You are

## 2025-07-12 PROBLEM — N18.2 BENIGN HYPERTENSION WITH CKD (CHRONIC KIDNEY DISEASE), STAGE II: Status: ACTIVE | Noted: 2025-07-12

## 2025-07-12 PROBLEM — I12.9 BENIGN HYPERTENSION WITH CKD (CHRONIC KIDNEY DISEASE), STAGE II: Status: ACTIVE | Noted: 2025-07-12

## 2025-07-15 DIAGNOSIS — G89.18 ACUTE POST-OPERATIVE PAIN: Primary | ICD-10-CM

## 2025-07-15 RX ORDER — ACETAMINOPHEN 325 MG/1
650 TABLET ORAL EVERY 6 HOURS PRN
Qty: 120 TABLET | Refills: 1 | Status: SHIPPED | OUTPATIENT
Start: 2025-07-21

## 2025-07-15 RX ORDER — IBUPROFEN 400 MG/1
400 TABLET, FILM COATED ORAL EVERY 6 HOURS PRN
Qty: 120 TABLET | Refills: 1 | Status: SHIPPED | OUTPATIENT
Start: 2025-07-21

## 2025-07-20 ENCOUNTER — ANESTHESIA EVENT (OUTPATIENT)
Dept: OPERATING ROOM | Age: 70
End: 2025-07-20
Payer: MEDICARE

## 2025-07-21 ENCOUNTER — ANESTHESIA (OUTPATIENT)
Dept: OPERATING ROOM | Age: 70
End: 2025-07-21
Payer: MEDICARE

## 2025-07-21 ENCOUNTER — HOSPITAL ENCOUNTER (OUTPATIENT)
Age: 70
Setting detail: OUTPATIENT SURGERY
Discharge: HOME | End: 2025-07-21
Attending: STUDENT IN AN ORGANIZED HEALTH CARE EDUCATION/TRAINING PROGRAM | Admitting: STUDENT IN AN ORGANIZED HEALTH CARE EDUCATION/TRAINING PROGRAM
Payer: MEDICARE

## 2025-07-21 VITALS
BODY MASS INDEX: 30.18 KG/M2 | DIASTOLIC BLOOD PRESSURE: 70 MMHG | OXYGEN SATURATION: 91 % | WEIGHT: 164 LBS | SYSTOLIC BLOOD PRESSURE: 130 MMHG | RESPIRATION RATE: 21 BRPM | HEIGHT: 62 IN | TEMPERATURE: 98.1 F | HEART RATE: 62 BPM

## 2025-07-21 DIAGNOSIS — J38.1 VOCAL CORD POLYP: ICD-10-CM

## 2025-07-21 LAB — GLUCOSE BLD-MCNC: 158 MG/DL (ref 65–105)

## 2025-07-21 PROCEDURE — 7100000010 HC PHASE II RECOVERY - FIRST 15 MIN: Performed by: STUDENT IN AN ORGANIZED HEALTH CARE EDUCATION/TRAINING PROGRAM

## 2025-07-21 PROCEDURE — 2709999900 HC NON-CHARGEABLE SUPPLY: Performed by: STUDENT IN AN ORGANIZED HEALTH CARE EDUCATION/TRAINING PROGRAM

## 2025-07-21 PROCEDURE — 6360000002 HC RX W HCPCS

## 2025-07-21 PROCEDURE — 3600000014 HC SURGERY LEVEL 4 ADDTL 15MIN: Performed by: STUDENT IN AN ORGANIZED HEALTH CARE EDUCATION/TRAINING PROGRAM

## 2025-07-21 PROCEDURE — 7100000001 HC PACU RECOVERY - ADDTL 15 MIN: Performed by: STUDENT IN AN ORGANIZED HEALTH CARE EDUCATION/TRAINING PROGRAM

## 2025-07-21 PROCEDURE — 82947 ASSAY GLUCOSE BLOOD QUANT: CPT

## 2025-07-21 PROCEDURE — 2580000003 HC RX 258: Performed by: ANESTHESIOLOGY

## 2025-07-21 PROCEDURE — 31541 LARYNSCOP W/TUMR EXC + SCOPE: CPT | Performed by: STUDENT IN AN ORGANIZED HEALTH CARE EDUCATION/TRAINING PROGRAM

## 2025-07-21 PROCEDURE — 2500000003 HC RX 250 WO HCPCS

## 2025-07-21 PROCEDURE — 7100000000 HC PACU RECOVERY - FIRST 15 MIN: Performed by: STUDENT IN AN ORGANIZED HEALTH CARE EDUCATION/TRAINING PROGRAM

## 2025-07-21 PROCEDURE — 3700000001 HC ADD 15 MINUTES (ANESTHESIA): Performed by: STUDENT IN AN ORGANIZED HEALTH CARE EDUCATION/TRAINING PROGRAM

## 2025-07-21 PROCEDURE — 6370000000 HC RX 637 (ALT 250 FOR IP): Performed by: STUDENT IN AN ORGANIZED HEALTH CARE EDUCATION/TRAINING PROGRAM

## 2025-07-21 PROCEDURE — 3700000000 HC ANESTHESIA ATTENDED CARE: Performed by: STUDENT IN AN ORGANIZED HEALTH CARE EDUCATION/TRAINING PROGRAM

## 2025-07-21 PROCEDURE — 7100000011 HC PHASE II RECOVERY - ADDTL 15 MIN: Performed by: STUDENT IN AN ORGANIZED HEALTH CARE EDUCATION/TRAINING PROGRAM

## 2025-07-21 PROCEDURE — 2500000003 HC RX 250 WO HCPCS: Performed by: STUDENT IN AN ORGANIZED HEALTH CARE EDUCATION/TRAINING PROGRAM

## 2025-07-21 PROCEDURE — 88305 TISSUE EXAM BY PATHOLOGIST: CPT

## 2025-07-21 PROCEDURE — 3600000004 HC SURGERY LEVEL 4 BASE: Performed by: STUDENT IN AN ORGANIZED HEALTH CARE EDUCATION/TRAINING PROGRAM

## 2025-07-21 RX ORDER — KETOROLAC TROMETHAMINE 30 MG/ML
INJECTION, SOLUTION INTRAMUSCULAR; INTRAVENOUS
Status: DISCONTINUED | OUTPATIENT
Start: 2025-07-21 | End: 2025-07-21 | Stop reason: SDUPTHER

## 2025-07-21 RX ORDER — LIDOCAINE HYDROCHLORIDE 40 MG/ML
SOLUTION TOPICAL PRN
Status: DISCONTINUED | OUTPATIENT
Start: 2025-07-21 | End: 2025-07-21 | Stop reason: HOSPADM

## 2025-07-21 RX ORDER — LABETALOL HYDROCHLORIDE 5 MG/ML
10 INJECTION, SOLUTION INTRAVENOUS
Status: DISCONTINUED | OUTPATIENT
Start: 2025-07-21 | End: 2025-07-21 | Stop reason: HOSPADM

## 2025-07-21 RX ORDER — METOCLOPRAMIDE HYDROCHLORIDE 5 MG/ML
10 INJECTION INTRAMUSCULAR; INTRAVENOUS
Status: DISCONTINUED | OUTPATIENT
Start: 2025-07-21 | End: 2025-07-21 | Stop reason: HOSPADM

## 2025-07-21 RX ORDER — PROPOFOL 10 MG/ML
INJECTION, EMULSION INTRAVENOUS
Status: DISCONTINUED | OUTPATIENT
Start: 2025-07-21 | End: 2025-07-21 | Stop reason: SDUPTHER

## 2025-07-21 RX ORDER — IPRATROPIUM BROMIDE AND ALBUTEROL SULFATE 2.5; .5 MG/3ML; MG/3ML
1 SOLUTION RESPIRATORY (INHALATION)
Status: DISCONTINUED | OUTPATIENT
Start: 2025-07-21 | End: 2025-07-21 | Stop reason: HOSPADM

## 2025-07-21 RX ORDER — DEXAMETHASONE SODIUM PHOSPHATE 10 MG/ML
INJECTION, SOLUTION INTRA-ARTICULAR; INTRALESIONAL; INTRAMUSCULAR; INTRAVENOUS; SOFT TISSUE
Status: DISCONTINUED | OUTPATIENT
Start: 2025-07-21 | End: 2025-07-21 | Stop reason: SDUPTHER

## 2025-07-21 RX ORDER — HYDRALAZINE HYDROCHLORIDE 20 MG/ML
10 INJECTION INTRAMUSCULAR; INTRAVENOUS
Status: DISCONTINUED | OUTPATIENT
Start: 2025-07-21 | End: 2025-07-21 | Stop reason: HOSPADM

## 2025-07-21 RX ORDER — OXYCODONE HYDROCHLORIDE 5 MG/1
5 TABLET ORAL
Status: DISCONTINUED | OUTPATIENT
Start: 2025-07-21 | End: 2025-07-21 | Stop reason: HOSPADM

## 2025-07-21 RX ORDER — OXYMETAZOLINE HYDROCHLORIDE 0.05 G/100ML
SPRAY NASAL PRN
Status: DISCONTINUED | OUTPATIENT
Start: 2025-07-21 | End: 2025-07-21 | Stop reason: HOSPADM

## 2025-07-21 RX ORDER — PROCHLORPERAZINE EDISYLATE 5 MG/ML
5 INJECTION INTRAMUSCULAR; INTRAVENOUS
Status: DISCONTINUED | OUTPATIENT
Start: 2025-07-21 | End: 2025-07-21 | Stop reason: HOSPADM

## 2025-07-21 RX ORDER — MAGNESIUM HYDROXIDE 1200 MG/15ML
LIQUID ORAL CONTINUOUS PRN
Status: DISCONTINUED | OUTPATIENT
Start: 2025-07-21 | End: 2025-07-21 | Stop reason: HOSPADM

## 2025-07-21 RX ORDER — SODIUM CHLORIDE 9 MG/ML
INJECTION, SOLUTION INTRAVENOUS PRN
Status: DISCONTINUED | OUTPATIENT
Start: 2025-07-21 | End: 2025-07-21 | Stop reason: HOSPADM

## 2025-07-21 RX ORDER — SODIUM CHLORIDE 0.9 % (FLUSH) 0.9 %
5-40 SYRINGE (ML) INJECTION EVERY 12 HOURS SCHEDULED
Status: DISCONTINUED | OUTPATIENT
Start: 2025-07-21 | End: 2025-07-21 | Stop reason: HOSPADM

## 2025-07-21 RX ORDER — FENTANYL CITRATE 50 UG/ML
INJECTION, SOLUTION INTRAMUSCULAR; INTRAVENOUS
Status: DISCONTINUED | OUTPATIENT
Start: 2025-07-21 | End: 2025-07-21 | Stop reason: SDUPTHER

## 2025-07-21 RX ORDER — SODIUM CHLORIDE, SODIUM LACTATE, POTASSIUM CHLORIDE, CALCIUM CHLORIDE 600; 310; 30; 20 MG/100ML; MG/100ML; MG/100ML; MG/100ML
INJECTION, SOLUTION INTRAVENOUS CONTINUOUS
Status: DISCONTINUED | OUTPATIENT
Start: 2025-07-21 | End: 2025-07-21 | Stop reason: HOSPADM

## 2025-07-21 RX ORDER — SODIUM CHLORIDE 0.9 % (FLUSH) 0.9 %
5-40 SYRINGE (ML) INJECTION PRN
Status: DISCONTINUED | OUTPATIENT
Start: 2025-07-21 | End: 2025-07-21 | Stop reason: HOSPADM

## 2025-07-21 RX ORDER — LIDOCAINE HYDROCHLORIDE 10 MG/ML
INJECTION, SOLUTION EPIDURAL; INFILTRATION; INTRACAUDAL; PERINEURAL
Status: DISCONTINUED | OUTPATIENT
Start: 2025-07-21 | End: 2025-07-21 | Stop reason: SDUPTHER

## 2025-07-21 RX ORDER — ONDANSETRON 2 MG/ML
INJECTION INTRAMUSCULAR; INTRAVENOUS
Status: DISCONTINUED | OUTPATIENT
Start: 2025-07-21 | End: 2025-07-21 | Stop reason: SDUPTHER

## 2025-07-21 RX ORDER — ROCURONIUM BROMIDE 10 MG/ML
INJECTION, SOLUTION INTRAVENOUS
Status: DISCONTINUED | OUTPATIENT
Start: 2025-07-21 | End: 2025-07-21 | Stop reason: SDUPTHER

## 2025-07-21 RX ADMIN — KETOROLAC TROMETHAMINE 15 MG: 30 INJECTION, SOLUTION INTRAMUSCULAR at 07:56

## 2025-07-21 RX ADMIN — PROPOFOL 20 MG: 10 INJECTION, EMULSION INTRAVENOUS at 08:00

## 2025-07-21 RX ADMIN — ONDANSETRON 4 MG: 2 INJECTION, SOLUTION INTRAMUSCULAR; INTRAVENOUS at 07:48

## 2025-07-21 RX ADMIN — PROPOFOL 100 MG: 10 INJECTION, EMULSION INTRAVENOUS at 07:29

## 2025-07-21 RX ADMIN — FENTANYL CITRATE 100 MCG: 50 INJECTION, SOLUTION INTRAMUSCULAR; INTRAVENOUS at 07:29

## 2025-07-21 RX ADMIN — DEXAMETHASONE SODIUM PHOSPHATE 8 MG: 10 INJECTION INTRAMUSCULAR; INTRAVENOUS at 07:40

## 2025-07-21 RX ADMIN — LIDOCAINE HYDROCHLORIDE 50 MG: 10 INJECTION, SOLUTION EPIDURAL; INFILTRATION; INTRACAUDAL; PERINEURAL at 07:29

## 2025-07-21 RX ADMIN — ROCURONIUM BROMIDE 50 MG: 10 INJECTION, SOLUTION INTRAVENOUS at 07:29

## 2025-07-21 RX ADMIN — SUGAMMADEX 200 MG: 100 INJECTION, SOLUTION INTRAVENOUS at 08:01

## 2025-07-21 RX ADMIN — SODIUM CHLORIDE, POTASSIUM CHLORIDE, SODIUM LACTATE AND CALCIUM CHLORIDE: 600; 310; 30; 20 INJECTION, SOLUTION INTRAVENOUS at 07:25

## 2025-07-21 ASSESSMENT — PAIN - FUNCTIONAL ASSESSMENT: PAIN_FUNCTIONAL_ASSESSMENT: NONE - DENIES PAIN

## 2025-07-21 NOTE — ANESTHESIA POSTPROCEDURE EVALUATION
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    Jase Phase I: Jase Score: 10    Jase Phase II: Jase Score: 10    Anesthesia Post Evaluation    Patient location during evaluation: bedside  Patient participation: complete - patient participated  Level of consciousness: awake  Airway patency: patent  Nausea & Vomiting: no nausea and no vomiting  Cardiovascular status: blood pressure returned to baseline  Respiratory status: acceptable  Hydration status: euvolemic  Comments: /70   Pulse 62   Temp 98.1 °F (36.7 °C) (Temporal)   Resp 21   Ht 1.575 m (5' 2\")   Wt 74.4 kg (164 lb)   SpO2 91%   BMI 30.00 kg/m²     Pain management: adequate    There were no known notable events for this encounter.

## 2025-07-21 NOTE — OP NOTE
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]     Postoperative Diagnosis:   same    Procedure:   MICRO SUSPENSION DIRECT LARYNGOSCOPY, CO2 LASER  (MICROSCOPE, FORTEC CONF#  [id number]) *EXTENDED PACU STAY*, N/A - Mouth       Anesthesia Type:   General Endotracheal    Complications:  None    Estimated Blood Loss:   Minimal    Pathologic Specimen:   ID Type Source Tests Collected by Time Destination   A : RIGHT VOCAL CORD MASS Tissue Mouth SURGICAL PATHOLOGY Leonel Saavedra MD 2025 0757    B : LEFT VOCAL CORD MASS Tissue Mouth SURGICAL PATHOLOGY Leonel Saavedra MD 2025 0758          Operative Findings:   Larynx: abnormal - see glottis  Grade I view without cricoid pressure.     Epiglottis: normal  False folds: Left false vocal fold polyp  AE folds: normal  Arytenoids: normal    Glottis:    Vocal folds:  abnormal - right true vocal fold polyp, obstructive    Subglottis:  normal          INDICATIONS AND CONSENT  The patient was seen and evaluated by the Otolaryngology practice.  After history and physical examination, recommendations were made to proceed to the operating room for the above listed procedures.  Indications, risks and benefits were discussed with the patient, who agreed to proceed and signed proper informed consent.    DESCRIPTION OF PROCEDURE:  The patient was taken to the operating room and laid supine on the operating room table. General mask inhalational anesthesia was induced by the anesthesiology team.   Proper surgeon-initiated time-out was performed.  Once an adequate level of anesthesia was achieved, the patient was intubated safely by the anesthesia team. The head of the bed was turned 90 degrees and the patient was properly positioned for

## 2025-07-21 NOTE — INTERVAL H&P NOTE
Pt Name: Gem Mcgill  MRN: [id number]  YOB: 1955  Date of evaluation: 7/21/2025    I have reviewed the patient's history and physical examination completed in pre-admission testing on 7/8/25    No changes to history or on examination today, unless noted below.   none    SORIN RAZO, SHARDA - CNP  7/21/25  6:38 AM

## 2025-07-21 NOTE — ANESTHESIA PRE PROCEDURE
Time of last solid consumption: 2200                        Date of last liquid consumption: 07/20/25                        Date of last solid food consumption: 07/20/25    BMI:   Wt Readings from Last 3 Encounters:   07/21/25 74.4 kg (164 lb)   07/08/25 74.4 kg (164 lb)   06/05/25 75.1 kg (165 lb 9.6 oz)     Body mass index is 30 kg/m².    CBC:   Lab Results   Component Value Date/Time    WBC 9.7 07/08/2025 03:15 PM    RBC 4.04 07/08/2025 03:15 PM    HGB 12.2 07/08/2025 03:15 PM    HCT 36.8 07/08/2025 03:15 PM    MCV 91.1 07/08/2025 03:15 PM    RDW 12.9 07/08/2025 03:15 PM     07/08/2025 03:15 PM       CMP:   Lab Results   Component Value Date/Time     07/08/2025 03:15 PM    K 4.4 07/08/2025 03:15 PM    CL 97 07/08/2025 03:15 PM    CO2 28 07/08/2025 03:15 PM    BUN 15 07/08/2025 03:15 PM    CREATININE 0.8 07/08/2025 03:15 PM    GFRAA >60 08/24/2022 07:33 AM    LABGLOM 79 07/08/2025 03:15 PM    LABGLOM >60 11/18/2023 12:49 PM    LABGLOM >60 11/18/2023 12:49 PM    GLUCOSE 116 07/08/2025 03:15 PM    GLUCOSE 212 04/26/2019 11:19 AM    CALCIUM 10.0 07/08/2025 03:15 PM    BILITOT 0.5 03/13/2025 04:27 PM    ALKPHOS 94 03/13/2025 04:27 PM    AST 19 03/13/2025 04:27 PM    ALT 17 03/13/2025 04:27 PM       POC Tests: No results for input(s): \"POCGLU\", \"POCNA\", \"POCK\", \"POCCL\", \"POCBUN\", \"POCHEMO\", \"POCHCT\" in the last 72 hours.    Coags:   Lab Results   Component Value Date/Time    PROTIME 12.6 06/01/2023 12:08 PM    INR 0.9 06/01/2023 12:08 PM    APTT 25.9 09/30/2014 08:20 AM       HCG (If Applicable): No results found for: \"PREGTESTUR\", \"PREGSERUM\", \"HCG\", \"HCGQUANT\"     ABGs: No results found for: \"PHART\", \"PO2ART\", \"ZGA0LZW\", \"GBC0INB\", \"BEART\", \"J9YGBDPT\"     Type & Screen (If Applicable):  Lab Results   Component Value Date    ABORH A POSITIVE 09/30/2014    LABANTI NEGATIVE 09/30/2014       Drug/Infectious Status (If Applicable):  Lab Results   Component Value Date/Time    HEPCAB NONREACTIVE

## 2025-07-22 LAB — SURGICAL PATHOLOGY REPORT: NORMAL

## 2025-07-24 ENCOUNTER — RESULTS FOLLOW-UP (OUTPATIENT)
Dept: OTOLARYNGOLOGY | Age: 70
End: 2025-07-24

## 2025-07-25 NOTE — TELEPHONE ENCOUNTER
I called Ms. Mcgill to discuss results of her surgical pathology at the request of Dr. Saavedra.  There is no evidence of malignancy/cancer.  I let her know that Dr. Saavedra will see her at her postoperative appointment, but not hesitate to call our clinic with any additional questions or concern.  States understanding of information given.  Relays that she feels that she is breathing a little easier.  Her voice remains raspy but knows that it is likely going to get worse before better.  It takes a little bit to return to normal.  Denies any questions, concerns or needs at this time.

## 2025-08-06 DIAGNOSIS — J44.9 CHRONIC OBSTRUCTIVE PULMONARY DISEASE, UNSPECIFIED COPD TYPE (HCC): ICD-10-CM

## 2025-08-06 RX ORDER — ALBUTEROL SULFATE 90 UG/1
2 INHALANT RESPIRATORY (INHALATION) EVERY 6 HOURS PRN
Qty: 8 G | Refills: 5 | Status: SHIPPED | OUTPATIENT
Start: 2025-08-06

## 2025-08-15 DIAGNOSIS — E83.42 HYPOMAGNESEMIA: ICD-10-CM

## 2025-08-15 RX ORDER — LANOLIN ALCOHOL/MO/W.PET/CERES
CREAM (GRAM) TOPICAL
Qty: 90 TABLET | Refills: 3 | Status: SHIPPED | OUTPATIENT
Start: 2025-08-15

## 2025-08-19 ENCOUNTER — OFFICE VISIT (OUTPATIENT)
Dept: FAMILY MEDICINE CLINIC | Age: 70
End: 2025-08-19
Payer: MEDICARE

## 2025-08-19 VITALS
DIASTOLIC BLOOD PRESSURE: 78 MMHG | BODY MASS INDEX: 30.4 KG/M2 | HEART RATE: 77 BPM | WEIGHT: 165.2 LBS | OXYGEN SATURATION: 94 % | SYSTOLIC BLOOD PRESSURE: 116 MMHG | HEIGHT: 62 IN | TEMPERATURE: 97.6 F

## 2025-08-19 DIAGNOSIS — Z12.31 ENCOUNTER FOR SCREENING MAMMOGRAM FOR BREAST CANCER: ICD-10-CM

## 2025-08-19 DIAGNOSIS — N18.2 TYPE 2 DIABETES MELLITUS WITH STAGE 2 CHRONIC KIDNEY DISEASE, WITHOUT LONG-TERM CURRENT USE OF INSULIN (HCC): Primary | ICD-10-CM

## 2025-08-19 DIAGNOSIS — E11.22 TYPE 2 DIABETES MELLITUS WITH STAGE 2 CHRONIC KIDNEY DISEASE, WITHOUT LONG-TERM CURRENT USE OF INSULIN (HCC): Primary | ICD-10-CM

## 2025-08-19 DIAGNOSIS — I12.9 BENIGN HYPERTENSION WITH CKD (CHRONIC KIDNEY DISEASE), STAGE II: ICD-10-CM

## 2025-08-19 DIAGNOSIS — N18.2 BENIGN HYPERTENSION WITH CKD (CHRONIC KIDNEY DISEASE), STAGE II: ICD-10-CM

## 2025-08-19 DIAGNOSIS — E11.42 TYPE 2 DIABETES MELLITUS WITH DIABETIC POLYNEUROPATHY, WITHOUT LONG-TERM CURRENT USE OF INSULIN (HCC): ICD-10-CM

## 2025-08-19 DIAGNOSIS — Z78.0 POSTMENOPAUSAL STATE: ICD-10-CM

## 2025-08-19 DIAGNOSIS — E55.9 VITAMIN D DEFICIENCY: ICD-10-CM

## 2025-08-19 DIAGNOSIS — J44.1 CHRONIC OBSTRUCTIVE PULMONARY DISEASE WITH ACUTE EXACERBATION (HCC): ICD-10-CM

## 2025-08-19 DIAGNOSIS — E03.9 ACQUIRED HYPOTHYROIDISM: ICD-10-CM

## 2025-08-19 LAB — HBA1C MFR BLD: 6.7 %

## 2025-08-19 PROCEDURE — 1036F TOBACCO NON-USER: CPT | Performed by: FAMILY MEDICINE

## 2025-08-19 PROCEDURE — 83036 HEMOGLOBIN GLYCOSYLATED A1C: CPT | Performed by: FAMILY MEDICINE

## 2025-08-19 PROCEDURE — G8427 DOCREV CUR MEDS BY ELIG CLIN: HCPCS | Performed by: FAMILY MEDICINE

## 2025-08-19 PROCEDURE — 3017F COLORECTAL CA SCREEN DOC REV: CPT | Performed by: FAMILY MEDICINE

## 2025-08-19 PROCEDURE — 3074F SYST BP LT 130 MM HG: CPT | Performed by: FAMILY MEDICINE

## 2025-08-19 PROCEDURE — G8417 CALC BMI ABV UP PARAM F/U: HCPCS | Performed by: FAMILY MEDICINE

## 2025-08-19 PROCEDURE — 99215 OFFICE O/P EST HI 40 MIN: CPT | Performed by: FAMILY MEDICINE

## 2025-08-19 PROCEDURE — 3023F SPIROM DOC REV: CPT | Performed by: FAMILY MEDICINE

## 2025-08-19 PROCEDURE — 1090F PRES/ABSN URINE INCON ASSESS: CPT | Performed by: FAMILY MEDICINE

## 2025-08-19 PROCEDURE — 1159F MED LIST DOCD IN RCRD: CPT | Performed by: FAMILY MEDICINE

## 2025-08-19 PROCEDURE — 3078F DIAST BP <80 MM HG: CPT | Performed by: FAMILY MEDICINE

## 2025-08-19 PROCEDURE — 2022F DILAT RTA XM EVC RTNOPTHY: CPT | Performed by: FAMILY MEDICINE

## 2025-08-19 PROCEDURE — 1123F ACP DISCUSS/DSCN MKR DOCD: CPT | Performed by: FAMILY MEDICINE

## 2025-08-19 PROCEDURE — 1160F RVW MEDS BY RX/DR IN RCRD: CPT | Performed by: FAMILY MEDICINE

## 2025-08-19 PROCEDURE — 3044F HG A1C LEVEL LT 7.0%: CPT | Performed by: FAMILY MEDICINE

## 2025-08-19 PROCEDURE — G8399 PT W/DXA RESULTS DOCUMENT: HCPCS | Performed by: FAMILY MEDICINE

## 2025-08-19 PROCEDURE — 1126F AMNT PAIN NOTED NONE PRSNT: CPT | Performed by: FAMILY MEDICINE

## 2025-08-19 RX ORDER — BLOOD-GLUCOSE METER
KIT MISCELLANEOUS
Qty: 1 KIT | Refills: 0 | Status: SHIPPED | OUTPATIENT
Start: 2025-08-19

## 2025-08-19 RX ORDER — METOPROLOL SUCCINATE 50 MG/1
50 TABLET, EXTENDED RELEASE ORAL DAILY
Qty: 90 TABLET | Refills: 3 | Status: SHIPPED | OUTPATIENT
Start: 2025-08-19

## 2025-08-19 RX ORDER — TETANUS AND DIPHTHERIA TOXOIDS ADSORBED 2; 2 [LF]/.5ML; [LF]/.5ML
0.5 INJECTION INTRAMUSCULAR ONCE
Qty: 0.5 ML | Refills: 0 | Status: CANCELLED | OUTPATIENT
Start: 2025-08-19 | End: 2025-08-19

## 2025-08-19 RX ORDER — ALCOHOL ANTISEPTIC PADS
PADS, MEDICATED (EA) TOPICAL
Qty: 100 EACH | Refills: 3 | Status: SHIPPED | OUTPATIENT
Start: 2025-08-19

## 2025-08-19 RX ORDER — AZITHROMYCIN 250 MG/1
TABLET, FILM COATED ORAL
Qty: 6 TABLET | Refills: 0 | Status: SHIPPED | OUTPATIENT
Start: 2025-08-19 | End: 2025-08-24

## 2025-08-19 RX ORDER — DULAGLUTIDE 1.5 MG/.5ML
1.5 INJECTION, SOLUTION SUBCUTANEOUS WEEKLY
Qty: 12 ADJUSTABLE DOSE PRE-FILLED PEN SYRINGE | Refills: 3 | Status: SHIPPED | OUTPATIENT
Start: 2025-08-19

## 2025-08-19 RX ORDER — GLUCOSAMINE HCL/CHONDROITIN SU 500-400 MG
CAPSULE ORAL
Qty: 100 STRIP | Refills: 3 | Status: SHIPPED | OUTPATIENT
Start: 2025-08-19

## 2025-08-19 RX ORDER — METOPROLOL SUCCINATE 50 MG/1
50 TABLET, EXTENDED RELEASE ORAL DAILY
COMMUNITY
Start: 2025-06-15 | End: 2025-08-19 | Stop reason: SDUPTHER

## 2025-08-19 RX ORDER — CLOPIDOGREL BISULFATE 75 MG/1
75 TABLET ORAL DAILY
COMMUNITY
Start: 2025-06-15

## 2025-08-19 RX ORDER — FLUTICASONE FUROATE, UMECLIDINIUM BROMIDE AND VILANTEROL TRIFENATATE 100; 62.5; 25 UG/1; UG/1; UG/1
1 POWDER RESPIRATORY (INHALATION) DAILY
Qty: 60 EACH | Refills: 5 | Status: SHIPPED | OUTPATIENT
Start: 2025-08-19

## 2025-08-19 SDOH — ECONOMIC STABILITY: FOOD INSECURITY: WITHIN THE PAST 12 MONTHS, YOU WORRIED THAT YOUR FOOD WOULD RUN OUT BEFORE YOU GOT MONEY TO BUY MORE.: NEVER TRUE

## 2025-08-19 SDOH — ECONOMIC STABILITY: FOOD INSECURITY: WITHIN THE PAST 12 MONTHS, THE FOOD YOU BOUGHT JUST DIDN'T LAST AND YOU DIDN'T HAVE MONEY TO GET MORE.: NEVER TRUE

## 2025-08-19 ASSESSMENT — PATIENT HEALTH QUESTIONNAIRE - PHQ9
3. TROUBLE FALLING OR STAYING ASLEEP: NOT AT ALL
1. LITTLE INTEREST OR PLEASURE IN DOING THINGS: NOT AT ALL
2. FEELING DOWN, DEPRESSED OR HOPELESS: NOT AT ALL
6. FEELING BAD ABOUT YOURSELF - OR THAT YOU ARE A FAILURE OR HAVE LET YOURSELF OR YOUR FAMILY DOWN: NOT AT ALL
10. IF YOU CHECKED OFF ANY PROBLEMS, HOW DIFFICULT HAVE THESE PROBLEMS MADE IT FOR YOU TO DO YOUR WORK, TAKE CARE OF THINGS AT HOME, OR GET ALONG WITH OTHER PEOPLE: NOT DIFFICULT AT ALL
9. THOUGHTS THAT YOU WOULD BE BETTER OFF DEAD, OR OF HURTING YOURSELF: NOT AT ALL
4. FEELING TIRED OR HAVING LITTLE ENERGY: NOT AT ALL
SUM OF ALL RESPONSES TO PHQ QUESTIONS 1-9: 0
5. POOR APPETITE OR OVEREATING: NOT AT ALL
7. TROUBLE CONCENTRATING ON THINGS, SUCH AS READING THE NEWSPAPER OR WATCHING TELEVISION: NOT AT ALL
SUM OF ALL RESPONSES TO PHQ QUESTIONS 1-9: 0
8. MOVING OR SPEAKING SO SLOWLY THAT OTHER PEOPLE COULD HAVE NOTICED. OR THE OPPOSITE, BEING SO FIGETY OR RESTLESS THAT YOU HAVE BEEN MOVING AROUND A LOT MORE THAN USUAL: NOT AT ALL

## 2025-08-19 ASSESSMENT — ENCOUNTER SYMPTOMS
CONSTIPATION: 0
COUGH: 1
DIARRHEA: 0
VOMITING: 0
ABDOMINAL PAIN: 0
NAUSEA: 0
VOICE CHANGE: 1
TROUBLE SWALLOWING: 0
CHEST TIGHTNESS: 0
WHEEZING: 1
SHORTNESS OF BREATH: 1
ABDOMINAL DISTENTION: 0

## 2025-08-20 DIAGNOSIS — J44.1 CHRONIC OBSTRUCTIVE PULMONARY DISEASE WITH ACUTE EXACERBATION (HCC): Primary | ICD-10-CM

## (undated) DEVICE — Device

## (undated) DEVICE — TOWEL,OR,DSP,ST,BLUE,DLX,XR,4/PK,20PK/CS: Brand: MEDLINE

## (undated) DEVICE — NEEDLE HYPO 27GA L15IN REG BVL W O SFTY FOR SYR DISPOSABLE

## (undated) DEVICE — SPONGE,NEURO,0.5"X3",XR,STRL,LF,10/PK: Brand: MEDLINE

## (undated) DEVICE — RENTAL LASER CO2 CASE

## (undated) DEVICE — GLOVE ORANGE PI 8   MSG9080

## (undated) DEVICE — STRAP ARMBRD W1.5XL32IN FOAM STR YET SFT W/ HK AND LOOP

## (undated) DEVICE — SYRINGE MED 10ML LUERLOCK TIP W/O SFTY DISP

## (undated) DEVICE — STRAP,POSITIONING,KNEE/BODY,FOAM,4X60": Brand: MEDLINE